# Patient Record
Sex: FEMALE | Race: BLACK OR AFRICAN AMERICAN | NOT HISPANIC OR LATINO | Employment: OTHER | URBAN - METROPOLITAN AREA
[De-identification: names, ages, dates, MRNs, and addresses within clinical notes are randomized per-mention and may not be internally consistent; named-entity substitution may affect disease eponyms.]

---

## 2018-06-05 ENCOUNTER — HOSPITAL ENCOUNTER (OUTPATIENT)
Dept: LAB | Facility: HOSPITAL | Age: 73
Discharge: HOME | End: 2018-06-05
Attending: INTERNAL MEDICINE | Admitting: INTERNAL MEDICINE

## 2018-06-05 LAB
ANION GAP SERPL CALC-SCNC: 9.5 MMOL/L (ref 10–20)
BUN SERPL-MCNC: 16 MG/DL (ref 8–20)
BUN/CREAT SERPL: 14.5 (ref 5.4–26.2)
CALCIUM SERPL-MCNC: 8.7 MG/DL (ref 8.9–10.3)
CHLORIDE SERPL-SCNC: 106 MMOL/L (ref 101–111)
CONV CO2: 28 MMOL/L (ref 22–32)
CREAT UR-MCNC: 1.1 MG/DL (ref 0.4–1)
GLUCOSE SERPL-MCNC: 90 MG/DL (ref 65–99)
POTASSIUM SERPL-SCNC: 3.5 MMOL/L (ref 3.6–5.1)
SODIUM SERPL-SCNC: 140 MMOL/L (ref 136–144)

## 2019-03-25 ENCOUNTER — OFFICE VISIT (OUTPATIENT)
Dept: INTERNAL MEDICINE | Facility: CLINIC | Age: 74
End: 2019-03-25

## 2019-03-25 ENCOUNTER — TELEPHONE (OUTPATIENT)
Dept: INTERNAL MEDICINE | Facility: CLINIC | Age: 74
End: 2019-03-25

## 2019-03-25 VITALS — HEIGHT: 61 IN

## 2019-03-25 DIAGNOSIS — R05.9 COUGH IN ADULT: ICD-10-CM

## 2019-03-25 DIAGNOSIS — I25.10 CORONARY ARTERIOSCLEROSIS IN NATIVE ARTERY: ICD-10-CM

## 2019-03-25 DIAGNOSIS — J45.991 ASTHMA, COUGH VARIANT: Primary | ICD-10-CM

## 2019-03-25 PROBLEM — R51.9 CHRONIC DAILY HEADACHE: Status: ACTIVE | Noted: 2019-03-25

## 2019-03-25 PROBLEM — I10 ESSENTIAL HYPERTENSION: Status: ACTIVE | Noted: 2019-03-25

## 2019-03-25 PROCEDURE — 99213 OFFICE O/P EST LOW 20 MIN: CPT | Performed by: INTERNAL MEDICINE

## 2019-03-25 RX ORDER — OMEPRAZOLE 20 MG/1
20 CAPSULE, DELAYED RELEASE ORAL DAILY
COMMUNITY
End: 2019-12-10 | Stop reason: SDUPTHER

## 2019-03-25 RX ORDER — FUROSEMIDE 40 MG/1
40 TABLET ORAL 2 TIMES DAILY
COMMUNITY
End: 2019-04-08 | Stop reason: SDUPTHER

## 2019-03-25 RX ORDER — POTASSIUM CHLORIDE 750 MG/1
10 TABLET, FILM COATED, EXTENDED RELEASE ORAL DAILY
Status: ON HOLD | COMMUNITY
End: 2019-06-13 | Stop reason: DRUGHIGH

## 2019-03-25 RX ORDER — ERGOCALCIFEROL 1.25 MG/1
50000 CAPSULE ORAL WEEKLY
COMMUNITY
End: 2019-04-03 | Stop reason: SDUPTHER

## 2019-03-25 RX ORDER — METHYLPREDNISOLONE 4 MG/1
TABLET ORAL
Qty: 1 EACH | Refills: 0 | Status: SHIPPED | OUTPATIENT
Start: 2019-03-25 | End: 2019-05-28

## 2019-03-25 RX ORDER — ALBUTEROL SULFATE 90 UG/1
2 AEROSOL, METERED RESPIRATORY (INHALATION) EVERY 4 HOURS PRN
COMMUNITY
End: 2020-05-13 | Stop reason: SDUPTHER

## 2019-03-25 RX ORDER — AMLODIPINE BESYLATE AND BENAZEPRIL HYDROCHLORIDE 10; 40 MG/1; MG/1
1 CAPSULE ORAL DAILY
COMMUNITY
End: 2019-07-26

## 2019-03-25 RX ORDER — FERROUS SULFATE 325(65) MG
325 TABLET ORAL
COMMUNITY
End: 2019-04-03 | Stop reason: SDUPTHER

## 2019-03-25 RX ORDER — RANOLAZINE 500 MG/1
500 TABLET, EXTENDED RELEASE ORAL 2 TIMES DAILY
COMMUNITY
End: 2020-02-25 | Stop reason: SDUPTHER

## 2019-03-25 RX ORDER — ATORVASTATIN CALCIUM 40 MG/1
40 TABLET, FILM COATED ORAL DAILY
COMMUNITY
End: 2019-04-03 | Stop reason: SDUPTHER

## 2019-03-25 RX ORDER — LORATADINE 10 MG/1
10 TABLET ORAL
COMMUNITY
End: 2021-12-21

## 2019-03-25 RX ORDER — CARVEDILOL 25 MG/1
25 TABLET ORAL 2 TIMES DAILY WITH MEALS
COMMUNITY
End: 2019-11-27 | Stop reason: SDUPTHER

## 2019-03-25 RX ORDER — PROMETHAZINE HYDROCHLORIDE 25 MG/1
25 TABLET ORAL EVERY 6 HOURS PRN
COMMUNITY
End: 2019-04-03 | Stop reason: SDUPTHER

## 2019-03-25 RX ORDER — ASPIRIN 81 MG/1
81 TABLET ORAL DAILY
COMMUNITY

## 2019-03-25 RX ORDER — CLOPIDOGREL BISULFATE 75 MG/1
75 TABLET ORAL DAILY
COMMUNITY
End: 2019-11-11 | Stop reason: SDUPTHER

## 2019-03-25 RX ORDER — ISOSORBIDE MONONITRATE 60 MG/1
60 TABLET, EXTENDED RELEASE ORAL DAILY
COMMUNITY
End: 2020-02-06 | Stop reason: SDUPTHER

## 2019-03-25 NOTE — PROGRESS NOTES
Subjective   Marleny Greenwood is a 73 y.o. female.     This cough has been months-thought she was getting better but it came back again.  She does not feel SOB unless she's in a coughing spell.  Took steroids in December-thought there was some improvement.  Saw  in January-he did not think cardiac involvement.      Cough   This is a recurrent problem. The current episode started more than 1 month ago. The problem has been gradually worsening. The problem occurs hourly. The cough is non-productive. Pertinent negatives include no chest pain, rhinorrhea, shortness of breath, sweats, weight loss or wheezing. Nothing aggravates the symptoms. She has tried prescription cough suppressant and a beta-agonist inhaler for the symptoms. The treatment provided mild relief. There is no history of asthma or environmental allergies.        The following portions of the patient's history were reviewed and updated as appropriate: allergies, current medications, past medical history, past social history, past surgical history and problem list.    Review of Systems   Constitutional: Negative for unexpected weight loss.   HENT: Negative for congestion, rhinorrhea, sinus pressure and trouble swallowing.    Respiratory: Positive for cough. Negative for shortness of breath and wheezing.    Cardiovascular: Negative for chest pain.   Musculoskeletal: Negative for back pain.   Allergic/Immunologic: Negative for environmental allergies.       Objective   Physical Exam   Constitutional: She appears well-developed.   HENT:   Mouth/Throat: Oropharynx is clear and moist.   Cardiovascular: Normal rate, regular rhythm and normal heart sounds.   Pulmonary/Chest: Effort normal and breath sounds normal. She has no wheezes.   Lymphadenopathy:     She has no cervical adenopathy.   Psychiatric: She has a normal mood and affect.         Assessment/Plan   Marleny was seen today for cough.    Diagnoses and all orders for this visit:    Asthma, cough  variant    Cough in adult

## 2019-03-25 NOTE — TELEPHONE ENCOUNTER
----- Message from Arsh Kent sent at 3/25/2019  9:06 AM EDT -----  Contact: [phone]  Patient c/o cough she cannot get rid of, needs to get in ASAP, please call

## 2019-04-03 ENCOUNTER — TELEPHONE (OUTPATIENT)
Dept: INTERNAL MEDICINE | Facility: CLINIC | Age: 74
End: 2019-04-03

## 2019-04-03 RX ORDER — ATORVASTATIN CALCIUM 40 MG/1
40 TABLET, FILM COATED ORAL DAILY
Qty: 90 TABLET | Refills: 3 | Status: SHIPPED | OUTPATIENT
Start: 2019-04-03 | End: 2020-04-13

## 2019-04-03 RX ORDER — ERGOCALCIFEROL 1.25 MG/1
50000 CAPSULE ORAL WEEKLY
Qty: 12 CAPSULE | Refills: 3 | Status: SHIPPED | OUTPATIENT
Start: 2019-04-03 | End: 2020-03-05

## 2019-04-03 RX ORDER — PROMETHAZINE HYDROCHLORIDE 25 MG/1
25 TABLET ORAL EVERY 6 HOURS PRN
Qty: 90 TABLET | Refills: 3 | Status: SHIPPED | OUTPATIENT
Start: 2019-04-03 | End: 2020-05-13 | Stop reason: SDUPTHER

## 2019-04-03 RX ORDER — FERROUS SULFATE 325(65) MG
325 TABLET ORAL
Qty: 90 TABLET | Refills: 3 | Status: SHIPPED | OUTPATIENT
Start: 2019-04-03 | End: 2020-05-12

## 2019-04-03 NOTE — TELEPHONE ENCOUNTER
Patient need promethazine 25mg, atorvastin 40mg, and vit-d-2 1.25mg sent to [facility]. She also need iron 325mg tab sent to [facility].

## 2019-04-08 RX ORDER — FUROSEMIDE 40 MG/1
40 TABLET ORAL DAILY
Qty: 90 TABLET | Refills: 3 | Status: ON HOLD | OUTPATIENT
Start: 2019-04-08 | End: 2019-06-13 | Stop reason: SDUPTHER

## 2019-04-29 ENCOUNTER — TRANSCRIBE ORDERS (OUTPATIENT)
Dept: ADMINISTRATIVE | Facility: HOSPITAL | Age: 74
End: 2019-04-29

## 2019-04-29 DIAGNOSIS — R05.9 COUGH: Primary | ICD-10-CM

## 2019-05-02 ENCOUNTER — HOSPITAL ENCOUNTER (OUTPATIENT)
Dept: CT IMAGING | Facility: HOSPITAL | Age: 74
Discharge: HOME | End: 2019-05-02
Admitting: INTERNAL MEDICINE

## 2019-05-02 DIAGNOSIS — R05.9 COUGH: ICD-10-CM

## 2019-05-02 PROCEDURE — 71250 CT THORAX DX C-: CPT

## 2019-05-25 ENCOUNTER — RESULTS ENCOUNTER (OUTPATIENT)
Dept: INTERNAL MEDICINE | Facility: CLINIC | Age: 74
End: 2019-05-25

## 2019-05-25 DIAGNOSIS — I25.10 CORONARY ARTERIOSCLEROSIS IN NATIVE ARTERY: ICD-10-CM

## 2019-05-28 ENCOUNTER — OFFICE VISIT (OUTPATIENT)
Dept: INTERNAL MEDICINE | Facility: CLINIC | Age: 74
End: 2019-05-28

## 2019-05-28 VITALS — WEIGHT: 151 LBS | BODY MASS INDEX: 28.51 KG/M2 | HEIGHT: 61 IN

## 2019-05-28 DIAGNOSIS — I10 ESSENTIAL HYPERTENSION: Primary | ICD-10-CM

## 2019-05-28 DIAGNOSIS — I25.10 CORONARY ARTERIOSCLEROSIS IN NATIVE ARTERY: ICD-10-CM

## 2019-05-28 DIAGNOSIS — R91.1 NODULE OF UPPER LOBE OF RIGHT LUNG: ICD-10-CM

## 2019-05-28 PROCEDURE — 99213 OFFICE O/P EST LOW 20 MIN: CPT | Performed by: INTERNAL MEDICINE

## 2019-05-28 RX ORDER — CODEINE/BUTALBITAL/ASA/CAFFEIN 30-50-325
1 CAPSULE ORAL EVERY 4 HOURS PRN
COMMUNITY
End: 2019-07-17 | Stop reason: SDUPTHER

## 2019-05-28 NOTE — PROGRESS NOTES
Assessment and Plan  Marleny was seen today for hypertension.    Diagnoses and all orders for this visit:    Essential hypertension    Nodule of upper lobe of right lung  Comments:  will f/u with pulm, likely need CT in a few months.    Coronary arteriosclerosis in native artery  Comments:  stable      F/U and Patient Instructions    Return in about 6 months (around 11/28/2019).  There are no Patient Instructions on file for this visit.    Subjective    Marleny Greenwood is a 73 y.o. female being seen in our office today for Hypertension     History of the Present Illness  HPI  Went to see pulmonologist about her cough- CT was negative.  He gave her Hydromet prn use - using now prn- less than every day.  Uses 5 ml at a time.    CT reviewed by me- RUL nodule- likely to have 6 month f/u.  I reviewed CT form 2017 and it was not present then.   Otherwise she feels great- better than she's been in a while.  She is trying to be more active.  Headaches are stable.  Had blood work at [facility] as well, reported as negative.    Area in abd that stuck out and became sore with all of the coughing, seems better now.    Patient History        Significant Past History  The following portions of the patient's history were reviewed and updated as appropriate:PMHroutine: Social history , Allergies, Current Medications, Active Problem List and Health Maintenance              Social History  She  reports that she has never smoked. She does not have any smokeless tobacco history on file. She reports that she does not drink alcohol or use drugs.                         Review of Symptoms  Review of Systems   Constitution: Negative for weight loss.   HENT: Negative.    Cardiovascular: Negative.    Musculoskeletal: Negative.    Neurological: Negative.      Objective  Vital Signs         BP Readings from Last 1 Encounters:   No data found for BP     Wt Readings from Last 3 Encounters:   05/28/19 68.5 kg (151 lb)   Body mass index is 28.53  kg/m².          Physical Exam   Physical Exam   Constitutional: No distress.   Cardiovascular: Normal rate and regular rhythm.   Pulmonary/Chest: Effort normal and breath sounds normal.   Musculoskeletal: She exhibits no edema.   Psychiatric: She has a normal mood and affect. Her behavior is normal.     Data Reviewed    No results found for this or any previous visit (from the past 2016 hour(s)).

## 2019-06-11 ENCOUNTER — HOSPITAL ENCOUNTER (INPATIENT)
Facility: HOSPITAL | Age: 74
LOS: 2 days | Discharge: HOME | End: 2019-06-13
Attending: INTERNAL MEDICINE | Admitting: INTERNAL MEDICINE

## 2019-06-11 ENCOUNTER — OFFICE VISIT (OUTPATIENT)
Dept: CARDIOLOGY | Facility: CLINIC | Age: 74
End: 2019-06-11

## 2019-06-11 VITALS
BODY MASS INDEX: 29.25 KG/M2 | DIASTOLIC BLOOD PRESSURE: 80 MMHG | SYSTOLIC BLOOD PRESSURE: 116 MMHG | HEART RATE: 76 BPM | WEIGHT: 149 LBS | HEIGHT: 60 IN

## 2019-06-11 DIAGNOSIS — I50.41 ACUTE COMBINED SYSTOLIC AND DIASTOLIC CONGESTIVE HEART FAILURE (HCC): ICD-10-CM

## 2019-06-11 DIAGNOSIS — I47.29 NONSUSTAINED VENTRICULAR TACHYCARDIA (HCC): ICD-10-CM

## 2019-06-11 DIAGNOSIS — I10 ESSENTIAL HYPERTENSION: ICD-10-CM

## 2019-06-11 DIAGNOSIS — R06.89 RESPIRATORY INSUFFICIENCY: Primary | ICD-10-CM

## 2019-06-11 DIAGNOSIS — I25.10 CORONARY ARTERIOSCLEROSIS IN NATIVE ARTERY: ICD-10-CM

## 2019-06-11 DIAGNOSIS — I50.31 ACUTE DIASTOLIC CONGESTIVE HEART FAILURE (HCC): Primary | ICD-10-CM

## 2019-06-11 DIAGNOSIS — E78.5 HYPERLIPIDEMIA LDL GOAL <100: ICD-10-CM

## 2019-06-11 DIAGNOSIS — Z95.5 H/O HEART ARTERY STENT: ICD-10-CM

## 2019-06-11 DIAGNOSIS — Z95.1 S/P CABG (CORONARY ARTERY BYPASS GRAFT): ICD-10-CM

## 2019-06-11 PROBLEM — Z98.890 HISTORY OF CARDIAC CATH: Status: ACTIVE | Noted: 2019-06-11

## 2019-06-11 PROBLEM — Z98.890 HISTORY OF LOOP RECORDER: Status: ACTIVE | Noted: 2019-06-11

## 2019-06-11 PROBLEM — I50.9 CONGESTIVE HEART FAILURE (CHF) (HCC): Status: ACTIVE | Noted: 2019-06-11

## 2019-06-11 PROBLEM — Z92.89 H/O ECHOCARDIOGRAM: Status: ACTIVE | Noted: 2019-06-11

## 2019-06-11 PROBLEM — Z92.89 HISTORY OF NUCLEAR STRESS TEST: Status: ACTIVE | Noted: 2019-06-11

## 2019-06-11 LAB
ANION GAP SERPL CALCULATED.3IONS-SCNC: 12.2 MMOL/L
BUN BLD-MCNC: 13 MG/DL (ref 8–23)
BUN/CREAT SERPL: 14.3 (ref 7–25)
CALCIUM SPEC-SCNC: 8.9 MG/DL (ref 8.6–10.5)
CHLORIDE SERPL-SCNC: 106 MMOL/L (ref 98–107)
CO2 SERPL-SCNC: 25.8 MMOL/L (ref 22–29)
CREAT BLD-MCNC: 0.91 MG/DL (ref 0.57–1)
GFR SERPL CREATININE-BSD FRML MDRD: 74 ML/MIN/1.73
GLUCOSE BLD-MCNC: 103 MG/DL (ref 65–99)
GLUCOSE BLDC GLUCOMTR-MCNC: 103 MG/DL (ref 70–130)
GLUCOSE BLDC GLUCOMTR-MCNC: 115 MG/DL (ref 70–130)
POTASSIUM BLD-SCNC: 3.7 MMOL/L (ref 3.5–5.2)
SODIUM BLD-SCNC: 144 MMOL/L (ref 136–145)

## 2019-06-11 PROCEDURE — 93005 ELECTROCARDIOGRAM TRACING: CPT | Performed by: INTERNAL MEDICINE

## 2019-06-11 PROCEDURE — 80048 BASIC METABOLIC PNL TOTAL CA: CPT | Performed by: INTERNAL MEDICINE

## 2019-06-11 PROCEDURE — 94640 AIRWAY INHALATION TREATMENT: CPT

## 2019-06-11 PROCEDURE — 25010000002 FUROSEMIDE PER 20 MG: Performed by: INTERNAL MEDICINE

## 2019-06-11 PROCEDURE — 25010000002 ENOXAPARIN PER 10 MG: Performed by: INTERNAL MEDICINE

## 2019-06-11 PROCEDURE — 93010 ELECTROCARDIOGRAM REPORT: CPT | Performed by: INTERNAL MEDICINE

## 2019-06-11 PROCEDURE — 94664 DEMO&/EVAL PT USE INHALER: CPT

## 2019-06-11 PROCEDURE — 82962 GLUCOSE BLOOD TEST: CPT

## 2019-06-11 PROCEDURE — 99223 1ST HOSP IP/OBS HIGH 75: CPT | Performed by: INTERNAL MEDICINE

## 2019-06-11 RX ORDER — SODIUM CHLORIDE 0.9 % (FLUSH) 0.9 %
3-10 SYRINGE (ML) INJECTION AS NEEDED
Status: DISCONTINUED | OUTPATIENT
Start: 2019-06-11 | End: 2019-06-13 | Stop reason: HOSPADM

## 2019-06-11 RX ORDER — NITROGLYCERIN 0.4 MG/1
0.4 TABLET SUBLINGUAL
COMMUNITY
End: 2019-08-23 | Stop reason: SDUPTHER

## 2019-06-11 RX ORDER — FLUTICASONE PROPIONATE 50 MCG
1 SPRAY, SUSPENSION (ML) NASAL DAILY
Status: DISCONTINUED | OUTPATIENT
Start: 2019-06-11 | End: 2019-06-13 | Stop reason: HOSPADM

## 2019-06-11 RX ORDER — NITROGLYCERIN 0.4 MG/1
0.4 TABLET SUBLINGUAL
Status: DISCONTINUED | OUTPATIENT
Start: 2019-06-11 | End: 2019-06-13 | Stop reason: HOSPADM

## 2019-06-11 RX ORDER — ATORVASTATIN CALCIUM 20 MG/1
40 TABLET, FILM COATED ORAL DAILY
Status: DISCONTINUED | OUTPATIENT
Start: 2019-06-11 | End: 2019-06-11

## 2019-06-11 RX ORDER — PROMETHAZINE HYDROCHLORIDE 25 MG/1
25 TABLET ORAL EVERY 6 HOURS PRN
Status: DISCONTINUED | OUTPATIENT
Start: 2019-06-11 | End: 2019-06-13 | Stop reason: HOSPADM

## 2019-06-11 RX ORDER — FUROSEMIDE 10 MG/ML
40 INJECTION INTRAMUSCULAR; INTRAVENOUS
Status: DISCONTINUED | OUTPATIENT
Start: 2019-06-11 | End: 2019-06-13 | Stop reason: HOSPADM

## 2019-06-11 RX ORDER — POTASSIUM CHLORIDE 750 MG/1
10 CAPSULE, EXTENDED RELEASE ORAL DAILY
Status: DISCONTINUED | OUTPATIENT
Start: 2019-06-11 | End: 2019-06-13 | Stop reason: HOSPADM

## 2019-06-11 RX ORDER — CARVEDILOL 25 MG/1
25 TABLET ORAL 2 TIMES DAILY WITH MEALS
Status: DISCONTINUED | OUTPATIENT
Start: 2019-06-11 | End: 2019-06-13 | Stop reason: HOSPADM

## 2019-06-11 RX ORDER — PANTOPRAZOLE SODIUM 40 MG/1
40 TABLET, DELAYED RELEASE ORAL EVERY MORNING
Status: DISCONTINUED | OUTPATIENT
Start: 2019-06-11 | End: 2019-06-13 | Stop reason: HOSPADM

## 2019-06-11 RX ORDER — ASPIRIN 81 MG/1
81 TABLET ORAL DAILY
Status: DISCONTINUED | OUTPATIENT
Start: 2019-06-11 | End: 2019-06-13 | Stop reason: HOSPADM

## 2019-06-11 RX ORDER — POLYETHYLENE GLYCOL 3350 17 G/17G
17 POWDER, FOR SOLUTION ORAL DAILY
Status: DISCONTINUED | OUTPATIENT
Start: 2019-06-11 | End: 2019-06-13 | Stop reason: HOSPADM

## 2019-06-11 RX ORDER — CLOPIDOGREL BISULFATE 75 MG/1
75 TABLET ORAL DAILY
Status: DISCONTINUED | OUTPATIENT
Start: 2019-06-11 | End: 2019-06-13 | Stop reason: HOSPADM

## 2019-06-11 RX ORDER — SODIUM CHLORIDE 0.9 % (FLUSH) 0.9 %
3 SYRINGE (ML) INJECTION EVERY 12 HOURS SCHEDULED
Status: DISCONTINUED | OUTPATIENT
Start: 2019-06-11 | End: 2019-06-13 | Stop reason: HOSPADM

## 2019-06-11 RX ORDER — CODEINE/BUTALBITAL/ASA/CAFFEIN 30-50-325
1 CAPSULE ORAL EVERY 4 HOURS PRN
Status: DISCONTINUED | OUTPATIENT
Start: 2019-06-11 | End: 2019-06-11 | Stop reason: RX

## 2019-06-11 RX ORDER — ATORVASTATIN CALCIUM 20 MG/1
40 TABLET, FILM COATED ORAL NIGHTLY
Status: DISCONTINUED | OUTPATIENT
Start: 2019-06-11 | End: 2019-06-13 | Stop reason: HOSPADM

## 2019-06-11 RX ORDER — MONTELUKAST SODIUM 10 MG/1
10 TABLET ORAL NIGHTLY
Status: DISCONTINUED | OUTPATIENT
Start: 2019-06-11 | End: 2019-06-13 | Stop reason: HOSPADM

## 2019-06-11 RX ORDER — ALBUTEROL SULFATE 2.5 MG/3ML
2.5 SOLUTION RESPIRATORY (INHALATION) EVERY 6 HOURS PRN
Status: DISCONTINUED | OUTPATIENT
Start: 2019-06-11 | End: 2019-06-13 | Stop reason: HOSPADM

## 2019-06-11 RX ORDER — ISOSORBIDE MONONITRATE 60 MG/1
60 TABLET, EXTENDED RELEASE ORAL DAILY
Status: DISCONTINUED | OUTPATIENT
Start: 2019-06-11 | End: 2019-06-13 | Stop reason: HOSPADM

## 2019-06-11 RX ORDER — MONTELUKAST SODIUM 10 MG/1
10 TABLET ORAL NIGHTLY
COMMUNITY
End: 2020-03-11

## 2019-06-11 RX ORDER — RANOLAZINE 500 MG/1
500 TABLET, EXTENDED RELEASE ORAL EVERY 12 HOURS SCHEDULED
Status: DISCONTINUED | OUTPATIENT
Start: 2019-06-11 | End: 2019-06-13 | Stop reason: HOSPADM

## 2019-06-11 RX ORDER — CETIRIZINE HYDROCHLORIDE 10 MG/1
10 TABLET ORAL DAILY
Status: DISCONTINUED | OUTPATIENT
Start: 2019-06-11 | End: 2019-06-13 | Stop reason: HOSPADM

## 2019-06-11 RX ORDER — FERROUS SULFATE 325(65) MG
325 TABLET ORAL
Status: DISCONTINUED | OUTPATIENT
Start: 2019-06-11 | End: 2019-06-13 | Stop reason: HOSPADM

## 2019-06-11 RX ORDER — POLYETHYLENE GLYCOL 3350 17 G/17G
17 POWDER, FOR SOLUTION ORAL DAILY
COMMUNITY

## 2019-06-11 RX ORDER — BUDESONIDE AND FORMOTEROL FUMARATE DIHYDRATE 160; 4.5 UG/1; UG/1
2 AEROSOL RESPIRATORY (INHALATION)
Status: DISCONTINUED | OUTPATIENT
Start: 2019-06-11 | End: 2019-06-13 | Stop reason: HOSPADM

## 2019-06-11 RX ORDER — ERGOCALCIFEROL 1.25 MG/1
50000 CAPSULE ORAL WEEKLY
Status: DISCONTINUED | OUTPATIENT
Start: 2019-06-11 | End: 2019-06-13 | Stop reason: HOSPADM

## 2019-06-11 RX ADMIN — FUROSEMIDE 40 MG: 10 INJECTION, SOLUTION INTRAMUSCULAR; INTRAVENOUS at 20:56

## 2019-06-11 RX ADMIN — CARVEDILOL 25 MG: 25 TABLET, FILM COATED ORAL at 18:25

## 2019-06-11 RX ADMIN — ENOXAPARIN SODIUM 40 MG: 40 INJECTION SUBCUTANEOUS at 15:01

## 2019-06-11 RX ADMIN — SODIUM CHLORIDE, PRESERVATIVE FREE 3 ML: 5 INJECTION INTRAVENOUS at 20:56

## 2019-06-11 RX ADMIN — ATORVASTATIN CALCIUM 40 MG: 20 TABLET, FILM COATED ORAL at 20:56

## 2019-06-11 RX ADMIN — BUDESONIDE AND FORMOTEROL FUMARATE DIHYDRATE 2 PUFF: 160; 4.5 AEROSOL RESPIRATORY (INHALATION) at 20:19

## 2019-06-11 RX ADMIN — FUROSEMIDE 40 MG: 10 INJECTION, SOLUTION INTRAMUSCULAR; INTRAVENOUS at 15:01

## 2019-06-11 RX ADMIN — MONTELUKAST SODIUM 10 MG: 10 TABLET, FILM COATED ORAL at 20:56

## 2019-06-11 RX ADMIN — ASPIRIN 81 MG: 81 TABLET, COATED ORAL at 21:52

## 2019-06-11 RX ADMIN — RANOLAZINE 500 MG: 500 TABLET, FILM COATED, EXTENDED RELEASE ORAL at 20:56

## 2019-06-11 NOTE — PROGRESS NOTES
[facility]        Subjective:     Encounter Date:06/11/2019      Patient ID: Marleny Greenwood is a 73 y.o. female.      HPI: I saw Marleny Greenwood today for continued treatment of her cardiovascular disease she is a very pleasant 73-year-old female who reports progressive respiratory insufficiency over the last month she has had a nonproductive cough and is undergone ENT evaluation which was unremarkable according to the patient.  She states that she can walk across the room in her home and she is fatigued and short of breath.  She denies chest pain pressure or tightness she sleeps with 2 pillows does not report orthopnea or PND in that position but has had lower extremity edema.  She denies syncope near syncope or palpitation.    Ms. Greenwood has a history of coronary artery disease and underwent coronary artery bypass surgery 4/12/2011 with a left DANNI to the LAD and a saphenous vein graft to the posterior descending artery.  She underwent percutaneous coronary intervention with bare-metal stent deployment to the saphenous vein graft to the right coronary artery 3/22/2012.  Her most recent coronary angiogram was 11/13/2017 which demonstrated an occluded saphenous vein graft to the right coronary artery, atretic LIMA to the LAD and a patent saphenous vein graft to the LAD, chronic total occlusion of the native right coronary artery and LAD.  Echocardiogram obtained 6/5/2018 revealed left ventricular ejection fraction 60-65%, mild concentric left ventricular hypertrophy, mild aortic mitral and tricuspid insufficiency.  Ms. Greenwood has a history of hypertension which is well controlled.  She also has a prior history of nonsustained ventricular tachycardia currently is asymptomatic.  She is tolerating her current medical regimen well and her lipids are monitored by her primary care physician Dr. Liz Zaragoza.      The following portions of the patient's history were reviewed and updated as  appropriate: allergies, current medications, past family history, past medical history, past social history, past surgical history and problem list.    Problem List:  Patient Active Problem List   Diagnosis   • Asthma, cough variant   • Coronary arteriosclerosis in native artery   • Essential hypertension   • Chronic daily headache   • Nodule of upper lobe of right lung   • Hyperlipidemia LDL goal <100   • Nonsustained ventricular tachycardia (CMS/HCC)   • Respiratory insufficiency   • History of cardiac cath   • History of loop recorder   • H/O heart artery stent   • S/P CABG (coronary artery bypass graft)   • H/O echocardiogram   • History of nuclear stress test       Past Medical History:  Past Medical History:   Diagnosis Date   • Asthma    • H/O echocardiogram 6/11/2019 6/05/18 - Normal LV size and function.  EF 60-65%.  Mild concentric LVH.  Midl AR, MR, and TR.   • H/O heart artery stent 6/11/2019    3/22/12 - VISION stent to the SVG of the RCA reducing a total occlusion to 0% residual narrowing.   • Headache    • History of cardiac cath 6/11/2019   • History of loop recorder 6/11/2019    5/20/15 - Medtronic LINQ implant.   • History of nuclear stress test 6/11/2019    10/09/17 - Abnormal study.  EF 46%.   • Hyperlipidemia LDL goal <100 6/11/2019   • Hypertension    • Nonsustained ventricular tachycardia (CMS/HCC) 6/11/2019   • Respiratory insufficiency 6/11/2019   • S/P CABG (coronary artery bypass graft) 6/11/2019 4/12/11 - LIMA to the LAD and SVG to the posterior descending artery.       Past Surgical History:  Past Surgical History:   Procedure Laterality Date   • APPENDECTOMY     • CHOLECYSTECTOMY     • CORONARY ARTERY BYPASS GRAFT Bilateral 2011   • HYSTERECTOMY         Social History:  Social History     Socioeconomic History   • Marital status:      Spouse name: Not on file   • Number of children: Not on file   • Years of education: Not on file   • Highest education level: Not on file  "  Tobacco Use   • Smoking status: Never Smoker   Substance and Sexual Activity   • Alcohol use: No     Frequency: Never   • Drug use: No       Allergies:  Allergies   Allergen Reactions   • Cardizem [Diltiazem] Unknown (See Comments)     Pt is unknown why she can not take this medication    • Levaquin [Levofloxacin] Cough         ROS:  Review of Systems   Constitution: Positive for weakness and malaise/fatigue.   HENT: Negative for hearing loss and nosebleeds.    Eyes: Negative for double vision and visual disturbance.   Cardiovascular: Positive for dyspnea on exertion and leg swelling. Negative for chest pain, claudication, near-syncope, orthopnea, palpitations, paroxysmal nocturnal dyspnea and syncope.   Respiratory: Positive for shortness of breath. Negative for cough, sleep disturbances due to breathing, snoring, sputum production and wheezing.    Endocrine: Negative for cold intolerance, heat intolerance, polydipsia and polyuria.   Hematologic/Lymphatic: Negative for adenopathy and bleeding problem. Does not bruise/bleed easily.   Skin: Negative for flushing and itching.   Musculoskeletal: Negative for back pain, falls, joint pain, joint swelling, muscle weakness and neck pain.   Gastrointestinal: Negative for abdominal pain, dysphagia, heartburn, nausea and vomiting.   Genitourinary: Negative for dysuria and frequency.   Neurological: Negative for disturbances in coordination, dizziness, light-headedness, loss of balance and numbness.   Psychiatric/Behavioral: Negative for altered mental status and memory loss. The patient is not nervous/anxious.    Allergic/Immunologic: Negative for hives and persistent infections.          Objective:         /80   Pulse 76   Ht 152.4 cm (60\")   Wt 67.6 kg (149 lb)   BMI 29.10 kg/m²     Physical Exam   Constitutional: She is oriented to person, place, and time. She appears well-developed and well-nourished.   HENT:   Head: Normocephalic and atraumatic.   Eyes: " Conjunctivae and EOM are normal. Pupils are equal, round, and reactive to light.   Neck: Neck supple. JVD present. No thyromegaly present.   Cardiovascular: Normal rate, regular rhythm, S1 normal, S2 normal and intact distal pulses. PMI is not displaced. Exam reveals no S3, no S4, no distant heart sounds, no friction rub, no midsystolic click and no opening snap.   No murmur heard.  Pulses:       Carotid pulses are 2+ on the right side, and 2+ on the left side.       Radial pulses are 2+ on the right side, and 2+ on the left side.        Femoral pulses are 2+ on the right side, and 2+ on the left side.       Dorsalis pedis pulses are 2+ on the right side, and 2+ on the left side.        Posterior tibial pulses are 2+ on the right side, and 2+ on the left side.   Pulmonary/Chest: Effort normal. No respiratory distress. She has no wheezes. She has rales.   Abdominal: Soft. Bowel sounds are normal. She exhibits no distension and no mass. There is no tenderness.   Musculoskeletal: Normal range of motion. She exhibits edema.   Neurological: She is alert and oriented to person, place, and time.   Skin: Skin is warm and dry. No erythema.   Psychiatric: She has a normal mood and affect.       In-Office Procedure(s):  Procedures    ASCVD RIsk Score::  The ASCVD Risk score (Thom NEEL Jr., et al., 2013) failed to calculate for the following reasons:    Cannot find a previous HDL lab    Cannot find a previous total cholesterol lab        Assessment/Plan:         1. Respiratory insufficiency  Progressive respiratory insufficiency with evidence of volume overload, elevated jugular venous pressure lower extremity edema now quite symptomatic and limiting activity level.    2. Acute combined systolic and diastolic congestive heart failure (CMS/HCC)  Same    3. Coronary arteriosclerosis in native artery  Stable on current medical regimen    4. Essential hypertension  Stable on current medical regimen with volume overload    5.  Hyperlipidemia LDL goal <100  Stable on current medical regimen    6. Nonsustained ventricular tachycardia (CMS/HCC)  Asymptomatic    7. S/P CABG (coronary artery bypass graft)  Asymptomatic    8. H/O heart artery stent  Asymptomatic    Ms. Greenwood has progressive respiratory insufficiency with volume overload with a declining activity level.  We will admit Ms. Greenwood for inpatient treatment of her congestive heart failure with intravenous diuretics, monitoring electrolyte and renal function.  Obtain an echocardiogram.  We will restrict her salt and fluid intake.  I will keep you abreast of her course and any further medication adjustments.           Mal Moser MD  06/11/19  .

## 2019-06-11 NOTE — PROGRESS NOTES
"Pharmacy Consult to dose Lovenox    Marleny Greenwood has been consulted for pharmacy to dose enoxaparin for VTE prophylaxis per Dr Moser's request.     73 y.o. female 154.9 cm (61\") 67.6 kg (149 lb)     No results found for: PLT  No results found for: HGB  Estimated Creatinine Clearance: 49.1 mL/min (by C-G formula based on SCr of 0.91 mg/dL).      Plan  Will start lovenox 40mg Q24h.  Pharmacy will continue to follow.     Gill Ralph, [facility]      "

## 2019-06-11 NOTE — H&P
[facility] HEART SPECIALISTS H & P        Subjective:     Encounter Date:06/11/2019      Patient ID: Marleny Greenwood is a 73 y.o. female.      HPI: I saw Marleny Greenwood today for continued treatment of her cardiovascular disease she is a very pleasant 73-year-old female who reports progressive respiratory insufficiency over the last month she has had a nonproductive cough and is undergone ENT evaluation which was unremarkable according to the patient.  She states that she can walk across the room in her home and she is fatigued and short of breath.  She denies chest pain pressure or tightness she sleeps with 2 pillows does not report orthopnea or PND in that position but has had lower extremity edema.  She denies syncope near syncope or palpitation.    Ms. Greenwood has a history of coronary artery disease and underwent coronary artery bypass surgery 4/12/2011 with a left DANNI to the LAD and a saphenous vein graft to the posterior descending artery.  She underwent percutaneous coronary intervention with bare-metal stent deployment to the saphenous vein graft to the right coronary artery 3/22/2012.  Her most recent coronary angiogram was 11/13/2017 which demonstrated an occluded saphenous vein graft to the right coronary artery, atretic LIMA to the LAD and a patent saphenous vein graft to the LAD, chronic total occlusion of the native right coronary artery and LAD.  Echocardiogram obtained 6/5/2018 revealed left ventricular ejection fraction 60-65%, mild concentric left ventricular hypertrophy, mild aortic mitral and tricuspid insufficiency.  Ms. Greenwood has a history of hypertension which is well controlled.  She also has a prior history of nonsustained ventricular tachycardia currently is asymptomatic.  She is tolerating her current medical regimen well and her lipids are monitored by her primary care physician Dr. Liz Zaragoza.      The following portions of the patient's history were reviewed and updated as  appropriate: allergies, current medications, past family history, past medical history, past social history, past surgical history and problem list.    Problem List:  Patient Active Problem List   Diagnosis   • Asthma, cough variant   • Coronary arteriosclerosis in native artery   • Essential hypertension   • Chronic daily headache   • Nodule of upper lobe of right lung   • Hyperlipidemia LDL goal <100   • Nonsustained ventricular tachycardia (CMS/HCC)   • Respiratory insufficiency   • History of cardiac cath   • History of loop recorder   • H/O heart artery stent   • S/P CABG (coronary artery bypass graft)   • H/O echocardiogram   • History of nuclear stress test       Past Medical History:  Past Medical History:   Diagnosis Date   • Asthma    • H/O echocardiogram 6/11/2019 6/05/18 - Normal LV size and function.  EF 60-65%.  Mild concentric LVH.  Midl AR, MR, and TR.   • H/O heart artery stent 6/11/2019    3/22/12 - VISION stent to the SVG of the RCA reducing a total occlusion to 0% residual narrowing.   • Headache    • History of cardiac cath 6/11/2019   • History of loop recorder 6/11/2019    5/20/15 - Medtronic LINQ implant.   • History of nuclear stress test 6/11/2019    10/09/17 - Abnormal study.  EF 46%.   • Hyperlipidemia LDL goal <100 6/11/2019   • Hypertension    • Nonsustained ventricular tachycardia (CMS/HCC) 6/11/2019   • Respiratory insufficiency 6/11/2019   • S/P CABG (coronary artery bypass graft) 6/11/2019 4/12/11 - LIMA to the LAD and SVG to the posterior descending artery.       Past Surgical History:  Past Surgical History:   Procedure Laterality Date   • APPENDECTOMY     • CHOLECYSTECTOMY     • CORONARY ARTERY BYPASS GRAFT Bilateral 2011   • HYSTERECTOMY         Social History:  Social History     Socioeconomic History   • Marital status:      Spouse name: Not on file   • Number of children: Not on file   • Years of education: Not on file   • Highest education level: Not on file    Tobacco Use   • Smoking status: Never Smoker   • Smokeless tobacco: Never Used   Substance and Sexual Activity   • Alcohol use: No     Frequency: Never   • Drug use: No   • Sexual activity: Defer       Allergies:  Allergies   Allergen Reactions   • Cardizem [Diltiazem] Unknown (See Comments)     Pt is unknown why she can not take this medication    • Levaquin [Levofloxacin] Cough   • Adhesive Tape Unknown (See Comments)     Rash and itchy skin         ROS:  Review of Systems   Constitution: Positive for weakness and malaise/fatigue.   HENT: Negative for hearing loss and nosebleeds.    Eyes: Negative for double vision and visual disturbance.   Cardiovascular: Positive for dyspnea on exertion and leg swelling. Negative for chest pain, claudication, near-syncope, orthopnea, palpitations, paroxysmal nocturnal dyspnea and syncope.   Respiratory: Positive for shortness of breath. Negative for cough, sleep disturbances due to breathing, snoring, sputum production and wheezing.    Endocrine: Negative for cold intolerance, heat intolerance, polydipsia and polyuria.   Hematologic/Lymphatic: Negative for adenopathy and bleeding problem. Does not bruise/bleed easily.   Skin: Negative for flushing and itching.   Musculoskeletal: Negative for back pain, falls, joint pain, joint swelling, muscle weakness and neck pain.   Gastrointestinal: Negative for abdominal pain, dysphagia, heartburn, nausea and vomiting.   Genitourinary: Negative for dysuria and frequency.   Neurological: Negative for disturbances in coordination, dizziness, light-headedness, loss of balance and numbness.   Psychiatric/Behavioral: Negative for altered mental status and memory loss. The patient is not nervous/anxious.    Allergic/Immunologic: Negative for hives and persistent infections.          Objective:         There were no vitals taken for this visit.    Physical Exam   Constitutional: She is oriented to person, place, and time. She appears  well-developed and well-nourished.   HENT:   Head: Normocephalic and atraumatic.   Eyes: Conjunctivae and EOM are normal. Pupils are equal, round, and reactive to light.   Neck: Neck supple. JVD present. No thyromegaly present.   Cardiovascular: Normal rate, regular rhythm, S1 normal, S2 normal and intact distal pulses. PMI is not displaced. Exam reveals no S3, no S4, no distant heart sounds, no friction rub, no midsystolic click and no opening snap.   No murmur heard.  Pulses:       Carotid pulses are 2+ on the right side, and 2+ on the left side.       Radial pulses are 2+ on the right side, and 2+ on the left side.        Femoral pulses are 2+ on the right side, and 2+ on the left side.       Dorsalis pedis pulses are 2+ on the right side, and 2+ on the left side.        Posterior tibial pulses are 2+ on the right side, and 2+ on the left side.   Pulmonary/Chest: Effort normal. No respiratory distress. She has no wheezes. She has rales.   Abdominal: Soft. Bowel sounds are normal. She exhibits no distension and no mass. There is no tenderness.   Musculoskeletal: Normal range of motion. She exhibits edema.   Neurological: She is alert and oriented to person, place, and time.   Skin: Skin is warm and dry. No erythema.   Psychiatric: She has a normal mood and affect.       In-Office Procedure(s):  Procedures    ASCVD RIsk Score::  The ASCVD Risk score (Thom NEEL Jr., et al., 2013) failed to calculate for the following reasons:    Cannot find a previous HDL lab    Cannot find a previous total cholesterol lab        Assessment/Plan:         1. Respiratory insufficiency  Progressive respiratory insufficiency with evidence of volume overload, elevated jugular venous pressure lower extremity edema now quite symptomatic and limiting activity level.    2. Acute combined systolic and diastolic congestive heart failure (CMS/HCC)  Same    3. Coronary arteriosclerosis in native artery  Stable on current medical regimen    4.  Essential hypertension  Stable on current medical regimen with volume overload    5. Hyperlipidemia LDL goal <100  Stable on current medical regimen    6. Nonsustained ventricular tachycardia (CMS/HCC)  Asymptomatic    7. S/P CABG (coronary artery bypass graft)  Asymptomatic    8. H/O heart artery stent  Asymptomatic    Ms. Greenwood has progressive respiratory insufficiency with volume overload with a declining activity level.  We will admit Ms. Greenwood for inpatient treatment of her congestive heart failure with intravenous diuretics, monitoring electrolyte and renal function.  Obtain an echocardiogram.  We will restrict her salt and fluid intake.  I will keep you abreast of her course and any further medication adjustments.           Mal Moser MD  06/11/19  .

## 2019-06-11 NOTE — PLAN OF CARE
Problem: Patient Care Overview  Goal: Plan of Care Review  Outcome: Ongoing (interventions implemented as appropriate)   06/11/19 1401   Plan of Care Review   Progress no change   OTHER   Outcome Summary Direct admit from Cardio office. CTM

## 2019-06-12 LAB
ANION GAP SERPL CALCULATED.3IONS-SCNC: 11.9 MMOL/L
BUN BLD-MCNC: 15 MG/DL (ref 8–23)
BUN/CREAT SERPL: 14.2 (ref 7–25)
CALCIUM SPEC-SCNC: 8.6 MG/DL (ref 8.6–10.5)
CHLORIDE SERPL-SCNC: 104 MMOL/L (ref 98–107)
CO2 SERPL-SCNC: 28.1 MMOL/L (ref 22–29)
CREAT BLD-MCNC: 1.06 MG/DL (ref 0.57–1)
GFR SERPL CREATININE-BSD FRML MDRD: 62 ML/MIN/1.73
GLUCOSE BLD-MCNC: 92 MG/DL (ref 65–99)
POTASSIUM BLD-SCNC: 3.2 MMOL/L (ref 3.5–5.2)
SODIUM BLD-SCNC: 144 MMOL/L (ref 136–145)

## 2019-06-12 PROCEDURE — 94799 UNLISTED PULMONARY SVC/PX: CPT

## 2019-06-12 PROCEDURE — 25010000002 ENOXAPARIN PER 10 MG: Performed by: INTERNAL MEDICINE

## 2019-06-12 PROCEDURE — 25010000002 FUROSEMIDE PER 20 MG: Performed by: INTERNAL MEDICINE

## 2019-06-12 PROCEDURE — 80048 BASIC METABOLIC PNL TOTAL CA: CPT | Performed by: INTERNAL MEDICINE

## 2019-06-12 PROCEDURE — 99232 SBSQ HOSP IP/OBS MODERATE 35: CPT | Performed by: INTERNAL MEDICINE

## 2019-06-12 RX ORDER — POTASSIUM CHLORIDE 1.5 G/1.77G
40 POWDER, FOR SOLUTION ORAL ONCE
Status: COMPLETED | OUTPATIENT
Start: 2019-06-12 | End: 2019-06-12

## 2019-06-12 RX ORDER — POTASSIUM CHLORIDE 1.5 G/1.77G
20 POWDER, FOR SOLUTION ORAL 2 TIMES DAILY
Status: DISCONTINUED | OUTPATIENT
Start: 2019-06-12 | End: 2019-06-13 | Stop reason: HOSPADM

## 2019-06-12 RX ORDER — GUAIFENESIN/DEXTROMETHORPHAN 100-10MG/5
10 SYRUP ORAL EVERY 4 HOURS PRN
Status: DISCONTINUED | OUTPATIENT
Start: 2019-06-12 | End: 2019-06-13 | Stop reason: HOSPADM

## 2019-06-12 RX ADMIN — POTASSIUM CHLORIDE 20 MEQ: 1.5 POWDER, FOR SOLUTION ORAL at 20:40

## 2019-06-12 RX ADMIN — ENOXAPARIN SODIUM 40 MG: 40 INJECTION SUBCUTANEOUS at 14:13

## 2019-06-12 RX ADMIN — BUDESONIDE AND FORMOTEROL FUMARATE DIHYDRATE 2 PUFF: 160; 4.5 AEROSOL RESPIRATORY (INHALATION) at 08:33

## 2019-06-12 RX ADMIN — GUAIFENESIN AND DEXTROMETHORPHAN 10 ML: 100; 10 SYRUP ORAL at 20:41

## 2019-06-12 RX ADMIN — FUROSEMIDE 40 MG: 10 INJECTION, SOLUTION INTRAMUSCULAR; INTRAVENOUS at 17:20

## 2019-06-12 RX ADMIN — MONTELUKAST SODIUM 10 MG: 10 TABLET, FILM COATED ORAL at 20:40

## 2019-06-12 RX ADMIN — AMLODIPINE BESYLATE: 10 TABLET ORAL at 08:41

## 2019-06-12 RX ADMIN — ISOSORBIDE MONONITRATE 60 MG: 60 TABLET ORAL at 08:41

## 2019-06-12 RX ADMIN — POTASSIUM CHLORIDE 40 MEQ: 1.5 POWDER, FOR SOLUTION ORAL at 17:20

## 2019-06-12 RX ADMIN — RANOLAZINE 500 MG: 500 TABLET, FILM COATED, EXTENDED RELEASE ORAL at 08:41

## 2019-06-12 RX ADMIN — RANOLAZINE 500 MG: 500 TABLET, FILM COATED, EXTENDED RELEASE ORAL at 20:40

## 2019-06-12 RX ADMIN — POTASSIUM CHLORIDE 10 MEQ: 750 CAPSULE, EXTENDED RELEASE ORAL at 08:41

## 2019-06-12 RX ADMIN — ASPIRIN 81 MG: 81 TABLET, COATED ORAL at 20:41

## 2019-06-12 RX ADMIN — CLOPIDOGREL 75 MG: 75 TABLET, FILM COATED ORAL at 08:41

## 2019-06-12 RX ADMIN — FUROSEMIDE 40 MG: 10 INJECTION, SOLUTION INTRAMUSCULAR; INTRAVENOUS at 08:40

## 2019-06-12 RX ADMIN — SODIUM CHLORIDE, PRESERVATIVE FREE 3 ML: 5 INJECTION INTRAVENOUS at 20:45

## 2019-06-12 RX ADMIN — CARVEDILOL 25 MG: 25 TABLET, FILM COATED ORAL at 17:21

## 2019-06-12 RX ADMIN — PANTOPRAZOLE SODIUM 40 MG: 40 TABLET, DELAYED RELEASE ORAL at 06:05

## 2019-06-12 RX ADMIN — FERROUS SULFATE TAB 325 MG (65 MG ELEMENTAL FE) 325 MG: 325 (65 FE) TAB at 08:41

## 2019-06-12 RX ADMIN — CETIRIZINE HYDROCHLORIDE 10 MG: 10 TABLET, FILM COATED ORAL at 08:41

## 2019-06-12 RX ADMIN — CARVEDILOL 25 MG: 25 TABLET, FILM COATED ORAL at 08:41

## 2019-06-12 RX ADMIN — ATORVASTATIN CALCIUM 40 MG: 20 TABLET, FILM COATED ORAL at 20:40

## 2019-06-12 RX ADMIN — SODIUM CHLORIDE, PRESERVATIVE FREE 3 ML: 5 INJECTION INTRAVENOUS at 08:41

## 2019-06-12 NOTE — PROGRESS NOTES
[facility]      Hospital Follow Up    LOS:  LOS: 1 day   Patient Name: Marleny Greenwood  Age/Sex: 73 y.o. female  : 1945  MRN: [id number]    Day of Service: 19   Length of Stay: 1  Encounter Provider: Gregory Sharp MD  Place of Service: [facility]  Patient Care Team:  Liz Zaragoza MD as PCP - General (Internal Medicine)    Subjective:     Interval History: Patient admitted with CHF, placed on IV diuretics.  Feeling better, no dypsnea, edema improved.    Current Medications:   Scheduled Meds:  amLODIPine-lisinopril 10-40 mg combo dose  Oral Q24H   aspirin 81 mg Oral Daily   atorvastatin 40 mg Oral Nightly   budesonide-formoterol 2 puff Inhalation BID - RT   carvedilol 25 mg Oral BID With Meals   cetirizine 10 mg Oral Daily   clopidogrel 75 mg Oral Daily   enoxaparin 40 mg Subcutaneous Q24H   ferrous sulfate 325 mg Oral Daily With Breakfast   fluticasone 1 spray Nasal Daily   furosemide 40 mg Intravenous BID   isosorbide mononitrate 60 mg Oral Daily   montelukast 10 mg Oral Nightly   pantoprazole 40 mg Oral QAM   polyethylene glycol 17 g Oral Daily   potassium chloride 10 mEq Oral Daily   ranolazine 500 mg Oral Q12H   sodium chloride 3 mL Intravenous Q12H   vitamin D 50,000 Units Oral Weekly     Continuous Infusions:     Allergies:  Allergies   Allergen Reactions   • Cardizem [Diltiazem] Unknown (See Comments)     Pt is unknown why she can not take this medication    • Levaquin [Levofloxacin] Cough   • Adhesive Tape Unknown (See Comments)     Rash and itchy skin       Review of Systems   Constitution: Negative.   HENT:        Cough   Eyes: Negative.    Cardiovascular: Positive for palpitations. Negative for chest pain, dyspnea on exertion, leg swelling and orthopnea.   Respiratory: Positive for cough.    Endocrine: Negative.    Hematologic/Lymphatic: Negative.    Skin: Negative.    Musculoskeletal: Negative.    Gastrointestinal:  Negative.    Genitourinary: Negative.    Neurological: Negative.    Psychiatric/Behavioral: Negative.        Objective:     Temp:  [97.8 °F (36.6 °C)-98.5 °F (36.9 °C)] 98 °F (36.7 °C)  Heart Rate:  [69-76] 76  Resp:  [16-18] 16  BP: (121-140)/(76-89) 121/76     Intake/Output Summary (Last 24 hours) at 6/12/2019 1330  Last data filed at 6/12/2019 1258  Gross per 24 hour   Intake 120 ml   Output 2800 ml   Net -2680 ml     Body mass index is 28.89 kg/m².      06/11/19  1300 06/12/19  0531   Weight: 67.6 kg (149 lb) 69.4 kg (152 lb 14.4 oz)           Physical Exam   Constitutional: She is oriented to person, place, and time. She appears well-developed and well-nourished. No distress.   HENT:   Head: Normocephalic and atraumatic.   Eyes: Conjunctivae and EOM are normal. Pupils are equal, round, and reactive to light. No scleral icterus.   Neck: Normal range of motion. No thyromegaly present.   Cardiovascular: Normal rate, regular rhythm and normal heart sounds.   Pulmonary/Chest: Effort normal and breath sounds normal.   Abdominal: Soft. Bowel sounds are normal.   Musculoskeletal: Normal range of motion.   Neurological: She is alert and oriented to person, place, and time.   Skin: Skin is warm and dry.   Psychiatric: She has a normal mood and affect.         Lab Review:   Results from last 7 days   Lab Units 06/12/19  0513 06/11/19  1246   SODIUM mmol/L 144 144   POTASSIUM mmol/L 3.2* 3.7   CHLORIDE mmol/L 104 106   CO2 mmol/L 28.1 25.8   BUN mg/dL 15 13   CREATININE mg/dL 1.06* 0.91   GLUCOSE mg/dL 92 103*   CALCIUM mg/dL 8.6 8.9                           Invalid input(s): LDLCALC            Recent Radiology:  Imaging Results (most recent)     None          I reviewed the patient's new clinical results.  I personally viewed and interpreted the patient's EKG      Assessment:       Congestive heart failure (CHF) (CMS/HCC)  Improving, continue diuresis today.    Plan:   Possible discharge in am.        Gregory Sharp,  MD  06/12/19  1:30 PM

## 2019-06-12 NOTE — PROGRESS NOTES
Discharge Planning Assessment  [facility]     Patient Name: Marleny Greenwood  MRN: [id number]  Today's Date: 6/12/2019    Admit Date: 6/11/2019    Discharge Needs Assessment     Row Name 06/12/19 0412       Living Environment    Lives With  spouse    Name(s) of Who Lives With Patient  , Claude Greenwood, [phone]    Current Living Arrangements  home/apartment/condo    Primary Care Provided by  self    Provides Primary Care For  no one    Family Caregiver if Needed  spouse    Quality of Family Relationships  helpful;involved;supportive    Able to Return to Prior Arrangements  yes       Transition Planning    Patient/Family Anticipates Transition to  home with family    Patient/Family Anticipated Services at Transition  none    Transportation Anticipated  family or friend will provide       Discharge Needs Assessment    Concerns to be Addressed  no discharge needs identified;denies needs/concerns at this time    Equipment Currently Used at Home  none    Discharge Coordination/Progress  Home        Discharge Plan     Row Name 06/12/19 6015       Plan    Plan  Home with     Patient/Family in Agreement with Plan  yes    Plan Comments  IMM letter checked. CCP met with pt and  (Claude Greenwood, [phone]) to complete d/c planning screen. CCP role explained. Pt confirms facesheet. Pt resides with her  in a two level home equipped with chair lift between levels, and denies DME use. Pt unable to recall past home health agency used, and has been to rehab at [facility] (now [facility] Pt's pharmacy is [address] Pt denies known d/c needs at this time. CCP to follow to assist should needs arise. Dipika Hughes, [facility]        Destination      No service coordination in this encounter.      Durable Medical Equipment      No service coordination in this encounter.      Dialysis/Infusion      No service coordination in this encounter.      Home Medical Care      No service coordination in this encounter.       Therapy      No service coordination in this encounter.      Community Resources      No service coordination in this encounter.          Demographic Summary     Row Name 06/12/19 1330       General Information    Admission Type  inpatient    Arrived From  home    Required Notices Provided  Important Message from Medicare    Referral Source  admission list    Reason for Consult  discharge planning    Preferred Language  English        Functional Status     Row Name 06/12/19 1334       Functional Status    Usual Activity Tolerance  good    Current Activity Tolerance  good       Functional Status, IADL    Medications  independent    Meal Preparation  independent    Housekeeping  independent    Laundry  independent    Shopping  independent       Mental Status Summary    Recent Changes in Mental Status/Cognitive Functioning  no changes        Psychosocial    No documentation.       Abuse/Neglect    No documentation.       Legal    No documentation.       Substance Abuse    No documentation.       Patient Forms    No documentation.           Sarah Hughes LCSW

## 2019-06-12 NOTE — PLAN OF CARE
Problem: Patient Care Overview  Goal: Plan of Care Review  Outcome: Ongoing (interventions implemented as appropriate)   06/12/19 4220   Plan of Care Review   Progress improving   OTHER   Outcome Summary Pt A&Ox4. VSS. Edema slightly better. Will monitor.    Coping/Psychosocial   Plan of Care Reviewed With patient       Problem: Cardiac: Heart Failure (Adult)  Goal: Signs and Symptoms of Listed Potential Problems Will be Absent, Minimized or Managed (Cardiac: Heart Failure)  Outcome: Ongoing (interventions implemented as appropriate)      Problem: Fall Risk (Adult)  Goal: Absence of Fall  Outcome: Ongoing (interventions implemented as appropriate)

## 2019-06-12 NOTE — PLAN OF CARE
Problem: Patient Care Overview  Goal: Plan of Care Review  Outcome: Ongoing (interventions implemented as appropriate)   06/12/19 9342   Plan of Care Review   Progress improving   OTHER   Outcome Summary VSS. IV Lasix given as ordered. Good urine output. Still with some mild BLE edema. Will continue to monitor.    Coping/PsychosocialPlan of Care Reviewed With    patient       Problem: Cardiac: Heart Failure (Adult)  Goal: Signs and Symptoms of Listed Potential Problems Will be Absent, Minimized or Managed (Cardiac: Heart Failure)  Outcome: Ongoing (interventions implemented as appropriate)

## 2019-06-13 ENCOUNTER — APPOINTMENT (OUTPATIENT)
Dept: CARDIOLOGY | Facility: HOSPITAL | Age: 74
End: 2019-06-13

## 2019-06-13 VITALS
SYSTOLIC BLOOD PRESSURE: 105 MMHG | BODY MASS INDEX: 28.72 KG/M2 | DIASTOLIC BLOOD PRESSURE: 71 MMHG | TEMPERATURE: 98.6 F | HEART RATE: 76 BPM | HEIGHT: 61 IN | WEIGHT: 152.12 LBS | RESPIRATION RATE: 18 BRPM | OXYGEN SATURATION: 95 %

## 2019-06-13 PROBLEM — I50.9 CONGESTIVE HEART FAILURE (CHF): Status: RESOLVED | Noted: 2019-06-11 | Resolved: 2019-06-13

## 2019-06-13 LAB
ANION GAP SERPL CALCULATED.3IONS-SCNC: 9.5 MMOL/L
BUN BLD-MCNC: 19 MG/DL (ref 8–23)
BUN/CREAT SERPL: 15.4 (ref 7–25)
CALCIUM SPEC-SCNC: 8.1 MG/DL (ref 8.6–10.5)
CHLORIDE SERPL-SCNC: 100 MMOL/L (ref 98–107)
CO2 SERPL-SCNC: 28.5 MMOL/L (ref 22–29)
CREAT BLD-MCNC: 1.23 MG/DL (ref 0.57–1)
GFR SERPL CREATININE-BSD FRML MDRD: 52 ML/MIN/1.73
GLUCOSE BLD-MCNC: 97 MG/DL (ref 65–99)
POTASSIUM BLD-SCNC: 3.6 MMOL/L (ref 3.5–5.2)
SODIUM BLD-SCNC: 138 MMOL/L (ref 136–145)

## 2019-06-13 PROCEDURE — 93306 TTE W/DOPPLER COMPLETE: CPT | Performed by: INTERNAL MEDICINE

## 2019-06-13 PROCEDURE — 93306 TTE W/DOPPLER COMPLETE: CPT

## 2019-06-13 PROCEDURE — 25010000002 FUROSEMIDE PER 20 MG: Performed by: INTERNAL MEDICINE

## 2019-06-13 PROCEDURE — 25010000002 PERFLUTREN (DEFINITY) 8.476 MG IN SODIUM CHLORIDE 0.9 % 10 ML INJECTION: Performed by: INTERNAL MEDICINE

## 2019-06-13 PROCEDURE — 80048 BASIC METABOLIC PNL TOTAL CA: CPT | Performed by: INTERNAL MEDICINE

## 2019-06-13 PROCEDURE — 99238 HOSP IP/OBS DSCHRG MGMT 30/<: CPT | Performed by: INTERNAL MEDICINE

## 2019-06-13 RX ORDER — FUROSEMIDE 40 MG/1
60 TABLET ORAL DAILY
Qty: 90 TABLET | Refills: 3 | Status: SHIPPED | OUTPATIENT
Start: 2019-06-13 | End: 2019-10-25 | Stop reason: SDUPTHER

## 2019-06-13 RX ORDER — POTASSIUM CHLORIDE 750 MG/1
20 TABLET, FILM COATED, EXTENDED RELEASE ORAL DAILY
Qty: 60 TABLET | Refills: 3 | Status: SHIPPED | OUTPATIENT
Start: 2019-06-13 | End: 2019-10-25

## 2019-06-13 RX ORDER — POTASSIUM CHLORIDE 750 MG/1
20 TABLET, FILM COATED, EXTENDED RELEASE ORAL DAILY
Qty: 60 TABLET | Refills: 3 | Status: SHIPPED | OUTPATIENT
Start: 2019-06-13 | End: 2019-06-13 | Stop reason: DRUGHIGH

## 2019-06-13 RX ADMIN — PANTOPRAZOLE SODIUM 40 MG: 40 TABLET, DELAYED RELEASE ORAL at 06:06

## 2019-06-13 RX ADMIN — RANOLAZINE 500 MG: 500 TABLET, FILM COATED, EXTENDED RELEASE ORAL at 09:33

## 2019-06-13 RX ADMIN — GUAIFENESIN AND DEXTROMETHORPHAN 10 ML: 100; 10 SYRUP ORAL at 10:51

## 2019-06-13 RX ADMIN — POLYETHYLENE GLYCOL 3350 17 G: 17 POWDER, FOR SOLUTION ORAL at 09:38

## 2019-06-13 RX ADMIN — FERROUS SULFATE TAB 325 MG (65 MG ELEMENTAL FE) 325 MG: 325 (65 FE) TAB at 09:33

## 2019-06-13 RX ADMIN — PERFLUTREN 2 ML: 6.52 INJECTION, SUSPENSION INTRAVENOUS at 07:30

## 2019-06-13 RX ADMIN — CARVEDILOL 25 MG: 25 TABLET, FILM COATED ORAL at 09:33

## 2019-06-13 RX ADMIN — AMLODIPINE BESYLATE: 10 TABLET ORAL at 09:33

## 2019-06-13 RX ADMIN — POTASSIUM CHLORIDE 10 MEQ: 750 CAPSULE, EXTENDED RELEASE ORAL at 09:33

## 2019-06-13 RX ADMIN — FUROSEMIDE 40 MG: 10 INJECTION, SOLUTION INTRAMUSCULAR; INTRAVENOUS at 09:34

## 2019-06-13 RX ADMIN — ISOSORBIDE MONONITRATE 60 MG: 60 TABLET ORAL at 09:33

## 2019-06-13 RX ADMIN — CLOPIDOGREL 75 MG: 75 TABLET, FILM COATED ORAL at 09:33

## 2019-06-13 RX ADMIN — SODIUM CHLORIDE, PRESERVATIVE FREE 3 ML: 5 INJECTION INTRAVENOUS at 09:36

## 2019-06-13 RX ADMIN — CETIRIZINE HYDROCHLORIDE 10 MG: 10 TABLET, FILM COATED ORAL at 09:33

## 2019-06-13 NOTE — PLAN OF CARE
Problem: Patient Care Overview  Goal: Plan of Care Review  Outcome: Ongoing (interventions implemented as appropriate)   06/13/19 0695   Plan of Care Review   Progress improving   OTHER   Outcome Summary VSS. No new complaints. Patient slept most of the night. BLE swelling improved. Plan is for possible d/c today. Will continue to monitor.    Coping/Psychosocial   Plan of Care Reviewed With patient       Problem: Cardiac: Heart Failure (Adult)  Goal: Signs and Symptoms of Listed Potential Problems Will be Absent, Minimized or Managed (Cardiac: Heart Failure)  Outcome: Ongoing (interventions implemented as appropriate)      Problem: Fall Risk (Adult)  Goal: Absence of Fall  Outcome: Ongoing (interventions implemented as appropriate)

## 2019-06-13 NOTE — DISCHARGE SUMMARY
[facility]    DISCHARGE SUMMARY      Patient Name: Marleny Greenwood  :1945  73 y.o.    Date of Admit: 2019  Date of Discharge:  2019    Discharge Diagnosis:  Problem List Items Addressed This Visit        Cardiovascular and Mediastinum    RESOLVED: Congestive heart failure (CHF) (CMS/[facility]) - Primary    Relevant Orders    Basic Metabolic Panel          Hospital Course:   Ms. Greenwood is a 73-year-old female with a history of combined systolic and diastolic heart failure.  She also has coronary artery disease.  She has a prior history of coronary artery bypass grafting and a history of nonsustained ventricular tachycardia.  She presented to the office on 2019 with respiratory insufficiency and evidence of volume overload.  Was admitted to the hospital and placed on intravenous diuretics.  She responded to intravenous diuresis.  On 2019, she is feeling well.  She is not complaining of shortness of air.  She has no lower extremity edema.  Her BMP demonstrated her that her creatinine had increased to 1.23, most likely secondary to diuresis.  She will be discharged on Lasix 60 mg daily and potassium supplement of 40 mEq daily.  She will not take any Lasix tomorrow.  He will not take any potassium tomorrow.  She will follow-up with Dr. Mal Moser in 4 weeks and with Dr. Liz Zaragoza in 2 weeks.  She will have a BMP obtained in the office in 1 week.    Procedures Performed         Consults     No orders found from 2019 to 2019.          Pertinent Test Results:   Results from last 7 days   Lab Units 19  0618   SODIUM mmol/L 138   POTASSIUM mmol/L 3.6   CHLORIDE mmol/L 100   CO2 mmol/L 28.5   BUN mg/dL 19   CREATININE mg/dL 1.23*   CALCIUM mg/dL 8.1*   GLUCOSE mg/dL 97         @LABRCNT(bnp)@                    Condition on Discharge: stable    Discharge Medications     Discharge Medications      Changes to Medications      Instructions Start Date   furosemide 40  MG tablet  Commonly known as:  LASIX  What changed:  how much to take   60 mg, Oral, Daily      potassium chloride 10 MEQ CR tablet  Commonly known as:  K-DUR  What changed:  how much to take   20 mEq, Oral, Daily         Continue These Medications      Instructions Start Date   albuterol sulfate  (90 Base) MCG/ACT inhaler  Commonly known as:  PROVENTIL HFA;VENTOLIN HFA;PROAIR HFA   2 puffs, Inhalation, Every 4 Hours PRN      amLODIPine-benazepril 10-40 MG per capsule  Commonly known as:  LOTREL   1 capsule, Oral, Daily      aspirin 81 MG EC tablet   81 mg, Oral, Daily      atorvastatin 40 MG tablet  Commonly known as:  LIPITOR   40 mg, Oral, Daily      butalbital-aspirin-caffeine-codeine -60-30 MG capsule  Commonly known as:  FIORINAL WITH CODEINE   1 capsule, Oral, Every 4 Hours PRN      carvedilol 25 MG tablet  Commonly known as:  COREG   25 mg, Oral, 2 Times Daily With Meals      CLARITIN 10 MG tablet  Generic drug:  loratadine   10 mg, Oral, Daily      clopidogrel 75 MG tablet  Commonly known as:  PLAVIX   75 mg, Oral, Daily      ferrous sulfate 325 (65 FE) MG tablet   325 mg, Oral, Daily With Breakfast      fluticasone 27.5 MCG/SPRAY nasal spray  Commonly known as:  VERAMYST   2 sprays, Nasal, Daily      isosorbide mononitrate 60 MG 24 hr tablet  Commonly known as:  IMDUR   60 mg, Oral, Daily      mometasone-formoterol 200-5 MCG/ACT inhaler  Commonly known as:  DULERA 200   2 puffs, Inhalation, 2 Times Daily - RT      montelukast 10 MG tablet  Commonly known as:  SINGULAIR   10 mg, Oral, Nightly      nitroglycerin 0.4 MG SL tablet  Commonly known as:  NITROSTAT   0.4 mg, Sublingual, Every 5 Minutes PRN, Take no more than 3 doses in 15 minutes.       OCUVITE ADULT 50+ PO   Oral      omeprazole 20 MG capsule  Commonly known as:  priLOSEC   20 mg, Oral, Daily      polyethylene glycol packet  Commonly known as:  MIRALAX   17 g, Oral, Daily      PROLIA SC   Subcutaneous, One injection q6 months        promethazine 25 MG tablet  Commonly known as:  PHENERGAN   25 mg, Oral, Every 6 Hours PRN      RANEXA 500 MG 12 hr tablet  Generic drug:  ranolazine   500 mg, Oral, 2 Times Daily      vitamin D 63965 units capsule capsule  Commonly known as:  ERGOCALCIFEROL   50,000 Units, Oral, Weekly             Discharge Diet:     Activity at Discharge:     Discharge disposition: home    Follow-up Appointments  Future Appointments   Date Time Provider Department Center   11/25/2019 10:10 AM [facility]   12/2/2019 10:15 AM Liz Zaragoza MD [facility]     Additional Instructions for the Follow-ups that You Need to Schedule     Discharge Follow-up with PCP   As directed       Currently Documented PCP:    Liz Zaragoza MD    PCP Phone Number:    [phone]     Follow Up Details:  2 weeks         Discharge Follow-up with Specified Provider: Mal Moser; 1 Month   As directed      To:  Mal Moser    Follow Up:  1 Month         Basic Metabolic Panel    Jun 25, 2019 (Approximate)            Test Results Pending at Discharge       Ronit Moser MD, [facility]    06/13/19  3:10 PM    Time: Discharge 30 min

## 2019-06-14 ENCOUNTER — READMISSION MANAGEMENT (OUTPATIENT)
Dept: CALL CENTER | Facility: HOSPITAL | Age: 74
End: 2019-06-14

## 2019-06-14 NOTE — PROGRESS NOTES
Case Management Discharge Note    Final Note: Pt was dc'd home    Destination      No service has been selected for the patient.      Durable Medical Equipment      No service has been selected for the patient.      Dialysis/Infusion      No service has been selected for the patient.      Home Medical Care      No service has been selected for the patient.      Therapy      No service has been selected for the patient.      Community Resources      No service has been selected for the patient.        Transportation Services  Private: Car    Final Discharge Disposition Code: 01 - home or self-care

## 2019-06-14 NOTE — OUTREACH NOTE
Prep Survey      Responses   Facility patient discharged from?  [facility]   Is patient eligible?  Yes   Discharge diagnosis  CHF   Does the patient have one of the following disease processes/diagnoses(primary or secondary)?  CHF   Is there a DME ordered?  No   Prep survey completed?  Yes          Yun Mcclure RN

## 2019-06-17 ENCOUNTER — READMISSION MANAGEMENT (OUTPATIENT)
Dept: CALL CENTER | Facility: HOSPITAL | Age: 74
End: 2019-06-17

## 2019-06-17 LAB
AORTIC DIMENSIONLESS INDEX: 0.7 (DI)
BH CV ECHO MEAS - AI DEC SLOPE: 229 CM/SEC^2
BH CV ECHO MEAS - AI MAX PG: 85.7 MMHG
BH CV ECHO MEAS - AI MAX VEL: 463 CM/SEC
BH CV ECHO MEAS - AI P1/2T: 592.2 MSEC
BH CV ECHO MEAS - AO MAX PG: 8.9 MMHG
BH CV ECHO MEAS - AO MEAN PG (FULL): 2 MMHG
BH CV ECHO MEAS - AO MEAN PG: 5 MMHG
BH CV ECHO MEAS - AO ROOT AREA (BSA CORRECTED): 1.9
BH CV ECHO MEAS - AO ROOT AREA: 8 CM^2
BH CV ECHO MEAS - AO ROOT DIAM: 3.2 CM
BH CV ECHO MEAS - AO V2 MEAN: 110 CM/SEC
BH CV ECHO MEAS - AO V2 VTI: 31 CM
BH CV ECHO MEAS - ASC AORTA: 3.7 CM
BH CV ECHO MEAS - AVA(I,A): 1.7 CM^2
BH CV ECHO MEAS - AVA(I,D): 1.7 CM^2
BH CV ECHO MEAS - BSA(HAYCOCK): 1.7 M^2
BH CV ECHO MEAS - BSA: 1.7 M^2
BH CV ECHO MEAS - BZI_BMI: 29.1 KILOGRAMS/M^2
BH CV ECHO MEAS - BZI_METRIC_HEIGHT: 154 CM
BH CV ECHO MEAS - BZI_METRIC_WEIGHT: 69 KG
BH CV ECHO MEAS - EDV(CUBED): 61.6 ML
BH CV ECHO MEAS - EDV(MOD-SP4): 80.1 ML
BH CV ECHO MEAS - EDV(SP4-EL): 84.3 ML
BH CV ECHO MEAS - EDV(TEICH): 67.9 ML
BH CV ECHO MEAS - EF(CUBED): 60.4 %
BH CV ECHO MEAS - EF(MOD-BP): 62 %
BH CV ECHO MEAS - EF(MOD-SP4): 62.4 %
BH CV ECHO MEAS - EF(SP4-EL): 68.1 %
BH CV ECHO MEAS - EF(TEICH): 52.6 %
BH CV ECHO MEAS - ESV(CUBED): 24.4 ML
BH CV ECHO MEAS - ESV(MOD-SP4): 30.1 ML
BH CV ECHO MEAS - ESV(SP4-EL): 26.9 ML
BH CV ECHO MEAS - ESV(TEICH): 32.2 ML
BH CV ECHO MEAS - FS: 26.6 %
BH CV ECHO MEAS - IVS/LVPW: 1.2
BH CV ECHO MEAS - IVSD: 1.3 CM
BH CV ECHO MEAS - LAT PEAK E' VEL: 7.9 CM/SEC
BH CV ECHO MEAS - LV DIASTOLIC VOL/BSA (35-75): 47.9 ML/M^2
BH CV ECHO MEAS - LV MASS(C)D: 162.4 GRAMS
BH CV ECHO MEAS - LV MASS(C)DI: 97 GRAMS/M^2
BH CV ECHO MEAS - LV MEAN PG: 3 MMHG
BH CV ECHO MEAS - LV SYSTOLIC VOL/BSA (12-30): 18 ML/M^2
BH CV ECHO MEAS - LV V1 MAX: 109 CM/SEC
BH CV ECHO MEAS - LV V1 MEAN: 76.6 CM/SEC
BH CV ECHO MEAS - LV V1 VTI: 23.2 CM
BH CV ECHO MEAS - LVAD AP4: 27.3 CM^2
BH CV ECHO MEAS - LVAS AP4: 14 CM^2
BH CV ECHO MEAS - LVIDD: 4 CM
BH CV ECHO MEAS - LVIDS: 2.9 CM
BH CV ECHO MEAS - LVLD AP4: 7.5 CM
BH CV ECHO MEAS - LVLS AP4: 6.2 CM
BH CV ECHO MEAS - LVOT AREA (M): 2.3 CM^2
BH CV ECHO MEAS - LVOT AREA: 2.3 CM^2
BH CV ECHO MEAS - LVOT DIAM: 1.7 CM
BH CV ECHO MEAS - LVPWD: 1.1 CM
BH CV ECHO MEAS - MED PEAK E' VEL: 5.3 CM/SEC
BH CV ECHO MEAS - MV A DUR: 183 SEC
BH CV ECHO MEAS - MV A MAX VEL: 108 CM/SEC
BH CV ECHO MEAS - MV DEC SLOPE: 248 CM/SEC^2
BH CV ECHO MEAS - MV DEC TIME: 232 SEC
BH CV ECHO MEAS - MV E MAX VEL: 72.1 CM/SEC
BH CV ECHO MEAS - MV E/A: 0.67
BH CV ECHO MEAS - MV MEAN PG: 1 MMHG
BH CV ECHO MEAS - MV P1/2T MAX VEL: 88.8 CM/SEC
BH CV ECHO MEAS - MV P1/2T: 104.9 MSEC
BH CV ECHO MEAS - MV V2 MEAN: 54.6 CM/SEC
BH CV ECHO MEAS - MV V2 VTI: 30.7 CM
BH CV ECHO MEAS - MVA P1/2T LCG: 2.5 CM^2
BH CV ECHO MEAS - MVA(P1/2T): 2.1 CM^2
BH CV ECHO MEAS - MVA(VTI): 1.7 CM^2
BH CV ECHO MEAS - PA ACC SLOPE: 779 CM/SEC^2
BH CV ECHO MEAS - PA ACC TIME: 0.09 SEC
BH CV ECHO MEAS - PA MAX PG: 2.3 MMHG
BH CV ECHO MEAS - PA PR(ACCEL): 39.4 MMHG
BH CV ECHO MEAS - PA V2 MAX: 76.6 CM/SEC
BH CV ECHO MEAS - PULM A REVS DUR: 0.13 SEC
BH CV ECHO MEAS - PULM A REVS VEL: 37 CM/SEC
BH CV ECHO MEAS - PULM DIAS VEL: 44.4 CM/SEC
BH CV ECHO MEAS - PULM S/D: 1.6
BH CV ECHO MEAS - PULM SYS VEL: 70.1 CM/SEC
BH CV ECHO MEAS - QP/QS: 1.2
BH CV ECHO MEAS - RV MEAN PG: 1 MMHG
BH CV ECHO MEAS - RV V1 MEAN: 47.5 CM/SEC
BH CV ECHO MEAS - RV V1 VTI: 12.9 CM
BH CV ECHO MEAS - RVOT AREA: 4.9 CM^2
BH CV ECHO MEAS - RVOT DIAM: 2.5 CM
BH CV ECHO MEAS - RVSP: 8 MMHG
BH CV ECHO MEAS - SI(AO): 148.9 ML/M^2
BH CV ECHO MEAS - SI(CUBED): 22.2 ML/M^2
BH CV ECHO MEAS - SI(LVOT): 31.5 ML/M^2
BH CV ECHO MEAS - SI(MOD-SP4): 29.9 ML/M^2
BH CV ECHO MEAS - SI(SP4-EL): 34.3 ML/M^2
BH CV ECHO MEAS - SI(TEICH): 21.3 ML/M^2
BH CV ECHO MEAS - SV(AO): 249.3 ML
BH CV ECHO MEAS - SV(CUBED): 37.2 ML
BH CV ECHO MEAS - SV(LVOT): 52.7 ML
BH CV ECHO MEAS - SV(MOD-SP4): 50 ML
BH CV ECHO MEAS - SV(RVOT): 63.3 ML
BH CV ECHO MEAS - SV(SP4-EL): 57.4 ML
BH CV ECHO MEAS - SV(TEICH): 35.7 ML
BH CV ECHO MEAS - TAPSE (>1.6): 1.1 CM2
BH CV ECHO MEAS - TR MAX VEL: 258 CM/SEC
BH CV ECHO MEASUREMENTS AVERAGE E/E' RATIO: 10.92
BH CV XLRA - RV BASE: 4.2 CM
BH CV XLRA - TDI S': 5.92 CM/SEC
LEFT ATRIUM VOLUME INDEX: 34 ML/M2
MAXIMAL PREDICTED HEART RATE: 148 BPM
STRESS TARGET HR: 126 BPM

## 2019-06-18 ENCOUNTER — READMISSION MANAGEMENT (OUTPATIENT)
Dept: CALL CENTER | Facility: HOSPITAL | Age: 74
End: 2019-06-18

## 2019-06-18 NOTE — OUTREACH NOTE
CHF Week 1 Survey      Responses   Facility patient discharged from?  [facility]   Does the patient have one of the following disease processes/diagnoses(primary or secondary)?  CHF   Is there a successful TCM telephone encounter documented?  No   CHF Week 1 attempt successful?  Yes   Call start time  1547   Call end time  1556   Discharge diagnosis  CHF   Is patient permission given to speak with other caregiver?  Yes   List who call center can speak with  Claude Greenwood-   Meds reviewed with patient/caregiver?  Yes   Is the patient having any side effects they believe may be caused by any medication additions or changes?  No   Does the patient have all medications ordered at discharge?  Yes   Is the patient taking all medications as directed (includes completed medication regime)?  Yes   Comments regarding appointments  Pt is aware of upcoming appointments   Does the patient have a primary care provider?   Yes   Does the patient have an appointment with their PCP within 7 days of discharge?  No   Comments regarding PCP  Pt has an appointment with PCP on 7/2/19.    What is preventing the patient from scheduling follow up appointments within 7 days of discharge?  Earlier appointment not available   Nursing Interventions  Verified appointment date/time/provider   Has the patient kept scheduled appointments due by today?  N/A   Comments  Educated patient that BMP was ordered to be drawn around 6/25/19. She is going to take care of making an appointment.    Psychosocial issues?  No   Did the patient receive a copy of their discharge instructions?  Yes   Nursing interventions  Reviewed instructions with patient   What is the patient's perception of their health status since discharge?  Improving   Nursing interventions  Nurse provided patient education   Is the patient weighing daily?  Yes   Does the patient have scales?  Yes   Daily weight interventions  Education provided on importance of daily weight   Is the  patient able to teach back Heart Failure diet management?  Yes   Is the patient able to teach back Heart Failure Zones?  Yes   Is the patient able to teach back signs and symptoms of worsening condition? (i.e. weight gain, shortness of air, etc.)  Yes   If the patient is a current smoker, are they able to teach back resources for cessation?  -- [Not a smoker]   Is the patient/caregiver able to teach back the hierarchy of who to call/visit for symptoms/problems? PCP, Specialist, Home health nurse, Urgent Care, ED, 911  Yes   Additional teach back comments  Educated patient to keep a record of daily weights and take those with her to see her cardiologist.     CHF Week 1 call completed?  Yes          Estelita Cristina RN

## 2019-06-26 ENCOUNTER — READMISSION MANAGEMENT (OUTPATIENT)
Dept: CALL CENTER | Facility: HOSPITAL | Age: 74
End: 2019-06-26

## 2019-06-26 NOTE — OUTREACH NOTE
CHF Week 2 Survey      Responses   Facility patient discharged from?  [facility]   Does the patient have one of the following disease processes/diagnoses(primary or secondary)?  CHF   Week 2 attempt successful?  Yes   Call start time  1209   Call end time  1212   Discharge diagnosis  CHF   Meds reviewed with patient/caregiver?  Yes   Is the patient having any side effects they believe may be caused by any medication additions or changes?  No   Does the patient have all medications ordered at discharge?  Yes   Is the patient taking all medications as directed (includes completed medication regime)?  Yes   Comments regarding appointments  Pt is aware of upcoming appointments   Does the patient have a primary care provider?   Yes   Does the patient have an appointment with their PCP within 7 days of discharge?  No   Comments regarding PCP  Pt has an appointment with PCP on 7/2/19.    What is preventing the patient from scheduling follow up appointments within 7 days of discharge?  Earlier appointment not available   Has the patient kept scheduled appointments due by today?  Yes   Has home health visited the patient within 72 hours of discharge?  N/A   Psychosocial issues?  No   Did the patient receive a copy of their discharge instructions?  Yes   Nursing interventions  Reviewed instructions with patient   What is the patient's perception of their health status since discharge?  Improving   Nursing interventions  Nurse provided patient education   Is the patient weighing daily?  Yes   Does the patient have scales?  Yes   Daily weight interventions  Education provided on importance of daily weight   Is the patient able to teach back Heart Failure diet management?  Yes   Is the patient able to teach back Heart Failure Zones?  Yes   Is the patient able to teach back signs and symptoms of worsening condition? (i.e. weight gain, shortness of air, etc.)  Yes   Is the patient/caregiver able to teach back the hierarchy of who  to call/visit for symptoms/problems? PCP, Specialist, Home health nurse, Urgent Care, ED, 911  Yes   CHF Week 2 call completed?  Yes          George Reese RN

## 2019-07-02 ENCOUNTER — OFFICE VISIT (OUTPATIENT)
Dept: INTERNAL MEDICINE | Facility: CLINIC | Age: 74
End: 2019-07-02

## 2019-07-02 VITALS
DIASTOLIC BLOOD PRESSURE: 68 MMHG | WEIGHT: 149 LBS | HEIGHT: 60 IN | BODY MASS INDEX: 29.25 KG/M2 | SYSTOLIC BLOOD PRESSURE: 104 MMHG | HEART RATE: 72 BPM | RESPIRATION RATE: 10 BRPM

## 2019-07-02 DIAGNOSIS — J45.991 ASTHMA, COUGH VARIANT: ICD-10-CM

## 2019-07-02 DIAGNOSIS — I50.31 ACUTE DIASTOLIC CONGESTIVE HEART FAILURE (HCC): Primary | ICD-10-CM

## 2019-07-02 LAB
BUN SERPL-MCNC: 16 MG/DL (ref 8–23)
BUN/CREAT SERPL: 14.5 (ref 7–25)
CALCIUM SERPL-MCNC: 8.8 MG/DL (ref 8.6–10.5)
CHLORIDE SERPL-SCNC: 105 MMOL/L (ref 98–107)
CO2 SERPL-SCNC: 27.8 MMOL/L (ref 22–29)
CREAT SERPL-MCNC: 1.1 MG/DL (ref 0.57–1)
GLUCOSE SERPL-MCNC: 89 MG/DL (ref 65–99)
POTASSIUM SERPL-SCNC: 4.1 MMOL/L (ref 3.5–5.2)
SODIUM SERPL-SCNC: 144 MMOL/L (ref 136–145)

## 2019-07-02 PROCEDURE — 99214 OFFICE O/P EST MOD 30 MIN: CPT | Performed by: INTERNAL MEDICINE

## 2019-07-02 NOTE — PROGRESS NOTES
Assessment and Plan  Marleny was seen today for congestive heart failure.    Diagnoses and all orders for this visit:    Acute diastolic congestive heart failure (CMS/[facility])  Comments:  appears better compensated today- check BMP with increased dose of furosemide.   Orders:  -     Basic Metabolic Panel    Asthma, cough variant  Comments:  will refill the hydromet one time, will keep appointment with Dr. Sheridan.  Call with worsening.  Reassured about lung nodule and about the sensation in the ster    Other orders  -     HYDROcodone-homatropine (HYCODAN) 5-1.5 MG/5ML syrup; Take 5 mL by mouth Every 6 (Six) Hours As Needed for Cough for up to 10 days.      F/U and Patient Instructions    No Follow-up on file.  There are no Patient Instructions on file for this visit.    Subjective    Marleny Greenwood is a 73 y.o. female being seen in our office today for Congestive Heart Failure (Hospital follow up )     History of the Present Illness  HPI Was hospitalized for volume overload.  Her biggest concern is that her cough has returned.  The only thing that has helped is Hydromet- given to her by pulmonologist.  She is seeing him again 7/10.  She is also having complaints of feeling like there is something in her upper cw that is pushing against her throat, worst when she lies back.  She has been sleeping in the recliner for several nights. This has been since she had to have a revision of her sternal wound due to infection but acutely worse lately.  Reviewed CT from 4/29- no evidence of abnormality.    Newly diagnosed 4mm RUL nodule- to recheck in November.   Does feel like her heart failure has improved- she is on a larger dose of diuretic.     Patient History        Significant Past History  The following portions of the patient's history were reviewed and updated as appropriate:PMHroutine: Social history , Surgical history , Allergies, Current Medications, Active Problem List and Health Maintenance              Social  History  She  reports that she has never smoked. She has never used smokeless tobacco. She reports that she does not drink alcohol or use drugs.                         Review of Symptoms  Review of Systems   Constitution: Negative for decreased appetite and weight loss.   Cardiovascular: Positive for orthopnea. Negative for chest pain and palpitations.   Respiratory: Negative for cough. Shortness of breath: better since hospitalization but still with some dyspnea.    Gastrointestinal: Negative.    Psychiatric/Behavioral: Negative.      Objective  Vital Signs         BP Readings from Last 1 Encounters:   07/02/19 104/68     Wt Readings from Last 3 Encounters:   07/02/19 67.6 kg (149 lb)   06/13/19 69 kg (152 lb 1.9 oz)   06/11/19 67.6 kg (149 lb)   Body mass index is 29.1 kg/m².          Physical Exam   Physical Exam   Constitutional: No distress.   HENT:   Mouth/Throat: No oropharyngeal exudate.   Neck: No thyromegaly present.   Cardiovascular: Normal rate and regular rhythm.   Pulmonary/Chest: Effort normal and breath sounds normal. No respiratory distress. She has no wheezes.   Lymphadenopathy:     She has no cervical adenopathy.     Data Reviewed    Recent Results (from the past 2016 hour(s))   POC Glucose Once    Collection Time: 06/11/19 11:54 AM   Result Value Ref Range    Glucose 115 70 - 130 mg/dL   Basic Metabolic Panel    Collection Time: 06/11/19 12:46 PM   Result Value Ref Range    Glucose 103 (H) 65 - 99 mg/dL    BUN 13 8 - 23 mg/dL    Creatinine 0.91 0.57 - 1.00 mg/dL    Sodium 144 136 - 145 mmol/L    Potassium 3.7 3.5 - 5.2 mmol/L    Chloride 106 98 - 107 mmol/L    CO2 25.8 22.0 - 29.0 mmol/L    Calcium 8.9 8.6 - 10.5 mg/dL    eGFR  African Amer 74 >60 mL/min/1.73    BUN/Creatinine Ratio 14.3 7.0 - 25.0    Anion Gap 12.2 mmol/L   POC Glucose Once    Collection Time: 06/11/19  4:25 PM   Result Value Ref Range    Glucose 103 70 - 130 mg/dL   Basic Metabolic Panel    Collection Time: 06/12/19  5:13 AM    Result Value Ref Range    Glucose 92 65 - 99 mg/dL    BUN 15 8 - 23 mg/dL    Creatinine 1.06 (H) 0.57 - 1.00 mg/dL    Sodium 144 136 - 145 mmol/L    Potassium 3.2 (L) 3.5 - 5.2 mmol/L    Chloride 104 98 - 107 mmol/L    CO2 28.1 22.0 - 29.0 mmol/L    Calcium 8.6 8.6 - 10.5 mg/dL    eGFR  African Amer 62 >60 mL/min/1.73    BUN/Creatinine Ratio 14.2 7.0 - 25.0    Anion Gap 11.9 mmol/L   Basic Metabolic Panel    Collection Time: 06/13/19  6:18 AM   Result Value Ref Range    Glucose 97 65 - 99 mg/dL    BUN 19 8 - 23 mg/dL    Creatinine 1.23 (H) 0.57 - 1.00 mg/dL    Sodium 138 136 - 145 mmol/L    Potassium 3.6 3.5 - 5.2 mmol/L    Chloride 100 98 - 107 mmol/L    CO2 28.5 22.0 - 29.0 mmol/L    Calcium 8.1 (L) 8.6 - 10.5 mg/dL    eGFR  African Amer 52 (L) >60 mL/min/1.73    BUN/Creatinine Ratio 15.4 7.0 - 25.0    Anion Gap 9.5 mmol/L   Adult Transthoracic Echo Complete W/ Cont if Necessary Per Protocol    Collection Time: 06/13/19  9:08 AM   Result Value Ref Range    BSA 1.7 m^2    IVSd 1.3 cm    LVIDd 4.0 cm    LVIDs 2.9 cm    LVPWd 1.1 cm    IVS/LVPW 1.2     FS 26.6 %    EDV(Teich) 67.9 ml    ESV(Teich) 32.2 ml    EF(Teich) 52.6 %    EDV(cubed) 61.6 ml    ESV(cubed) 24.4 ml    EF(cubed) 60.4 %    LV mass(C)d 162.4 grams    LV mass(C)dI 97.0 grams/m^2    SV(Teich) 35.7 ml    SI(Teich) 21.3 ml/m^2    SV(cubed) 37.2 ml    SI(cubed) 22.2 ml/m^2    Ao root diam 3.2 cm    Ao root area 8.0 cm^2    asc Aorta Diam 3.7 cm    LVOT diam 1.7 cm    LVOT area 2.3 cm^2    LVOT area(traced) 2.3 cm^2    RVOT diam 2.5 cm    RVOT area 4.9 cm^2    LVAd ap4 27.3 cm^2    LVLd ap4 7.5 cm    EDV(MOD-sp4) 80.1 ml    EDV(sp4-el) 84.3 ml    LVAs ap4 14.0 cm^2    LVLs ap4 6.2 cm    ESV(MOD-sp4) 30.1 ml    ESV(sp4-el) 26.9 ml    EF(MOD-sp4) 62.4 %    EF(sp4-el) 68.1 %    SV(MOD-sp4) 50.0 ml    SI(MOD-sp4) 29.9 ml/m^2    SV(sp4-el) 57.4 ml    SI(sp4-el) 34.3 ml/m^2    Ao root area (BSA corrected) 1.9     LV Montes Vol (BSA corrected) 47.9 ml/m^2    LV  Sys Vol (BSA corrected) 18.0 ml/m^2    MV A dur 183.0 sec    MV E max luis 72.1 cm/sec    MV A max luis 108.0 cm/sec    MV E/A 0.67     MV V2 mean 54.6 cm/sec    MV mean PG 1.0 mmHg    MV V2 VTI 30.7 cm    MVA(VTI) 1.7 cm^2    MV P1/2t max luis 88.8 cm/sec    MV P1/2t 104.9 msec    MVA(P1/2t) 2.1 cm^2    MV dec slope 248.0 cm/sec^2    MV dec time 232 sec    Ao V2 mean 110.0 cm/sec    Ao mean PG 5.0 mmHg    Ao mean PG (full) 2.0 mmHg    Ao V2 VTI 31.0 cm    CARMEN(I,A) 1.7 cm^2    CARMEN(I,D) 1.7 cm^2    AI max luis 463.0 cm/sec    AI max PG 85.7 mmHg    AI dec slope 229.0 cm/sec^2    AI P1/2t 592.2 msec    LV V1 mean PG 3.0 mmHg    LV V1 mean 76.6 cm/sec    LV V1 VTI 23.2 cm    SV(Ao) 249.3 ml    SI(Ao) 148.9 ml/m^2    SV(LVOT) 52.7 ml    SV(RVOT) 63.3 ml    SI(LVOT) 31.5 ml/m^2    PA V2 max 76.6 cm/sec    PA max PG 2.3 mmHg    PA acc slope 779.0 cm/sec^2    PA acc time 0.09 sec    RV V1 mean PG 1.0 mmHg    RV V1 mean 47.5 cm/sec    RV V1 VTI 12.9 cm    TR max luis 258.0 cm/sec    PA pr(Accel) 39.4 mmHg    Pulm Sys Luis 70.1 cm/sec    Pulm Montes Luis 44.4 cm/sec    Pulm S/D 1.6     Qp/Qs 1.2     Pulm A Revs Dur 0.13 sec    Pulm A Revs Luis 37.0 cm/sec    MVA P1/2T LCG 2.5 cm^2     CV ECHO MYLES - BZI_BMI 29.1 kilograms/m^2     CV ECHO MYLES - BSA(HAYCOCK) 1.7 m^2     CV ECHO MYLES - BZI_METRIC_WEIGHT 69.0 kg     CV ECHO MYLES - BZI_METRIC_HEIGHT 154.0 cm    Target HR (85%) 126 bpm    Max. Pred. HR (100%) 148 bpm    TDI S' 5.92 cm/sec    RV Base 4.20 cm    Dimensionless Index 0.7 (DI)    LA Volume Index 34.0 mL/m2    Avg E/e' ratio 10.92     EF(MOD-bp) 62.0 %    Lat Peak E' Luis 7.9 cm/sec    LV V1 max 109.0 cm/sec    Med Peak E' Luis 5.30 cm/sec    RVSP(TR) 8 mmHg    Ao max PG 8.9 mmHg    TAPSE (>1.6) 1.10 cm2

## 2019-07-17 RX ORDER — CODEINE/BUTALBITAL/ASA/CAFFEIN 30-50-325
1 CAPSULE ORAL EVERY 4 HOURS PRN
Qty: 360 CAPSULE | Refills: 1 | Status: SHIPPED | OUTPATIENT
Start: 2019-07-17 | End: 2020-08-04

## 2019-07-26 ENCOUNTER — OFFICE VISIT (OUTPATIENT)
Dept: CARDIOLOGY | Facility: CLINIC | Age: 74
End: 2019-07-26

## 2019-07-26 VITALS
BODY MASS INDEX: 29.84 KG/M2 | HEIGHT: 60 IN | RESPIRATION RATE: 16 BRPM | HEART RATE: 72 BPM | DIASTOLIC BLOOD PRESSURE: 56 MMHG | SYSTOLIC BLOOD PRESSURE: 94 MMHG | WEIGHT: 152 LBS

## 2019-07-26 DIAGNOSIS — Z95.1 S/P CABG (CORONARY ARTERY BYPASS GRAFT): ICD-10-CM

## 2019-07-26 DIAGNOSIS — I47.29 NONSUSTAINED VENTRICULAR TACHYCARDIA (HCC): ICD-10-CM

## 2019-07-26 DIAGNOSIS — I25.10 CORONARY ARTERIOSCLEROSIS IN NATIVE ARTERY: Primary | ICD-10-CM

## 2019-07-26 DIAGNOSIS — Z95.5 H/O HEART ARTERY STENT: ICD-10-CM

## 2019-07-26 DIAGNOSIS — R06.89 RESPIRATORY INSUFFICIENCY: ICD-10-CM

## 2019-07-26 DIAGNOSIS — E78.5 HYPERLIPIDEMIA LDL GOAL <100: ICD-10-CM

## 2019-07-26 DIAGNOSIS — I10 ESSENTIAL HYPERTENSION: ICD-10-CM

## 2019-07-26 PROCEDURE — 99213 OFFICE O/P EST LOW 20 MIN: CPT | Performed by: INTERNAL MEDICINE

## 2019-07-26 RX ORDER — BUDESONIDE AND FORMOTEROL FUMARATE DIHYDRATE 160; 4.5 UG/1; UG/1
2 AEROSOL RESPIRATORY (INHALATION) DAILY
COMMUNITY
End: 2019-10-25

## 2019-07-26 RX ORDER — AMLODIPINE BESYLATE 10 MG/1
10 TABLET ORAL DAILY
Status: ON HOLD | COMMUNITY
End: 2023-01-20 | Stop reason: SDUPTHER

## 2019-07-26 RX ORDER — FLUTICASONE PROPIONATE 50 MCG
2 SPRAY, SUSPENSION (ML) NASAL DAILY
COMMUNITY
End: 2019-10-25

## 2019-07-26 RX ORDER — LOSARTAN POTASSIUM 25 MG/1
25 TABLET ORAL DAILY
COMMUNITY
End: 2019-10-25 | Stop reason: SDUPTHER

## 2019-07-26 NOTE — PROGRESS NOTES
[facility] [place] [facility]        Subjective:     Encounter Date:07/26/2019      Patient ID: Marleny Greenwood is a 73 y.o. female.      HPI: I saw Marleny: Continue cardiovascular care.  She is a pleasant 74-year-old female who has complaint of nonproductive cough and dyspnea on exertion.  She has a known history of coronary heart disease underwent coronary artery bypass surgery in 2011.  Coronary angiography 11/13/2017 demonstrated an occluded vein graft to the right coronary artery, atretic LIMA to the LAD and a patent saphenous vein graft to the LAD.  There is chronic total occlusion of the native right coronary artery and LAD.  Echocardiogram obtained 6/13/2019 demonstrated left ventricular ejection fraction 62%, mild concentric left ventricular hypertrophy grade 1 diastolic dysfunction and mild aortic mitral tricuspid insufficiency.  She demonstrates some volume excess and underwent effective diuresis.  Nevertheless her respiratory insufficiency and chronic cough persists.  She is undergoing evaluation by pulmonologist Dr.Scott Jameson.  She is to undergo bronchoscopy 8/7/2019.  She denies orthopnea or PND and does not experience chest discomfort or lower extremity edema.      The following portions of the patient's history were reviewed and updated as appropriate: allergies, current medications, past family history, past medical history, past social history, past surgical history and problem list.    Problem List:  Patient Active Problem List   Diagnosis   • Asthma, cough variant   • Coronary arteriosclerosis in native artery   • Essential hypertension   • Chronic daily headache   • Nodule of upper lobe of right lung   • Hyperlipidemia LDL goal <100   • Nonsustained ventricular tachycardia (CMS/HCC)   • Respiratory insufficiency   • History of cardiac cath   • History of loop recorder   • H/O heart artery stent   • S/P CABG (coronary artery bypass graft)   • H/O echocardiogram   • History of nuclear stress  test       Past Medical History:  Past Medical History:   Diagnosis Date   • Asthma    • H/O echocardiogram 6/11/2019 6/05/18 - Normal LV size and function.  EF 60-65%.  Mild concentric LVH.  Midl AR, MR, and TR.   • H/O heart artery stent 6/11/2019    3/22/12 - VISION stent to the SVG of the RCA reducing a total occlusion to 0% residual narrowing.   • Headache    • History of cardiac cath 6/11/2019   • History of loop recorder 6/11/2019    5/20/15 - [employer] LINQ implant.   • History of nuclear stress test 6/11/2019    10/09/17 - Abnormal study.  EF 46%.   • Hyperlipidemia LDL goal <100 6/11/2019   • Hypertension    • Nonsustained ventricular tachycardia (CMS/HCC) 6/11/2019   • Respiratory insufficiency 6/11/2019   • S/P CABG (coronary artery bypass graft) 6/11/2019 4/12/11 - LIMA to the LAD and SVG to the posterior descending artery.       Past Surgical History:  Past Surgical History:   Procedure Laterality Date   • APPENDECTOMY     • CHOLECYSTECTOMY     • CORONARY ARTERY BYPASS GRAFT Bilateral 2011   • HYSTERECTOMY         Social History:  Social History     Socioeconomic History   • Marital status:      Spouse name: Not on file   • Number of children: Not on file   • Years of education: Not on file   • Highest education level: Not on file   Tobacco Use   • Smoking status: Never Smoker   • Smokeless tobacco: Never Used   Substance and Sexual Activity   • Alcohol use: No     Frequency: Never   • Drug use: No   • Sexual activity: Defer       Allergies:  Allergies   Allergen Reactions   • Cardizem [Diltiazem] Unknown (See Comments)     Pt is unknown why she can not take this medication    • Levaquin [Levofloxacin] Cough   • Adhesive Tape Unknown (See Comments)     Rash and itchy skin         ROS:  Review of Systems   Constitution: Positive for weakness and malaise/fatigue.   HENT: Negative for hearing loss and nosebleeds.    Eyes: Negative for double vision and visual disturbance.   Cardiovascular:  "Positive for dyspnea on exertion. Negative for chest pain, claudication, leg swelling, near-syncope, orthopnea, palpitations, paroxysmal nocturnal dyspnea and syncope.   Respiratory: Negative for cough, shortness of breath, sleep disturbances due to breathing, snoring, sputum production and wheezing.    Endocrine: Negative for cold intolerance, heat intolerance, polydipsia and polyuria.   Hematologic/Lymphatic: Negative for adenopathy and bleeding problem. Does not bruise/bleed easily.   Skin: Negative for flushing and itching.   Musculoskeletal: Negative for back pain, falls, joint pain, joint swelling, muscle weakness and neck pain.   Gastrointestinal: Negative for abdominal pain, dysphagia, heartburn, nausea and vomiting.   Genitourinary: Negative for dysuria and frequency.   Neurological: Negative for disturbances in coordination, dizziness, light-headedness, loss of balance and numbness.   Psychiatric/Behavioral: Negative for altered mental status and memory loss. The patient is not nervous/anxious.    Allergic/Immunologic: Negative for hives and persistent infections.          Objective:         BP 94/56 (BP Location: Left arm, Patient Position: Sitting, Cuff Size: Adult)   Pulse 72   Resp 16   Ht 152.4 cm (60\")   Wt 68.9 kg (152 lb)   BMI 29.69 kg/m²     Physical Exam   Constitutional: She is oriented to person, place, and time. She appears well-developed and well-nourished.   HENT:   Head: Normocephalic and atraumatic.   Eyes: Conjunctivae and EOM are normal. Pupils are equal, round, and reactive to light.   Neck: Neck supple. No JVD present. No thyromegaly present.   Cardiovascular: Normal rate, regular rhythm, S1 normal, S2 normal and intact distal pulses. PMI is not displaced. Exam reveals no S3, no S4, no distant heart sounds, no friction rub, no midsystolic click and no opening snap.   No murmur heard.  Pulses:       Carotid pulses are 2+ on the right side, and 2+ on the left side.       Radial " pulses are 2+ on the right side, and 2+ on the left side.        Femoral pulses are 2+ on the right side, and 2+ on the left side.       Dorsalis pedis pulses are 2+ on the right side, and 2+ on the left side.        Posterior tibial pulses are 2+ on the right side, and 2+ on the left side.   Pulmonary/Chest: Effort normal. No respiratory distress. She has no wheezes. She has no rales.   Abdominal: Soft. Bowel sounds are normal. She exhibits no distension and no mass. There is no tenderness.   Musculoskeletal: Normal range of motion. She exhibits no edema.   Neurological: She is alert and oriented to person, place, and time.   Skin: Skin is warm and dry. No erythema.   Psychiatric: She has a normal mood and affect.       In-Office Procedure(s):  Procedures    ASCVD RIsk Score::  The ASCVD Risk score (Thomlevon SHAIKH Jr., et al., 2013) failed to calculate for the following reasons:    Cannot find a previous HDL lab    Cannot find a previous total cholesterol lab        Assessment/Plan:         1. Coronary arteriosclerosis in native artery  Stable    2. Essential hypertension  Controlled    3. Hyperlipidemia LDL goal <100  Stable    4. Nonsustained ventricular tachycardia (CMS/HCC)  Stable    5. Respiratory insufficiency  Persistent    6. H/O heart artery stent  Stable    7. S/P CABG (coronary artery bypass graft)  Stable    I made no changes in Ms. Greenwood's medications.  Of encouraged her to continue her salt and fluid restriction follow her current medical regimen.  She will continue to monitor her weight and if there is a progressive increase she will contact.  In addition should she develop any orthostatic dizziness or lower extremity edema she will contact.  I will await the completion of the pulmonary evaluation.  If it is unremarkable we will then consider right and left heart catheterization for further evaluation of possible underlying constrictive or restrictive physiology.  I will plan to see her in follow-up in 3  months.           Mal Moser MD  07/26/19  .

## 2019-08-07 ENCOUNTER — HOSPITAL ENCOUNTER (OUTPATIENT)
Facility: HOSPITAL | Age: 74
Setting detail: HOSPITAL OUTPATIENT SURGERY
Discharge: HOME | End: 2019-08-07
Attending: INTERNAL MEDICINE | Admitting: INTERNAL MEDICINE

## 2019-08-07 ENCOUNTER — ANESTHESIA EVENT (OUTPATIENT)
Dept: GASTROENTEROLOGY | Facility: HOSPITAL | Age: 74
End: 2019-08-07

## 2019-08-07 ENCOUNTER — ANESTHESIA (OUTPATIENT)
Dept: GASTROENTEROLOGY | Facility: HOSPITAL | Age: 74
End: 2019-08-07

## 2019-08-07 VITALS
TEMPERATURE: 98.6 F | BODY MASS INDEX: 29.51 KG/M2 | HEIGHT: 60 IN | WEIGHT: 150.31 LBS | DIASTOLIC BLOOD PRESSURE: 86 MMHG | SYSTOLIC BLOOD PRESSURE: 136 MMHG | OXYGEN SATURATION: 98 % | HEART RATE: 82 BPM | RESPIRATION RATE: 16 BRPM

## 2019-08-07 DIAGNOSIS — R05.3 CHRONIC COUGH: ICD-10-CM

## 2019-08-07 LAB
APPEARANCE FLD: ABNORMAL
B PARAPERT DNA SPEC QL NAA+PROBE: NOT DETECTED
B PERT DNA SPEC QL NAA+PROBE: NOT DETECTED
C PNEUM DNA NPH QL NAA+NON-PROBE: NOT DETECTED
COLOR FLD: ABNORMAL
EOSINOPHIL NFR FLD MANUAL: 3 %
FLUAV H1 2009 PAND RNA NPH QL NAA+PROBE: NOT DETECTED
FLUAV H1 HA GENE NPH QL NAA+PROBE: NOT DETECTED
FLUAV H3 RNA NPH QL NAA+PROBE: NOT DETECTED
FLUAV SUBTYP SPEC NAA+PROBE: NOT DETECTED
FLUBV RNA ISLT QL NAA+PROBE: NOT DETECTED
GIE STN SPEC: NORMAL
HADV DNA SPEC NAA+PROBE: NOT DETECTED
HCOV 229E RNA SPEC QL NAA+PROBE: NOT DETECTED
HCOV HKU1 RNA SPEC QL NAA+PROBE: NOT DETECTED
HCOV NL63 RNA SPEC QL NAA+PROBE: NOT DETECTED
HCOV OC43 RNA SPEC QL NAA+PROBE: NOT DETECTED
HMPV RNA NPH QL NAA+NON-PROBE: NOT DETECTED
HPIV1 RNA SPEC QL NAA+PROBE: NOT DETECTED
HPIV2 RNA SPEC QL NAA+PROBE: NOT DETECTED
HPIV3 RNA NPH QL NAA+PROBE: NOT DETECTED
HPIV4 P GENE NPH QL NAA+PROBE: NOT DETECTED
LYMPHOCYTES NFR FLD MANUAL: 15 %
M PNEUMO IGG SER IA-ACNC: NOT DETECTED
MONOS+MACROS NFR FLD: 64 %
NEUTROPHILS NFR FLD MANUAL: 18 %
OTHER CELLS FLUID PER 100/WBCS: 38 /100 WBCS
RBC # FLD AUTO: 8600 /MM3
RHINOVIRUS RNA SPEC NAA+PROBE: NOT DETECTED
RSV RNA NPH QL NAA+NON-PROBE: NOT DETECTED
WBC # FLD AUTO: 68 /MM3

## 2019-08-07 PROCEDURE — 87071 CULTURE AEROBIC QUANT OTHER: CPT | Performed by: INTERNAL MEDICINE

## 2019-08-07 PROCEDURE — 87206 SMEAR FLUORESCENT/ACID STAI: CPT | Performed by: INTERNAL MEDICINE

## 2019-08-07 PROCEDURE — 25010000002 PROPOFOL 1000 MG/ML EMULSION: Performed by: ANESTHESIOLOGY

## 2019-08-07 PROCEDURE — 88112 CYTOPATH CELL ENHANCE TECH: CPT | Performed by: INTERNAL MEDICINE

## 2019-08-07 PROCEDURE — 88305 TISSUE EXAM BY PATHOLOGIST: CPT | Performed by: INTERNAL MEDICINE

## 2019-08-07 PROCEDURE — 87102 FUNGUS ISOLATION CULTURE: CPT | Performed by: INTERNAL MEDICINE

## 2019-08-07 PROCEDURE — 87116 MYCOBACTERIA CULTURE: CPT | Performed by: INTERNAL MEDICINE

## 2019-08-07 PROCEDURE — 0100U HC BIOFIRE FILMARRAY RESP PANEL 2: CPT | Performed by: INTERNAL MEDICINE

## 2019-08-07 PROCEDURE — 87205 SMEAR GRAM STAIN: CPT | Performed by: INTERNAL MEDICINE

## 2019-08-07 PROCEDURE — 25010000002 PROPOFOL 10 MG/ML EMULSION: Performed by: ANESTHESIOLOGY

## 2019-08-07 PROCEDURE — 89051 BODY FLUID CELL COUNT: CPT | Performed by: INTERNAL MEDICINE

## 2019-08-07 PROCEDURE — 88312 SPECIAL STAINS GROUP 1: CPT | Performed by: INTERNAL MEDICINE

## 2019-08-07 RX ORDER — LIDOCAINE HYDROCHLORIDE 10 MG/ML
INJECTION, SOLUTION EPIDURAL; INFILTRATION; INTRACAUDAL; PERINEURAL AS NEEDED
Status: DISCONTINUED | OUTPATIENT
Start: 2019-08-07 | End: 2019-08-07 | Stop reason: HOSPADM

## 2019-08-07 RX ORDER — LIDOCAINE HYDROCHLORIDE 20 MG/ML
INJECTION, SOLUTION EPIDURAL; INFILTRATION; INTRACAUDAL; PERINEURAL AS NEEDED
Status: DISCONTINUED | OUTPATIENT
Start: 2019-08-07 | End: 2019-08-07 | Stop reason: HOSPADM

## 2019-08-07 RX ORDER — PROPOFOL 10 MG/ML
VIAL (ML) INTRAVENOUS AS NEEDED
Status: DISCONTINUED | OUTPATIENT
Start: 2019-08-07 | End: 2019-08-07 | Stop reason: SURG

## 2019-08-07 RX ORDER — SODIUM CHLORIDE, SODIUM LACTATE, POTASSIUM CHLORIDE, CALCIUM CHLORIDE 600; 310; 30; 20 MG/100ML; MG/100ML; MG/100ML; MG/100ML
1000 INJECTION, SOLUTION INTRAVENOUS CONTINUOUS
Status: DISCONTINUED | OUTPATIENT
Start: 2019-08-07 | End: 2019-08-07 | Stop reason: HOSPADM

## 2019-08-07 RX ORDER — LIDOCAINE HYDROCHLORIDE 20 MG/ML
INJECTION, SOLUTION INFILTRATION; PERINEURAL AS NEEDED
Status: DISCONTINUED | OUTPATIENT
Start: 2019-08-07 | End: 2019-08-07 | Stop reason: SURG

## 2019-08-07 RX ADMIN — SODIUM CHLORIDE, POTASSIUM CHLORIDE, SODIUM LACTATE AND CALCIUM CHLORIDE 1000 ML: 600; 310; 30; 20 INJECTION, SOLUTION INTRAVENOUS at 08:29

## 2019-08-07 RX ADMIN — LIDOCAINE HYDROCHLORIDE 50 MG: 20 INJECTION, SOLUTION INFILTRATION; PERINEURAL at 09:50

## 2019-08-07 RX ADMIN — PROPOFOL 125 MCG/KG/MIN: 10 INJECTION, EMULSION INTRAVENOUS at 09:53

## 2019-08-07 RX ADMIN — PROPOFOL 150 MG: 10 INJECTION, EMULSION INTRAVENOUS at 09:50

## 2019-08-07 NOTE — ANESTHESIA PROCEDURE NOTES
Airway  Date/Time: 8/7/2019 9:52 AM  Airway not difficult    General Information and Staff    Patient location: endo.  Anesthesiologist: Miguel Ángel Correia MD    Indications and Patient Condition  Indications for airway management: airway protection      Final Airway Details  Final airway type: supraglottic airway      Successful airway: Combitube  Size 3    Number of attempts at approach: 1

## 2019-08-07 NOTE — ANESTHESIA POSTPROCEDURE EVALUATION
Patient: Marleny Greenwood    Procedure Summary     Date:  08/07/19 Room / Location:  [facility] 7 /  [facility]    Anesthesia Start:  0939 Anesthesia Stop:  1003    Procedure:  BRONCHOSCOPY WITH BRONCHOALVEOLAR LAVAGE (N/A Bronchus) Diagnosis:      Surgeon:  Thomas Sheridan MD Provider:  Miguel Ángel Correia MD    Anesthesia Type:  MAC ASA Status:  3          Anesthesia Type: MAC  Last vitals  BP   107/57 (08/07/19 1007)   Temp   37 °C (98.6 °F) (08/07/19 0826)   Pulse   69 (08/07/19 1007)   Resp   16 (08/07/19 1007)     SpO2   96 % (08/07/19 1007)     Post Anesthesia Care and Evaluation    Patient location during evaluation: bedside  Patient participation: complete - patient participated  Level of consciousness: awake and alert  Pain score: 0  Pain management: adequate  Airway patency: patent  Anesthetic complications: No anesthetic complications  PONV Status: none  Cardiovascular status: acceptable  Respiratory status: acceptable  Hydration status: acceptable  Post Neuraxial Block status: Motor and sensory function returned to baseline

## 2019-08-07 NOTE — DISCHARGE INSTRUCTIONS
For the next 24 hours patient needs to be with a responsible adult.    For 24 hours DO NOT drive, operate machinery, appliances, drink alcohol, make important decisions or sign legal documents.    Start with a light or bland diet if you are feeling sick to your stomach otherwise advance to regular diet as tolerated.    Follow recommendations on procedure report if provided by your doctor.    Call Dr Sheridan for problems [phone]    Problems may include but not limited to: large amounts of bleeding, trouble breathing, repeated vomiting, severe unrelieved pain, fever or chills.

## 2019-08-07 NOTE — ANESTHESIA PREPROCEDURE EVALUATION
Anesthesia Evaluation     Patient summary reviewed   NPO Solid Status: > 8 hours  NPO Liquid Status: > 8 hours           Airway   Mallampati: III  TM distance: >3 FB  Neck ROM: full  No difficulty expected  Dental - normal exam     Pulmonary     breath sounds clear to auscultation  Cardiovascular   Exercise tolerance: good (4-7 METS)    Rhythm: regular  Rate: normal    (+) CABG,       Neuro/Psych  GI/Hepatic/Renal/Endo      Musculoskeletal     Abdominal    Substance History      OB/GYN          Other                        Anesthesia Plan    ASA 3     MAC     intravenous induction   Anesthetic plan, all risks, benefits, and alternatives have been provided, discussed and informed consent has been obtained with: patient.

## 2019-08-08 LAB
CYTO UR: NORMAL
LAB AP CASE REPORT: NORMAL
LAB AP DIAGNOSIS COMMENT: NORMAL
LAB AP SPECIAL STAINS: NORMAL
PATH REPORT.FINAL DX SPEC: NORMAL
PATH REPORT.GROSS SPEC: NORMAL

## 2019-08-09 LAB
BACTERIA SPEC AEROBE CULT: NORMAL
GRAM STN SPEC: NORMAL
GRAM STN SPEC: NORMAL

## 2019-08-23 ENCOUNTER — TELEPHONE (OUTPATIENT)
Dept: INTERNAL MEDICINE | Facility: CLINIC | Age: 74
End: 2019-08-23

## 2019-08-23 ENCOUNTER — TELEPHONE (OUTPATIENT)
Dept: CARDIOLOGY | Facility: CLINIC | Age: 74
End: 2019-08-23

## 2019-08-23 RX ORDER — NITROGLYCERIN 0.4 MG/1
0.4 TABLET SUBLINGUAL
Qty: 25 TABLET | Refills: 3 | Status: SHIPPED | OUTPATIENT
Start: 2019-08-23 | End: 2021-08-18 | Stop reason: SDUPTHER

## 2019-08-23 NOTE — TELEPHONE ENCOUNTER
Patient called left message for refill on Nitrostat 0.4 mg tablets. Looked in patient's chart and notice that it has never been filled by Dr. Zaragoza. I called the patient back and asked if her cardiologist usually fills the prescription for and she said she was going to call cardiology first and then give us a call back

## 2019-09-09 LAB — FUNGUS WND CULT: ABNORMAL

## 2019-09-18 LAB
MYCOBACTERIUM SPEC CULT: NORMAL
NIGHT BLUE STAIN TISS: NORMAL

## 2019-10-25 ENCOUNTER — TELEPHONE (OUTPATIENT)
Dept: CARDIOLOGY | Facility: CLINIC | Age: 74
End: 2019-10-25

## 2019-10-25 ENCOUNTER — OFFICE VISIT (OUTPATIENT)
Dept: CARDIOLOGY | Facility: CLINIC | Age: 74
End: 2019-10-25

## 2019-10-25 ENCOUNTER — APPOINTMENT (OUTPATIENT)
Dept: LAB | Facility: HOSPITAL | Age: 74
End: 2019-10-25

## 2019-10-25 VITALS
HEIGHT: 60 IN | SYSTOLIC BLOOD PRESSURE: 186 MMHG | WEIGHT: 155 LBS | RESPIRATION RATE: 18 BRPM | OXYGEN SATURATION: 95 % | BODY MASS INDEX: 30.43 KG/M2 | HEART RATE: 64 BPM | DIASTOLIC BLOOD PRESSURE: 106 MMHG

## 2019-10-25 DIAGNOSIS — Z95.5 H/O HEART ARTERY STENT: ICD-10-CM

## 2019-10-25 DIAGNOSIS — E87.6 HYPOKALEMIA: Primary | ICD-10-CM

## 2019-10-25 DIAGNOSIS — I47.29 NONSUSTAINED VENTRICULAR TACHYCARDIA (HCC): ICD-10-CM

## 2019-10-25 DIAGNOSIS — Z98.890 HISTORY OF LOOP RECORDER: ICD-10-CM

## 2019-10-25 DIAGNOSIS — I10 ESSENTIAL HYPERTENSION: ICD-10-CM

## 2019-10-25 DIAGNOSIS — I31.1 PERICARDIAL CONSTRICTION: ICD-10-CM

## 2019-10-25 DIAGNOSIS — I25.10 CORONARY ARTERIOSCLEROSIS IN NATIVE ARTERY: Primary | ICD-10-CM

## 2019-10-25 DIAGNOSIS — Z95.1 S/P CABG (CORONARY ARTERY BYPASS GRAFT): ICD-10-CM

## 2019-10-25 DIAGNOSIS — E78.5 HYPERLIPIDEMIA LDL GOAL <100: ICD-10-CM

## 2019-10-25 DIAGNOSIS — R06.02 SHORTNESS OF BREATH: ICD-10-CM

## 2019-10-25 DIAGNOSIS — R06.89 RESPIRATORY INSUFFICIENCY: ICD-10-CM

## 2019-10-25 LAB
ANION GAP SERPL CALCULATED.3IONS-SCNC: 10.4 MMOL/L (ref 5–15)
BUN BLD-MCNC: 15 MG/DL (ref 8–23)
BUN/CREAT SERPL: 15.5 (ref 7–25)
CALCIUM SPEC-SCNC: 8.9 MG/DL (ref 8.6–10.5)
CHLORIDE SERPL-SCNC: 105 MMOL/L (ref 98–107)
CO2 SERPL-SCNC: 28.6 MMOL/L (ref 22–29)
CREAT BLD-MCNC: 0.97 MG/DL (ref 0.57–1)
GFR SERPL CREATININE-BSD FRML MDRD: 68 ML/MIN/1.73
GLUCOSE BLD-MCNC: 108 MG/DL (ref 65–99)
NT-PROBNP SERPL-MCNC: 168.7 PG/ML (ref 5–900)
POTASSIUM BLD-SCNC: 3.2 MMOL/L (ref 3.5–5.2)
SODIUM BLD-SCNC: 144 MMOL/L (ref 136–145)

## 2019-10-25 PROCEDURE — 83880 ASSAY OF NATRIURETIC PEPTIDE: CPT | Performed by: INTERNAL MEDICINE

## 2019-10-25 PROCEDURE — 80048 BASIC METABOLIC PNL TOTAL CA: CPT | Performed by: INTERNAL MEDICINE

## 2019-10-25 PROCEDURE — 99215 OFFICE O/P EST HI 40 MIN: CPT | Performed by: INTERNAL MEDICINE

## 2019-10-25 PROCEDURE — 36415 COLL VENOUS BLD VENIPUNCTURE: CPT | Performed by: INTERNAL MEDICINE

## 2019-10-25 RX ORDER — FUROSEMIDE 40 MG/1
60 TABLET ORAL 2 TIMES DAILY
Qty: 180 TABLET | Refills: 1 | Status: SHIPPED | OUTPATIENT
Start: 2019-10-25 | End: 2020-04-16

## 2019-10-25 RX ORDER — LOSARTAN POTASSIUM 50 MG/1
50 TABLET ORAL DAILY
Qty: 90 TABLET | Refills: 1 | Status: SHIPPED | OUTPATIENT
Start: 2019-10-25 | End: 2020-03-11

## 2019-10-25 RX ORDER — POTASSIUM CHLORIDE 1500 MG/1
20 TABLET, FILM COATED, EXTENDED RELEASE ORAL 3 TIMES DAILY
Qty: 90 TABLET | Refills: 5 | Status: SHIPPED | OUTPATIENT
Start: 2019-10-25 | End: 2019-12-02 | Stop reason: SDUPTHER

## 2019-10-25 NOTE — PROGRESS NOTES
[facility]        Subjective:     Encounter Date:10/25/2019      Patient ID: Marleny Greenwood is a 74 y.o. female.      HPI:I saw Marleny Greenwood today for continued cardiovascular care.  She is a pleasant 74-year-old female who has complaint of nonproductive cough and dyspnea on exertion.  She has a known history of coronary heart disease underwent coronary artery bypass surgery in 2011.  Coronary angiography 11/13/2017 demonstrated an occluded vein graft to the right coronary artery, atretic LIMA to the LAD and a patent saphenous vein graft to the LAD.  There is chronic total occlusion of the native right coronary artery and LAD.  Echocardiogram obtained 6/13/2019 demonstrated left ventricular ejection fraction 62%, mild concentric left ventricular hypertrophy grade 1 diastolic dysfunction and mild aortic mitral tricuspid insufficiency.  She demonstrates some volume excess and underwent effective diuresis.  Nevertheless her respiratory insufficiency and chronic cough persists.    She underwent bronchoscopy by pulmonologist Dr. Thomas Jameson on 8/7/2019 which apparently was unremarkable.  Her dyspnea on exertion persists.  She does not report chest pain pressure tightness.  She does not experience orthopnea or PND, no lower extremity edema    The following portions of the patient's history were reviewed and updated as appropriate: allergies, current medications, past family history, past medical history, past social history, past surgical history and problem list.    Problem List:  Patient Active Problem List   Diagnosis   • Asthma, cough variant   • Coronary arteriosclerosis in native artery   • Essential hypertension   • Chronic daily headache   • Nodule of upper lobe of right lung   • Hyperlipidemia LDL goal <100   • Nonsustained ventricular tachycardia (CMS/HCC)   • Respiratory insufficiency   • History of cardiac cath   • History of loop recorder   • H/O heart artery stent   • S/P CABG  (coronary artery bypass graft)   • H/O echocardiogram   • History of nuclear stress test       Past Medical History:  Past Medical History:   Diagnosis Date   • Asthma    • H/O echocardiogram 6/11/2019 6/05/18 - Normal LV size and function.  EF 60-65%.  Mild concentric LVH.  Midl AR, MR, and TR.   • H/O heart artery stent 6/11/2019    3/22/12 - VISION stent to the SVG of the RCA reducing a total occlusion to 0% residual narrowing.   • Headache    • History of cardiac cath 6/11/2019   • History of loop recorder 6/11/2019    5/20/15 - [employer] LINQ implant.   • History of nuclear stress test 6/11/2019    10/09/17 - Abnormal study.  EF 46%.   • Hyperlipidemia LDL goal <100 6/11/2019   • Hypertension    • Nonsustained ventricular tachycardia (CMS/HCC) 6/11/2019   • Respiratory insufficiency 6/11/2019   • S/P CABG (coronary artery bypass graft) 6/11/2019 4/12/11 - LIMA to the LAD and SVG to the posterior descending artery.       Past Surgical History:  Past Surgical History:   Procedure Laterality Date   • APPENDECTOMY     • BRONCHOSCOPY N/A 8/7/2019    Procedure: BRONCHOSCOPY WITH BRONCHOALVEOLAR LAVAGE;  Surgeon: Thomas Sheridan MD;  Location: [facility];  Service: Pulmonary   • CHOLECYSTECTOMY     • CORONARY ARTERY BYPASS GRAFT Bilateral 2011   • HYSTERECTOMY         Social History:  Social History     Socioeconomic History   • Marital status:      Spouse name: Not on file   • Number of children: Not on file   • Years of education: Not on file   • Highest education level: Not on file   Tobacco Use   • Smoking status: Never Smoker   • Smokeless tobacco: Never Used   Substance and Sexual Activity   • Alcohol use: No     Frequency: Never   • Drug use: No   • Sexual activity: Defer       Allergies:  Allergies   Allergen Reactions   • Cardizem [Diltiazem] Unknown (See Comments)     Pt is unknown why she can not take this medication    • Levaquin [Levofloxacin] Cough   • Adhesive Tape Unknown (See  "Comments)     Rash and itchy skin         ROS:  Review of Systems   Constitution: Positive for weakness and malaise/fatigue.   HENT: Negative for hearing loss and nosebleeds.    Eyes: Negative for double vision and visual disturbance.   Cardiovascular: Positive for dyspnea on exertion. Negative for chest pain, claudication, leg swelling, near-syncope, orthopnea, palpitations, paroxysmal nocturnal dyspnea and syncope.   Respiratory: Negative for cough, shortness of breath, sleep disturbances due to breathing, snoring, sputum production and wheezing.    Endocrine: Negative for cold intolerance, heat intolerance, polydipsia and polyuria.   Hematologic/Lymphatic: Negative for adenopathy and bleeding problem. Does not bruise/bleed easily.   Skin: Negative for flushing and itching.   Musculoskeletal: Negative for back pain, falls, joint pain, joint swelling, muscle weakness and neck pain.   Gastrointestinal: Negative for abdominal pain, dysphagia, heartburn, nausea and vomiting.   Genitourinary: Negative for dysuria and frequency.   Neurological: Negative for disturbances in coordination, dizziness, light-headedness, loss of balance and numbness.   Psychiatric/Behavioral: Negative for altered mental status and memory loss. The patient is not nervous/anxious.    Allergic/Immunologic: Negative for hives and persistent infections.          Objective:         BP (!) 186/106 (BP Location: Left arm, Patient Position: Sitting, Cuff Size: Large Adult)   Pulse 64   Resp 18   Ht 152.4 cm (60\")   Wt 70.3 kg (155 lb)   SpO2 95%   BMI 30.27 kg/m²     Physical Exam   Constitutional: She is oriented to person, place, and time. She appears well-developed and well-nourished.   HENT:   Head: Normocephalic and atraumatic.   Eyes: Conjunctivae and EOM are normal. Pupils are equal, round, and reactive to light.   Neck: Neck supple. No JVD present. No thyromegaly present.   Cardiovascular: Normal rate, regular rhythm, S1 normal, S2 " normal and intact distal pulses. PMI is not displaced. Exam reveals no S3, no S4, no distant heart sounds, no friction rub, no midsystolic click and no opening snap.   No murmur heard.  Pulses:       Carotid pulses are 2+ on the right side, and 2+ on the left side.       Radial pulses are 2+ on the right side, and 2+ on the left side.        Femoral pulses are 2+ on the right side, and 2+ on the left side.       Dorsalis pedis pulses are 2+ on the right side, and 2+ on the left side.        Posterior tibial pulses are 2+ on the right side, and 2+ on the left side.   Pulmonary/Chest: Effort normal. No respiratory distress. She has no wheezes. She has no rales.   Abdominal: Soft. Bowel sounds are normal. She exhibits no distension and no mass. There is no tenderness.   Musculoskeletal: Normal range of motion. She exhibits no edema.   Neurological: She is alert and oriented to person, place, and time.   Skin: Skin is warm and dry. No erythema.   Psychiatric: She has a normal mood and affect.       In-Office Procedure(s):  Procedures    ASCVD RIsk Score::  The ASCVD Risk score (Thom DC Jr., et al., 2013) failed to calculate for the following reasons:    Cannot find a previous HDL lab    Cannot find a previous total cholesterol lab        Assessment/Plan:         1. Coronary arteriosclerosis in native artery  Stable    2. Essential hypertension  Elevated, target less than 130/80 will advance losartan  - Basic Metabolic Panel    3. Hyperlipidemia LDL goal <100  Stable    4. Nonsustained ventricular tachycardia (CMS/HCC)  Stable    5. Respiratory insufficiency  Persistent and limiting, advanced blood pressure control and diuretic therapy  - Case Request Cath Lab: Right and Left Heart Cath    6. H/O heart artery stent  Stable    7. S/P CABG (coronary artery bypass graft)  Stable    8. History of loop recorder      9. Shortness of breath  Persistent  - BNP    10. Pericardial constriction  Consider constrictive or restrictive  physiology  - Case Request Cath Lab: Right and Left Heart Cath      We will advance losartan 50 mg a day and Lasix from 60 mg once daily to 40 mg twice daily.  We will obtain a basic metabolic panel and a BNP in 7 days.  We will proceed to right left heart catheterization coronary arteriography left ventriculogram for further assessment of for limiting respiratory insufficiency and possible constrictive or restrictive physiology.  Risk benefits been explained patient understands and is prepared to proceed.         Mal Moser MD  10/25/19  .

## 2019-10-25 NOTE — TELEPHONE ENCOUNTER
Per Dr. Resendez,  From office visit 10/25, pt to increase Lasix from 60mg qd to 40mg BID. Also increase K to 20meq TID. Recheck BMP 10/28 or 10/29. Run results past doc on call for any adjustments that may be needed. [place]

## 2019-10-28 ENCOUNTER — LAB (OUTPATIENT)
Dept: LAB | Facility: HOSPITAL | Age: 74
End: 2019-10-28

## 2019-10-28 DIAGNOSIS — E87.6 HYPOKALEMIA: ICD-10-CM

## 2019-10-28 LAB
ANION GAP SERPL CALCULATED.3IONS-SCNC: 11.7 MMOL/L (ref 5–15)
BUN BLD-MCNC: 21 MG/DL (ref 8–23)
BUN/CREAT SERPL: 16 (ref 7–25)
CALCIUM SPEC-SCNC: 8.8 MG/DL (ref 8.6–10.5)
CHLORIDE SERPL-SCNC: 105 MMOL/L (ref 98–107)
CO2 SERPL-SCNC: 26.3 MMOL/L (ref 22–29)
CREAT BLD-MCNC: 1.31 MG/DL (ref 0.57–1)
GFR SERPL CREATININE-BSD FRML MDRD: 48 ML/MIN/1.73
GLUCOSE BLD-MCNC: 103 MG/DL (ref 65–99)
POTASSIUM BLD-SCNC: 3.1 MMOL/L (ref 3.5–5.2)
SODIUM BLD-SCNC: 143 MMOL/L (ref 136–145)

## 2019-10-28 PROCEDURE — 80048 BASIC METABOLIC PNL TOTAL CA: CPT

## 2019-10-28 PROCEDURE — 36415 COLL VENOUS BLD VENIPUNCTURE: CPT

## 2019-10-29 NOTE — TELEPHONE ENCOUNTER
Labs back and K is still low. Per Dr. Bolton, increase to 40meq BID and have pt take an extra 40meq today. Recheck BMP Monday morning. Continue Lasix 40mg BID. Pt notified. [place]

## 2019-11-01 DIAGNOSIS — Z01.818 PRE-OP TESTING: Primary | ICD-10-CM

## 2019-11-01 DIAGNOSIS — I25.10 CORONARY ARTERIOSCLEROSIS IN NATIVE ARTERY: ICD-10-CM

## 2019-11-01 PROBLEM — I31.1: Status: ACTIVE | Noted: 2019-11-01

## 2019-11-04 ENCOUNTER — LAB (OUTPATIENT)
Dept: LAB | Facility: HOSPITAL | Age: 74
End: 2019-11-04

## 2019-11-04 DIAGNOSIS — I25.10 CORONARY ARTERIOSCLEROSIS IN NATIVE ARTERY: ICD-10-CM

## 2019-11-04 DIAGNOSIS — Z01.818 PRE-OP TESTING: ICD-10-CM

## 2019-11-04 LAB
ALBUMIN SERPL-MCNC: 4.1 G/DL (ref 3.5–5.2)
ALBUMIN/GLOB SERPL: 1.4 G/DL
ALP SERPL-CCNC: 82 U/L (ref 39–117)
ALT SERPL W P-5'-P-CCNC: 16 U/L (ref 1–33)
ANION GAP SERPL CALCULATED.3IONS-SCNC: 13.9 MMOL/L (ref 5–15)
AST SERPL-CCNC: 10 U/L (ref 1–32)
BASOPHILS # BLD AUTO: 0.03 10*3/MM3 (ref 0–0.2)
BASOPHILS NFR BLD AUTO: 0.4 % (ref 0–1.5)
BILIRUB SERPL-MCNC: 0.3 MG/DL (ref 0.2–1.2)
BUN BLD-MCNC: 26 MG/DL (ref 8–23)
BUN/CREAT SERPL: 18.3 (ref 7–25)
CALCIUM SPEC-SCNC: 8.9 MG/DL (ref 8.6–10.5)
CHLORIDE SERPL-SCNC: 106 MMOL/L (ref 98–107)
CO2 SERPL-SCNC: 26.1 MMOL/L (ref 22–29)
CREAT BLD-MCNC: 1.42 MG/DL (ref 0.57–1)
DEPRECATED RDW RBC AUTO: 46.7 FL (ref 37–54)
EOSINOPHIL # BLD AUTO: 0.15 10*3/MM3 (ref 0–0.4)
EOSINOPHIL NFR BLD AUTO: 2 % (ref 0.3–6.2)
ERYTHROCYTE [DISTWIDTH] IN BLOOD BY AUTOMATED COUNT: 14.5 % (ref 12.3–15.4)
GFR SERPL CREATININE-BSD FRML MDRD: 44 ML/MIN/1.73
GLOBULIN UR ELPH-MCNC: 3 GM/DL
GLUCOSE BLD-MCNC: 116 MG/DL (ref 65–99)
HCT VFR BLD AUTO: 40 % (ref 34–46.6)
HGB BLD-MCNC: 13.1 G/DL (ref 12–15.9)
IMM GRANULOCYTES # BLD AUTO: 0.07 10*3/MM3 (ref 0–0.05)
IMM GRANULOCYTES NFR BLD AUTO: 0.9 % (ref 0–0.5)
INR PPP: 1.07 (ref 0.9–1.1)
LYMPHOCYTES # BLD AUTO: 1.18 10*3/MM3 (ref 0.7–3.1)
LYMPHOCYTES NFR BLD AUTO: 15.6 % (ref 19.6–45.3)
MCH RBC QN AUTO: 29 PG (ref 26.6–33)
MCHC RBC AUTO-ENTMCNC: 32.8 G/DL (ref 31.5–35.7)
MCV RBC AUTO: 88.7 FL (ref 79–97)
MONOCYTES # BLD AUTO: 0.88 10*3/MM3 (ref 0.1–0.9)
MONOCYTES NFR BLD AUTO: 11.7 % (ref 5–12)
NEUTROPHILS # BLD AUTO: 5.23 10*3/MM3 (ref 1.7–7)
NEUTROPHILS NFR BLD AUTO: 69.4 % (ref 42.7–76)
NRBC BLD AUTO-RTO: 0 /100 WBC (ref 0–0.2)
PLATELET # BLD AUTO: 175 10*3/MM3 (ref 140–450)
PMV BLD AUTO: 10 FL (ref 6–12)
POTASSIUM BLD-SCNC: 3.6 MMOL/L (ref 3.5–5.2)
PROT SERPL-MCNC: 7.1 G/DL (ref 6–8.5)
PROTHROMBIN TIME: 13.6 SECONDS (ref 11.7–14.2)
RBC # BLD AUTO: 4.51 10*6/MM3 (ref 3.77–5.28)
SODIUM BLD-SCNC: 146 MMOL/L (ref 136–145)
WBC NRBC COR # BLD: 7.54 10*3/MM3 (ref 3.4–10.8)

## 2019-11-04 PROCEDURE — 80053 COMPREHEN METABOLIC PANEL: CPT

## 2019-11-04 PROCEDURE — 85025 COMPLETE CBC W/AUTO DIFF WBC: CPT

## 2019-11-04 PROCEDURE — 85610 PROTHROMBIN TIME: CPT

## 2019-11-04 PROCEDURE — 36415 COLL VENOUS BLD VENIPUNCTURE: CPT

## 2019-11-11 RX ORDER — CLOPIDOGREL BISULFATE 75 MG/1
TABLET ORAL
Qty: 90 TABLET | Refills: 4 | Status: SHIPPED | OUTPATIENT
Start: 2019-11-11 | End: 2021-02-02

## 2019-11-14 ENCOUNTER — LAB (OUTPATIENT)
Dept: LAB | Facility: HOSPITAL | Age: 74
End: 2019-11-14

## 2019-11-14 ENCOUNTER — TELEPHONE (OUTPATIENT)
Dept: CARDIOLOGY | Facility: CLINIC | Age: 74
End: 2019-11-14

## 2019-11-14 DIAGNOSIS — E87.6 HYPOKALEMIA: ICD-10-CM

## 2019-11-14 DIAGNOSIS — E87.6 HYPOKALEMIA: Primary | ICD-10-CM

## 2019-11-14 LAB
ANION GAP SERPL CALCULATED.3IONS-SCNC: 10.8 MMOL/L (ref 5–15)
BUN BLD-MCNC: 19 MG/DL (ref 8–23)
BUN/CREAT SERPL: 13.1 (ref 7–25)
CALCIUM SPEC-SCNC: 9.2 MG/DL (ref 8.6–10.5)
CHLORIDE SERPL-SCNC: 102 MMOL/L (ref 98–107)
CO2 SERPL-SCNC: 29.2 MMOL/L (ref 22–29)
CREAT BLD-MCNC: 1.45 MG/DL (ref 0.57–1)
GFR SERPL CREATININE-BSD FRML MDRD: 43 ML/MIN/1.73
GLUCOSE BLD-MCNC: 111 MG/DL (ref 65–99)
POTASSIUM BLD-SCNC: 2.8 MMOL/L (ref 3.5–5.2)
SODIUM BLD-SCNC: 142 MMOL/L (ref 136–145)

## 2019-11-14 PROCEDURE — 80048 BASIC METABOLIC PNL TOTAL CA: CPT

## 2019-11-14 PROCEDURE — 36415 COLL VENOUS BLD VENIPUNCTURE: CPT

## 2019-11-14 NOTE — TELEPHONE ENCOUNTER
Dr Mal Moser reviewed labs 11/14/19, K+ noted 2.8.  Initially recommended Spironolactone 25mg QD with an additional 60mEq x1 of KCl and pt to continue KCl 60mEq daily. BMP Mon 11/18.  I spoke with pt. She states she was out of town and did not have her KCl recently, she went 5 days without med. I also verified current dosage of KCl, she states she take 40mEq BID.  I spoke with Dr Mal Moser regarding the above. He changed initial orders for pt to take an additional KCl of 60mEq x1 and pt to continue KCl 40mEq BID. He does not want pt to start Spironolactone at this time. Recheck BMP Mon 11/18/19.  I spoke with pt and gave her the current recommendations. She verbalized understanding and repeated instructions back to me. Lab order entered in Epic. RTRN

## 2019-11-18 ENCOUNTER — RESULTS ENCOUNTER (OUTPATIENT)
Dept: CARDIOLOGY | Facility: CLINIC | Age: 74
End: 2019-11-18

## 2019-11-18 ENCOUNTER — PREP FOR SURGERY (OUTPATIENT)
Dept: OTHER | Facility: HOSPITAL | Age: 74
End: 2019-11-18

## 2019-11-18 ENCOUNTER — APPOINTMENT (OUTPATIENT)
Dept: LAB | Facility: HOSPITAL | Age: 74
End: 2019-11-18

## 2019-11-18 DIAGNOSIS — E87.6 HYPOKALEMIA: ICD-10-CM

## 2019-11-18 LAB
ANION GAP SERPL CALCULATED.3IONS-SCNC: 15.3 MMOL/L (ref 5–15)
BUN BLD-MCNC: 25 MG/DL (ref 8–23)
BUN/CREAT SERPL: 14.1 (ref 7–25)
CALCIUM SPEC-SCNC: 9.1 MG/DL (ref 8.6–10.5)
CHLORIDE SERPL-SCNC: 104 MMOL/L (ref 98–107)
CO2 SERPL-SCNC: 23.7 MMOL/L (ref 22–29)
CREAT BLD-MCNC: 1.77 MG/DL (ref 0.57–1)
GFR SERPL CREATININE-BSD FRML MDRD: 34 ML/MIN/1.73
GLUCOSE BLD-MCNC: 104 MG/DL (ref 65–99)
POTASSIUM BLD-SCNC: 4 MMOL/L (ref 3.5–5.2)
SODIUM BLD-SCNC: 143 MMOL/L (ref 136–145)

## 2019-11-18 PROCEDURE — 80048 BASIC METABOLIC PNL TOTAL CA: CPT | Performed by: INTERNAL MEDICINE

## 2019-11-18 PROCEDURE — 36415 COLL VENOUS BLD VENIPUNCTURE: CPT | Performed by: INTERNAL MEDICINE

## 2019-11-18 RX ORDER — SODIUM CHLORIDE 9 MG/ML
100 INJECTION, SOLUTION INTRAVENOUS ONCE
Status: CANCELLED | OUTPATIENT
Start: 2019-11-19

## 2019-11-19 ENCOUNTER — HOSPITAL ENCOUNTER (INPATIENT)
Facility: HOSPITAL | Age: 74
LOS: 1 days | Discharge: HOME | End: 2019-11-22
Attending: INTERNAL MEDICINE | Admitting: INTERNAL MEDICINE

## 2019-11-19 DIAGNOSIS — I31.1 PERICARDIAL CONSTRICTION: ICD-10-CM

## 2019-11-19 DIAGNOSIS — R06.89 RESPIRATORY INSUFFICIENCY: ICD-10-CM

## 2019-11-19 LAB
ANION GAP SERPL CALCULATED.3IONS-SCNC: 14.7 MMOL/L (ref 5–15)
BUN BLD-MCNC: 26 MG/DL (ref 8–23)
BUN/CREAT SERPL: 17 (ref 7–25)
CALCIUM SPEC-SCNC: 9 MG/DL (ref 8.6–10.5)
CHLORIDE SERPL-SCNC: 108 MMOL/L (ref 98–107)
CO2 SERPL-SCNC: 23.3 MMOL/L (ref 22–29)
CREAT BLD-MCNC: 1.53 MG/DL (ref 0.57–1)
GFR SERPL CREATININE-BSD FRML MDRD: 40 ML/MIN/1.73
GLUCOSE BLD-MCNC: 123 MG/DL (ref 65–99)
HCT VFR BLDA CALC: 32 % (ref 38–51)
HCT VFR BLDA CALC: 33 % (ref 38–51)
HCT VFR BLDA CALC: 34 % (ref 38–51)
HGB BLDA-MCNC: 10.9 G/DL (ref 12–17)
HGB BLDA-MCNC: 11.2 G/DL (ref 12–17)
HGB BLDA-MCNC: 11.6 G/DL (ref 12–17)
MAGNESIUM SERPL-MCNC: 1.9 MG/DL (ref 1.6–2.4)
POTASSIUM BLD-SCNC: 3.8 MMOL/L (ref 3.5–5.2)
SAO2 % BLDA: 74 % (ref 95–98)
SAO2 % BLDA: 76 % (ref 95–98)
SAO2 % BLDA: 95 % (ref 95–98)
SODIUM BLD-SCNC: 146 MMOL/L (ref 136–145)

## 2019-11-19 PROCEDURE — 85018 HEMOGLOBIN: CPT

## 2019-11-19 PROCEDURE — C1894 INTRO/SHEATH, NON-LASER: HCPCS | Performed by: INTERNAL MEDICINE

## 2019-11-19 PROCEDURE — 0 IOPAMIDOL PER 1 ML: Performed by: INTERNAL MEDICINE

## 2019-11-19 PROCEDURE — C1769 GUIDE WIRE: HCPCS | Performed by: INTERNAL MEDICINE

## 2019-11-19 PROCEDURE — B2111ZZ FLUOROSCOPY OF MULTIPLE CORONARY ARTERIES USING LOW OSMOLAR CONTRAST: ICD-10-PCS | Performed by: INTERNAL MEDICINE

## 2019-11-19 PROCEDURE — 80048 BASIC METABOLIC PNL TOTAL CA: CPT | Performed by: NURSE PRACTITIONER

## 2019-11-19 PROCEDURE — B2131ZZ FLUOROSCOPY OF MULTIPLE CORONARY ARTERY BYPASS GRAFTS USING LOW OSMOLAR CONTRAST: ICD-10-PCS | Performed by: INTERNAL MEDICINE

## 2019-11-19 PROCEDURE — 93461 R&L HRT ART/VENTRICLE ANGIO: CPT | Performed by: INTERNAL MEDICINE

## 2019-11-19 PROCEDURE — 25010000002 FENTANYL CITRATE (PF) 100 MCG/2ML SOLUTION: Performed by: INTERNAL MEDICINE

## 2019-11-19 PROCEDURE — 83735 ASSAY OF MAGNESIUM: CPT | Performed by: NURSE PRACTITIONER

## 2019-11-19 PROCEDURE — 85014 HEMATOCRIT: CPT

## 2019-11-19 PROCEDURE — 99152 MOD SED SAME PHYS/QHP 5/>YRS: CPT | Performed by: INTERNAL MEDICINE

## 2019-11-19 PROCEDURE — G0378 HOSPITAL OBSERVATION PER HR: HCPCS

## 2019-11-19 PROCEDURE — 4A023N7 MEASUREMENT OF CARDIAC SAMPLING AND PRESSURE, LEFT HEART, PERCUTANEOUS APPROACH: ICD-10-PCS | Performed by: INTERNAL MEDICINE

## 2019-11-19 PROCEDURE — 99153 MOD SED SAME PHYS/QHP EA: CPT | Performed by: INTERNAL MEDICINE

## 2019-11-19 PROCEDURE — 25010000002 MIDAZOLAM PER 1 MG: Performed by: INTERNAL MEDICINE

## 2019-11-19 RX ORDER — ATORVASTATIN CALCIUM 20 MG/1
40 TABLET, FILM COATED ORAL DAILY
Status: DISCONTINUED | OUTPATIENT
Start: 2019-11-19 | End: 2019-11-22 | Stop reason: HOSPADM

## 2019-11-19 RX ORDER — POLYETHYLENE GLYCOL 3350 17 G/17G
17 POWDER, FOR SOLUTION ORAL DAILY
Status: DISCONTINUED | OUTPATIENT
Start: 2019-11-19 | End: 2019-11-19 | Stop reason: SDUPTHER

## 2019-11-19 RX ORDER — POLYETHYLENE GLYCOL 3350 17 G/17G
17 POWDER, FOR SOLUTION ORAL DAILY
Status: CANCELLED | OUTPATIENT
Start: 2019-11-19

## 2019-11-19 RX ORDER — ASPIRIN 81 MG/1
81 TABLET ORAL DAILY
Status: DISCONTINUED | OUTPATIENT
Start: 2019-11-19 | End: 2019-11-22 | Stop reason: HOSPADM

## 2019-11-19 RX ORDER — SODIUM CHLORIDE 0.9 % (FLUSH) 0.9 %
3 SYRINGE (ML) INJECTION EVERY 12 HOURS SCHEDULED
Status: DISCONTINUED | OUTPATIENT
Start: 2019-11-19 | End: 2019-11-19 | Stop reason: HOSPADM

## 2019-11-19 RX ORDER — ISOSORBIDE MONONITRATE 60 MG/1
60 TABLET, EXTENDED RELEASE ORAL DAILY
Status: DISCONTINUED | OUTPATIENT
Start: 2019-11-19 | End: 2019-11-22 | Stop reason: HOSPADM

## 2019-11-19 RX ORDER — SODIUM CHLORIDE 0.9 % (FLUSH) 0.9 %
10 SYRINGE (ML) INJECTION AS NEEDED
Status: DISCONTINUED | OUTPATIENT
Start: 2019-11-19 | End: 2019-11-22 | Stop reason: HOSPADM

## 2019-11-19 RX ORDER — CLOPIDOGREL BISULFATE 75 MG/1
75 TABLET ORAL DAILY
Status: DISCONTINUED | OUTPATIENT
Start: 2019-11-19 | End: 2019-11-22 | Stop reason: HOSPADM

## 2019-11-19 RX ORDER — RANOLAZINE 500 MG/1
500 TABLET, EXTENDED RELEASE ORAL EVERY 12 HOURS SCHEDULED
Status: DISCONTINUED | OUTPATIENT
Start: 2019-11-19 | End: 2019-11-22 | Stop reason: HOSPADM

## 2019-11-19 RX ORDER — MONTELUKAST SODIUM 10 MG/1
10 TABLET ORAL NIGHTLY
Status: DISCONTINUED | OUTPATIENT
Start: 2019-11-19 | End: 2019-11-19

## 2019-11-19 RX ORDER — SODIUM CHLORIDE 0.9 % (FLUSH) 0.9 %
10 SYRINGE (ML) INJECTION EVERY 12 HOURS SCHEDULED
Status: DISCONTINUED | OUTPATIENT
Start: 2019-11-19 | End: 2019-11-22 | Stop reason: HOSPADM

## 2019-11-19 RX ORDER — NITROGLYCERIN 0.4 MG/1
0.4 TABLET SUBLINGUAL
Status: DISCONTINUED | OUTPATIENT
Start: 2019-11-19 | End: 2019-11-22 | Stop reason: HOSPADM

## 2019-11-19 RX ORDER — POLYETHYLENE GLYCOL 3350 17 G/17G
17 POWDER, FOR SOLUTION ORAL DAILY
Status: DISCONTINUED | OUTPATIENT
Start: 2019-11-19 | End: 2019-11-22 | Stop reason: HOSPADM

## 2019-11-19 RX ORDER — LIDOCAINE HYDROCHLORIDE 20 MG/ML
INJECTION, SOLUTION INFILTRATION; PERINEURAL AS NEEDED
Status: DISCONTINUED | OUTPATIENT
Start: 2019-11-19 | End: 2019-11-19 | Stop reason: HOSPADM

## 2019-11-19 RX ORDER — SODIUM CHLORIDE 9 MG/ML
100 INJECTION, SOLUTION INTRAVENOUS ONCE
Status: COMPLETED | OUTPATIENT
Start: 2019-11-19 | End: 2019-11-20

## 2019-11-19 RX ORDER — SODIUM CHLORIDE 0.9 % (FLUSH) 0.9 %
10 SYRINGE (ML) INJECTION AS NEEDED
Status: DISCONTINUED | OUTPATIENT
Start: 2019-11-19 | End: 2019-11-19 | Stop reason: HOSPADM

## 2019-11-19 RX ORDER — CARVEDILOL 25 MG/1
25 TABLET ORAL 2 TIMES DAILY WITH MEALS
Status: DISCONTINUED | OUTPATIENT
Start: 2019-11-19 | End: 2019-11-22 | Stop reason: HOSPADM

## 2019-11-19 RX ORDER — FERROUS SULFATE 325(65) MG
325 TABLET ORAL
Status: DISCONTINUED | OUTPATIENT
Start: 2019-11-20 | End: 2019-11-22 | Stop reason: HOSPADM

## 2019-11-19 RX ORDER — AMLODIPINE BESYLATE 10 MG/1
10 TABLET ORAL DAILY
Status: DISCONTINUED | OUTPATIENT
Start: 2019-11-19 | End: 2019-11-22 | Stop reason: HOSPADM

## 2019-11-19 RX ORDER — SODIUM CHLORIDE 9 MG/ML
100 INJECTION, SOLUTION INTRAVENOUS CONTINUOUS
Status: ACTIVE | OUTPATIENT
Start: 2019-11-19 | End: 2019-11-19

## 2019-11-19 RX ORDER — MIDAZOLAM HYDROCHLORIDE 1 MG/ML
INJECTION INTRAMUSCULAR; INTRAVENOUS AS NEEDED
Status: DISCONTINUED | OUTPATIENT
Start: 2019-11-19 | End: 2019-11-19 | Stop reason: HOSPADM

## 2019-11-19 RX ORDER — FENTANYL CITRATE 50 UG/ML
INJECTION, SOLUTION INTRAMUSCULAR; INTRAVENOUS AS NEEDED
Status: DISCONTINUED | OUTPATIENT
Start: 2019-11-19 | End: 2019-11-19 | Stop reason: HOSPADM

## 2019-11-19 RX ORDER — CETIRIZINE HYDROCHLORIDE 10 MG/1
10 TABLET ORAL DAILY
Status: DISCONTINUED | OUTPATIENT
Start: 2019-11-19 | End: 2019-11-19

## 2019-11-19 RX ORDER — POLYETHYLENE GLYCOL 3350 17 G/17G
17 POWDER, FOR SOLUTION ORAL DAILY
Status: DISCONTINUED | OUTPATIENT
Start: 2019-11-19 | End: 2019-11-19

## 2019-11-19 RX ADMIN — CARVEDILOL 25 MG: 25 TABLET, FILM COATED ORAL at 22:46

## 2019-11-19 RX ADMIN — ATORVASTATIN CALCIUM 40 MG: 20 TABLET, FILM COATED ORAL at 22:46

## 2019-11-19 RX ADMIN — SODIUM CHLORIDE, PRESERVATIVE FREE 10 ML: 5 INJECTION INTRAVENOUS at 22:47

## 2019-11-19 RX ADMIN — RANOLAZINE 500 MG: 500 TABLET, FILM COATED, EXTENDED RELEASE ORAL at 22:46

## 2019-11-19 RX ADMIN — SODIUM CHLORIDE 100 ML/HR: 9 INJECTION, SOLUTION INTRAVENOUS at 08:57

## 2019-11-19 RX ADMIN — SODIUM CHLORIDE 100 ML/HR: 9 INJECTION, SOLUTION INTRAVENOUS at 14:12

## 2019-11-20 ENCOUNTER — APPOINTMENT (OUTPATIENT)
Dept: GENERAL RADIOLOGY | Facility: HOSPITAL | Age: 74
End: 2019-11-20

## 2019-11-20 ENCOUNTER — APPOINTMENT (OUTPATIENT)
Dept: CARDIOLOGY | Facility: HOSPITAL | Age: 74
End: 2019-11-20

## 2019-11-20 ENCOUNTER — APPOINTMENT (OUTPATIENT)
Dept: RESPIRATORY THERAPY | Facility: HOSPITAL | Age: 74
End: 2019-11-20

## 2019-11-20 LAB
ANION GAP SERPL CALCULATED.3IONS-SCNC: 11.2 MMOL/L (ref 5–15)
BUN BLD-MCNC: 23 MG/DL (ref 8–23)
BUN/CREAT SERPL: 16.3 (ref 7–25)
CALCIUM SPEC-SCNC: 8.6 MG/DL (ref 8.6–10.5)
CHLORIDE SERPL-SCNC: 107 MMOL/L (ref 98–107)
CO2 SERPL-SCNC: 25.8 MMOL/L (ref 22–29)
CREAT BLD-MCNC: 1.41 MG/DL (ref 0.57–1)
DEPRECATED RDW RBC AUTO: 46.8 FL (ref 37–54)
ERYTHROCYTE [DISTWIDTH] IN BLOOD BY AUTOMATED COUNT: 14.7 % (ref 12.3–15.4)
GFR SERPL CREATININE-BSD FRML MDRD: 44 ML/MIN/1.73
GLUCOSE BLD-MCNC: 96 MG/DL (ref 65–99)
HCT VFR BLD AUTO: 33.6 % (ref 34–46.6)
HGB BLD-MCNC: 11.4 G/DL (ref 12–15.9)
MAGNESIUM SERPL-MCNC: 1.9 MG/DL (ref 1.6–2.4)
MCH RBC QN AUTO: 29.8 PG (ref 26.6–33)
MCHC RBC AUTO-ENTMCNC: 33.9 G/DL (ref 31.5–35.7)
MCV RBC AUTO: 87.7 FL (ref 79–97)
NT-PROBNP SERPL-MCNC: 230 PG/ML (ref 5–900)
PLATELET # BLD AUTO: 159 10*3/MM3 (ref 140–450)
PMV BLD AUTO: 9.9 FL (ref 6–12)
POTASSIUM BLD-SCNC: 3.4 MMOL/L (ref 3.5–5.2)
RBC # BLD AUTO: 3.83 10*6/MM3 (ref 3.77–5.28)
SODIUM BLD-SCNC: 144 MMOL/L (ref 136–145)
WBC NRBC COR # BLD: 5.97 10*3/MM3 (ref 3.4–10.8)

## 2019-11-20 PROCEDURE — 93306 TTE W/DOPPLER COMPLETE: CPT

## 2019-11-20 PROCEDURE — G0378 HOSPITAL OBSERVATION PER HR: HCPCS

## 2019-11-20 PROCEDURE — 93306 TTE W/DOPPLER COMPLETE: CPT | Performed by: INTERNAL MEDICINE

## 2019-11-20 PROCEDURE — 25010000002 FUROSEMIDE PER 20 MG: Performed by: INTERNAL MEDICINE

## 2019-11-20 PROCEDURE — 85027 COMPLETE CBC AUTOMATED: CPT | Performed by: NURSE PRACTITIONER

## 2019-11-20 PROCEDURE — 94618 PULMONARY STRESS TESTING: CPT

## 2019-11-20 PROCEDURE — 83735 ASSAY OF MAGNESIUM: CPT | Performed by: NURSE PRACTITIONER

## 2019-11-20 PROCEDURE — 80048 BASIC METABOLIC PNL TOTAL CA: CPT | Performed by: NURSE PRACTITIONER

## 2019-11-20 PROCEDURE — 25010000002 PERFLUTREN (DEFINITY) 8.476 MG IN SODIUM CHLORIDE 0.9 % 10 ML INJECTION: Performed by: INTERNAL MEDICINE

## 2019-11-20 PROCEDURE — 94060 EVALUATION OF WHEEZING: CPT

## 2019-11-20 PROCEDURE — 25010000002 FUROSEMIDE PER 20 MG: Performed by: NURSE PRACTITIONER

## 2019-11-20 PROCEDURE — 94640 AIRWAY INHALATION TREATMENT: CPT

## 2019-11-20 PROCEDURE — 71046 X-RAY EXAM CHEST 2 VIEWS: CPT

## 2019-11-20 PROCEDURE — 83880 ASSAY OF NATRIURETIC PEPTIDE: CPT | Performed by: INTERNAL MEDICINE

## 2019-11-20 PROCEDURE — 76000 FLUOROSCOPY <1 HR PHYS/QHP: CPT

## 2019-11-20 PROCEDURE — 99226 PR SBSQ OBSERVATION CARE/DAY 35 MINUTES: CPT | Performed by: INTERNAL MEDICINE

## 2019-11-20 RX ORDER — FUROSEMIDE 10 MG/ML
20 INJECTION INTRAMUSCULAR; INTRAVENOUS ONCE
Status: COMPLETED | OUTPATIENT
Start: 2019-11-20 | End: 2019-11-20

## 2019-11-20 RX ORDER — FUROSEMIDE 10 MG/ML
40 INJECTION INTRAMUSCULAR; INTRAVENOUS ONCE
Status: DISCONTINUED | OUTPATIENT
Start: 2019-11-20 | End: 2019-11-22 | Stop reason: HOSPADM

## 2019-11-20 RX ORDER — POTASSIUM CHLORIDE 750 MG/1
40 CAPSULE, EXTENDED RELEASE ORAL ONCE
Status: COMPLETED | OUTPATIENT
Start: 2019-11-20 | End: 2019-11-20

## 2019-11-20 RX ORDER — POTASSIUM CHLORIDE 750 MG/1
30 CAPSULE, EXTENDED RELEASE ORAL ONCE
Status: COMPLETED | OUTPATIENT
Start: 2019-11-20 | End: 2019-11-20

## 2019-11-20 RX ORDER — ALBUTEROL SULFATE 2.5 MG/3ML
2.5 SOLUTION RESPIRATORY (INHALATION) ONCE
Status: COMPLETED | OUTPATIENT
Start: 2019-11-20 | End: 2019-11-20

## 2019-11-20 RX ORDER — FUROSEMIDE 40 MG/1
40 TABLET ORAL
Status: DISCONTINUED | OUTPATIENT
Start: 2019-11-20 | End: 2019-11-22 | Stop reason: HOSPADM

## 2019-11-20 RX ADMIN — AMLODIPINE BESYLATE 10 MG: 10 TABLET ORAL at 09:10

## 2019-11-20 RX ADMIN — ALBUTEROL SULFATE 2.5 MG: 2.5 SOLUTION RESPIRATORY (INHALATION) at 08:53

## 2019-11-20 RX ADMIN — RANOLAZINE 500 MG: 500 TABLET, FILM COATED, EXTENDED RELEASE ORAL at 21:28

## 2019-11-20 RX ADMIN — POTASSIUM CHLORIDE 30 MEQ: 750 CAPSULE, EXTENDED RELEASE ORAL at 09:10

## 2019-11-20 RX ADMIN — CARVEDILOL 25 MG: 25 TABLET, FILM COATED ORAL at 18:48

## 2019-11-20 RX ADMIN — CARVEDILOL 25 MG: 25 TABLET, FILM COATED ORAL at 09:10

## 2019-11-20 RX ADMIN — RANOLAZINE 500 MG: 500 TABLET, FILM COATED, EXTENDED RELEASE ORAL at 09:10

## 2019-11-20 RX ADMIN — FUROSEMIDE 40 MG: 40 TABLET ORAL at 21:28

## 2019-11-20 RX ADMIN — ISOSORBIDE MONONITRATE 60 MG: 60 TABLET ORAL at 09:10

## 2019-11-20 RX ADMIN — FUROSEMIDE 20 MG: 20 INJECTION, SOLUTION INTRAMUSCULAR; INTRAVENOUS at 11:32

## 2019-11-20 RX ADMIN — SODIUM CHLORIDE, PRESERVATIVE FREE 10 ML: 5 INJECTION INTRAVENOUS at 09:11

## 2019-11-20 RX ADMIN — ATORVASTATIN CALCIUM 40 MG: 20 TABLET, FILM COATED ORAL at 09:10

## 2019-11-20 RX ADMIN — ASPIRIN 81 MG: 81 TABLET, COATED ORAL at 09:10

## 2019-11-20 RX ADMIN — SODIUM CHLORIDE, PRESERVATIVE FREE 10 ML: 5 INJECTION INTRAVENOUS at 21:29

## 2019-11-20 RX ADMIN — FERROUS SULFATE TAB 325 MG (65 MG ELEMENTAL FE) 325 MG: 325 (65 FE) TAB at 09:10

## 2019-11-20 RX ADMIN — CLOPIDOGREL 75 MG: 75 TABLET, FILM COATED ORAL at 09:10

## 2019-11-20 RX ADMIN — POTASSIUM CHLORIDE 40 MEQ: 750 CAPSULE, EXTENDED RELEASE ORAL at 16:24

## 2019-11-20 RX ADMIN — PERFLUTREN 2 ML: 6.52 INJECTION, SUSPENSION INTRAVENOUS at 18:20

## 2019-11-21 LAB
ANION GAP SERPL CALCULATED.3IONS-SCNC: 10.8 MMOL/L (ref 5–15)
AORTIC DIMENSIONLESS INDEX: 0.8 (DI)
BH CV ECHO MEAS - AO MAX PG (FULL): 4.5 MMHG
BH CV ECHO MEAS - AO MAX PG: 12.8 MMHG
BH CV ECHO MEAS - AO MEAN PG (FULL): 2 MMHG
BH CV ECHO MEAS - AO MEAN PG: 7 MMHG
BH CV ECHO MEAS - AO ROOT AREA (BSA CORRECTED): 2.1
BH CV ECHO MEAS - AO ROOT AREA: 9.6 CM^2
BH CV ECHO MEAS - AO ROOT DIAM: 3.5 CM
BH CV ECHO MEAS - AO V2 MAX: 179 CM/SEC
BH CV ECHO MEAS - AO V2 MEAN: 122 CM/SEC
BH CV ECHO MEAS - AO V2 VTI: 35.6 CM
BH CV ECHO MEAS - ASC AORTA: 3.4 CM
BH CV ECHO MEAS - AVA(I,A): 1.5 CM^2
BH CV ECHO MEAS - AVA(I,D): 1.5 CM^2
BH CV ECHO MEAS - AVA(V,A): 1.6 CM^2
BH CV ECHO MEAS - AVA(V,D): 1.6 CM^2
BH CV ECHO MEAS - BSA(HAYCOCK): 1.7 M^2
BH CV ECHO MEAS - BSA: 1.7 M^2
BH CV ECHO MEAS - BZI_BMI: 29.1 KILOGRAMS/M^2
BH CV ECHO MEAS - BZI_METRIC_HEIGHT: 154 CM
BH CV ECHO MEAS - BZI_METRIC_WEIGHT: 69 KG
BH CV ECHO MEAS - EDV(CUBED): 97.3 ML
BH CV ECHO MEAS - EDV(MOD-SP2): 73 ML
BH CV ECHO MEAS - EDV(MOD-SP4): 41 ML
BH CV ECHO MEAS - EDV(TEICH): 97.3 ML
BH CV ECHO MEAS - EF(CUBED): 79.8 %
BH CV ECHO MEAS - EF(MOD-BP): 74 %
BH CV ECHO MEAS - EF(MOD-SP2): 74 %
BH CV ECHO MEAS - EF(MOD-SP4): 70.7 %
BH CV ECHO MEAS - EF(TEICH): 72.2 %
BH CV ECHO MEAS - ESV(CUBED): 19.7 ML
BH CV ECHO MEAS - ESV(MOD-SP2): 19 ML
BH CV ECHO MEAS - ESV(MOD-SP4): 12 ML
BH CV ECHO MEAS - ESV(TEICH): 27 ML
BH CV ECHO MEAS - FS: 41.3 %
BH CV ECHO MEAS - IVS/LVPW: 0.82
BH CV ECHO MEAS - IVSD: 0.9 CM
BH CV ECHO MEAS - LAT PEAK E' VEL: 12.2 CM/SEC
BH CV ECHO MEAS - LV DIASTOLIC VOL/BSA (35-75): 24.5 ML/M^2
BH CV ECHO MEAS - LV MASS(C)D: 158.8 GRAMS
BH CV ECHO MEAS - LV MASS(C)DI: 94.9 GRAMS/M^2
BH CV ECHO MEAS - LV MAX PG: 8.3 MMHG
BH CV ECHO MEAS - LV MEAN PG: 5 MMHG
BH CV ECHO MEAS - LV SYSTOLIC VOL/BSA (12-30): 7.2 ML/M^2
BH CV ECHO MEAS - LV V1 MAX: 144 CM/SEC
BH CV ECHO MEAS - LV V1 MEAN: 102 CM/SEC
BH CV ECHO MEAS - LV V1 VTI: 27.3 CM
BH CV ECHO MEAS - LVIDD: 4.6 CM
BH CV ECHO MEAS - LVIDS: 2.7 CM
BH CV ECHO MEAS - LVLD AP2: 7.1 CM
BH CV ECHO MEAS - LVLD AP4: 7.3 CM
BH CV ECHO MEAS - LVLS AP2: 6 CM
BH CV ECHO MEAS - LVLS AP4: 6.5 CM
BH CV ECHO MEAS - LVOT AREA (M): 2 CM^2
BH CV ECHO MEAS - LVOT AREA: 2 CM^2
BH CV ECHO MEAS - LVOT DIAM: 1.6 CM
BH CV ECHO MEAS - LVPWD: 1.1 CM
BH CV ECHO MEAS - MED PEAK E' VEL: 8.7 CM/SEC
BH CV ECHO MEAS - MV A DUR: 177 SEC
BH CV ECHO MEAS - MV A MAX VEL: 108 CM/SEC
BH CV ECHO MEAS - MV DEC SLOPE: 356 CM/SEC^2
BH CV ECHO MEAS - MV DEC TIME: 283 SEC
BH CV ECHO MEAS - MV E MAX VEL: 72.7 CM/SEC
BH CV ECHO MEAS - MV E/A: 0.67
BH CV ECHO MEAS - MV MAX PG: 4.7 MMHG
BH CV ECHO MEAS - MV MEAN PG: 2 MMHG
BH CV ECHO MEAS - MV P1/2T MAX VEL: 98.3 CM/SEC
BH CV ECHO MEAS - MV P1/2T: 80.9 MSEC
BH CV ECHO MEAS - MV V2 MAX: 108 CM/SEC
BH CV ECHO MEAS - MV V2 MEAN: 58.8 CM/SEC
BH CV ECHO MEAS - MV V2 VTI: 30.4 CM
BH CV ECHO MEAS - MVA P1/2T LCG: 2.2 CM^2
BH CV ECHO MEAS - MVA(P1/2T): 2.7 CM^2
BH CV ECHO MEAS - MVA(VTI): 1.8 CM^2
BH CV ECHO MEAS - PA ACC TIME: 0.08 SEC
BH CV ECHO MEAS - PA MAX PG (FULL): 0.97 MMHG
BH CV ECHO MEAS - PA MAX PG: 3.1 MMHG
BH CV ECHO MEAS - PA PR(ACCEL): 42.1 MMHG
BH CV ECHO MEAS - PA V2 MAX: 87.6 CM/SEC
BH CV ECHO MEAS - PULM A REVS DUR: 0.11 SEC
BH CV ECHO MEAS - PULM A REVS VEL: 43.8 CM/SEC
BH CV ECHO MEAS - PULM DIAS VEL: 51.4 CM/SEC
BH CV ECHO MEAS - PULM S/D: 1.6
BH CV ECHO MEAS - PULM SYS VEL: 84.8 CM/SEC
BH CV ECHO MEAS - PVA(V,A): 2.3 CM^2
BH CV ECHO MEAS - PVA(V,D): 2.3 CM^2
BH CV ECHO MEAS - QP/QS: 0.63
BH CV ECHO MEAS - RAP SYSTOLE: 8 MMHG
BH CV ECHO MEAS - RV MAX PG: 2.1 MMHG
BH CV ECHO MEAS - RV MEAN PG: 1 MMHG
BH CV ECHO MEAS - RV V1 MAX: 72.4 CM/SEC
BH CV ECHO MEAS - RV V1 MEAN: 44 CM/SEC
BH CV ECHO MEAS - RV V1 VTI: 12.1 CM
BH CV ECHO MEAS - RVOT AREA: 2.8 CM^2
BH CV ECHO MEAS - RVOT DIAM: 1.9 CM
BH CV ECHO MEAS - RVSP: 45 MMHG
BH CV ECHO MEAS - SI(AO): 204.6 ML/M^2
BH CV ECHO MEAS - SI(CUBED): 46.4 ML/M^2
BH CV ECHO MEAS - SI(LVOT): 32.8 ML/M^2
BH CV ECHO MEAS - SI(MOD-SP2): 32.3 ML/M^2
BH CV ECHO MEAS - SI(MOD-SP4): 17.3 ML/M^2
BH CV ECHO MEAS - SI(TEICH): 42 ML/M^2
BH CV ECHO MEAS - SV(AO): 342.5 ML
BH CV ECHO MEAS - SV(CUBED): 77.7 ML
BH CV ECHO MEAS - SV(LVOT): 54.9 ML
BH CV ECHO MEAS - SV(MOD-SP2): 54 ML
BH CV ECHO MEAS - SV(MOD-SP4): 29 ML
BH CV ECHO MEAS - SV(RVOT): 34.3 ML
BH CV ECHO MEAS - SV(TEICH): 70.3 ML
BH CV ECHO MEAS - TAPSE (>1.6): 1.5 CM
BH CV ECHO MEAS - TR MAX PG: 37
BH CV ECHO MEAS - TR MAX VEL: 306 CM/SEC
BH CV ECHO MEASUREMENTS AVERAGE E/E' RATIO: 6.96
BH CV XLRA - RV BASE: 2.64 CM
BH CV XLRA - TDI S': 9.46 CM/SEC
BUN BLD-MCNC: 19 MG/DL (ref 8–23)
BUN/CREAT SERPL: 15.4 (ref 7–25)
CALCIUM SPEC-SCNC: 8.4 MG/DL (ref 8.6–10.5)
CHLORIDE SERPL-SCNC: 106 MMOL/L (ref 98–107)
CO2 SERPL-SCNC: 27.2 MMOL/L (ref 22–29)
CREAT BLD-MCNC: 1.23 MG/DL (ref 0.57–1)
GFR SERPL CREATININE-BSD FRML MDRD: 52 ML/MIN/1.73
GLUCOSE BLD-MCNC: 88 MG/DL (ref 65–99)
LEFT ATRIUM VOLUME INDEX: 22 ML/M2
MAGNESIUM SERPL-MCNC: 1.6 MG/DL (ref 1.6–2.4)
POTASSIUM BLD-SCNC: 3.4 MMOL/L (ref 3.5–5.2)
SODIUM BLD-SCNC: 144 MMOL/L (ref 136–145)

## 2019-11-21 PROCEDURE — 80048 BASIC METABOLIC PNL TOTAL CA: CPT | Performed by: NURSE PRACTITIONER

## 2019-11-21 PROCEDURE — 83735 ASSAY OF MAGNESIUM: CPT | Performed by: NURSE PRACTITIONER

## 2019-11-21 PROCEDURE — 25010000002 MAGNESIUM SULFATE IN D5W 1G/100ML (PREMIX) 1-5 GM/100ML-% SOLUTION: Performed by: NURSE PRACTITIONER

## 2019-11-21 PROCEDURE — 99233 SBSQ HOSP IP/OBS HIGH 50: CPT | Performed by: INTERNAL MEDICINE

## 2019-11-21 PROCEDURE — 25010000002 FUROSEMIDE PER 20 MG: Performed by: NURSE PRACTITIONER

## 2019-11-21 RX ORDER — FUROSEMIDE 10 MG/ML
40 INJECTION INTRAMUSCULAR; INTRAVENOUS ONCE
Status: COMPLETED | OUTPATIENT
Start: 2019-11-21 | End: 2019-11-21

## 2019-11-21 RX ORDER — POTASSIUM CHLORIDE 750 MG/1
20 CAPSULE, EXTENDED RELEASE ORAL ONCE
Status: COMPLETED | OUTPATIENT
Start: 2019-11-21 | End: 2019-11-21

## 2019-11-21 RX ORDER — MAGNESIUM SULFATE 1 G/100ML
2 INJECTION INTRAVENOUS ONCE
Status: COMPLETED | OUTPATIENT
Start: 2019-11-21 | End: 2019-11-21

## 2019-11-21 RX ORDER — MAGNESIUM SULFATE 1 G/100ML
1 INJECTION INTRAVENOUS ONCE
Status: DISCONTINUED | OUTPATIENT
Start: 2019-11-21 | End: 2019-11-21

## 2019-11-21 RX ORDER — POTASSIUM CHLORIDE 750 MG/1
40 CAPSULE, EXTENDED RELEASE ORAL ONCE
Status: COMPLETED | OUTPATIENT
Start: 2019-11-21 | End: 2019-11-21

## 2019-11-21 RX ADMIN — ISOSORBIDE MONONITRATE 60 MG: 60 TABLET ORAL at 09:56

## 2019-11-21 RX ADMIN — FUROSEMIDE 40 MG: 40 TABLET ORAL at 09:57

## 2019-11-21 RX ADMIN — POTASSIUM CHLORIDE 40 MEQ: 750 CAPSULE, EXTENDED RELEASE ORAL at 09:56

## 2019-11-21 RX ADMIN — FUROSEMIDE 40 MG: 40 INJECTION, SOLUTION INTRAMUSCULAR; INTRAVENOUS at 11:01

## 2019-11-21 RX ADMIN — AMLODIPINE BESYLATE 10 MG: 10 TABLET ORAL at 09:57

## 2019-11-21 RX ADMIN — RANOLAZINE 500 MG: 500 TABLET, FILM COATED, EXTENDED RELEASE ORAL at 22:49

## 2019-11-21 RX ADMIN — ASPIRIN 81 MG: 81 TABLET, COATED ORAL at 09:57

## 2019-11-21 RX ADMIN — SODIUM CHLORIDE, PRESERVATIVE FREE 10 ML: 5 INJECTION INTRAVENOUS at 22:52

## 2019-11-21 RX ADMIN — RANOLAZINE 500 MG: 500 TABLET, FILM COATED, EXTENDED RELEASE ORAL at 09:56

## 2019-11-21 RX ADMIN — POTASSIUM CHLORIDE 20 MEQ: 750 CAPSULE, EXTENDED RELEASE ORAL at 11:22

## 2019-11-21 RX ADMIN — CLOPIDOGREL 75 MG: 75 TABLET, FILM COATED ORAL at 09:56

## 2019-11-21 RX ADMIN — FUROSEMIDE 40 MG: 40 TABLET ORAL at 17:03

## 2019-11-21 RX ADMIN — ATORVASTATIN CALCIUM 40 MG: 20 TABLET, FILM COATED ORAL at 09:57

## 2019-11-21 RX ADMIN — CARVEDILOL 25 MG: 25 TABLET, FILM COATED ORAL at 17:03

## 2019-11-21 RX ADMIN — CARVEDILOL 25 MG: 25 TABLET, FILM COATED ORAL at 09:57

## 2019-11-21 RX ADMIN — MAGNESIUM SULFATE 2 G: 1 INJECTION INTRAVENOUS at 09:57

## 2019-11-21 RX ADMIN — FERROUS SULFATE TAB 325 MG (65 MG ELEMENTAL FE) 325 MG: 325 (65 FE) TAB at 09:57

## 2019-11-21 RX ADMIN — SODIUM CHLORIDE, PRESERVATIVE FREE 10 ML: 5 INJECTION INTRAVENOUS at 10:08

## 2019-11-22 VITALS
BODY MASS INDEX: 28.89 KG/M2 | HEIGHT: 61 IN | SYSTOLIC BLOOD PRESSURE: 111 MMHG | RESPIRATION RATE: 16 BRPM | TEMPERATURE: 98 F | OXYGEN SATURATION: 93 % | HEART RATE: 71 BPM | DIASTOLIC BLOOD PRESSURE: 63 MMHG | WEIGHT: 153 LBS

## 2019-11-22 LAB
ANION GAP SERPL CALCULATED.3IONS-SCNC: 11.9 MMOL/L (ref 5–15)
BUN BLD-MCNC: 18 MG/DL (ref 8–23)
BUN/CREAT SERPL: 14.5 (ref 7–25)
CALCIUM SPEC-SCNC: 8.8 MG/DL (ref 8.6–10.5)
CHLORIDE SERPL-SCNC: 103 MMOL/L (ref 98–107)
CO2 SERPL-SCNC: 29.1 MMOL/L (ref 22–29)
CREAT BLD-MCNC: 1.24 MG/DL (ref 0.57–1)
GFR SERPL CREATININE-BSD FRML MDRD: 51 ML/MIN/1.73
GLUCOSE BLD-MCNC: 86 MG/DL (ref 65–99)
MAGNESIUM SERPL-MCNC: 2.2 MG/DL (ref 1.6–2.4)
POTASSIUM BLD-SCNC: 3.7 MMOL/L (ref 3.5–5.2)
SODIUM BLD-SCNC: 144 MMOL/L (ref 136–145)

## 2019-11-22 PROCEDURE — 80048 BASIC METABOLIC PNL TOTAL CA: CPT | Performed by: NURSE PRACTITIONER

## 2019-11-22 PROCEDURE — 99239 HOSP IP/OBS DSCHRG MGMT >30: CPT | Performed by: INTERNAL MEDICINE

## 2019-11-22 PROCEDURE — 83735 ASSAY OF MAGNESIUM: CPT | Performed by: NURSE PRACTITIONER

## 2019-11-22 RX ADMIN — AMLODIPINE BESYLATE 10 MG: 10 TABLET ORAL at 09:08

## 2019-11-22 RX ADMIN — RANOLAZINE 500 MG: 500 TABLET, FILM COATED, EXTENDED RELEASE ORAL at 09:07

## 2019-11-22 RX ADMIN — FERROUS SULFATE TAB 325 MG (65 MG ELEMENTAL FE) 325 MG: 325 (65 FE) TAB at 09:08

## 2019-11-22 RX ADMIN — ASPIRIN 81 MG: 81 TABLET, COATED ORAL at 09:08

## 2019-11-22 RX ADMIN — CLOPIDOGREL 75 MG: 75 TABLET, FILM COATED ORAL at 09:07

## 2019-11-22 RX ADMIN — CARVEDILOL 25 MG: 25 TABLET, FILM COATED ORAL at 09:07

## 2019-11-22 RX ADMIN — FUROSEMIDE 40 MG: 40 TABLET ORAL at 09:00

## 2019-11-22 RX ADMIN — SODIUM CHLORIDE, PRESERVATIVE FREE 10 ML: 5 INJECTION INTRAVENOUS at 09:00

## 2019-11-22 RX ADMIN — ISOSORBIDE MONONITRATE 60 MG: 60 TABLET ORAL at 09:07

## 2019-11-22 RX ADMIN — ATORVASTATIN CALCIUM 40 MG: 20 TABLET, FILM COATED ORAL at 09:08

## 2019-11-23 ENCOUNTER — READMISSION MANAGEMENT (OUTPATIENT)
Dept: CALL CENTER | Facility: HOSPITAL | Age: 74
End: 2019-11-23

## 2019-11-23 NOTE — OUTREACH NOTE
Prep Survey      Responses   Facility patient discharged from?  [place]   Is patient eligible?  Yes   Discharge diagnosis  Pericardial constriction-right and left cath this visit   Does the patient have one of the following disease processes/diagnoses(primary or secondary)?  Other   Does the patient have Home health ordered?  No   Is there a DME ordered?  No   Prep survey completed?  Yes          Beth Medeiros RN

## 2019-11-24 ENCOUNTER — READMISSION MANAGEMENT (OUTPATIENT)
Dept: CALL CENTER | Facility: HOSPITAL | Age: 74
End: 2019-11-24

## 2019-11-24 NOTE — OUTREACH NOTE
Medical Week 1 Survey      Responses   Facility patient discharged from?  [place]   Does the patient have one of the following disease processes/diagnoses(primary or secondary)?  Other   Is there a successful TCM telephone encounter documented?  No   Week 1 attempt successful?  Yes   Call start time  1003   Call end time  1009   Is patient permission given to speak with other caregiver?  Yes   List who call center can speak with  Claude   Medication alerts for this patient  no new medication   Meds reviewed with patient/caregiver?  N/A   Does the patient have all medications ordered at discharge?  N/A   Is the patient taking all medications as directed (includes completed medication regime)?  N/A   Comments regarding appointments  Appointments were reviewed and she plans on keeping all appointments as scheduled   Does the patient have a primary care provider?   Yes   Does the patient have an appointment with their PCP within 7 days of discharge?  N/A   Has the patient kept scheduled appointments due by today?  N/A   Comments  will be calling pcp in the morning   Has home health visited the patient within 72 hours of discharge?  No   Did the patient receive a copy of their discharge instructions?  Yes   Nursing interventions  Reviewed instructions with patient [declined MY Chart]   What is the patient's perception of their health status since discharge?  Improving   Is the patient/caregiver able to teach back signs and symptoms related to disease process for when to call PCP?  Yes   Is the patient/caregiver able to teach back signs and symptoms related to disease process for when to call 911?  Yes   Is the patient/caregiver able to teach back the hierarchy of who to call/visit for symptoms/problems? PCP, Specialist, Home health nurse, Urgent Care, ED, 911  Yes   Week 1 call completed?  Yes   Wrap up additional comments  patient is concerned about breathing medication which was dc in the hospital, will be  calling pulmonary in the morning          Alida Wellington RN

## 2019-11-26 LAB
ALBUMIN SERPL-MCNC: 3.9 G/DL (ref 3.5–5.2)
ALBUMIN/GLOB SERPL: 1.5 G/DL
ALP SERPL-CCNC: 81 U/L (ref 39–117)
ALT SERPL-CCNC: 12 U/L (ref 1–33)
AST SERPL-CCNC: 15 U/L (ref 1–32)
BILIRUB SERPL-MCNC: 0.2 MG/DL (ref 0.2–1.2)
BUN SERPL-MCNC: 17 MG/DL (ref 8–23)
BUN/CREAT SERPL: 12.8 (ref 7–25)
CALCIUM SERPL-MCNC: 9.1 MG/DL (ref 8.6–10.5)
CHLORIDE SERPL-SCNC: 104 MMOL/L (ref 98–107)
CHOLEST SERPL-MCNC: 154 MG/DL (ref 0–200)
CO2 SERPL-SCNC: 30.7 MMOL/L (ref 22–29)
CREAT SERPL-MCNC: 1.33 MG/DL (ref 0.57–1)
GLOBULIN SER CALC-MCNC: 2.6 GM/DL
GLUCOSE SERPL-MCNC: 119 MG/DL (ref 65–99)
HDLC SERPL-MCNC: 62 MG/DL (ref 40–60)
LDLC SERPL CALC-MCNC: 75 MG/DL (ref 0–100)
LDLC/HDLC SERPL: 1.22 {RATIO}
POTASSIUM SERPL-SCNC: 3.3 MMOL/L (ref 3.5–5.2)
PROT SERPL-MCNC: 6.5 G/DL (ref 6–8.5)
SODIUM SERPL-SCNC: 148 MMOL/L (ref 136–145)
TRIGL SERPL-MCNC: 83 MG/DL (ref 0–150)
VLDLC SERPL CALC-MCNC: 16.6 MG/DL

## 2019-11-27 ENCOUNTER — TELEPHONE (OUTPATIENT)
Dept: INTERNAL MEDICINE | Facility: CLINIC | Age: 74
End: 2019-11-27

## 2019-11-27 RX ORDER — CARVEDILOL 25 MG/1
TABLET ORAL
Qty: 180 TABLET | Refills: 1 | Status: SHIPPED | OUTPATIENT
Start: 2019-11-27 | End: 2020-05-26

## 2019-12-02 ENCOUNTER — OFFICE VISIT (OUTPATIENT)
Dept: INTERNAL MEDICINE | Facility: CLINIC | Age: 74
End: 2019-12-02

## 2019-12-02 VITALS
HEIGHT: 60 IN | HEART RATE: 68 BPM | SYSTOLIC BLOOD PRESSURE: 92 MMHG | BODY MASS INDEX: 30.82 KG/M2 | DIASTOLIC BLOOD PRESSURE: 50 MMHG | WEIGHT: 157 LBS

## 2019-12-02 DIAGNOSIS — I10 ESSENTIAL HYPERTENSION: Primary | ICD-10-CM

## 2019-12-02 DIAGNOSIS — E87.6 HYPOKALEMIA: ICD-10-CM

## 2019-12-02 DIAGNOSIS — M81.0 AGE-RELATED OSTEOPOROSIS WITHOUT CURRENT PATHOLOGICAL FRACTURE: ICD-10-CM

## 2019-12-02 DIAGNOSIS — E87.79 OTHER HYPERVOLEMIA: ICD-10-CM

## 2019-12-02 DIAGNOSIS — R14.0 ABDOMINAL DISTENSION: ICD-10-CM

## 2019-12-02 PROCEDURE — 99214 OFFICE O/P EST MOD 30 MIN: CPT | Performed by: INTERNAL MEDICINE

## 2019-12-02 RX ORDER — POTASSIUM CHLORIDE 1500 MG/1
20 TABLET, FILM COATED, EXTENDED RELEASE ORAL 4 TIMES DAILY
Qty: 120 TABLET | Refills: 5 | Status: SHIPPED | OUTPATIENT
Start: 2019-12-02 | End: 2020-05-26 | Stop reason: SDUPTHER

## 2019-12-02 NOTE — PROGRESS NOTES
Assessment and Plan  Marleny was seen today for hypertension.    Diagnoses and all orders for this visit:    Essential hypertension  Comments:  doing fine, no symptoms.     Hypokalemia  Comments:  increased to 4 supplements last week- will check again next week.   Orders:  -     potassium chloride ER (K-TAB) 20 MEQ tablet controlled-release ER tablet; Take 1 tablet by mouth 4 (Four) Times a Day.  -     Basic Metabolic Panel; Future    Other hypervolemia  Comments:  advised to weigh herself every am and call me or Dr. Moser if she starts to see an increase.    Age-related osteoporosis without current pathological fracture  Comments:  due for Prolia inj.  Will arrange.     Abdominal distension  Comments:  I think this is due to changes in musculature and her coughing, etc.  She will call if there are changes.       F/U and Patient Instructions    Return in about 3 months (around 3/2/2020).  There are no Patient Instructions on file for this visit.    Subjective    Marleny Greenwood is a 74 y.o. female being seen in our office today for Hypertension     History of the Present Illness  HPI Hospitalized for SOB- dx with pulm edema, had cath with results noted.    Saw cardiology and pulm - who said things are stable from that standpoint.  Symptoms seems to be related to volume overload. Potassium has been a problem, now on 4 tablets daily along with the furosemide.   Pulm took her off Dulera- she feels no difference. She has not needed albuterol inhaler at all since d/c.  Had some RUQ pain last year with a prolonged bout of coughing.  She feels like the area is swollen but there is no pain, etc.    Patient History        Significant Past History  The following portions of the patient's history were reviewed and updated as appropriate:PMHroutine: Social history , Allergies, Current Medications, Active Problem List and Health Maintenance              Social History  She  reports that she has never smoked. She has never  used smokeless tobacco. She reports that she does not drink alcohol or use drugs.                         Review of Symptoms  Review of Systems   Constitution: Negative.   Cardiovascular: Negative.    Respiratory: Positive for cough. Negative for shortness of breath.    Endocrine: Negative.    Musculoskeletal: Negative.    Genitourinary: Negative.    Psychiatric/Behavioral: Negative.      Objective  Vital Signs         BP Readings from Last 1 Encounters:   12/02/19 92/50     Wt Readings from Last 3 Encounters:   12/02/19 71.2 kg (157 lb)   11/21/19 69.4 kg (153 lb)   10/25/19 70.3 kg (155 lb)   Body mass index is 30.66 kg/m².          Physical Exam   Physical Exam   Constitutional: No distress.   Cardiovascular: Normal rate and regular rhythm.   Pulmonary/Chest: Effort normal and breath sounds normal.   Abdominal: Soft. Bowel sounds are normal. She exhibits no distension and no mass. There is no tenderness.   Psychiatric: She has a normal mood and affect.     Data Reviewed    Recent Results (from the past 2016 hour(s))   Basic Metabolic Panel    Collection Time: 10/25/19 11:39 AM   Result Value Ref Range    Glucose 108 (H) 65 - 99 mg/dL    BUN 15 8 - 23 mg/dL    Creatinine 0.97 0.57 - 1.00 mg/dL    Sodium 144 136 - 145 mmol/L    Potassium 3.2 (L) 3.5 - 5.2 mmol/L    Chloride 105 98 - 107 mmol/L    CO2 28.6 22.0 - 29.0 mmol/L    Calcium 8.9 8.6 - 10.5 mg/dL    eGFR  African Amer 68 >60 mL/min/1.73    BUN/Creatinine Ratio 15.5 7.0 - 25.0    Anion Gap 10.4 5.0 - 15.0 mmol/L   BNP    Collection Time: 10/25/19 11:39 AM   Result Value Ref Range    proBNP 168.7 5.0 - 900.0 pg/mL   Basic Metabolic Panel    Collection Time: 10/28/19 10:35 AM   Result Value Ref Range    Glucose 103 (H) 65 - 99 mg/dL    BUN 21 8 - 23 mg/dL    Creatinine 1.31 (H) 0.57 - 1.00 mg/dL    Sodium 143 136 - 145 mmol/L    Potassium 3.1 (L) 3.5 - 5.2 mmol/L    Chloride 105 98 - 107 mmol/L    CO2 26.3 22.0 - 29.0 mmol/L    Calcium 8.8 8.6 - 10.5 mg/dL     eGFR   Amer 48 (L) >60 mL/min/1.73    BUN/Creatinine Ratio 16.0 7.0 - 25.0    Anion Gap 11.7 5.0 - 15.0 mmol/L   Comprehensive Metabolic Panel    Collection Time: 11/04/19 11:38 AM   Result Value Ref Range    Glucose 116 (H) 65 - 99 mg/dL    BUN 26 (H) 8 - 23 mg/dL    Creatinine 1.42 (H) 0.57 - 1.00 mg/dL    Sodium 146 (H) 136 - 145 mmol/L    Potassium 3.6 3.5 - 5.2 mmol/L    Chloride 106 98 - 107 mmol/L    CO2 26.1 22.0 - 29.0 mmol/L    Calcium 8.9 8.6 - 10.5 mg/dL    Total Protein 7.1 6.0 - 8.5 g/dL    Albumin 4.10 3.50 - 5.20 g/dL    ALT (SGPT) 16 1 - 33 U/L    AST (SGOT) 10 1 - 32 U/L    Alkaline Phosphatase 82 39 - 117 U/L    Total Bilirubin 0.3 0.2 - 1.2 mg/dL    eGFR  African Amer 44 (L) >60 mL/min/1.73    Globulin 3.0 gm/dL    A/G Ratio 1.4 g/dL    BUN/Creatinine Ratio 18.3 7.0 - 25.0    Anion Gap 13.9 5.0 - 15.0 mmol/L   Protime-INR    Collection Time: 11/04/19 11:38 AM   Result Value Ref Range    Protime 13.6 11.7 - 14.2 Seconds    INR 1.07 0.90 - 1.10   CBC Auto Differential    Collection Time: 11/04/19 11:38 AM   Result Value Ref Range    WBC 7.54 3.40 - 10.80 10*3/mm3    RBC 4.51 3.77 - 5.28 10*6/mm3    Hemoglobin 13.1 12.0 - 15.9 g/dL    Hematocrit 40.0 34.0 - 46.6 %    MCV 88.7 79.0 - 97.0 fL    MCH 29.0 26.6 - 33.0 pg    MCHC 32.8 31.5 - 35.7 g/dL    RDW 14.5 12.3 - 15.4 %    RDW-SD 46.7 37.0 - 54.0 fl    MPV 10.0 6.0 - 12.0 fL    Platelets 175 140 - 450 10*3/mm3    Neutrophil % 69.4 42.7 - 76.0 %    Lymphocyte % 15.6 (L) 19.6 - 45.3 %    Monocyte % 11.7 5.0 - 12.0 %    Eosinophil % 2.0 0.3 - 6.2 %    Basophil % 0.4 0.0 - 1.5 %    Immature Grans % 0.9 (H) 0.0 - 0.5 %    Neutrophils, Absolute 5.23 1.70 - 7.00 10*3/mm3    Lymphocytes, Absolute 1.18 0.70 - 3.10 10*3/mm3    Monocytes, Absolute 0.88 0.10 - 0.90 10*3/mm3    Eosinophils, Absolute 0.15 0.00 - 0.40 10*3/mm3    Basophils, Absolute 0.03 0.00 - 0.20 10*3/mm3    Immature Grans, Absolute 0.07 (H) 0.00 - 0.05 10*3/mm3    nRBC 0.0 0.0 - 0.2  /100 WBC   Basic Metabolic Panel    Collection Time: 11/14/19 10:50 AM   Result Value Ref Range    Glucose 111 (H) 65 - 99 mg/dL    BUN 19 8 - 23 mg/dL    Creatinine 1.45 (H) 0.57 - 1.00 mg/dL    Sodium 142 136 - 145 mmol/L    Potassium 2.8 (L) 3.5 - 5.2 mmol/L    Chloride 102 98 - 107 mmol/L    CO2 29.2 (H) 22.0 - 29.0 mmol/L    Calcium 9.2 8.6 - 10.5 mg/dL    eGFR  African Amer 43 (L) >60 mL/min/1.73    BUN/Creatinine Ratio 13.1 7.0 - 25.0    Anion Gap 10.8 5.0 - 15.0 mmol/L   Basic Metabolic Panel    Collection Time: 11/18/19 11:01 AM   Result Value Ref Range    Glucose 104 (H) 65 - 99 mg/dL    BUN 25 (H) 8 - 23 mg/dL    Creatinine 1.77 (H) 0.57 - 1.00 mg/dL    Sodium 143 136 - 145 mmol/L    Potassium 4.0 3.5 - 5.2 mmol/L    Chloride 104 98 - 107 mmol/L    CO2 23.7 22.0 - 29.0 mmol/L    Calcium 9.1 8.6 - 10.5 mg/dL    eGFR  African Amer 34 (L) >60 mL/min/1.73    BUN/Creatinine Ratio 14.1 7.0 - 25.0    Anion Gap 15.3 (H) 5.0 - 15.0 mmol/L   POC Panel 9, ISTAT    Collection Time: 11/19/19 12:28 PM   Result Value Ref Range    Hemoglobin 11.6 (L) 12.0 - 17.0 g/dL    Hematocrit 34 (L) 38 - 51 %    O2 Saturation, Arterial 76 (L) 95 - 98 %   POC Panel 9, ISTAT    Collection Time: 11/19/19 12:45 PM   Result Value Ref Range    Hemoglobin 11.2 (L) 12.0 - 17.0 g/dL    Hematocrit 33 (L) 38 - 51 %    O2 Saturation, Arterial 74 (L) 95 - 98 %   POC Panel 9, ISTAT    Collection Time: 11/19/19  1:06 PM   Result Value Ref Range    Hemoglobin 10.9 (L) 12.0 - 17.0 g/dL    Hematocrit 32 (L) 38 - 51 %    O2 Saturation, Arterial 95 95 - 98 %   Basic Metabolic Panel    Collection Time: 11/19/19  4:59 PM   Result Value Ref Range    Glucose 123 (H) 65 - 99 mg/dL    BUN 26 (H) 8 - 23 mg/dL    Creatinine 1.53 (H) 0.57 - 1.00 mg/dL    Sodium 146 (H) 136 - 145 mmol/L    Potassium 3.8 3.5 - 5.2 mmol/L    Chloride 108 (H) 98 - 107 mmol/L    CO2 23.3 22.0 - 29.0 mmol/L    Calcium 9.0 8.6 - 10.5 mg/dL    eGFR  African Amer 40 (L) >60 mL/min/1.73     BUN/Creatinine Ratio 17.0 7.0 - 25.0    Anion Gap 14.7 5.0 - 15.0 mmol/L   Magnesium    Collection Time: 11/19/19  4:59 PM   Result Value Ref Range    Magnesium 1.9 1.6 - 2.4 mg/dL   Basic Metabolic Panel    Collection Time: 11/20/19  5:47 AM   Result Value Ref Range    Glucose 96 65 - 99 mg/dL    BUN 23 8 - 23 mg/dL    Creatinine 1.41 (H) 0.57 - 1.00 mg/dL    Sodium 144 136 - 145 mmol/L    Potassium 3.4 (L) 3.5 - 5.2 mmol/L    Chloride 107 98 - 107 mmol/L    CO2 25.8 22.0 - 29.0 mmol/L    Calcium 8.6 8.6 - 10.5 mg/dL    eGFR  African Amer 44 (L) >60 mL/min/1.73    BUN/Creatinine Ratio 16.3 7.0 - 25.0    Anion Gap 11.2 5.0 - 15.0 mmol/L   Magnesium    Collection Time: 11/20/19  5:47 AM   Result Value Ref Range    Magnesium 1.9 1.6 - 2.4 mg/dL   CBC (No Diff)    Collection Time: 11/20/19  5:47 AM   Result Value Ref Range    WBC 5.97 3.40 - 10.80 10*3/mm3    RBC 3.83 3.77 - 5.28 10*6/mm3    Hemoglobin 11.4 (L) 12.0 - 15.9 g/dL    Hematocrit 33.6 (L) 34.0 - 46.6 %    MCV 87.7 79.0 - 97.0 fL    MCH 29.8 26.6 - 33.0 pg    MCHC 33.9 31.5 - 35.7 g/dL    RDW 14.7 12.3 - 15.4 %    RDW-SD 46.8 37.0 - 54.0 fl    MPV 9.9 6.0 - 12.0 fL    Platelets 159 140 - 450 10*3/mm3   BNP    Collection Time: 11/20/19  5:20 PM   Result Value Ref Range    proBNP 230.0 5.0 - 900.0 pg/mL   Adult Transthoracic Echo Complete W/ Cont if Necessary Per Protocol    Collection Time: 11/20/19  6:32 PM   Result Value Ref Range    BSA 1.7 m^2    IVSd 0.9 cm    LVIDd 4.6 cm    LVIDs 2.7 cm    LVPWd 1.1 cm    IVS/LVPW 0.82     FS 41.3 %    EDV(Teich) 97.3 ml    ESV(Teich) 27.0 ml    EF(Teich) 72.2 %    EDV(cubed) 97.3 ml    ESV(cubed) 19.7 ml    EF(cubed) 79.8 %    LV mass(C)d 158.8 grams    LV mass(C)dI 94.9 grams/m^2    SV(Teich) 70.3 ml    SI(Teich) 42.0 ml/m^2    SV(cubed) 77.7 ml    SI(cubed) 46.4 ml/m^2    Ao root diam 3.5 cm    Ao root area 9.6 cm^2    asc Aorta Diam 3.4 cm    LVOT diam 1.6 cm    LVOT area 2.0 cm^2    LVOT area(traced) 2.0 cm^2     RVOT diam 1.9 cm    RVOT area 2.8 cm^2    LVLd ap4 7.3 cm    EDV(MOD-sp4) 41.0 ml    LVLs ap4 6.5 cm    ESV(MOD-sp4) 12.0 ml    EF(MOD-sp4) 70.7 %    LVLd ap2 7.1 cm    EDV(MOD-sp2) 73.0 ml    LVLs ap2 6.0 cm    ESV(MOD-sp2) 19.0 ml    EF(MOD-sp2) 74.0 %    SV(MOD-sp4) 29.0 ml    SI(MOD-sp4) 17.3 ml/m^2    SV(MOD-sp2) 54.0 ml    SI(MOD-sp2) 32.3 ml/m^2    Ao root area (BSA corrected) 2.1     LV Montes Vol (BSA corrected) 24.5 ml/m^2    LV Sys Vol (BSA corrected) 7.2 ml/m^2    TAPSE (>1.6) 1.5 cm    MV A dur 177.0 sec    MV E max luis 72.7 cm/sec    MV A max luis 108.0 cm/sec    MV E/A 0.67     MV V2 max 108.0 cm/sec    MV max PG 4.7 mmHg    MV V2 mean 58.8 cm/sec    MV mean PG 2.0 mmHg    MV V2 VTI 30.4 cm    MVA(VTI) 1.8 cm^2    MV P1/2t max luis 98.3 cm/sec    MV P1/2t 80.9 msec    MVA(P1/2t) 2.7 cm^2    MV dec slope 356.0 cm/sec^2    MV dec time 283 sec    Ao pk luis 179.0 cm/sec    Ao max PG 12.8 mmHg    Ao max PG (full) 4.5 mmHg    Ao V2 mean 122.0 cm/sec    Ao mean PG 7.0 mmHg    Ao mean PG (full) 2.0 mmHg    Ao V2 VTI 35.6 cm    CARMEN(I,A) 1.5 cm^2    CARMEN(I,D) 1.5 cm^2    CARMEN(V,A) 1.6 cm^2    CARMEN(V,D) 1.6 cm^2    LV V1 max PG 8.3 mmHg    LV V1 mean PG 5.0 mmHg    LV V1 max 144.0 cm/sec    LV V1 mean 102.0 cm/sec    LV V1 VTI 27.3 cm    SV(Ao) 342.5 ml    SI(Ao) 204.6 ml/m^2    SV(LVOT) 54.9 ml    SV(RVOT) 34.3 ml    SI(LVOT) 32.8 ml/m^2    PA V2 max 87.6 cm/sec    PA max PG 3.1 mmHg    PA max PG (full) 0.97 mmHg    BH CV ECHO MYLES - PVA(V,A) 2.3 cm^2    BH CV ECHO MYLES - PVA(V,D) 2.3 cm^2    PA acc time 0.08 sec    RV V1 max PG 2.1 mmHg    RV V1 mean PG 1.0 mmHg    RV V1 max 72.4 cm/sec    RV V1 mean 44.0 cm/sec    RV V1 VTI 12.1 cm    TR max luis 306.0 cm/sec    PA pr(Accel) 42.1 mmHg    Pulm Sys Luis 84.8 cm/sec    Pulm Montes Luis 51.4 cm/sec    Pulm S/D 1.6     Qp/Qs 0.63     Pulm A Revs Dur 0.11 sec    Pulm A Revs Luis 43.8 cm/sec    MVA P1/2T LCG 2.2 cm^2    BH CV ECHO MYLES - BZI_BMI 29.1 kilograms/m^2    BH CV  ECHO MYLES - BSA([facility]) 1.7 m^2     CV ECHO MYLES - BZI_METRIC_WEIGHT 69.0 kg     CV ECHO MYLES - BZI_METRIC_HEIGHT 154.0 cm    TDI S' 9.46 cm/sec    RV Base 2.64 cm    Dimensionless Index 0.8 (DI)    LA Volume Index 22.0 mL/m2    Avg E/e' ratio 6.96     EF(MOD-bp) 74.0 %    Lat Peak E' Luis 12.2 cm/sec    Med Peak E' Luis 8.70 cm/sec    RAP systole 8 mmHg    RVSP(TR) 45 mmHg     CV ECHO MYLES - TR MAX PG 37    Basic Metabolic Panel    Collection Time: 11/21/19  4:30 AM   Result Value Ref Range    Glucose 88 65 - 99 mg/dL    BUN 19 8 - 23 mg/dL    Creatinine 1.23 (H) 0.57 - 1.00 mg/dL    Sodium 144 136 - 145 mmol/L    Potassium 3.4 (L) 3.5 - 5.2 mmol/L    Chloride 106 98 - 107 mmol/L    CO2 27.2 22.0 - 29.0 mmol/L    Calcium 8.4 (L) 8.6 - 10.5 mg/dL    eGFR  African Amer 52 (L) >60 mL/min/1.73    BUN/Creatinine Ratio 15.4 7.0 - 25.0    Anion Gap 10.8 5.0 - 15.0 mmol/L   Magnesium    Collection Time: 11/21/19  4:30 AM   Result Value Ref Range    Magnesium 1.6 1.6 - 2.4 mg/dL   Basic Metabolic Panel    Collection Time: 11/22/19  5:21 AM   Result Value Ref Range    Glucose 86 65 - 99 mg/dL    BUN 18 8 - 23 mg/dL    Creatinine 1.24 (H) 0.57 - 1.00 mg/dL    Sodium 144 136 - 145 mmol/L    Potassium 3.7 3.5 - 5.2 mmol/L    Chloride 103 98 - 107 mmol/L    CO2 29.1 (H) 22.0 - 29.0 mmol/L    Calcium 8.8 8.6 - 10.5 mg/dL    eGFR  African Amer 51 (L) >60 mL/min/1.73    BUN/Creatinine Ratio 14.5 7.0 - 25.0    Anion Gap 11.9 5.0 - 15.0 mmol/L   Magnesium    Collection Time: 11/22/19  5:21 AM   Result Value Ref Range    Magnesium 2.2 1.6 - 2.4 mg/dL   Comprehensive Metabolic Panel    Collection Time: 11/26/19 10:02 AM   Result Value Ref Range    Glucose 119 (H) 65 - 99 mg/dL    BUN 17 8 - 23 mg/dL    Creatinine 1.33 (H) 0.57 - 1.00 mg/dL    eGFR Non African Am 39 (L) >60 mL/min/1.73    eGFR  Am 47 (L) >60 mL/min/1.73    BUN/Creatinine Ratio 12.8 7.0 - 25.0    Sodium 148 (H) 136 - 145 mmol/L    Potassium 3.3 (L) 3.5 - 5.2  mmol/L    Chloride 104 98 - 107 mmol/L    Total CO2 30.7 (H) 22.0 - 29.0 mmol/L    Calcium 9.1 8.6 - 10.5 mg/dL    Total Protein 6.5 6.0 - 8.5 g/dL    Albumin 3.90 3.50 - 5.20 g/dL    Globulin 2.6 gm/dL    A/G Ratio 1.5 g/dL    Total Bilirubin 0.2 0.2 - 1.2 mg/dL    Alkaline Phosphatase 81 39 - 117 U/L    AST (SGOT) 15 1 - 32 U/L    ALT (SGPT) 12 1 - 33 U/L   Lipid Panel With LDL / HDL Ratio    Collection Time: 11/26/19 10:02 AM   Result Value Ref Range    Total Cholesterol 154 0 - 200 mg/dL    Triglycerides 83 0 - 150 mg/dL    HDL Cholesterol 62 (H) 40 - 60 mg/dL    VLDL Cholesterol 16.6 mg/dL    LDL Cholesterol  75 0 - 100 mg/dL    LDL/HDL Ratio 1.22

## 2019-12-03 ENCOUNTER — READMISSION MANAGEMENT (OUTPATIENT)
Dept: CALL CENTER | Facility: HOSPITAL | Age: 74
End: 2019-12-03

## 2019-12-03 DIAGNOSIS — M81.0 AGE-RELATED OSTEOPOROSIS WITHOUT CURRENT PATHOLOGICAL FRACTURE: Primary | ICD-10-CM

## 2019-12-04 NOTE — OUTREACH NOTE
Medical Week 2 Survey      Responses   Facility patient discharged from?  [facility]   Does the patient have one of the following disease processes/diagnoses(primary or secondary)?  Other   Week 2 attempt successful?  Yes   Call start time  1818   Call end time  1819   Meds reviewed with patient/caregiver?  Yes   Is the patient taking all medications as directed (includes completed medication regime)?  Yes   Medication comments  potassium increased   Has the patient kept scheduled appointments due by today?  Yes   What is the patient's perception of their health status since discharge?  Improving   Week 2 Call Completed?  Yes   Wrap up additional comments  doing well          Laurie Crowe, RN

## 2019-12-05 PROBLEM — I31.1: Status: RESOLVED | Noted: 2019-11-01 | Resolved: 2019-12-05

## 2019-12-06 ENCOUNTER — OFFICE VISIT (OUTPATIENT)
Dept: CARDIOLOGY | Facility: CLINIC | Age: 74
End: 2019-12-06

## 2019-12-06 VITALS
WEIGHT: 157 LBS | HEIGHT: 60 IN | RESPIRATION RATE: 16 BRPM | SYSTOLIC BLOOD PRESSURE: 104 MMHG | DIASTOLIC BLOOD PRESSURE: 68 MMHG | BODY MASS INDEX: 30.82 KG/M2 | HEART RATE: 68 BPM

## 2019-12-06 DIAGNOSIS — Z95.1 S/P CABG (CORONARY ARTERY BYPASS GRAFT): ICD-10-CM

## 2019-12-06 DIAGNOSIS — E78.5 HYPERLIPIDEMIA LDL GOAL <100: ICD-10-CM

## 2019-12-06 DIAGNOSIS — Z95.5 H/O HEART ARTERY STENT: ICD-10-CM

## 2019-12-06 DIAGNOSIS — I47.29 NONSUSTAINED VENTRICULAR TACHYCARDIA (HCC): ICD-10-CM

## 2019-12-06 DIAGNOSIS — I10 ESSENTIAL HYPERTENSION: ICD-10-CM

## 2019-12-06 DIAGNOSIS — R06.89 RESPIRATORY INSUFFICIENCY: Primary | ICD-10-CM

## 2019-12-06 DIAGNOSIS — I25.10 CORONARY ARTERIOSCLEROSIS IN NATIVE ARTERY: ICD-10-CM

## 2019-12-06 PROCEDURE — 99213 OFFICE O/P EST LOW 20 MIN: CPT | Performed by: INTERNAL MEDICINE

## 2019-12-06 NOTE — PROGRESS NOTES
[facility]        Subjective:     Encounter Date:12/06/2019      Patient ID: Malreny Greenwood is a 74 y.o. female.      HPI: I saw Marleny Greenwood today for cardiovascular care.  She is a pleasant 74-year-old female who reports improved dyspnea on exertion.  She remains free of chest pain pressure tightness and does not experience orthopnea or PND no charity syncope near syncope or palpitation.  She has known coronary heart disease status post bypass surgery 2011.  Echocardiogram 6/30/2019 ventricular limited to function mild concentric left ventricular hypertrophy and grade 1 diastolic dysfunction.  She does state mild aortic mitral and tricuspid insufficiency.  She reported persistent and limiting dyspnea on exertion.  She underwent pulmonary evaluation including bronchoscopy which was unrevealing.  Subsequently she underwent right left heart catheterization on 11/19/2019 overall this demonstrated normal left heart filling pressures, mildly elevated right heart pressures.  Coronary disease and bypass were stable.  Echocardiogram obtained 11/20/2019 no significant ejection fraction 74%, grade 1 diastolic dysfunction mild aortic insufficiency.  She underwent aggressive diuresis and is overall improved.  Previously her blood pressure was markedly elevated up to 186/106 today is 104/68.      The following portions of the patient's history were reviewed and updated as appropriate: allergies, current medications, past family history, past medical history, past social history, past surgical history and problem list.    Problem List:  Patient Active Problem List   Diagnosis   • Asthma, cough variant   • Coronary arteriosclerosis in native artery   • Essential hypertension   • Chronic daily headache   • Nodule of upper lobe of right lung   • Hyperlipidemia LDL goal <100   • Nonsustained ventricular tachycardia (CMS/HCC)   • Respiratory insufficiency   • History of cardiac cath   • History of loop recorder    • H/O heart artery stent   • S/P CABG (coronary artery bypass graft)   • H/O echocardiogram   • History of nuclear stress test   • Age-related osteoporosis without current pathological fracture       Past Medical History:  Past Medical History:   Diagnosis Date   • Anemia    • Asthma    • Benign essential hypertension    • Bronchitis    • Coronary artery disease    • Cough    • Dyspnea    • Esophageal reflux    • H/O echocardiogram 6/11/2019 6/05/18 - Normal LV size and function.  EF 60-65%.  Mild concentric LVH.  Midl AR, MR, and TR.   • H/O heart artery stent 6/11/2019    3/22/12 - VISION stent to the SVG of the RCA reducing a total occlusion to 0% residual narrowing.   • Headache    • History of cardiac cath 6/11/2019   • History of loop recorder 6/11/2019    5/20/15 - [employer] LINQ implant.   • History of nuclear stress test 6/11/2019    10/09/17 - Abnormal study.  EF 46%.   • Hyperlipidemia LDL goal <100 6/11/2019   • Hypertension    • Hypokalemia    • Migraine    • Moderate osteopenia    • Nonsustained ventricular tachycardia (CMS/HCC) 6/11/2019   • NSVT (nonsustained ventricular tachycardia) (CMS/HCC)    • Osteopenia    • Postmenopausal atrophic vaginitis    • Pre-syncope    • Respiratory insufficiency 6/11/2019   • S/P CABG (coronary artery bypass graft) 6/11/2019 4/12/11 - LIMA to the LAD and SVG to the posterior descending artery.   • S/P cardiac catheterization 06/02/2014   • Seasonal allergies    • Shoulder dislocation    • Status post placement of implantable loop recorder 05/20/2015      Successful loop/LINQ implantation by Dr. Geo Wilson.   • Sternal wound dehiscence    • Syncope    • Valvular heart disease    • Vasovagal syncope    • Vitamin D deficiency        Past Surgical History:  Past Surgical History:   Procedure Laterality Date   • APPENDECTOMY     • BRONCHOSCOPY N/A 8/7/2019    Procedure: BRONCHOSCOPY WITH BRONCHOALVEOLAR LAVAGE;  Surgeon: Thomas Sheridan MD;  Location: [facility]  ENDOSCOPY;  Service: Pulmonary   • CARDIAC CATHETERIZATION  2014    History of Cardiac Cath Procedure Outcome: Successful   • CARDIAC CATHETERIZATION N/A 2019    Procedure: Right and Left Heart Cath lv pressures;  Surgeon: Mal Moser MD;  [facility];  Service: Cardiology   • CARDIAC CATHETERIZATION N/A 2019    Procedure: Saphenous Vein Graft;  Surgeon: Mal Moser MD;  [facility];  Service: Cardiology   • CARDIAC CATHETERIZATION N/A 2019    Procedure: Coronary angiography;  Surgeon: Mal Moser MD;  [facility];  Service: Cardiology   • CATARACT EXTRACTION     •  SECTION     • CHOLECYSTECTOMY     • COLONOSCOPY  2009    q-10   • CORONARY ANGIOPLASTY WITH STENT PLACEMENT  2012     [facility] by Dr. Roman.   • CORONARY ARTERY BYPASS GRAFT Bilateral 2011    with a left internal mammary graft to the LAD and saphenous vein graft to the posterior descending artery   • ENDOSCOPY       Diagnostic Esophagogastroduodenoscopy   • HYSTERECTOMY     • NERVE BLOCK       Nerve Block Transforaminal Epidural Lumbar   • OTHER SURGICAL HISTORY  2015    Patient-Activated Cardiac Event Recorder - From 2015 to 2015       Social History:  Social History     Socioeconomic History   • Marital status:      Spouse name: Not on file   • Number of children: Not on file   • Years of education: Not on file   • Highest education level: Not on file   Tobacco Use   • Smoking status: Never Smoker   • Smokeless tobacco: Never Used   Substance and Sexual Activity   • Alcohol use: No     Frequency: Never   • Drug use: No   • Sexual activity: Defer       Allergies:  Allergies   Allergen Reactions   • Adhesive Tape Unknown (See Comments)     Rash and itchy skin   • Cardizem [Diltiazem] Unknown (See Comments)     Pt is unknown why she can not take this  "medication    • Levaquin [Levofloxacin] Cough         ROS:  Review of Systems   Constitution: Negative for weakness and malaise/fatigue.   HENT: Negative for hearing loss and nosebleeds.    Eyes: Negative for double vision and visual disturbance.   Cardiovascular: Positive for dyspnea on exertion. Negative for chest pain, claudication, near-syncope, orthopnea, palpitations, paroxysmal nocturnal dyspnea and syncope.   Respiratory: Negative for cough, shortness of breath, sleep disturbances due to breathing, snoring, sputum production and wheezing.    Endocrine: Negative for cold intolerance, heat intolerance, polydipsia and polyuria.   Hematologic/Lymphatic: Negative for adenopathy and bleeding problem. Does not bruise/bleed easily.   Skin: Negative for flushing and itching.   Musculoskeletal: Negative for back pain, falls, joint pain, joint swelling, muscle weakness and neck pain.   Gastrointestinal: Negative for abdominal pain, dysphagia, heartburn, nausea and vomiting.   Genitourinary: Negative for dysuria and frequency.   Neurological: Negative for disturbances in coordination, dizziness, light-headedness, loss of balance and numbness.   Psychiatric/Behavioral: Negative for altered mental status and memory loss. The patient is not nervous/anxious.    Allergic/Immunologic: Negative for hives and persistent infections.          Objective:         /68   Pulse 68   Resp 16   Ht 152.4 cm (60\")   Wt 71.2 kg (157 lb)   BMI 30.66 kg/m²     Physical Exam   Constitutional: She is oriented to person, place, and time. She appears well-developed and well-nourished.   HENT:   Head: Normocephalic and atraumatic.   Eyes: Conjunctivae and EOM are normal. Pupils are equal, round, and reactive to light.   Neck: Neck supple. No thyromegaly present.   Cardiovascular: Normal rate, regular rhythm, S1 normal, S2 normal, normal heart sounds and intact distal pulses. PMI is not displaced. Exam reveals no gallop, no S3, no S4, " no distant heart sounds, no friction rub, no midsystolic click and no opening snap.   No murmur heard.  Pulses:       Carotid pulses are 2+ on the right side, and 2+ on the left side.       Radial pulses are 2+ on the right side, and 2+ on the left side.        Femoral pulses are 2+ on the right side, and 2+ on the left side.       Dorsalis pedis pulses are 2+ on the right side, and 2+ on the left side.        Posterior tibial pulses are 2+ on the right side, and 2+ on the left side.   Pulmonary/Chest: Effort normal and breath sounds normal. No respiratory distress. She has no wheezes. She has no rales.   Abdominal: Soft. Bowel sounds are normal. She exhibits no distension and no mass. There is no tenderness.   Musculoskeletal: Normal range of motion. She exhibits no edema.   Neurological: She is alert and oriented to person, place, and time.   Skin: Skin is warm and dry. No erythema.   Psychiatric: She has a normal mood and affect.       In-Office Procedure(s):  Procedures    ASCVD RIsk Score::  The 10-year ASCVD risk score (Thom NEEL Jr., et al., 2013) is: 8.2%    Values used to calculate the score:      Age: 74 years      Sex: Female      Is Non- : Yes      Diabetic: No      Tobacco smoker: No      Systolic Blood Pressure: 104 mmHg      Is BP treated: Yes      HDL Cholesterol: 62 mg/dL      Total Cholesterol: 154 mg/dL        Assessment/Plan:         1. Respiratory insufficiency  Stable and improved with the euvolemic state    2. Coronary arteriosclerosis in native artery  Stable    3. S/P CABG (coronary artery bypass graft)  Stable    4. H/O heart artery stent  Stable    5. Essential hypertension  Well-controlled    6. Hyperlipidemia LDL goal <100  Stable    7. Nonsustained ventricular tachycardia (CMS/HCC)  Asymptomatic    Marleny is stable and improved.  She continues with minimal dyspnea on exertion improved volume status.  She will weigh herself daily and contact us if there is a gradual  increase in her weight.  She will continue to restrict salt and fluid in her diet by the current medical regimen.  We will see her again in 3 months.                 Mal Moser MD  12/06/19  .

## 2019-12-09 ENCOUNTER — RESULTS ENCOUNTER (OUTPATIENT)
Dept: INTERNAL MEDICINE | Facility: CLINIC | Age: 74
End: 2019-12-09

## 2019-12-09 DIAGNOSIS — E87.6 HYPOKALEMIA: ICD-10-CM

## 2019-12-10 RX ORDER — OMEPRAZOLE 20 MG/1
CAPSULE, DELAYED RELEASE ORAL
Qty: 90 CAPSULE | Refills: 4 | Status: SHIPPED | OUTPATIENT
Start: 2019-12-10 | End: 2021-02-25 | Stop reason: SDUPTHER

## 2019-12-11 ENCOUNTER — READMISSION MANAGEMENT (OUTPATIENT)
Dept: CALL CENTER | Facility: HOSPITAL | Age: 74
End: 2019-12-11

## 2019-12-11 LAB
BUN SERPL-MCNC: 23 MG/DL (ref 8–23)
BUN/CREAT SERPL: 14.8 (ref 7–25)
CALCIUM SERPL-MCNC: 9 MG/DL (ref 8.6–10.5)
CHLORIDE SERPL-SCNC: 103 MMOL/L (ref 98–107)
CO2 SERPL-SCNC: 29.2 MMOL/L (ref 22–29)
CREAT SERPL-MCNC: 1.55 MG/DL (ref 0.57–1)
GLUCOSE SERPL-MCNC: 122 MG/DL (ref 65–99)
POTASSIUM SERPL-SCNC: 4.4 MMOL/L (ref 3.5–5.2)
SODIUM SERPL-SCNC: 144 MMOL/L (ref 136–145)

## 2019-12-11 NOTE — OUTREACH NOTE
Medical Week 3 Survey      Responses   Facility patient discharged from?  [facility]   Does the patient have one of the following disease processes/diagnoses(primary or secondary)?  Other   Week 3 attempt successful?  Yes   Call start time  0834   Call end time  0836   Discharge diagnosis  Pericardial constriction-right and left cath this visit   Meds reviewed with patient/caregiver?  Yes   Is the patient taking all medications as directed (includes completed medication regime)?  Yes   Has the patient kept scheduled appointments due by today?  Yes   Has home health visited the patient within 72 hours of discharge?  N/A   What is the patient's perception of their health status since discharge?  Improving   Week 3 Call Completed?  Yes   Graduated  Yes   Did the patient feel the follow up calls were helpful during their recovery period?  Yes   Was the number of calls appropriate?  Yes          Maria Isabel Ellsworth RN

## 2019-12-17 DIAGNOSIS — I10 ESSENTIAL HYPERTENSION: Primary | ICD-10-CM

## 2019-12-20 ENCOUNTER — INFUSION (OUTPATIENT)
Dept: ONCOLOGY | Facility: HOSPITAL | Age: 74
End: 2019-12-20

## 2019-12-20 VITALS
OXYGEN SATURATION: 92 % | WEIGHT: 159.8 LBS | TEMPERATURE: 98.4 F | HEART RATE: 75 BPM | SYSTOLIC BLOOD PRESSURE: 146 MMHG | BODY MASS INDEX: 31.21 KG/M2 | DIASTOLIC BLOOD PRESSURE: 76 MMHG

## 2019-12-20 DIAGNOSIS — M81.0 AGE-RELATED OSTEOPOROSIS WITHOUT CURRENT PATHOLOGICAL FRACTURE: Primary | ICD-10-CM

## 2019-12-20 PROCEDURE — 96372 THER/PROPH/DIAG INJ SC/IM: CPT | Performed by: NURSE PRACTITIONER

## 2019-12-20 PROCEDURE — 25010000002 DENOSUMAB 60 MG/ML SOLUTION PREFILLED SYRINGE: Performed by: INTERNAL MEDICINE

## 2019-12-20 RX ADMIN — DENOSUMAB 60 MG: 60 INJECTION SUBCUTANEOUS at 15:35

## 2019-12-20 NOTE — NURSING NOTE
Arrived  for prolia injection. Indication and side effects reviewed. Denies recent dental work. Labs and medications verified. Prolia administered in   arm without incidence. Instructed to call prescribing MD for any concerns or questions. She has a f/u appt for 6- at 1330. Pt vu and discharged ambulatory. Discussed with her to talk with Dr. Nik romero supplemental calcium.

## 2019-12-23 ENCOUNTER — TELEPHONE (OUTPATIENT)
Dept: CARDIOLOGY | Facility: CLINIC | Age: 74
End: 2019-12-23

## 2019-12-23 NOTE — TELEPHONE ENCOUNTER
Reviewed with Dr Mal Moser: please ask that pt contact PCP as most recent change made there.  I spoke with pt and informed her of the above, she voiced understanding. RTRN

## 2019-12-23 NOTE — TELEPHONE ENCOUNTER
Mrs Greenwood called to report she has gained 4 pounds since Saturday (12/21/19). Requesting a return call    [phone]

## 2019-12-23 NOTE — TELEPHONE ENCOUNTER
I spoke with pt. She has noted swelling in left foot/ankle as well as ROJAS.  Dr Zaragoza decreased Lasix from 40mg BID to 40mg QD due to elevated Cr 12/11/19 of 1.55.  Please advise. RTRN

## 2019-12-24 ENCOUNTER — TELEPHONE (OUTPATIENT)
Dept: INTERNAL MEDICINE | Facility: CLINIC | Age: 74
End: 2019-12-24

## 2019-12-24 NOTE — TELEPHONE ENCOUNTER
Dr. LOZADA Pt called said that she is up 5 lbs from Saturday, she called cardio Dr they told her to call you since you took her off 1 of her lasix, pt is having some SOA with she moves around    Please advise    Pt # [phone]

## 2019-12-26 ENCOUNTER — OFFICE VISIT (OUTPATIENT)
Dept: INTERNAL MEDICINE | Facility: CLINIC | Age: 74
End: 2019-12-26

## 2019-12-26 VITALS
DIASTOLIC BLOOD PRESSURE: 56 MMHG | HEART RATE: 74 BPM | BODY MASS INDEX: 31.8 KG/M2 | SYSTOLIC BLOOD PRESSURE: 94 MMHG | WEIGHT: 162 LBS | HEIGHT: 60 IN | OXYGEN SATURATION: 95 %

## 2019-12-26 DIAGNOSIS — N28.9 RENAL INSUFFICIENCY, MILD: ICD-10-CM

## 2019-12-26 DIAGNOSIS — I10 ESSENTIAL HYPERTENSION: Primary | ICD-10-CM

## 2019-12-26 DIAGNOSIS — R06.89 RESPIRATORY INSUFFICIENCY: ICD-10-CM

## 2019-12-26 DIAGNOSIS — R60.0 LOWER EXTREMITY EDEMA: ICD-10-CM

## 2019-12-26 PROCEDURE — 99214 OFFICE O/P EST MOD 30 MIN: CPT | Performed by: INTERNAL MEDICINE

## 2019-12-26 NOTE — PROGRESS NOTES
Assessment and Plan  Marleny was seen today for weight gain.    Diagnoses and all orders for this visit:    Essential hypertension  Comments:  hold losartan until f/u next week wtjoe Hernandez NP    Renal insufficiency, mild  Comments:  recheck BMP today- monitor diuretic use     Respiratory insufficiency    Lower extremity edema  Comments:  daily weight- take 2 furosemide acutely.      F/U and Patient Instructions    Return in about 1 week (around 1/2/2020).  There are no Patient Instructions on file for this visit.    Subjective    Marleny Greenwood is a 74 y.o. female being seen in our office today for Weight Gain     History of the Present Illness  HPI noticed her weight was going up with some SOB- I instructed her to increase the furosemide back to BID on 12/24- notes a small improvement.  Had cut her back to once a day for increase in creatinine.  She says any moving around is making her tired and short winded.  Denies CP, cough.   Reviewed discharge note- recommended sleep study- has never been a problem for her- she does not want to pursue this - will f/u with her on this.     Patient History        Significant Past History  The following portions of the patient's history were reviewed and updated as appropriate:PMHroutine: Social history , Allergies, Current Medications, Active Problem List and Health Maintenance              Social History  She  reports that she has never smoked. She has never used smokeless tobacco. She reports that she does not drink alcohol or use drugs.                         Review of Symptoms  Review of Systems   Constitution: Positive for weight gain. Negative for chills and decreased appetite.   HENT: Negative for congestion.    Cardiovascular: Negative.    Respiratory: Positive for shortness of breath. Negative for cough and sleep disturbances due to breathing.    Musculoskeletal: Negative.    Gastrointestinal: Negative.      Objective  Vital Signs         BP Readings from Last 1  Encounters:   12/26/19 94/56     Wt Readings from Last 3 Encounters:   12/26/19 73.5 kg (162 lb)   12/20/19 72.5 kg (159 lb 12.8 oz)   12/06/19 71.2 kg (157 lb)   Body mass index is 31.64 kg/m².          Physical Exam   Physical Exam   Constitutional: No distress.   Neck: No JVD present.   Cardiovascular: Normal rate and regular rhythm.   Pulmonary/Chest: Effort normal and breath sounds normal.   Musculoskeletal: Edema: trace B edema.   Lymphadenopathy:     She has no cervical adenopathy.     Data Reviewed    Recent Results (from the past 2016 hour(s))   Basic Metabolic Panel    Collection Time: 10/25/19 11:39 AM   Result Value Ref Range    Glucose 108 (H) 65 - 99 mg/dL    BUN 15 8 - 23 mg/dL    Creatinine 0.97 0.57 - 1.00 mg/dL    Sodium 144 136 - 145 mmol/L    Potassium 3.2 (L) 3.5 - 5.2 mmol/L    Chloride 105 98 - 107 mmol/L    CO2 28.6 22.0 - 29.0 mmol/L    Calcium 8.9 8.6 - 10.5 mg/dL    eGFR  African Amer 68 >60 mL/min/1.73    BUN/Creatinine Ratio 15.5 7.0 - 25.0    Anion Gap 10.4 5.0 - 15.0 mmol/L   BNP    Collection Time: 10/25/19 11:39 AM   Result Value Ref Range    proBNP 168.7 5.0 - 900.0 pg/mL   Basic Metabolic Panel    Collection Time: 10/28/19 10:35 AM   Result Value Ref Range    Glucose 103 (H) 65 - 99 mg/dL    BUN 21 8 - 23 mg/dL    Creatinine 1.31 (H) 0.57 - 1.00 mg/dL    Sodium 143 136 - 145 mmol/L    Potassium 3.1 (L) 3.5 - 5.2 mmol/L    Chloride 105 98 - 107 mmol/L    CO2 26.3 22.0 - 29.0 mmol/L    Calcium 8.8 8.6 - 10.5 mg/dL    eGFR  African Amer 48 (L) >60 mL/min/1.73    BUN/Creatinine Ratio 16.0 7.0 - 25.0    Anion Gap 11.7 5.0 - 15.0 mmol/L   Comprehensive Metabolic Panel    Collection Time: 11/04/19 11:38 AM   Result Value Ref Range    Glucose 116 (H) 65 - 99 mg/dL    BUN 26 (H) 8 - 23 mg/dL    Creatinine 1.42 (H) 0.57 - 1.00 mg/dL    Sodium 146 (H) 136 - 145 mmol/L    Potassium 3.6 3.5 - 5.2 mmol/L    Chloride 106 98 - 107 mmol/L    CO2 26.1 22.0 - 29.0 mmol/L    Calcium 8.9 8.6 - 10.5  mg/dL    Total Protein 7.1 6.0 - 8.5 g/dL    Albumin 4.10 3.50 - 5.20 g/dL    ALT (SGPT) 16 1 - 33 U/L    AST (SGOT) 10 1 - 32 U/L    Alkaline Phosphatase 82 39 - 117 U/L    Total Bilirubin 0.3 0.2 - 1.2 mg/dL    eGFR  African Amer 44 (L) >60 mL/min/1.73    Globulin 3.0 gm/dL    A/G Ratio 1.4 g/dL    BUN/Creatinine Ratio 18.3 7.0 - 25.0    Anion Gap 13.9 5.0 - 15.0 mmol/L   Protime-INR    Collection Time: 11/04/19 11:38 AM   Result Value Ref Range    Protime 13.6 11.7 - 14.2 Seconds    INR 1.07 0.90 - 1.10   CBC Auto Differential    Collection Time: 11/04/19 11:38 AM   Result Value Ref Range    WBC 7.54 3.40 - 10.80 10*3/mm3    RBC 4.51 3.77 - 5.28 10*6/mm3    Hemoglobin 13.1 12.0 - 15.9 g/dL    Hematocrit 40.0 34.0 - 46.6 %    MCV 88.7 79.0 - 97.0 fL    MCH 29.0 26.6 - 33.0 pg    MCHC 32.8 31.5 - 35.7 g/dL    RDW 14.5 12.3 - 15.4 %    RDW-SD 46.7 37.0 - 54.0 fl    MPV 10.0 6.0 - 12.0 fL    Platelets 175 140 - 450 10*3/mm3    Neutrophil % 69.4 42.7 - 76.0 %    Lymphocyte % 15.6 (L) 19.6 - 45.3 %    Monocyte % 11.7 5.0 - 12.0 %    Eosinophil % 2.0 0.3 - 6.2 %    Basophil % 0.4 0.0 - 1.5 %    Immature Grans % 0.9 (H) 0.0 - 0.5 %    Neutrophils, Absolute 5.23 1.70 - 7.00 10*3/mm3    Lymphocytes, Absolute 1.18 0.70 - 3.10 10*3/mm3    Monocytes, Absolute 0.88 0.10 - 0.90 10*3/mm3    Eosinophils, Absolute 0.15 0.00 - 0.40 10*3/mm3    Basophils, Absolute 0.03 0.00 - 0.20 10*3/mm3    Immature Grans, Absolute 0.07 (H) 0.00 - 0.05 10*3/mm3    nRBC 0.0 0.0 - 0.2 /100 WBC   Basic Metabolic Panel    Collection Time: 11/14/19 10:50 AM   Result Value Ref Range    Glucose 111 (H) 65 - 99 mg/dL    BUN 19 8 - 23 mg/dL    Creatinine 1.45 (H) 0.57 - 1.00 mg/dL    Sodium 142 136 - 145 mmol/L    Potassium 2.8 (L) 3.5 - 5.2 mmol/L    Chloride 102 98 - 107 mmol/L    CO2 29.2 (H) 22.0 - 29.0 mmol/L    Calcium 9.2 8.6 - 10.5 mg/dL    eGFR  African Amer 43 (L) >60 mL/min/1.73    BUN/Creatinine Ratio 13.1 7.0 - 25.0    Anion Gap 10.8 5.0 -  15.0 mmol/L   Basic Metabolic Panel    Collection Time: 11/18/19 11:01 AM   Result Value Ref Range    Glucose 104 (H) 65 - 99 mg/dL    BUN 25 (H) 8 - 23 mg/dL    Creatinine 1.77 (H) 0.57 - 1.00 mg/dL    Sodium 143 136 - 145 mmol/L    Potassium 4.0 3.5 - 5.2 mmol/L    Chloride 104 98 - 107 mmol/L    CO2 23.7 22.0 - 29.0 mmol/L    Calcium 9.1 8.6 - 10.5 mg/dL    eGFR  African Amer 34 (L) >60 mL/min/1.73    BUN/Creatinine Ratio 14.1 7.0 - 25.0    Anion Gap 15.3 (H) 5.0 - 15.0 mmol/L   POC Panel 9, ISTAT    Collection Time: 11/19/19 12:28 PM   Result Value Ref Range    Hemoglobin 11.6 (L) 12.0 - 17.0 g/dL    Hematocrit 34 (L) 38 - 51 %    O2 Saturation, Arterial 76 (L) 95 - 98 %   POC Panel 9, ISTAT    Collection Time: 11/19/19 12:45 PM   Result Value Ref Range    Hemoglobin 11.2 (L) 12.0 - 17.0 g/dL    Hematocrit 33 (L) 38 - 51 %    O2 Saturation, Arterial 74 (L) 95 - 98 %   POC Panel 9, ISTAT    Collection Time: 11/19/19  1:06 PM   Result Value Ref Range    Hemoglobin 10.9 (L) 12.0 - 17.0 g/dL    Hematocrit 32 (L) 38 - 51 %    O2 Saturation, Arterial 95 95 - 98 %   Basic Metabolic Panel    Collection Time: 11/19/19  4:59 PM   Result Value Ref Range    Glucose 123 (H) 65 - 99 mg/dL    BUN 26 (H) 8 - 23 mg/dL    Creatinine 1.53 (H) 0.57 - 1.00 mg/dL    Sodium 146 (H) 136 - 145 mmol/L    Potassium 3.8 3.5 - 5.2 mmol/L    Chloride 108 (H) 98 - 107 mmol/L    CO2 23.3 22.0 - 29.0 mmol/L    Calcium 9.0 8.6 - 10.5 mg/dL    eGFR  African Amer 40 (L) >60 mL/min/1.73    BUN/Creatinine Ratio 17.0 7.0 - 25.0    Anion Gap 14.7 5.0 - 15.0 mmol/L   Magnesium    Collection Time: 11/19/19  4:59 PM   Result Value Ref Range    Magnesium 1.9 1.6 - 2.4 mg/dL   Basic Metabolic Panel    Collection Time: 11/20/19  5:47 AM   Result Value Ref Range    Glucose 96 65 - 99 mg/dL    BUN 23 8 - 23 mg/dL    Creatinine 1.41 (H) 0.57 - 1.00 mg/dL    Sodium 144 136 - 145 mmol/L    Potassium 3.4 (L) 3.5 - 5.2 mmol/L    Chloride 107 98 - 107 mmol/L    CO2  25.8 22.0 - 29.0 mmol/L    Calcium 8.6 8.6 - 10.5 mg/dL    eGFR  African Amer 44 (L) >60 mL/min/1.73    BUN/Creatinine Ratio 16.3 7.0 - 25.0    Anion Gap 11.2 5.0 - 15.0 mmol/L   Magnesium    Collection Time: 11/20/19  5:47 AM   Result Value Ref Range    Magnesium 1.9 1.6 - 2.4 mg/dL   CBC (No Diff)    Collection Time: 11/20/19  5:47 AM   Result Value Ref Range    WBC 5.97 3.40 - 10.80 10*3/mm3    RBC 3.83 3.77 - 5.28 10*6/mm3    Hemoglobin 11.4 (L) 12.0 - 15.9 g/dL    Hematocrit 33.6 (L) 34.0 - 46.6 %    MCV 87.7 79.0 - 97.0 fL    MCH 29.8 26.6 - 33.0 pg    MCHC 33.9 31.5 - 35.7 g/dL    RDW 14.7 12.3 - 15.4 %    RDW-SD 46.8 37.0 - 54.0 fl    MPV 9.9 6.0 - 12.0 fL    Platelets 159 140 - 450 10*3/mm3   BNP    Collection Time: 11/20/19  5:20 PM   Result Value Ref Range    proBNP 230.0 5.0 - 900.0 pg/mL   Adult Transthoracic Echo Complete W/ Cont if Necessary Per Protocol    Collection Time: 11/20/19  6:32 PM   Result Value Ref Range    BSA 1.7 m^2    IVSd 0.9 cm    LVIDd 4.6 cm    LVIDs 2.7 cm    LVPWd 1.1 cm    IVS/LVPW 0.82     FS 41.3 %    EDV(Teich) 97.3 ml    ESV(Teich) 27.0 ml    EF(Teich) 72.2 %    EDV(cubed) 97.3 ml    ESV(cubed) 19.7 ml    EF(cubed) 79.8 %    LV mass(C)d 158.8 grams    LV mass(C)dI 94.9 grams/m^2    SV(Teich) 70.3 ml    SI(Teich) 42.0 ml/m^2    SV(cubed) 77.7 ml    SI(cubed) 46.4 ml/m^2    Ao root diam 3.5 cm    Ao root area 9.6 cm^2    asc Aorta Diam 3.4 cm    LVOT diam 1.6 cm    LVOT area 2.0 cm^2    LVOT area(traced) 2.0 cm^2    RVOT diam 1.9 cm    RVOT area 2.8 cm^2    LVLd ap4 7.3 cm    EDV(MOD-sp4) 41.0 ml    LVLs ap4 6.5 cm    ESV(MOD-sp4) 12.0 ml    EF(MOD-sp4) 70.7 %    LVLd ap2 7.1 cm    EDV(MOD-sp2) 73.0 ml    LVLs ap2 6.0 cm    ESV(MOD-sp2) 19.0 ml    EF(MOD-sp2) 74.0 %    SV(MOD-sp4) 29.0 ml    SI(MOD-sp4) 17.3 ml/m^2    SV(MOD-sp2) 54.0 ml    SI(MOD-sp2) 32.3 ml/m^2    Ao root area (BSA corrected) 2.1     LV Montes Vol (BSA corrected) 24.5 ml/m^2    LV Sys Vol (BSA corrected) 7.2  ml/m^2    TAPSE (>1.6) 1.5 cm    MV A dur 177.0 sec    MV E max luis 72.7 cm/sec    MV A max luis 108.0 cm/sec    MV E/A 0.67     MV V2 max 108.0 cm/sec    MV max PG 4.7 mmHg    MV V2 mean 58.8 cm/sec    MV mean PG 2.0 mmHg    MV V2 VTI 30.4 cm    MVA(VTI) 1.8 cm^2    MV P1/2t max luis 98.3 cm/sec    MV P1/2t 80.9 msec    MVA(P1/2t) 2.7 cm^2    MV dec slope 356.0 cm/sec^2    MV dec time 283 sec    Ao pk luis 179.0 cm/sec    Ao max PG 12.8 mmHg    Ao max PG (full) 4.5 mmHg    Ao V2 mean 122.0 cm/sec    Ao mean PG 7.0 mmHg    Ao mean PG (full) 2.0 mmHg    Ao V2 VTI 35.6 cm    CARMEN(I,A) 1.5 cm^2    CARMEN(I,D) 1.5 cm^2    CARMEN(V,A) 1.6 cm^2    CARMEN(V,D) 1.6 cm^2    LV V1 max PG 8.3 mmHg    LV V1 mean PG 5.0 mmHg    LV V1 max 144.0 cm/sec    LV V1 mean 102.0 cm/sec    LV V1 VTI 27.3 cm    SV(Ao) 342.5 ml    SI(Ao) 204.6 ml/m^2    SV(LVOT) 54.9 ml    SV(RVOT) 34.3 ml    SI(LVOT) 32.8 ml/m^2    PA V2 max 87.6 cm/sec    PA max PG 3.1 mmHg    PA max PG (full) 0.97 mmHg    BH CV ECHO MYLES - PVA(V,A) 2.3 cm^2    BH CV ECHO MYLES - PVA(V,D) 2.3 cm^2    PA acc time 0.08 sec    RV V1 max PG 2.1 mmHg    RV V1 mean PG 1.0 mmHg    RV V1 max 72.4 cm/sec    RV V1 mean 44.0 cm/sec    RV V1 VTI 12.1 cm    TR max luis 306.0 cm/sec    PA pr(Accel) 42.1 mmHg    Pulm Sys Luis 84.8 cm/sec    Pulm Montes Luis 51.4 cm/sec    Pulm S/D 1.6     Qp/Qs 0.63     Pulm A Revs Dur 0.11 sec    Pulm A Revs Luis 43.8 cm/sec    MVA P1/2T LCG 2.2 cm^2     CV ECHO MYLES - BZI_BMI 29.1 kilograms/m^2     CV ECHO MYLES - BSA(HAYCOCK) 1.7 m^2     CV ECHO MYLES - BZI_METRIC_WEIGHT 69.0 kg     CV ECHO MYLES - BZI_METRIC_HEIGHT 154.0 cm    TDI S' 9.46 cm/sec    RV Base 2.64 cm    Dimensionless Index 0.8 (DI)    LA Volume Index 22.0 mL/m2    Avg E/e' ratio 6.96     EF(MOD-bp) 74.0 %    Lat Peak E' Luis 12.2 cm/sec    Med Peak E' Luis 8.70 cm/sec    RAP systole 8 mmHg    RVSP(TR) 45 mmHg     CV ECHO MYLES - TR MAX PG 37    Basic Metabolic Panel    Collection Time: 11/21/19  4:30  AM   Result Value Ref Range    Glucose 88 65 - 99 mg/dL    BUN 19 8 - 23 mg/dL    Creatinine 1.23 (H) 0.57 - 1.00 mg/dL    Sodium 144 136 - 145 mmol/L    Potassium 3.4 (L) 3.5 - 5.2 mmol/L    Chloride 106 98 - 107 mmol/L    CO2 27.2 22.0 - 29.0 mmol/L    Calcium 8.4 (L) 8.6 - 10.5 mg/dL    eGFR  African Amer 52 (L) >60 mL/min/1.73    BUN/Creatinine Ratio 15.4 7.0 - 25.0    Anion Gap 10.8 5.0 - 15.0 mmol/L   Magnesium    Collection Time: 11/21/19  4:30 AM   Result Value Ref Range    Magnesium 1.6 1.6 - 2.4 mg/dL   Basic Metabolic Panel    Collection Time: 11/22/19  5:21 AM   Result Value Ref Range    Glucose 86 65 - 99 mg/dL    BUN 18 8 - 23 mg/dL    Creatinine 1.24 (H) 0.57 - 1.00 mg/dL    Sodium 144 136 - 145 mmol/L    Potassium 3.7 3.5 - 5.2 mmol/L    Chloride 103 98 - 107 mmol/L    CO2 29.1 (H) 22.0 - 29.0 mmol/L    Calcium 8.8 8.6 - 10.5 mg/dL    eGFR  African Amer 51 (L) >60 mL/min/1.73    BUN/Creatinine Ratio 14.5 7.0 - 25.0    Anion Gap 11.9 5.0 - 15.0 mmol/L   Magnesium    Collection Time: 11/22/19  5:21 AM   Result Value Ref Range    Magnesium 2.2 1.6 - 2.4 mg/dL   Comprehensive Metabolic Panel    Collection Time: 11/26/19 10:02 AM   Result Value Ref Range    Glucose 119 (H) 65 - 99 mg/dL    BUN 17 8 - 23 mg/dL    Creatinine 1.33 (H) 0.57 - 1.00 mg/dL    eGFR Non African Am 39 (L) >60 mL/min/1.73    eGFR  Am 47 (L) >60 mL/min/1.73    BUN/Creatinine Ratio 12.8 7.0 - 25.0    Sodium 148 (H) 136 - 145 mmol/L    Potassium 3.3 (L) 3.5 - 5.2 mmol/L    Chloride 104 98 - 107 mmol/L    Total CO2 30.7 (H) 22.0 - 29.0 mmol/L    Calcium 9.1 8.6 - 10.5 mg/dL    Total Protein 6.5 6.0 - 8.5 g/dL    Albumin 3.90 3.50 - 5.20 g/dL    Globulin 2.6 gm/dL    A/G Ratio 1.5 g/dL    Total Bilirubin 0.2 0.2 - 1.2 mg/dL    Alkaline Phosphatase 81 39 - 117 U/L    AST (SGOT) 15 1 - 32 U/L    ALT (SGPT) 12 1 - 33 U/L   Lipid Panel With LDL / HDL Ratio    Collection Time: 11/26/19 10:02 AM   Result Value Ref Range    Total  Cholesterol 154 0 - 200 mg/dL    Triglycerides 83 0 - 150 mg/dL    HDL Cholesterol 62 (H) 40 - 60 mg/dL    VLDL Cholesterol 16.6 mg/dL    LDL Cholesterol  75 0 - 100 mg/dL    LDL/HDL Ratio 1.22    Basic Metabolic Panel    Collection Time: 12/11/19 10:35 AM   Result Value Ref Range    Glucose 122 (H) 65 - 99 mg/dL    BUN 23 8 - 23 mg/dL    Creatinine 1.55 (H) 0.57 - 1.00 mg/dL    eGFR Non African Am 33 (L) >60 mL/min/1.73    eGFR African Am 40 (L) >60 mL/min/1.73    BUN/Creatinine Ratio 14.8 7.0 - 25.0    Sodium 144 136 - 145 mmol/L    Potassium 4.4 3.5 - 5.2 mmol/L    Chloride 103 98 - 107 mmol/L    Total CO2 29.2 (H) 22.0 - 29.0 mmol/L    Calcium 9.0 8.6 - 10.5 mg/dL

## 2019-12-29 NOTE — PROGRESS NOTES
Subjective   Chief Complaint   Patient presents with   • Med Dose Change     follow up on medication change    • Leg Swelling     resolved   • Weight Gain     resolved       History of Present Illness     73 yo AAf presents for one week follow up after seeing her PCP Dr. Zaragoza last week for weight gain (5#) and dyspnea. Lasix was increased acutely (earlier this month it was decreased due to bump in her CR) and she was advised to hold Losartan (BP94/56).     Today she is feeling better. Weight is back down at home to baseline and lower extremity edema improved. Denies cough. Dyspnea at baseline. Notes some fatigue.       Patient Active Problem List   Diagnosis   • Asthma, cough variant   • Coronary arteriosclerosis in native artery   • Essential hypertension   • Chronic daily headache   • Nodule of upper lobe of right lung   • Hyperlipidemia LDL goal <100   • Nonsustained ventricular tachycardia (CMS/HCC)   • Respiratory insufficiency   • History of cardiac cath   • History of loop recorder   • H/O heart artery stent   • S/P CABG (coronary artery bypass graft)   • H/O echocardiogram   • History of nuclear stress test   • Age-related osteoporosis without current pathological fracture       Allergies   Allergen Reactions   • Adhesive Tape Unknown (See Comments)     Rash and itchy skin   • Cardizem [Diltiazem] Unknown (See Comments)     Pt is unknown why she can not take this medication    • Levaquin [Levofloxacin] Cough       Current Outpatient Medications on File Prior to Visit   Medication Sig Dispense Refill   • albuterol sulfate  (90 Base) MCG/ACT inhaler Inhale 2 puffs Every 4 (Four) Hours As Needed for Wheezing.     • amLODIPine (NORVASC) 10 MG tablet Take 10 mg by mouth Daily.     • aspirin 81 MG EC tablet Take 81 mg by mouth Daily.     • atorvastatin (LIPITOR) 40 MG tablet Take 1 tablet by mouth Daily. 90 tablet 3   • butalbital-aspirin-caffeine-codeine (FIORINAL WITH CODEINE) -26-30 MG capsule  Take 1 capsule by mouth Every 4 (Four) Hours As Needed for Headache. 360 capsule 1   • carvedilol (COREG) 25 MG tablet TAKE 1 TABLET TWICE A  tablet 1   • clopidogrel (PLAVIX) 75 MG tablet TAKE 1 TABLET DAILY 90 tablet 4   • Denosumab (PROLIA SC) Inject  under the skin into the appropriate area as directed. One injection q6 months     • ferrous sulfate 325 (65 FE) MG tablet Take 1 tablet by mouth Daily With Breakfast. 90 tablet 3   • furosemide (LASIX) 40 MG tablet Take 1.5 tablets by mouth 2 (Two) Times a Day. (Patient taking differently: Take 40 mg by mouth 2 (Two) Times a Day.) 180 tablet 1   • isosorbide mononitrate (IMDUR) 60 MG 24 hr tablet Take 60 mg by mouth Daily.     • loratadine (CLARITIN) 10 MG tablet Take 10 mg by mouth Daily.     • montelukast (SINGULAIR) 10 MG tablet Take 10 mg by mouth Every Night.     • Multiple Vitamins-Minerals (OCUVITE PO) Take 1 tablet by mouth Daily.     • nitroglycerin (NITROSTAT) 0.4 MG SL tablet Place 1 tablet under the tongue Every 5 (Five) Minutes As Needed for Chest Pain. Take no more than 3 doses in 15 minutes. 25 tablet 3   • omeprazole (priLOSEC) 20 MG capsule TAKE 1 CAPSULE DAILY 90 capsule 4   • polyethylene glycol (MIRALAX) packet Take 17 g by mouth Daily.     • potassium chloride ER (K-TAB) 20 MEQ tablet controlled-release ER tablet Take 1 tablet by mouth 4 (Four) Times a Day. 120 tablet 5   • promethazine (PHENERGAN) 25 MG tablet Take 1 tablet by mouth Every 6 (Six) Hours As Needed for Nausea or Vomiting. 90 tablet 3   • ranolazine (RANEXA) 500 MG 12 hr tablet Take 500 mg by mouth 2 (Two) Times a Day.     • vitamin D (ERGOCALCIFEROL) 08276 units capsule capsule Take 1 capsule by mouth 1 (One) Time Per Week. 12 capsule 3   • losartan (COZAAR) 50 MG tablet Take 1 tablet by mouth Daily. 90 tablet 1     No current facility-administered medications on file prior to visit.        Past Medical History:   Diagnosis Date   • Anemia    • Asthma    • Bronchitis    •  Coronary artery disease    • Cough    • Dyspnea    • Esophageal reflux    • H/O echocardiogram 6/11/2019 6/05/18 - Normal LV size and function.  EF 60-65%.  Mild concentric LVH.  Midl AR, MR, and TR.   • H/O heart artery stent 6/11/2019    3/22/12 - VISION stent to the SVG of the RCA reducing a total occlusion to 0% residual narrowing.   • Headache    • History of cardiac cath 6/11/2019   • History of loop recorder 6/11/2019    5/20/15 - [employer] LINQ implant.   • History of nuclear stress test 6/11/2019    10/09/17 - Abnormal study.  EF 46%.   • Hyperlipidemia LDL goal <100 6/11/2019   • Hypokalemia    • Migraine    • Moderate osteopenia    • Nonsustained ventricular tachycardia (CMS/HCC) 6/11/2019   • NSVT (nonsustained ventricular tachycardia) (CMS/HCC)    • Osteopenia    • Postmenopausal atrophic vaginitis    • Pre-syncope    • Respiratory insufficiency 6/11/2019   • S/P CABG (coronary artery bypass graft) 6/11/2019 4/12/11 - LIMA to the LAD and SVG to the posterior descending artery.   • S/P cardiac catheterization 06/02/2014   • Seasonal allergies    • Shoulder dislocation    • Status post placement of implantable loop recorder 05/20/2015      Successful loop/LINQ implantation by Dr. Geo Wilson.   • Sternal wound dehiscence    • Syncope    • Valvular heart disease    • Vasovagal syncope    • Vitamin D deficiency        Family History   Problem Relation Age of Onset   • Heart disease Other    • Breast cancer Other    • Breast cancer Mother    • Hypertension Mother        Social History     Socioeconomic History   • Marital status:      Spouse name: Not on file   • Number of children: Not on file   • Years of education: Not on file   • Highest education level: Not on file   Tobacco Use   • Smoking status: Never Smoker   • Smokeless tobacco: Never Used   Substance and Sexual Activity   • Alcohol use: No     Frequency: Never   • Drug use: No   • Sexual activity: Defer       Past Surgical History:    Procedure Laterality Date   • APPENDECTOMY     • BRONCHOSCOPY N/A 2019    Procedure: BRONCHOSCOPY WITH BRONCHOALVEOLAR LAVAGE;  Surgeon: Thomas Sheridan MD;  Location: [facility] ENDOSCOPY;  Service: Pulmonary   • CARDIAC CATHETERIZATION  2014    History of Cardiac Cath Procedure Outcome: Successful   • CARDIAC CATHETERIZATION N/A 2019    Procedure: Right and Left Heart Cath lv pressures;  Surgeon: Mal Moser MD;  [facility];  Service: Cardiology   • CARDIAC CATHETERIZATION N/A 2019    Procedure: Saphenous Vein Graft;  Surgeon: Mal Moser MD;  [facility] LOC[facility];  Service: Cardiology   • CARDIAC CATHETERIZATION N/A 2019    Procedure: Coronary angiography;  Surgeon: Mal Moser MD;  Location: [facility] CATH INVASIVE LOCATION;  Service: Cardiology   • CATARACT EXTRACTION     •  SECTION     • CHOLECYSTECTOMY     • COLONOSCOPY  2009    q-10   • CORONARY ANGIOPLASTY WITH STENT PLACEMENT  2012     [facility] by Dr. Roman.   • CORONARY ARTERY BYPASS GRAFT Bilateral 2011    with a left internal mammary graft to the LAD and saphenous vein graft to the posterior descending artery   • ENDOSCOPY       Diagnostic Esophagogastroduodenoscopy   • HYSTERECTOMY     • NERVE BLOCK       Nerve Block Transforaminal Epidural Lumbar   • OTHER SURGICAL HISTORY  2015    Patient-Activated Cardiac Event Recorder - From 2015 to 2015       The following portions of the patient's history were reviewed and updated as appropriate: problem list, allergies, current medications, past medical history and past social history.    Review of Systems   Constitutional: Positive for fatigue. Negative for unexpected weight gain.   Respiratory: Negative for cough and shortness of breath.    Cardiovascular: Negative for chest pain and leg swelling.       Immunization History   Administered Date(s)  "Administered   • Fluzone High Dose =>65 Years ([employer] ONLY) 09/01/2018, 09/15/2019   • PEDS-Pneumococcal Conjugate (PCV7) 06/23/2015       Objective   Vitals:    12/31/19 0949 12/31/19 1010   BP:  118/62   Pulse:  72   Resp:  12   Weight: 71.2 kg (157 lb)    Height: 152.4 cm (60\")      Body mass index is 30.66 kg/m².  Physical Exam   Constitutional: She is oriented to person, place, and time. She appears well-developed and well-nourished.   HENT:   Head: Normocephalic and atraumatic.   Cardiovascular: Normal rate, regular rhythm, S1 normal, S2 normal, normal heart sounds and intact distal pulses.   Pulmonary/Chest: Effort normal and breath sounds normal.   Musculoskeletal: She exhibits no edema.   Neurological: She is alert and oriented to person, place, and time.   Skin: Skin is warm and dry.   Psychiatric: She has a normal mood and affect.   Vitals reviewed.      Procedures    Assessment/Plan   Marleny was seen today for med dose change, leg swelling and weight gain.    Diagnoses and all orders for this visit:    Bilateral lower extremity edema  Comments:  Weight back to baseline. LLE resolved with bid dosing of furosemide. Recheck BMP--order previously placed.     Respiratory insufficiency  Comments:  Stable. Reports dyspnea at baseline.     Essential hypertension  Comments:  BP at goal with discontinuation of Losartan. Will recheck BP when she comes in for repeat BMP.      Records reviewed include last two OV with Dr. Zaragoza and recent labs.     Return for Next scheduled follow up.           "

## 2019-12-31 ENCOUNTER — OFFICE VISIT (OUTPATIENT)
Dept: INTERNAL MEDICINE | Facility: CLINIC | Age: 74
End: 2019-12-31

## 2019-12-31 VITALS
BODY MASS INDEX: 30.82 KG/M2 | RESPIRATION RATE: 12 BRPM | DIASTOLIC BLOOD PRESSURE: 62 MMHG | HEART RATE: 72 BPM | SYSTOLIC BLOOD PRESSURE: 118 MMHG | WEIGHT: 157 LBS | HEIGHT: 60 IN

## 2019-12-31 DIAGNOSIS — R60.0 BILATERAL LOWER EXTREMITY EDEMA: Primary | ICD-10-CM

## 2019-12-31 DIAGNOSIS — R06.89 RESPIRATORY INSUFFICIENCY: ICD-10-CM

## 2019-12-31 DIAGNOSIS — I10 ESSENTIAL HYPERTENSION: ICD-10-CM

## 2019-12-31 PROCEDURE — 99213 OFFICE O/P EST LOW 20 MIN: CPT | Performed by: NURSE PRACTITIONER

## 2020-01-14 ENCOUNTER — OFFICE VISIT (OUTPATIENT)
Dept: INTERNAL MEDICINE | Facility: CLINIC | Age: 75
End: 2020-01-14

## 2020-01-14 ENCOUNTER — RESULTS ENCOUNTER (OUTPATIENT)
Dept: INTERNAL MEDICINE | Facility: CLINIC | Age: 75
End: 2020-01-14

## 2020-01-14 VITALS
BODY MASS INDEX: 31.22 KG/M2 | HEIGHT: 60 IN | WEIGHT: 159 LBS | SYSTOLIC BLOOD PRESSURE: 94 MMHG | DIASTOLIC BLOOD PRESSURE: 56 MMHG | HEART RATE: 74 BPM

## 2020-01-14 DIAGNOSIS — I10 ESSENTIAL HYPERTENSION: ICD-10-CM

## 2020-01-14 DIAGNOSIS — R91.1 NODULE OF UPPER LOBE OF RIGHT LUNG: Primary | ICD-10-CM

## 2020-01-14 PROCEDURE — 99213 OFFICE O/P EST LOW 20 MIN: CPT | Performed by: INTERNAL MEDICINE

## 2020-01-14 NOTE — PROGRESS NOTES
Assessment and Plan  Marleny was seen today for hypertension.    Diagnoses and all orders for this visit:    Nodule of upper lobe of right lung  Comments:  due for f/u chest CT  Orders:  -     CT Chest Without Contrast; Future    Essential hypertension  Comments:  improved, continue daily weights- call if goes > 155.  Recheck as scheduled.       F/U and Patient Instructions    No follow-ups on file.  There are no Patient Instructions on file for this visit.    Subjective    Marleny Greenwood is a 74 y.o. female being seen in our office today for Hypertension     History of the Present Illness  HPI  Here for BP f/u - held losartan and added some furosemide in Dec.  She has felt better since.  Same cough but that has been worked up.  No orthopnea, edema.  She is weighing herself every am- [id number] consistently.   Denies orthostasis.    Patient History        Significant Past History  The following portions of the patient's history were reviewed and updated as appropriate:PMHroutine: Social history , Allergies, Current Medications, Active Problem List and Health Maintenance              Social History  She  reports that she has never smoked. She has never used smokeless tobacco. She reports that she does not drink alcohol or use drugs.                         Review of Symptoms  Review of Systems   Constitution: Negative.   Cardiovascular: Negative.    Respiratory: Positive for cough.    Psychiatric/Behavioral: Negative.      Objective  Vital Signs         BP Readings from Last 1 Encounters:   01/14/20 94/56     Wt Readings from Last 3 Encounters:   01/14/20 72.1 kg (159 lb)   12/31/19 71.2 kg (157 lb)   12/26/19 73.5 kg (162 lb)   Body mass index is 31.05 kg/m².          Physical Exam   Physical Exam   Constitutional: No distress.   Cardiovascular: Normal rate and regular rhythm.   Pulmonary/Chest: Effort normal and breath sounds normal.   Musculoskeletal: She exhibits no edema.     Data Reviewed    Recent Results  (from the past 2016 hour(s))   Basic Metabolic Panel    Collection Time: 10/25/19 11:39 AM   Result Value Ref Range    Glucose 108 (H) 65 - 99 mg/dL    BUN 15 8 - 23 mg/dL    Creatinine 0.97 0.57 - 1.00 mg/dL    Sodium 144 136 - 145 mmol/L    Potassium 3.2 (L) 3.5 - 5.2 mmol/L    Chloride 105 98 - 107 mmol/L    CO2 28.6 22.0 - 29.0 mmol/L    Calcium 8.9 8.6 - 10.5 mg/dL    eGFR  African Amer 68 >60 mL/min/1.73    BUN/Creatinine Ratio 15.5 7.0 - 25.0    Anion Gap 10.4 5.0 - 15.0 mmol/L   BNP    Collection Time: 10/25/19 11:39 AM   Result Value Ref Range    proBNP 168.7 5.0 - 900.0 pg/mL   Basic Metabolic Panel    Collection Time: 10/28/19 10:35 AM   Result Value Ref Range    Glucose 103 (H) 65 - 99 mg/dL    BUN 21 8 - 23 mg/dL    Creatinine 1.31 (H) 0.57 - 1.00 mg/dL    Sodium 143 136 - 145 mmol/L    Potassium 3.1 (L) 3.5 - 5.2 mmol/L    Chloride 105 98 - 107 mmol/L    CO2 26.3 22.0 - 29.0 mmol/L    Calcium 8.8 8.6 - 10.5 mg/dL    eGFR  African Amer 48 (L) >60 mL/min/1.73    BUN/Creatinine Ratio 16.0 7.0 - 25.0    Anion Gap 11.7 5.0 - 15.0 mmol/L   Comprehensive Metabolic Panel    Collection Time: 11/04/19 11:38 AM   Result Value Ref Range    Glucose 116 (H) 65 - 99 mg/dL    BUN 26 (H) 8 - 23 mg/dL    Creatinine 1.42 (H) 0.57 - 1.00 mg/dL    Sodium 146 (H) 136 - 145 mmol/L    Potassium 3.6 3.5 - 5.2 mmol/L    Chloride 106 98 - 107 mmol/L    CO2 26.1 22.0 - 29.0 mmol/L    Calcium 8.9 8.6 - 10.5 mg/dL    Total Protein 7.1 6.0 - 8.5 g/dL    Albumin 4.10 3.50 - 5.20 g/dL    ALT (SGPT) 16 1 - 33 U/L    AST (SGOT) 10 1 - 32 U/L    Alkaline Phosphatase 82 39 - 117 U/L    Total Bilirubin 0.3 0.2 - 1.2 mg/dL    eGFR  African Amer 44 (L) >60 mL/min/1.73    Globulin 3.0 gm/dL    A/G Ratio 1.4 g/dL    BUN/Creatinine Ratio 18.3 7.0 - 25.0    Anion Gap 13.9 5.0 - 15.0 mmol/L   Protime-INR    Collection Time: 11/04/19 11:38 AM   Result Value Ref Range    Protime 13.6 11.7 - 14.2 Seconds    INR 1.07 0.90 - 1.10   CBC Auto  Differential    Collection Time: 11/04/19 11:38 AM   Result Value Ref Range    WBC 7.54 3.40 - 10.80 10*3/mm3    RBC 4.51 3.77 - 5.28 10*6/mm3    Hemoglobin 13.1 12.0 - 15.9 g/dL    Hematocrit 40.0 34.0 - 46.6 %    MCV 88.7 79.0 - 97.0 fL    MCH 29.0 26.6 - 33.0 pg    MCHC 32.8 31.5 - 35.7 g/dL    RDW 14.5 12.3 - 15.4 %    RDW-SD 46.7 37.0 - 54.0 fl    MPV 10.0 6.0 - 12.0 fL    Platelets 175 140 - 450 10*3/mm3    Neutrophil % 69.4 42.7 - 76.0 %    Lymphocyte % 15.6 (L) 19.6 - 45.3 %    Monocyte % 11.7 5.0 - 12.0 %    Eosinophil % 2.0 0.3 - 6.2 %    Basophil % 0.4 0.0 - 1.5 %    Immature Grans % 0.9 (H) 0.0 - 0.5 %    Neutrophils, Absolute 5.23 1.70 - 7.00 10*3/mm3    Lymphocytes, Absolute 1.18 0.70 - 3.10 10*3/mm3    Monocytes, Absolute 0.88 0.10 - 0.90 10*3/mm3    Eosinophils, Absolute 0.15 0.00 - 0.40 10*3/mm3    Basophils, Absolute 0.03 0.00 - 0.20 10*3/mm3    Immature Grans, Absolute 0.07 (H) 0.00 - 0.05 10*3/mm3    nRBC 0.0 0.0 - 0.2 /100 WBC   Basic Metabolic Panel    Collection Time: 11/14/19 10:50 AM   Result Value Ref Range    Glucose 111 (H) 65 - 99 mg/dL    BUN 19 8 - 23 mg/dL    Creatinine 1.45 (H) 0.57 - 1.00 mg/dL    Sodium 142 136 - 145 mmol/L    Potassium 2.8 (L) 3.5 - 5.2 mmol/L    Chloride 102 98 - 107 mmol/L    CO2 29.2 (H) 22.0 - 29.0 mmol/L    Calcium 9.2 8.6 - 10.5 mg/dL    eGFR  African Amer 43 (L) >60 mL/min/1.73    BUN/Creatinine Ratio 13.1 7.0 - 25.0    Anion Gap 10.8 5.0 - 15.0 mmol/L   Basic Metabolic Panel    Collection Time: 11/18/19 11:01 AM   Result Value Ref Range    Glucose 104 (H) 65 - 99 mg/dL    BUN 25 (H) 8 - 23 mg/dL    Creatinine 1.77 (H) 0.57 - 1.00 mg/dL    Sodium 143 136 - 145 mmol/L    Potassium 4.0 3.5 - 5.2 mmol/L    Chloride 104 98 - 107 mmol/L    CO2 23.7 22.0 - 29.0 mmol/L    Calcium 9.1 8.6 - 10.5 mg/dL    eGFR  African Amer 34 (L) >60 mL/min/1.73    BUN/Creatinine Ratio 14.1 7.0 - 25.0    Anion Gap 15.3 (H) 5.0 - 15.0 mmol/L   POC Panel 9, ISTAT    Collection Time:  11/19/19 12:28 PM   Result Value Ref Range    Hemoglobin 11.6 (L) 12.0 - 17.0 g/dL    Hematocrit 34 (L) 38 - 51 %    O2 Saturation, Arterial 76 (L) 95 - 98 %   POC Panel 9, ISTAT    Collection Time: 11/19/19 12:45 PM   Result Value Ref Range    Hemoglobin 11.2 (L) 12.0 - 17.0 g/dL    Hematocrit 33 (L) 38 - 51 %    O2 Saturation, Arterial 74 (L) 95 - 98 %   POC Panel 9, ISTAT    Collection Time: 11/19/19  1:06 PM   Result Value Ref Range    Hemoglobin 10.9 (L) 12.0 - 17.0 g/dL    Hematocrit 32 (L) 38 - 51 %    O2 Saturation, Arterial 95 95 - 98 %   Basic Metabolic Panel    Collection Time: 11/19/19  4:59 PM   Result Value Ref Range    Glucose 123 (H) 65 - 99 mg/dL    BUN 26 (H) 8 - 23 mg/dL    Creatinine 1.53 (H) 0.57 - 1.00 mg/dL    Sodium 146 (H) 136 - 145 mmol/L    Potassium 3.8 3.5 - 5.2 mmol/L    Chloride 108 (H) 98 - 107 mmol/L    CO2 23.3 22.0 - 29.0 mmol/L    Calcium 9.0 8.6 - 10.5 mg/dL    eGFR  African Amer 40 (L) >60 mL/min/1.73    BUN/Creatinine Ratio 17.0 7.0 - 25.0    Anion Gap 14.7 5.0 - 15.0 mmol/L   Magnesium    Collection Time: 11/19/19  4:59 PM   Result Value Ref Range    Magnesium 1.9 1.6 - 2.4 mg/dL   Basic Metabolic Panel    Collection Time: 11/20/19  5:47 AM   Result Value Ref Range    Glucose 96 65 - 99 mg/dL    BUN 23 8 - 23 mg/dL    Creatinine 1.41 (H) 0.57 - 1.00 mg/dL    Sodium 144 136 - 145 mmol/L    Potassium 3.4 (L) 3.5 - 5.2 mmol/L    Chloride 107 98 - 107 mmol/L    CO2 25.8 22.0 - 29.0 mmol/L    Calcium 8.6 8.6 - 10.5 mg/dL    eGFR  African Amer 44 (L) >60 mL/min/1.73    BUN/Creatinine Ratio 16.3 7.0 - 25.0    Anion Gap 11.2 5.0 - 15.0 mmol/L   Magnesium    Collection Time: 11/20/19  5:47 AM   Result Value Ref Range    Magnesium 1.9 1.6 - 2.4 mg/dL   CBC (No Diff)    Collection Time: 11/20/19  5:47 AM   Result Value Ref Range    WBC 5.97 3.40 - 10.80 10*3/mm3    RBC 3.83 3.77 - 5.28 10*6/mm3    Hemoglobin 11.4 (L) 12.0 - 15.9 g/dL    Hematocrit 33.6 (L) 34.0 - 46.6 %    MCV 87.7 79.0  - 97.0 fL    MCH 29.8 26.6 - 33.0 pg    MCHC 33.9 31.5 - 35.7 g/dL    RDW 14.7 12.3 - 15.4 %    RDW-SD 46.8 37.0 - 54.0 fl    MPV 9.9 6.0 - 12.0 fL    Platelets 159 140 - 450 10*3/mm3   BNP    Collection Time: 11/20/19  5:20 PM   Result Value Ref Range    proBNP 230.0 5.0 - 900.0 pg/mL   Adult Transthoracic Echo Complete W/ Cont if Necessary Per Protocol    Collection Time: 11/20/19  6:32 PM   Result Value Ref Range    BSA 1.7 m^2    IVSd 0.9 cm    LVIDd 4.6 cm    LVIDs 2.7 cm    LVPWd 1.1 cm    IVS/LVPW 0.82     FS 41.3 %    EDV(Teich) 97.3 ml    ESV(Teich) 27.0 ml    EF(Teich) 72.2 %    EDV(cubed) 97.3 ml    ESV(cubed) 19.7 ml    EF(cubed) 79.8 %    LV mass(C)d 158.8 grams    LV mass(C)dI 94.9 grams/m^2    SV(Teich) 70.3 ml    SI(Teich) 42.0 ml/m^2    SV(cubed) 77.7 ml    SI(cubed) 46.4 ml/m^2    Ao root diam 3.5 cm    Ao root area 9.6 cm^2    asc Aorta Diam 3.4 cm    LVOT diam 1.6 cm    LVOT area 2.0 cm^2    LVOT area(traced) 2.0 cm^2    RVOT diam 1.9 cm    RVOT area 2.8 cm^2    LVLd ap4 7.3 cm    EDV(MOD-sp4) 41.0 ml    LVLs ap4 6.5 cm    ESV(MOD-sp4) 12.0 ml    EF(MOD-sp4) 70.7 %    LVLd ap2 7.1 cm    EDV(MOD-sp2) 73.0 ml    LVLs ap2 6.0 cm    ESV(MOD-sp2) 19.0 ml    EF(MOD-sp2) 74.0 %    SV(MOD-sp4) 29.0 ml    SI(MOD-sp4) 17.3 ml/m^2    SV(MOD-sp2) 54.0 ml    SI(MOD-sp2) 32.3 ml/m^2    Ao root area (BSA corrected) 2.1     LV Montes Vol (BSA corrected) 24.5 ml/m^2    LV Sys Vol (BSA corrected) 7.2 ml/m^2    TAPSE (>1.6) 1.5 cm    MV A dur 177.0 sec    MV E max marquis 72.7 cm/sec    MV A max marquis 108.0 cm/sec    MV E/A 0.67     MV V2 max 108.0 cm/sec    MV max PG 4.7 mmHg    MV V2 mean 58.8 cm/sec    MV mean PG 2.0 mmHg    MV V2 VTI 30.4 cm    MVA(VTI) 1.8 cm^2    MV P1/2t max marquis 98.3 cm/sec    MV P1/2t 80.9 msec    MVA(P1/2t) 2.7 cm^2    MV dec slope 356.0 cm/sec^2    MV dec time 283 sec    Ao pk marquis 179.0 cm/sec    Ao max PG 12.8 mmHg    Ao max PG (full) 4.5 mmHg    Ao V2 mean 122.0 cm/sec    Ao mean PG 7.0 mmHg     Ao mean PG (full) 2.0 mmHg    Ao V2 VTI 35.6 cm    CARMEN(I,A) 1.5 cm^2    CARMEN(I,D) 1.5 cm^2    CARMEN(V,A) 1.6 cm^2    CARMEN(V,D) 1.6 cm^2    LV V1 max PG 8.3 mmHg    LV V1 mean PG 5.0 mmHg    LV V1 max 144.0 cm/sec    LV V1 mean 102.0 cm/sec    LV V1 VTI 27.3 cm    SV(Ao) 342.5 ml    SI(Ao) 204.6 ml/m^2    SV(LVOT) 54.9 ml    SV(RVOT) 34.3 ml    SI(LVOT) 32.8 ml/m^2    PA V2 max 87.6 cm/sec    PA max PG 3.1 mmHg    PA max PG (full) 0.97 mmHg     CV ECHO MYLES - PVA(V,A) 2.3 cm^2     CV ECHO MYLES - PVA(V,D) 2.3 cm^2    PA acc time 0.08 sec    RV V1 max PG 2.1 mmHg    RV V1 mean PG 1.0 mmHg    RV V1 max 72.4 cm/sec    RV V1 mean 44.0 cm/sec    RV V1 VTI 12.1 cm    TR max luis 306.0 cm/sec    PA pr(Accel) 42.1 mmHg    Pulm Sys Luis 84.8 cm/sec    Pulm Montes Luis 51.4 cm/sec    Pulm S/D 1.6     Qp/Qs 0.63     Pulm A Revs Dur 0.11 sec    Pulm A Revs Luis 43.8 cm/sec    MVA P1/2T LCG 2.2 cm^2     CV ECHO MYLES - BZI_BMI 29.1 kilograms/m^2     CV ECHO MYLES - BSA([facility], [place]) 1.7 m^2     CV ECHO MYLES - BZI_METRIC_WEIGHT 69.0 kg     CV ECHO MYLES - BZI_METRIC_HEIGHT 154.0 cm    TDI S' 9.46 cm/sec    RV Base 2.64 cm    Dimensionless Index 0.8 (DI)    LA Volume Index 22.0 mL/m2    Avg E/e' ratio 6.96     EF(MOD-bp) 74.0 %    Lat Peak E' Luis 12.2 cm/sec    Med Peak E' Luis 8.70 cm/sec    RAP systole 8 mmHg    RVSP(TR) 45 mmHg    TR max PG 37    Basic Metabolic Panel    Collection Time: 11/21/19  4:30 AM   Result Value Ref Range    Glucose 88 65 - 99 mg/dL    BUN 19 8 - 23 mg/dL    Creatinine 1.23 (H) 0.57 - 1.00 mg/dL    Sodium 144 136 - 145 mmol/L    Potassium 3.4 (L) 3.5 - 5.2 mmol/L    Chloride 106 98 - 107 mmol/L    CO2 27.2 22.0 - 29.0 mmol/L    Calcium 8.4 (L) 8.6 - 10.5 mg/dL    eGFR  African Amer 52 (L) >60 mL/min/1.73    BUN/Creatinine Ratio 15.4 7.0 - 25.0    Anion Gap 10.8 5.0 - 15.0 mmol/L   Magnesium    Collection Time: 11/21/19  4:30 AM   Result Value Ref Range    Magnesium 1.6 1.6 - 2.4 mg/dL   Basic Metabolic Panel     Collection Time: 11/22/19  5:21 AM   Result Value Ref Range    Glucose 86 65 - 99 mg/dL    BUN 18 8 - 23 mg/dL    Creatinine 1.24 (H) 0.57 - 1.00 mg/dL    Sodium 144 136 - 145 mmol/L    Potassium 3.7 3.5 - 5.2 mmol/L    Chloride 103 98 - 107 mmol/L    CO2 29.1 (H) 22.0 - 29.0 mmol/L    Calcium 8.8 8.6 - 10.5 mg/dL    eGFR  African Amer 51 (L) >60 mL/min/1.73    BUN/Creatinine Ratio 14.5 7.0 - 25.0    Anion Gap 11.9 5.0 - 15.0 mmol/L   Magnesium    Collection Time: 11/22/19  5:21 AM   Result Value Ref Range    Magnesium 2.2 1.6 - 2.4 mg/dL   Comprehensive Metabolic Panel    Collection Time: 11/26/19 10:02 AM   Result Value Ref Range    Glucose 119 (H) 65 - 99 mg/dL    BUN 17 8 - 23 mg/dL    Creatinine 1.33 (H) 0.57 - 1.00 mg/dL    eGFR Non African Am 39 (L) >60 mL/min/1.73    eGFR  Am 47 (L) >60 mL/min/1.73    BUN/Creatinine Ratio 12.8 7.0 - 25.0    Sodium 148 (H) 136 - 145 mmol/L    Potassium 3.3 (L) 3.5 - 5.2 mmol/L    Chloride 104 98 - 107 mmol/L    Total CO2 30.7 (H) 22.0 - 29.0 mmol/L    Calcium 9.1 8.6 - 10.5 mg/dL    Total Protein 6.5 6.0 - 8.5 g/dL    Albumin 3.90 3.50 - 5.20 g/dL    Globulin 2.6 gm/dL    A/G Ratio 1.5 g/dL    Total Bilirubin 0.2 0.2 - 1.2 mg/dL    Alkaline Phosphatase 81 39 - 117 U/L    AST (SGOT) 15 1 - 32 U/L    ALT (SGPT) 12 1 - 33 U/L   Lipid Panel With LDL / HDL Ratio    Collection Time: 11/26/19 10:02 AM   Result Value Ref Range    Total Cholesterol 154 0 - 200 mg/dL    Triglycerides 83 0 - 150 mg/dL    HDL Cholesterol 62 (H) 40 - 60 mg/dL    VLDL Cholesterol 16.6 mg/dL    LDL Cholesterol  75 0 - 100 mg/dL    LDL/HDL Ratio 1.22    Basic Metabolic Panel    Collection Time: 12/11/19 10:35 AM   Result Value Ref Range    Glucose 122 (H) 65 - 99 mg/dL    BUN 23 8 - 23 mg/dL    Creatinine 1.55 (H) 0.57 - 1.00 mg/dL    eGFR Non African Am 33 (L) >60 mL/min/1.73    eGFR African Am 40 (L) >60 mL/min/1.73    BUN/Creatinine Ratio 14.8 7.0 - 25.0    Sodium 144 136 - 145 mmol/L    Potassium  4.4 3.5 - 5.2 mmol/L    Chloride 103 98 - 107 mmol/L    Total CO2 29.2 (H) 22.0 - 29.0 mmol/L    Calcium 9.0 8.6 - 10.5 mg/dL

## 2020-01-16 ENCOUNTER — TELEPHONE (OUTPATIENT)
Dept: INTERNAL MEDICINE | Facility: CLINIC | Age: 75
End: 2020-01-16

## 2020-01-16 ENCOUNTER — OFFICE VISIT (OUTPATIENT)
Dept: INTERNAL MEDICINE | Facility: CLINIC | Age: 75
End: 2020-01-16

## 2020-01-16 VITALS — BODY MASS INDEX: 31.22 KG/M2 | HEIGHT: 60 IN | WEIGHT: 159 LBS

## 2020-01-16 DIAGNOSIS — J06.9 UPPER RESPIRATORY TRACT INFECTION, UNSPECIFIED TYPE: Primary | ICD-10-CM

## 2020-01-16 PROCEDURE — 99213 OFFICE O/P EST LOW 20 MIN: CPT | Performed by: INTERNAL MEDICINE

## 2020-01-16 RX ORDER — AMOXICILLIN 875 MG/1
875 TABLET, COATED ORAL 2 TIMES DAILY
Qty: 14 TABLET | Refills: 0 | Status: SHIPPED | OUTPATIENT
Start: 2020-01-16 | End: 2020-03-03

## 2020-01-16 NOTE — PROGRESS NOTES
Assessment and Plan  Marleny was seen today for cough and nasal congestion.    Diagnoses and all orders for this visit:    Upper respiratory tract infection, unspecified type  Comments:  very low threshold to treat this lady- will add Mucinex and abx- call if worsens.     Other orders  -     amoxicillin (AMOXIL) 875 MG tablet; Take 1 tablet by mouth 2 (Two) Times a Day.      F/U and Patient Instructions    No follow-ups on file.  There are no Patient Instructions on file for this visit.    Subjective    Marleny Greenwood is a 74 y.o. female being seen in our office today for Cough and Nasal Congestion     History of the Present Illness  HPI Having trouble with cough, congestion - worse over the last couple of days.  The cough syrup she takes chronically hasn't been helping as much- which is usually a sign that there is something else going on.    Patient History        Significant Past History  The following portions of the patient's history were reviewed and updated as appropriate:PMHroutine: Social history , Allergies, Current Medications, Active Problem List and Health Maintenance              Social History  She  reports that she has never smoked. She has never used smokeless tobacco. She reports that she does not drink alcohol or use drugs.                         Review of Symptoms  Review of Systems   Constitution: Negative for decreased appetite and fever.   HENT: Positive for congestion. Negative for sore throat.    Cardiovascular: Negative.    Respiratory: Positive for cough. Negative for shortness of breath and wheezing.    Musculoskeletal: Negative.    Gastrointestinal: Negative.      Objective  Vital Signs         BP Readings from Last 1 Encounters:   01/14/20 94/56     Wt Readings from Last 3 Encounters:   01/16/20 72.1 kg (159 lb)   01/14/20 72.1 kg (159 lb)   12/31/19 71.2 kg (157 lb)   Body mass index is 31.05 kg/m².          Physical Exam   Physical Exam   Constitutional: No distress.    Cardiovascular: Normal rate, regular rhythm and normal heart sounds.   Pulmonary/Chest: Effort normal.   Some upper airway congestion, no wheezing or consolidation     Data Reviewed    Recent Results (from the past 2016 hour(s))   Basic Metabolic Panel    Collection Time: 10/28/19 10:35 AM   Result Value Ref Range    Glucose 103 (H) 65 - 99 mg/dL    BUN 21 8 - 23 mg/dL    Creatinine 1.31 (H) 0.57 - 1.00 mg/dL    Sodium 143 136 - 145 mmol/L    Potassium 3.1 (L) 3.5 - 5.2 mmol/L    Chloride 105 98 - 107 mmol/L    CO2 26.3 22.0 - 29.0 mmol/L    Calcium 8.8 8.6 - 10.5 mg/dL    eGFR  African Amer 48 (L) >60 mL/min/1.73    BUN/Creatinine Ratio 16.0 7.0 - 25.0    Anion Gap 11.7 5.0 - 15.0 mmol/L   Comprehensive Metabolic Panel    Collection Time: 11/04/19 11:38 AM   Result Value Ref Range    Glucose 116 (H) 65 - 99 mg/dL    BUN 26 (H) 8 - 23 mg/dL    Creatinine 1.42 (H) 0.57 - 1.00 mg/dL    Sodium 146 (H) 136 - 145 mmol/L    Potassium 3.6 3.5 - 5.2 mmol/L    Chloride 106 98 - 107 mmol/L    CO2 26.1 22.0 - 29.0 mmol/L    Calcium 8.9 8.6 - 10.5 mg/dL    Total Protein 7.1 6.0 - 8.5 g/dL    Albumin 4.10 3.50 - 5.20 g/dL    ALT (SGPT) 16 1 - 33 U/L    AST (SGOT) 10 1 - 32 U/L    Alkaline Phosphatase 82 39 - 117 U/L    Total Bilirubin 0.3 0.2 - 1.2 mg/dL    eGFR  African Amer 44 (L) >60 mL/min/1.73    Globulin 3.0 gm/dL    A/G Ratio 1.4 g/dL    BUN/Creatinine Ratio 18.3 7.0 - 25.0    Anion Gap 13.9 5.0 - 15.0 mmol/L   Protime-INR    Collection Time: 11/04/19 11:38 AM   Result Value Ref Range    Protime 13.6 11.7 - 14.2 Seconds    INR 1.07 0.90 - 1.10   CBC Auto Differential    Collection Time: 11/04/19 11:38 AM   Result Value Ref Range    WBC 7.54 3.40 - 10.80 10*3/mm3    RBC 4.51 3.77 - 5.28 10*6/mm3    Hemoglobin 13.1 12.0 - 15.9 g/dL    Hematocrit 40.0 34.0 - 46.6 %    MCV 88.7 79.0 - 97.0 fL    MCH 29.0 26.6 - 33.0 pg    MCHC 32.8 31.5 - 35.7 g/dL    RDW 14.5 12.3 - 15.4 %    RDW-SD 46.7 37.0 - 54.0 fl    MPV 10.0 6.0 -  12.0 fL    Platelets 175 140 - 450 10*3/mm3    Neutrophil % 69.4 42.7 - 76.0 %    Lymphocyte % 15.6 (L) 19.6 - 45.3 %    Monocyte % 11.7 5.0 - 12.0 %    Eosinophil % 2.0 0.3 - 6.2 %    Basophil % 0.4 0.0 - 1.5 %    Immature Grans % 0.9 (H) 0.0 - 0.5 %    Neutrophils, Absolute 5.23 1.70 - 7.00 10*3/mm3    Lymphocytes, Absolute 1.18 0.70 - 3.10 10*3/mm3    Monocytes, Absolute 0.88 0.10 - 0.90 10*3/mm3    Eosinophils, Absolute 0.15 0.00 - 0.40 10*3/mm3    Basophils, Absolute 0.03 0.00 - 0.20 10*3/mm3    Immature Grans, Absolute 0.07 (H) 0.00 - 0.05 10*3/mm3    nRBC 0.0 0.0 - 0.2 /100 WBC   Basic Metabolic Panel    Collection Time: 11/14/19 10:50 AM   Result Value Ref Range    Glucose 111 (H) 65 - 99 mg/dL    BUN 19 8 - 23 mg/dL    Creatinine 1.45 (H) 0.57 - 1.00 mg/dL    Sodium 142 136 - 145 mmol/L    Potassium 2.8 (L) 3.5 - 5.2 mmol/L    Chloride 102 98 - 107 mmol/L    CO2 29.2 (H) 22.0 - 29.0 mmol/L    Calcium 9.2 8.6 - 10.5 mg/dL    eGFR  African Amer 43 (L) >60 mL/min/1.73    BUN/Creatinine Ratio 13.1 7.0 - 25.0    Anion Gap 10.8 5.0 - 15.0 mmol/L   Basic Metabolic Panel    Collection Time: 11/18/19 11:01 AM   Result Value Ref Range    Glucose 104 (H) 65 - 99 mg/dL    BUN 25 (H) 8 - 23 mg/dL    Creatinine 1.77 (H) 0.57 - 1.00 mg/dL    Sodium 143 136 - 145 mmol/L    Potassium 4.0 3.5 - 5.2 mmol/L    Chloride 104 98 - 107 mmol/L    CO2 23.7 22.0 - 29.0 mmol/L    Calcium 9.1 8.6 - 10.5 mg/dL    eGFR  African Amer 34 (L) >60 mL/min/1.73    BUN/Creatinine Ratio 14.1 7.0 - 25.0    Anion Gap 15.3 (H) 5.0 - 15.0 mmol/L   POC Panel 9, ISTAT    Collection Time: 11/19/19 12:28 PM   Result Value Ref Range    Hemoglobin 11.6 (L) 12.0 - 17.0 g/dL    Hematocrit 34 (L) 38 - 51 %    O2 Saturation, Arterial 76 (L) 95 - 98 %   POC Panel 9, ISTAT    Collection Time: 11/19/19 12:45 PM   Result Value Ref Range    Hemoglobin 11.2 (L) 12.0 - 17.0 g/dL    Hematocrit 33 (L) 38 - 51 %    O2 Saturation, Arterial 74 (L) 95 - 98 %   POC Panel  9, ISTAT    Collection Time: 11/19/19  1:06 PM   Result Value Ref Range    Hemoglobin 10.9 (L) 12.0 - 17.0 g/dL    Hematocrit 32 (L) 38 - 51 %    O2 Saturation, Arterial 95 95 - 98 %   Basic Metabolic Panel    Collection Time: 11/19/19  4:59 PM   Result Value Ref Range    Glucose 123 (H) 65 - 99 mg/dL    BUN 26 (H) 8 - 23 mg/dL    Creatinine 1.53 (H) 0.57 - 1.00 mg/dL    Sodium 146 (H) 136 - 145 mmol/L    Potassium 3.8 3.5 - 5.2 mmol/L    Chloride 108 (H) 98 - 107 mmol/L    CO2 23.3 22.0 - 29.0 mmol/L    Calcium 9.0 8.6 - 10.5 mg/dL    eGFR  African Amer 40 (L) >60 mL/min/1.73    BUN/Creatinine Ratio 17.0 7.0 - 25.0    Anion Gap 14.7 5.0 - 15.0 mmol/L   Magnesium    Collection Time: 11/19/19  4:59 PM   Result Value Ref Range    Magnesium 1.9 1.6 - 2.4 mg/dL   Basic Metabolic Panel    Collection Time: 11/20/19  5:47 AM   Result Value Ref Range    Glucose 96 65 - 99 mg/dL    BUN 23 8 - 23 mg/dL    Creatinine 1.41 (H) 0.57 - 1.00 mg/dL    Sodium 144 136 - 145 mmol/L    Potassium 3.4 (L) 3.5 - 5.2 mmol/L    Chloride 107 98 - 107 mmol/L    CO2 25.8 22.0 - 29.0 mmol/L    Calcium 8.6 8.6 - 10.5 mg/dL    eGFR  African Amer 44 (L) >60 mL/min/1.73    BUN/Creatinine Ratio 16.3 7.0 - 25.0    Anion Gap 11.2 5.0 - 15.0 mmol/L   Magnesium    Collection Time: 11/20/19  5:47 AM   Result Value Ref Range    Magnesium 1.9 1.6 - 2.4 mg/dL   CBC (No Diff)    Collection Time: 11/20/19  5:47 AM   Result Value Ref Range    WBC 5.97 3.40 - 10.80 10*3/mm3    RBC 3.83 3.77 - 5.28 10*6/mm3    Hemoglobin 11.4 (L) 12.0 - 15.9 g/dL    Hematocrit 33.6 (L) 34.0 - 46.6 %    MCV 87.7 79.0 - 97.0 fL    MCH 29.8 26.6 - 33.0 pg    MCHC 33.9 31.5 - 35.7 g/dL    RDW 14.7 12.3 - 15.4 %    RDW-SD 46.8 37.0 - 54.0 fl    MPV 9.9 6.0 - 12.0 fL    Platelets 159 140 - 450 10*3/mm3   BNP    Collection Time: 11/20/19  5:20 PM   Result Value Ref Range    proBNP 230.0 5.0 - 900.0 pg/mL   Adult Transthoracic Echo Complete W/ Cont if Necessary Per Protocol     Collection Time: 11/20/19  6:32 PM   Result Value Ref Range    BSA 1.7 m^2    IVSd 0.9 cm    LVIDd 4.6 cm    LVIDs 2.7 cm    LVPWd 1.1 cm    IVS/LVPW 0.82     FS 41.3 %    EDV(Teich) 97.3 ml    ESV(Teich) 27.0 ml    EF(Teich) 72.2 %    EDV(cubed) 97.3 ml    ESV(cubed) 19.7 ml    EF(cubed) 79.8 %    LV mass(C)d 158.8 grams    LV mass(C)dI 94.9 grams/m^2    SV(Teich) 70.3 ml    SI(Teich) 42.0 ml/m^2    SV(cubed) 77.7 ml    SI(cubed) 46.4 ml/m^2    Ao root diam 3.5 cm    Ao root area 9.6 cm^2    asc Aorta Diam 3.4 cm    LVOT diam 1.6 cm    LVOT area 2.0 cm^2    LVOT area(traced) 2.0 cm^2    RVOT diam 1.9 cm    RVOT area 2.8 cm^2    LVLd ap4 7.3 cm    EDV(MOD-sp4) 41.0 ml    LVLs ap4 6.5 cm    ESV(MOD-sp4) 12.0 ml    EF(MOD-sp4) 70.7 %    LVLd ap2 7.1 cm    EDV(MOD-sp2) 73.0 ml    LVLs ap2 6.0 cm    ESV(MOD-sp2) 19.0 ml    EF(MOD-sp2) 74.0 %    SV(MOD-sp4) 29.0 ml    SI(MOD-sp4) 17.3 ml/m^2    SV(MOD-sp2) 54.0 ml    SI(MOD-sp2) 32.3 ml/m^2    Ao root area (BSA corrected) 2.1     LV Montes Vol (BSA corrected) 24.5 ml/m^2    LV Sys Vol (BSA corrected) 7.2 ml/m^2    TAPSE (>1.6) 1.5 cm    MV A dur 177.0 sec    MV E max marquis 72.7 cm/sec    MV A max marquis 108.0 cm/sec    MV E/A 0.67     MV V2 max 108.0 cm/sec    MV max PG 4.7 mmHg    MV V2 mean 58.8 cm/sec    MV mean PG 2.0 mmHg    MV V2 VTI 30.4 cm    MVA(VTI) 1.8 cm^2    MV P1/2t max marquis 98.3 cm/sec    MV P1/2t 80.9 msec    MVA(P1/2t) 2.7 cm^2    MV dec slope 356.0 cm/sec^2    MV dec time 283 sec    Ao pk marquis 179.0 cm/sec    Ao max PG 12.8 mmHg    Ao max PG (full) 4.5 mmHg    Ao V2 mean 122.0 cm/sec    Ao mean PG 7.0 mmHg    Ao mean PG (full) 2.0 mmHg    Ao V2 VTI 35.6 cm    CARMEN(I,A) 1.5 cm^2    CARMEN(I,D) 1.5 cm^2    CARMEN(V,A) 1.6 cm^2    CARMEN(V,D) 1.6 cm^2    LV V1 max PG 8.3 mmHg    LV V1 mean PG 5.0 mmHg    LV V1 max 144.0 cm/sec    LV V1 mean 102.0 cm/sec    LV V1 VTI 27.3 cm    SV(Ao) 342.5 ml    SI(Ao) 204.6 ml/m^2    SV(LVOT) 54.9 ml    SV(RVOT) 34.3 ml    SI(LVOT) 32.8  ml/m^2    PA V2 max 87.6 cm/sec    PA max PG 3.1 mmHg    PA max PG (full) 0.97 mmHg     CV ECHO MYLES - PVA(V,A) 2.3 cm^2     CV ECHO MYLES - PVA(V,D) 2.3 cm^2    PA acc time 0.08 sec    RV V1 max PG 2.1 mmHg    RV V1 mean PG 1.0 mmHg    RV V1 max 72.4 cm/sec    RV V1 mean 44.0 cm/sec    RV V1 VTI 12.1 cm    TR max luis 306.0 cm/sec    PA pr(Accel) 42.1 mmHg    Pulm Sys Luis 84.8 cm/sec    Pulm Montes Luis 51.4 cm/sec    Pulm S/D 1.6     Qp/Qs 0.63     Pulm A Revs Dur 0.11 sec    Pulm A Revs Luis 43.8 cm/sec    MVA P1/2T LCG 2.2 cm^2     CV ECHO MYLES - BZI_BMI 29.1 kilograms/m^2     CV ECHO MYLES - BSA(HAYCOCK) 1.7 m^2     CV ECHO MYLES - BZI_METRIC_WEIGHT 69.0 kg     CV ECHO MYLES - BZI_METRIC_HEIGHT 154.0 cm    TDI S' 9.46 cm/sec    RV Base 2.64 cm    Dimensionless Index 0.8 (DI)    LA Volume Index 22.0 mL/m2    Avg E/e' ratio 6.96     EF(MOD-bp) 74.0 %    Lat Peak E' Luis 12.2 cm/sec    Med Peak E' Luis 8.70 cm/sec    RAP systole 8 mmHg    RVSP(TR) 45 mmHg    TR max PG 37    Basic Metabolic Panel    Collection Time: 11/21/19  4:30 AM   Result Value Ref Range    Glucose 88 65 - 99 mg/dL    BUN 19 8 - 23 mg/dL    Creatinine 1.23 (H) 0.57 - 1.00 mg/dL    Sodium 144 136 - 145 mmol/L    Potassium 3.4 (L) 3.5 - 5.2 mmol/L    Chloride 106 98 - 107 mmol/L    CO2 27.2 22.0 - 29.0 mmol/L    Calcium 8.4 (L) 8.6 - 10.5 mg/dL    eGFR  African Amer 52 (L) >60 mL/min/1.73    BUN/Creatinine Ratio 15.4 7.0 - 25.0    Anion Gap 10.8 5.0 - 15.0 mmol/L   Magnesium    Collection Time: 11/21/19  4:30 AM   Result Value Ref Range    Magnesium 1.6 1.6 - 2.4 mg/dL   Basic Metabolic Panel    Collection Time: 11/22/19  5:21 AM   Result Value Ref Range    Glucose 86 65 - 99 mg/dL    BUN 18 8 - 23 mg/dL    Creatinine 1.24 (H) 0.57 - 1.00 mg/dL    Sodium 144 136 - 145 mmol/L    Potassium 3.7 3.5 - 5.2 mmol/L    Chloride 103 98 - 107 mmol/L    CO2 29.1 (H) 22.0 - 29.0 mmol/L    Calcium 8.8 8.6 - 10.5 mg/dL    eGFR  African Amer 51 (L) >60  mL/min/1.73    BUN/Creatinine Ratio 14.5 7.0 - 25.0    Anion Gap 11.9 5.0 - 15.0 mmol/L   Magnesium    Collection Time: 11/22/19  5:21 AM   Result Value Ref Range    Magnesium 2.2 1.6 - 2.4 mg/dL   Comprehensive Metabolic Panel    Collection Time: 11/26/19 10:02 AM   Result Value Ref Range    Glucose 119 (H) 65 - 99 mg/dL    BUN 17 8 - 23 mg/dL    Creatinine 1.33 (H) 0.57 - 1.00 mg/dL    eGFR Non African Am 39 (L) >60 mL/min/1.73    eGFR  Am 47 (L) >60 mL/min/1.73    BUN/Creatinine Ratio 12.8 7.0 - 25.0    Sodium 148 (H) 136 - 145 mmol/L    Potassium 3.3 (L) 3.5 - 5.2 mmol/L    Chloride 104 98 - 107 mmol/L    Total CO2 30.7 (H) 22.0 - 29.0 mmol/L    Calcium 9.1 8.6 - 10.5 mg/dL    Total Protein 6.5 6.0 - 8.5 g/dL    Albumin 3.90 3.50 - 5.20 g/dL    Globulin 2.6 gm/dL    A/G Ratio 1.5 g/dL    Total Bilirubin 0.2 0.2 - 1.2 mg/dL    Alkaline Phosphatase 81 39 - 117 U/L    AST (SGOT) 15 1 - 32 U/L    ALT (SGPT) 12 1 - 33 U/L   Lipid Panel With LDL / HDL Ratio    Collection Time: 11/26/19 10:02 AM   Result Value Ref Range    Total Cholesterol 154 0 - 200 mg/dL    Triglycerides 83 0 - 150 mg/dL    HDL Cholesterol 62 (H) 40 - 60 mg/dL    VLDL Cholesterol 16.6 mg/dL    LDL Cholesterol  75 0 - 100 mg/dL    LDL/HDL Ratio 1.22    Basic Metabolic Panel    Collection Time: 12/11/19 10:35 AM   Result Value Ref Range    Glucose 122 (H) 65 - 99 mg/dL    BUN 23 8 - 23 mg/dL    Creatinine 1.55 (H) 0.57 - 1.00 mg/dL    eGFR Non African Am 33 (L) >60 mL/min/1.73    eGFR African Am 40 (L) >60 mL/min/1.73    BUN/Creatinine Ratio 14.8 7.0 - 25.0    Sodium 144 136 - 145 mmol/L    Potassium 4.4 3.5 - 5.2 mmol/L    Chloride 103 98 - 107 mmol/L    Total CO2 29.2 (H) 22.0 - 29.0 mmol/L    Calcium 9.0 8.6 - 10.5 mg/dL

## 2020-01-16 NOTE — TELEPHONE ENCOUNTER
Dr. LOZADA Pt called said that she has terrible cold and paco, she would like to know if you or someone can see her today?    Do you want me to put her in with you or offer Mary?

## 2020-02-06 RX ORDER — ISOSORBIDE MONONITRATE 60 MG/1
60 TABLET, EXTENDED RELEASE ORAL DAILY
Qty: 90 TABLET | Refills: 1 | Status: SHIPPED | OUTPATIENT
Start: 2020-02-06 | End: 2020-08-04

## 2020-02-24 ENCOUNTER — TELEPHONE (OUTPATIENT)
Dept: CARDIOLOGY | Facility: CLINIC | Age: 75
End: 2020-02-24

## 2020-02-25 RX ORDER — RANOLAZINE 500 MG/1
500 TABLET, EXTENDED RELEASE ORAL 2 TIMES DAILY
Qty: 180 TABLET | Refills: 1 | Status: SHIPPED | OUTPATIENT
Start: 2020-02-25 | End: 2020-08-24

## 2020-02-28 ENCOUNTER — HOSPITAL ENCOUNTER (OUTPATIENT)
Dept: CT IMAGING | Facility: HOSPITAL | Age: 75
Discharge: HOME | End: 2020-02-28
Admitting: INTERNAL MEDICINE

## 2020-02-28 DIAGNOSIS — R91.1 NODULE OF UPPER LOBE OF RIGHT LUNG: ICD-10-CM

## 2020-02-28 PROCEDURE — 71250 CT THORAX DX C-: CPT

## 2020-03-03 ENCOUNTER — OFFICE VISIT (OUTPATIENT)
Dept: INTERNAL MEDICINE | Facility: CLINIC | Age: 75
End: 2020-03-03

## 2020-03-03 VITALS
OXYGEN SATURATION: 96 % | HEART RATE: 74 BPM | BODY MASS INDEX: 30.82 KG/M2 | WEIGHT: 157 LBS | HEIGHT: 60 IN | SYSTOLIC BLOOD PRESSURE: 78 MMHG | DIASTOLIC BLOOD PRESSURE: 46 MMHG

## 2020-03-03 DIAGNOSIS — R91.1 NODULE OF UPPER LOBE OF RIGHT LUNG: ICD-10-CM

## 2020-03-03 DIAGNOSIS — I10 ESSENTIAL HYPERTENSION: ICD-10-CM

## 2020-03-03 DIAGNOSIS — N39.3 STRESS INCONTINENCE OF URINE: ICD-10-CM

## 2020-03-03 DIAGNOSIS — J18.9 PNEUMONIA OF BOTH LUNGS DUE TO INFECTIOUS ORGANISM, UNSPECIFIED PART OF LUNG: Primary | ICD-10-CM

## 2020-03-03 PROCEDURE — 99214 OFFICE O/P EST MOD 30 MIN: CPT | Performed by: INTERNAL MEDICINE

## 2020-03-03 RX ORDER — DOXYCYCLINE HYCLATE 100 MG
100 TABLET ORAL 2 TIMES DAILY
Qty: 20 TABLET | Refills: 0 | Status: SHIPPED | OUTPATIENT
Start: 2020-03-03 | End: 2020-05-26

## 2020-03-03 NOTE — PROGRESS NOTES
Assessment and Plan  Marleny was seen today for hypertension, cough and fatigue.    Diagnoses and all orders for this visit:    Pneumonia of both lungs due to infectious organism, unspecified part of lung  Comments:  uncertain origin- will treat with abx and follow closely- she has f/u with Dr. Sheridan on Thursday.     Stress incontinence of urine  Comments:  related to cough, discussed Kegel exercises.      Essential hypertension  Comments:  very low today with no orthostatic symptoms- will check at home, hold losartan     Nodule of upper lobe of right lung  Comments:  CT was done for the above- now looks infectious/inflammatory    Other orders  -     doxycycline (VIBRAMYICN) 100 MG tablet; Take 1 tablet by mouth 2 (Two) Times a Day.    will discuss with Dr. Sheridan  F/U and Patient Instructions    No follow-ups on file.  There are no Patient Instructions on file for this visit.    Subjective    Marleny Greenwood is a 74 y.o. female being seen in our office today for Hypertension; Cough; and Fatigue     History of the Present Illness  HPI  Had CT to f/u RUL nodule- she feels terrible- coughing, tired, SOB- uses albuterol prn- less than daily which is helpful.  Eating, no fever/chills.    She does take cough med regularly, has been for months.     Patient History        Significant Past History  The following portions of the patient's history were reviewed and updated as appropriate:PMHroutine: Social history , Allergies, Current Medications, Active Problem List and Health Maintenance              Social History  She  reports that she has never smoked. She has never used smokeless tobacco. She reports that she does not drink alcohol or use drugs.                         Review of Symptoms  Review of Systems   Constitution: Negative for fever, night sweats and weight loss.   HENT: Negative for congestion.    Cardiovascular: Negative for chest pain.   Respiratory: Positive for cough and shortness of breath.     Musculoskeletal: Negative.    Gastrointestinal: Negative.      Objective  Vital Signs         BP Readings from Last 1 Encounters:   03/03/20 (!) 78/46     Wt Readings from Last 3 Encounters:   03/03/20 71.2 kg (157 lb)   01/16/20 72.1 kg (159 lb)   01/14/20 72.1 kg (159 lb)   Body mass index is 30.66 kg/m².          Physical Exam   Physical Exam   Constitutional: No distress.   Cardiovascular: Normal rate and regular rhythm.   Pulmonary/Chest: Effort normal and breath sounds normal. No respiratory distress. She exhibits no tenderness.   Musculoskeletal: She exhibits no edema.     Data Reviewed    Recent Results (from the past 2016 hour(s))   Basic Metabolic Panel    Collection Time: 12/11/19 10:35 AM   Result Value Ref Range    Glucose 122 (H) 65 - 99 mg/dL    BUN 23 8 - 23 mg/dL    Creatinine 1.55 (H) 0.57 - 1.00 mg/dL    eGFR Non African Am 33 (L) >60 mL/min/1.73    eGFR African Am 40 (L) >60 mL/min/1.73    BUN/Creatinine Ratio 14.8 7.0 - 25.0    Sodium 144 136 - 145 mmol/L    Potassium 4.4 3.5 - 5.2 mmol/L    Chloride 103 98 - 107 mmol/L    Total CO2 29.2 (H) 22.0 - 29.0 mmol/L    Calcium 9.0 8.6 - 10.5 mg/dL

## 2020-03-05 RX ORDER — ERGOCALCIFEROL 1.25 MG/1
CAPSULE ORAL
Qty: 12 CAPSULE | Refills: 4 | Status: SHIPPED | OUTPATIENT
Start: 2020-03-05 | End: 2021-04-29

## 2020-03-11 ENCOUNTER — OFFICE VISIT (OUTPATIENT)
Dept: CARDIOLOGY | Facility: CLINIC | Age: 75
End: 2020-03-11

## 2020-03-11 VITALS
BODY MASS INDEX: 30.04 KG/M2 | DIASTOLIC BLOOD PRESSURE: 72 MMHG | HEART RATE: 72 BPM | SYSTOLIC BLOOD PRESSURE: 120 MMHG | WEIGHT: 153 LBS | HEIGHT: 60 IN

## 2020-03-11 DIAGNOSIS — R06.89 RESPIRATORY INSUFFICIENCY: ICD-10-CM

## 2020-03-11 DIAGNOSIS — I10 ESSENTIAL HYPERTENSION: ICD-10-CM

## 2020-03-11 DIAGNOSIS — I50.31 ACUTE DIASTOLIC CONGESTIVE HEART FAILURE (HCC): ICD-10-CM

## 2020-03-11 DIAGNOSIS — R53.83 FATIGUE, UNSPECIFIED TYPE: ICD-10-CM

## 2020-03-11 DIAGNOSIS — Z95.1 S/P CABG (CORONARY ARTERY BYPASS GRAFT): ICD-10-CM

## 2020-03-11 DIAGNOSIS — E78.5 HYPERLIPIDEMIA, UNSPECIFIED HYPERLIPIDEMIA TYPE: ICD-10-CM

## 2020-03-11 DIAGNOSIS — I25.10 CORONARY ARTERIOSCLEROSIS IN NATIVE ARTERY: Primary | ICD-10-CM

## 2020-03-11 DIAGNOSIS — Z95.5 H/O HEART ARTERY STENT: ICD-10-CM

## 2020-03-11 DIAGNOSIS — I47.29 NONSUSTAINED VENTRICULAR TACHYCARDIA (HCC): ICD-10-CM

## 2020-03-11 PROCEDURE — 99214 OFFICE O/P EST MOD 30 MIN: CPT | Performed by: INTERNAL MEDICINE

## 2020-03-11 NOTE — PROGRESS NOTES
[facility]        Subjective:     Encounter Date:03/11/2020      Patient ID: Marleny Greenwood is a 74 y.o. female.      HPI: I saw Marleny Greenwood today for cardiovascular care.  She is a pleasant 74-year-old female undergoing treatment for pneumonia.  She reports a recent cough which has improved.  Her dyspnea on exertion remains stable.  She does report some generalized increased fatigue.  She has some mid chest atypical discomfort.  She denies orthopnea or PND sleeping on 2 pillows and no lower extremity edema.  She feels that her p.o. intake is stable.  She denies syncope near syncope or palpitations.  Her blood pressure remains well controlled she has a history of coronary heart disease status post bypass surgery and percutaneous coronary intervention.  She has a history of dyslipidemia and remains on atorvastatin 40 mg daily.  Her lipids remain well controlled.      The following portions of the patient's history were reviewed and updated as appropriate: allergies, current medications, past family history, past medical history, past social history, past surgical history and problem list.    Problem List:  Patient Active Problem List   Diagnosis   • Asthma, cough variant   • Coronary arteriosclerosis in native artery   • Essential hypertension   • Chronic daily headache   • Nodule of upper lobe of right lung   • Nonsustained ventricular tachycardia (CMS/HCC)   • History of cardiac cath   • History of loop recorder   • H/O heart artery stent   • S/P CABG (coronary artery bypass graft)   • H/O echocardiogram   • History of nuclear stress test   • Age-related osteoporosis without current pathological fracture   • Pneumonia   • Acute diastolic congestive heart failure (CMS/HCC)       Past Medical History:  Past Medical History:   Diagnosis Date   • Anemia    • Asthma    • Coronary artery disease    • Cough    • Dyspnea    • Esophageal reflux    • H/O echocardiogram 6/11/2019 6/05/18 - Normal  LV size and function.  EF 60-65%.  Mild concentric LVH.  Midl AR, MR, and TR.   • H/O heart artery stent 6/11/2019    3/22/12 - VISION stent to the SVG of the RCA reducing a total occlusion to 0% residual narrowing.   • Headache    • History of cardiac cath 6/11/2019   • History of loop recorder 6/11/2019    5/20/15 - Medtronic LINQ implant.   • History of nuclear stress test 6/11/2019    10/09/17 - Abnormal study.  EF 46%.   • Hyperlipidemia LDL goal <100 6/11/2019   • Hypokalemia    • Migraine    • Moderate osteopenia    • Nonsustained ventricular tachycardia (CMS/HCC) 6/11/2019   • NSVT (nonsustained ventricular tachycardia) (CMS/HCC)    • Pneumonia    • Postmenopausal atrophic vaginitis    • Pre-syncope    • Respiratory insufficiency 6/11/2019   • S/P CABG (coronary artery bypass graft) 6/11/2019 4/12/11 - LIMA to the LAD and SVG to the posterior descending artery.   • Seasonal allergies    • Shoulder dislocation    • Status post placement of implantable loop recorder 05/20/2015      Successful loop/LINQ implantation by Dr. Geo Wilson.   • Vitamin D deficiency        Past Surgical History:  Past Surgical History:   Procedure Laterality Date   • APPENDECTOMY     • BRONCHOSCOPY N/A 8/7/2019    Procedure: BRONCHOSCOPY WITH BRONCHOALVEOLAR LAVAGE;  Surgeon: Thomas Sheridan MD;  Location: [facility] ENDOSCOPY;  Service: Pulmonary   • CARDIAC CATHETERIZATION  06/02/2014    History of Cardiac Cath Procedure Outcome: Successful   • CARDIAC CATHETERIZATION N/A 11/19/2019    Procedure: Right and Left Heart Cath lv pressures;  Surgeon: Mal Moser MD;  Location: [facility] CATH INVASIVE LOCATION;  Service: Cardiology   • CARDIAC CATHETERIZATION N/A 11/19/2019    Procedure: Saphenous Vein Graft;  Surgeon: Mal Moser MD;  Location: [facility] CATH INVASIVE LOCATION;  Service: Cardiology   • CARDIAC CATHETERIZATION N/A 11/19/2019    Procedure: Coronary angiography;  Surgeon: Mal Moser MD;  Location: [facility] CATH  INVASIVE LOCATION;  Service: Cardiology   • CATARACT EXTRACTION     •  SECTION     • CHOLECYSTECTOMY     • COLONOSCOPY  2009    q-10   • CORONARY ANGIOPLASTY WITH STENT PLACEMENT  2012     [facility] by Dr. Roman.   • CORONARY ARTERY BYPASS GRAFT Bilateral 2011    with a left internal mammary graft to the LAD and saphenous vein graft to the posterior descending artery   • ENDOSCOPY       Diagnostic Esophagogastroduodenoscopy   • HYSTERECTOMY     • NERVE BLOCK       Nerve Block Transforaminal Epidural Lumbar   • OTHER SURGICAL HISTORY  2015    Patient-Activated Cardiac Event Recorder - From 2015 to 2015       Social History:  Social History     Socioeconomic History   • Marital status:      Spouse name: Not on file   • Number of children: Not on file   • Years of education: Not on file   • Highest education level: Not on file   Tobacco Use   • Smoking status: Never Smoker   • Smokeless tobacco: Never Used   Substance and Sexual Activity   • Alcohol use: No     Frequency: Never   • Drug use: No   • Sexual activity: Defer       Allergies:  Allergies   Allergen Reactions   • Adhesive Tape Unknown (See Comments)     Rash and itchy skin   • Cardizem [Diltiazem] Unknown (See Comments)     Pt is unknown why she can not take this medication    • Levaquin [Levofloxacin] Cough         ROS:  Review of Systems   Constitution: Positive for malaise/fatigue.   HENT: Negative for hearing loss and nosebleeds.    Eyes: Negative for double vision and visual disturbance.   Cardiovascular: Positive for dyspnea on exertion. Negative for chest pain, claudication, near-syncope, orthopnea, palpitations, paroxysmal nocturnal dyspnea and syncope.        Atypical chest discomfort   Respiratory: Positive for cough. Negative for shortness of breath, sleep disturbances due to breathing, snoring, sputum production and wheezing.    Endocrine: Negative for cold intolerance,  "heat intolerance, polydipsia and polyuria.   Hematologic/Lymphatic: Negative for adenopathy and bleeding problem. Does not bruise/bleed easily.   Skin: Negative for flushing and itching.   Musculoskeletal: Negative for back pain, falls, joint pain, joint swelling, muscle weakness and neck pain.   Gastrointestinal: Negative for abdominal pain, dysphagia, heartburn, nausea and vomiting.   Genitourinary: Negative for dysuria and frequency.   Neurological: Negative for disturbances in coordination, dizziness, light-headedness, loss of balance, numbness and weakness.   Psychiatric/Behavioral: Negative for altered mental status and memory loss. The patient is not nervous/anxious.    Allergic/Immunologic: Negative for hives and persistent infections.          Objective:         /72   Pulse 72   Ht 152.4 cm (60\")   Wt 69.4 kg (153 lb)   BMI 29.88 kg/m²     Physical Exam   Constitutional: She is oriented to person, place, and time. She appears well-developed and well-nourished.   HENT:   Head: Normocephalic and atraumatic.   Eyes: Pupils are equal, round, and reactive to light. Conjunctivae and EOM are normal.   Neck: Neck supple. No thyromegaly present.   Cardiovascular: Normal rate, regular rhythm, S1 normal, S2 normal, normal heart sounds and intact distal pulses. PMI is not displaced. Exam reveals no gallop, no S3, no S4, no distant heart sounds, no friction rub, no midsystolic click and no opening snap.   No murmur heard.  Pulses:       Carotid pulses are 2+ on the right side, and 2+ on the left side.       Radial pulses are 2+ on the right side, and 2+ on the left side.        Femoral pulses are 2+ on the right side, and 2+ on the left side.       Dorsalis pedis pulses are 2+ on the right side, and 2+ on the left side.        Posterior tibial pulses are 2+ on the right side, and 2+ on the left side.   Pulmonary/Chest: Effort normal and breath sounds normal. No respiratory distress. She has no wheezes. She has " no rales.   Few bilateral rhonchi noted   Abdominal: Soft. Bowel sounds are normal. She exhibits no distension and no mass. There is no tenderness.   Musculoskeletal: Normal range of motion. She exhibits no edema.   Neurological: She is alert and oriented to person, place, and time.   Skin: Skin is warm and dry. No erythema.   Psychiatric: She has a normal mood and affect.       In-Office Procedure(s):  Procedures    ASCVD RIsk Score::  The 10-year ASCVD risk score ([place] SHAIKH Jr., et al., 2013) is: 10.3%    Values used to calculate the score:      Age: 74 years      Sex: Female      Is Non- : Yes      Diabetic: No      Tobacco smoker: No      Systolic Blood Pressure: 120 mmHg      Is BP treated: Yes      HDL Cholesterol: 62 mg/dL      Total Cholesterol: 154 mg/dL        Assessment/Plan:         1. Coronary arteriosclerosis in native artery  Stable    2. S/P CABG (coronary artery bypass graft)  Stable    3. H/O heart artery stent  Stable    4. Essential hypertension  Controlled    5. Hyperlipidemia, unspecified hyperlipidemia type  Controlled    6. Nonsustained ventricular tachycardia (CMS/HCC)  Asymptomatic    7. Respiratory insufficiency  Recent pneumonia    8. Acute diastolic congestive heart failure (CMS/HCC)  Compensated and euvolemic    9. Fatigue, unspecified type  Recent pneumonia    Ms. Greenwood is stable from a cardiovascular standpoint.  She is free of angina and euvolemic.  Her respiratory status is improving with recent treatment of pneumonia.  She is aware the importance of continued salt restriction and following her medical regimen.  Of encouraged her to observe a low-cholesterol low saturated fat diet and continue her statin therapy.       Mal Moser MD  03/11/20  .

## 2020-03-18 ENCOUNTER — TRANSCRIBE ORDERS (OUTPATIENT)
Dept: ADMINISTRATIVE | Facility: HOSPITAL | Age: 75
End: 2020-03-18

## 2020-03-18 DIAGNOSIS — R91.8 PULMONARY INFILTRATE: Primary | ICD-10-CM

## 2020-03-24 ENCOUNTER — APPOINTMENT (OUTPATIENT)
Dept: CT IMAGING | Facility: HOSPITAL | Age: 75
End: 2020-03-24

## 2020-04-13 RX ORDER — ATORVASTATIN CALCIUM 40 MG/1
TABLET, FILM COATED ORAL
Qty: 90 TABLET | Refills: 3 | Status: SHIPPED | OUTPATIENT
Start: 2020-04-13 | End: 2021-04-06

## 2020-04-16 ENCOUNTER — HOSPITAL ENCOUNTER (OUTPATIENT)
Dept: CT IMAGING | Facility: HOSPITAL | Age: 75
Discharge: HOME | End: 2020-04-16
Admitting: INTERNAL MEDICINE

## 2020-04-16 ENCOUNTER — APPOINTMENT (OUTPATIENT)
Dept: CT IMAGING | Facility: HOSPITAL | Age: 75
End: 2020-04-16

## 2020-04-16 DIAGNOSIS — R91.8 PULMONARY INFILTRATE: ICD-10-CM

## 2020-04-16 DIAGNOSIS — I25.10 CORONARY ARTERIOSCLEROSIS IN NATIVE ARTERY: Primary | ICD-10-CM

## 2020-04-16 PROCEDURE — 71250 CT THORAX DX C-: CPT

## 2020-04-16 RX ORDER — FUROSEMIDE 40 MG/1
40 TABLET ORAL 2 TIMES DAILY
Qty: 180 TABLET | Refills: 1 | Status: SHIPPED | OUTPATIENT
Start: 2020-04-16 | End: 2020-09-23 | Stop reason: SDUPTHER

## 2020-04-28 ENCOUNTER — TRANSCRIBE ORDERS (OUTPATIENT)
Dept: ADMINISTRATIVE | Facility: HOSPITAL | Age: 75
End: 2020-04-28

## 2020-04-28 DIAGNOSIS — R91.8 PULMONARY INFILTRATE: Primary | ICD-10-CM

## 2020-05-12 RX ORDER — FERROUS SULFATE 325(65) MG
TABLET ORAL
Qty: 90 TABLET | Refills: 2 | Status: SHIPPED | OUTPATIENT
Start: 2020-05-12 | End: 2021-07-28

## 2020-05-13 RX ORDER — ALBUTEROL SULFATE 90 UG/1
2 AEROSOL, METERED RESPIRATORY (INHALATION) EVERY 4 HOURS PRN
Qty: 1 INHALER | Refills: 2 | Status: SHIPPED | OUTPATIENT
Start: 2020-05-13 | End: 2021-08-25 | Stop reason: SDUPTHER

## 2020-05-13 RX ORDER — PROMETHAZINE HYDROCHLORIDE 25 MG/1
25 TABLET ORAL EVERY 6 HOURS PRN
Qty: 90 TABLET | Refills: 3 | Status: SHIPPED | OUTPATIENT
Start: 2020-05-13 | End: 2021-04-22 | Stop reason: SDUPTHER

## 2020-05-13 NOTE — TELEPHONE ENCOUNTER
PT CALLING IN TO REQUEST REFILLS OF     promethazine (PHENERGAN) 25 MG tablet    albuterol sulfate  (90 Base) MCG/ACT inhaler    [facility] - [address] [phone] [facility] [phone]         PT CONTACT NUMBER [phone]

## 2020-05-26 ENCOUNTER — OFFICE VISIT (OUTPATIENT)
Dept: CARDIOLOGY | Facility: CLINIC | Age: 75
End: 2020-05-26

## 2020-05-26 VITALS
HEART RATE: 64 BPM | SYSTOLIC BLOOD PRESSURE: 110 MMHG | RESPIRATION RATE: 20 BRPM | WEIGHT: 155 LBS | DIASTOLIC BLOOD PRESSURE: 74 MMHG | TEMPERATURE: 98 F | HEIGHT: 60 IN | OXYGEN SATURATION: 97 % | BODY MASS INDEX: 30.43 KG/M2

## 2020-05-26 DIAGNOSIS — I10 ESSENTIAL HYPERTENSION: Primary | ICD-10-CM

## 2020-05-26 DIAGNOSIS — Z95.1 S/P CABG (CORONARY ARTERY BYPASS GRAFT): ICD-10-CM

## 2020-05-26 DIAGNOSIS — I47.29 NONSUSTAINED VENTRICULAR TACHYCARDIA (HCC): ICD-10-CM

## 2020-05-26 DIAGNOSIS — E87.70 HYPERVOLEMIA, UNSPECIFIED HYPERVOLEMIA TYPE: ICD-10-CM

## 2020-05-26 DIAGNOSIS — R06.89 RESPIRATORY INSUFFICIENCY: ICD-10-CM

## 2020-05-26 DIAGNOSIS — I25.10 CORONARY ARTERIOSCLEROSIS IN NATIVE ARTERY: ICD-10-CM

## 2020-05-26 DIAGNOSIS — E87.6 HYPOKALEMIA: ICD-10-CM

## 2020-05-26 DIAGNOSIS — I50.31 ACUTE DIASTOLIC CONGESTIVE HEART FAILURE (HCC): ICD-10-CM

## 2020-05-26 PROCEDURE — 99214 OFFICE O/P EST MOD 30 MIN: CPT | Performed by: INTERNAL MEDICINE

## 2020-05-26 RX ORDER — POTASSIUM CHLORIDE 1500 MG/1
60 TABLET, FILM COATED, EXTENDED RELEASE ORAL DAILY
Qty: 270 TABLET | Refills: 3 | Status: SHIPPED | OUTPATIENT
Start: 2020-05-26 | End: 2020-10-02

## 2020-05-26 RX ORDER — CARVEDILOL 25 MG/1
TABLET ORAL
Qty: 180 TABLET | Refills: 3 | Status: SHIPPED | OUTPATIENT
Start: 2020-05-26 | End: 2021-05-25

## 2020-05-26 RX ORDER — SPIRONOLACTONE 25 MG/1
12.5 TABLET ORAL DAILY
Qty: 45 TABLET | Refills: 3 | Status: SHIPPED | OUTPATIENT
Start: 2020-05-26 | End: 2020-07-24 | Stop reason: SDUPTHER

## 2020-05-28 NOTE — PROGRESS NOTES
[facility]        Subjective:     Encounter Date:05/26/2020      Patient ID: Marleny Greenwood is a 74 y.o. female.      HPI: I saw Marleny Greenwood today for continued cardiovascular care.  She is a very sweet 74-year-old female who is observing the CDC guidelines for social distancing.  She remains free of fever or cough.  She does not report any change in her sense of smell or taste.  Ms. Greenwood reports increased dyspnea on exertion as well as increased lower extremity edema left greater than right.  She denies chest pain pressure or tightness.  She is free of orthopnea or PND.  She does not report syncope near syncope or palpitations.  She tolerates her current medical regimen well no reported side effects.  Echocardiogram obtained 11/20/2019 revealed left ventricular ejection fraction 74% grade 1A diastolic dysfunction and mild aortic valve insufficiency.  She underwent right left heart catheterization coronary arteriography and imaging of bypass grafts on 11/19/2019 this demonstrated.  This demonstrated normal left heart filling pressures, left main free of disease, LAD mid total occlusion, D1 90%, D2 100%, left circumflex mid 30%, mid marginal branch totally occluded, LIMA to the LAD was atretic, saphenous vein graft to the mid diagonal branch was patent, saphenous vein graft to the right coronary artery was occluded.  The right coronary artery is diffusely diseased in the mid to distal segment there is right to right and left-to-right collaterals but the distal vessel is small and diffusely diseased.  Medical management was recommended.    The following portions of the patient's history were reviewed and updated as appropriate: allergies, current medications, past family history, past medical history, past social history, past surgical history and problem list.    Problem List:  Patient Active Problem List   Diagnosis   • Asthma, cough variant   • Coronary arteriosclerosis in native artery    • Essential hypertension   • Chronic daily headache   • Nodule of upper lobe of right lung   • Nonsustained ventricular tachycardia (CMS/HCC)   • History of cardiac cath   • History of loop recorder   • H/O heart artery stent   • S/P CABG (coronary artery bypass graft)   • H/O echocardiogram   • History of nuclear stress test   • Age-related osteoporosis without current pathological fracture   • Pneumonia   • Acute diastolic congestive heart failure (CMS/HCC)       Past Medical History:  Past Medical History:   Diagnosis Date   • Anemia    • Asthma    • Coronary artery disease    • Cough    • Dyspnea    • Esophageal reflux    • H/O echocardiogram 6/11/2019 6/05/18 - Normal LV size and function.  EF 60-65%.  Mild concentric LVH.  Midl AR, MR, and TR.   • H/O heart artery stent 6/11/2019    3/22/12 - VISION stent to the SVG of the RCA reducing a total occlusion to 0% residual narrowing.   • Headache    • History of cardiac cath 6/11/2019   • History of loop recorder 6/11/2019    5/20/15 - Medtronic LINQ implant.   • History of nuclear stress test 6/11/2019    10/09/17 - Abnormal study.  EF 46%.   • Hyperlipidemia LDL goal <100 6/11/2019   • Hypokalemia    • Migraine    • Moderate osteopenia    • Nonsustained ventricular tachycardia (CMS/HCC) 6/11/2019   • NSVT (nonsustained ventricular tachycardia) (CMS/HCC)    • Pneumonia    • Postmenopausal atrophic vaginitis    • Pre-syncope    • Respiratory insufficiency 6/11/2019   • S/P CABG (coronary artery bypass graft) 6/11/2019 4/12/11 - LIMA to the LAD and SVG to the posterior descending artery.   • Seasonal allergies    • Shoulder dislocation    • Status post placement of implantable loop recorder 05/20/2015      Successful loop/LINQ implantation by Dr. Geo Wilson.   • Vitamin D deficiency        Past Surgical History:  Past Surgical History:   Procedure Laterality Date   • APPENDECTOMY     • BRONCHOSCOPY N/A 8/7/2019    Procedure: BRONCHOSCOPY WITH  BRONCHOALVEOLAR LAVAGE;  Surgeon: Thomas Sheridan MD;  [facility] END[facility];  Service: Pulmonary   • CARDIAC CATHETERIZATION  2014    History of Cardiac Cath Procedure Outcome: Successful   • CARDIAC CATHETERIZATION N/A 2019    Procedure: Right and Left Heart Cath lv pressures;  Surgeon: Mal Moser MD;  [facility] C[facility]ASIVE [facility];  Service: Cardiology   • CARDIAC CATHETERIZATION N/A 2019    Procedure: Saphenous Vein Graft;  Surgeon: Mal Moser MD;  [facility] C[facility];  Service: Cardiology   • CARDIAC CATHETERIZATION N/A 2019    Procedure: Coronary angiography;  Surgeon: Mal Moser MD;  [facility];  Service: Cardiology   • CATARACT EXTRACTION     •  SECTION     • CHOLECYSTECTOMY     • COLONOSCOPY  2009    q-10   • CORONARY ANGIOPLASTY WITH STENT PLACEMENT  2012     [facility] by Dr. Roman.   • CORONARY ARTERY BYPASS GRAFT Bilateral 2011    with a left internal mammary graft to the LAD and saphenous vein graft to the posterior descending artery   • ENDOSCOPY       Diagnostic Esophagogastroduodenoscopy   • HYSTERECTOMY     • NERVE BLOCK       Nerve Block Transforaminal Epidural Lumbar   • OTHER SURGICAL HISTORY  2015    Patient-Activated Cardiac Event Recorder - From 2015 to 2015       Social History:  Social History     Socioeconomic History   • Marital status:      Spouse name: Not on file   • Number of children: Not on file   • Years of education: Not on file   • Highest education level: Not on file   Tobacco Use   • Smoking status: Never Smoker   • Smokeless tobacco: Never Used   Substance and Sexual Activity   • Alcohol use: No     Frequency: Never   • Drug use: No   • Sexual activity: Defer       Allergies:  Allergies   Allergen Reactions   • Adhesive Tape Unknown (See Comments)     Rash and itchy skin   • Cardizem [Diltiazem]  "Unknown (See Comments)     Pt is unknown why she can not take this medication    • Levaquin [Levofloxacin] Cough         ROS:  Review of Systems   Constitution: Negative for malaise/fatigue.   HENT: Negative for hearing loss and nosebleeds.    Eyes: Negative for double vision and visual disturbance.   Cardiovascular: Positive for dyspnea on exertion and leg swelling. Negative for chest pain, claudication, near-syncope, orthopnea, palpitations, paroxysmal nocturnal dyspnea and syncope.   Respiratory: Negative for cough, shortness of breath, sleep disturbances due to breathing, snoring, sputum production and wheezing.    Endocrine: Negative for cold intolerance, heat intolerance, polydipsia and polyuria.   Hematologic/Lymphatic: Negative for adenopathy and bleeding problem. Does not bruise/bleed easily.   Skin: Negative for flushing and itching.   Musculoskeletal: Negative for back pain, falls, joint pain, joint swelling, muscle weakness and neck pain.   Gastrointestinal: Negative for abdominal pain, dysphagia, heartburn, nausea and vomiting.   Genitourinary: Negative for dysuria and frequency.   Neurological: Negative for disturbances in coordination, dizziness, light-headedness, loss of balance, numbness and weakness.   Psychiatric/Behavioral: Negative for altered mental status and memory loss. The patient is not nervous/anxious.    Allergic/Immunologic: Negative for hives and persistent infections.          Objective:         /74 (BP Location: Left arm, Patient Position: Sitting, Cuff Size: Large Adult)   Pulse 64   Temp 98 °F (36.7 °C)   Resp 20   Ht 152.4 cm (60\")   Wt 70.3 kg (155 lb)   SpO2 97%   BMI 30.27 kg/m²     Physical Exam   Constitutional: She is oriented to person, place, and time. She appears well-developed and well-nourished.   HENT:   Head: Normocephalic and atraumatic.   Eyes: Pupils are equal, round, and reactive to light. Conjunctivae and EOM are normal.   Neck: Neck supple. No " thyromegaly present.   Cardiovascular: Normal rate, regular rhythm, S1 normal, S2 normal, normal heart sounds and intact distal pulses. PMI is not displaced. Exam reveals no gallop, no S3, no S4, no distant heart sounds, no friction rub, no midsystolic click and no opening snap.   No murmur heard.  Pulses:       Carotid pulses are 2+ on the right side, and 2+ on the left side.       Radial pulses are 2+ on the right side, and 2+ on the left side.        Femoral pulses are 2+ on the right side, and 2+ on the left side.       Dorsalis pedis pulses are 2+ on the right side, and 2+ on the left side.        Posterior tibial pulses are 2+ on the right side, and 2+ on the left side.   Pulmonary/Chest: Effort normal and breath sounds normal. No respiratory distress. She has no wheezes. She has no rales.   Abdominal: Soft. Bowel sounds are normal. She exhibits no distension and no mass. There is no tenderness.   Musculoskeletal: Normal range of motion. She exhibits no edema.   Trace bilateral lower extremity edema   Neurological: She is alert and oriented to person, place, and time.   Skin: Skin is warm and dry. No erythema.   Psychiatric: She has a normal mood and affect.       In-Office Procedure(s):  Procedures    ASCVD RIsk Score::  The 10-year ASCVD risk score ([place] NEEL Jr., et al., 2013) is: 8.9%    Values used to calculate the score:      Age: 74 years      Sex: Female      Is Non- : Yes      Diabetic: No      Tobacco smoker: No      Systolic Blood Pressure: 110 mmHg      Is BP treated: Yes      HDL Cholesterol: 62 mg/dL      Total Cholesterol: 154 mg/dL        Assessment/Plan:         1. Coronary arteriosclerosis in native artery      2. S/P CABG (coronary artery bypass graft)      3. Essential hypertension    - spironolactone (ALDACTONE) 25 MG tablet; Take 0.5 tablets by mouth Daily.  Dispense: 45 tablet; Refill: 3  - Basic Metabolic Panel; Future    4. Acute diastolic congestive heart failure  (CMS/HCC)      5. Nonsustained ventricular tachycardia (CMS/HCC)      6. Hypokalemia    - potassium chloride ER (K-TAB) 20 MEQ tablet controlled-release ER tablet; Take 3 tablets by mouth Daily.  Dispense: 270 tablet; Refill: 3    7. Respiratory insufficiency      8. Hypervolemia, unspecified hypervolemia type    I have counseled Ms. Greenwood on the importance of continued salt restriction is close to 2000 mg a day as possible and maintaining her fluid intake no more than 64 ounces daily.  I will initiate Spironolactone 12.5 mg daily and decrease her potassium intake to 60 mEq daily we will assess a BMP in 7 days.  I will see her in follow-up in 2 weeks and we will adjust her medications further as needed.         Mal Moser MD  05/28/20  .

## 2020-06-04 ENCOUNTER — OFFICE VISIT (OUTPATIENT)
Dept: INTERNAL MEDICINE | Facility: CLINIC | Age: 75
End: 2020-06-04

## 2020-06-04 VITALS
DIASTOLIC BLOOD PRESSURE: 54 MMHG | SYSTOLIC BLOOD PRESSURE: 96 MMHG | TEMPERATURE: 97.3 F | BODY MASS INDEX: 30.04 KG/M2 | HEIGHT: 60 IN | HEART RATE: 70 BPM | WEIGHT: 153 LBS

## 2020-06-04 DIAGNOSIS — I10 ESSENTIAL HYPERTENSION: ICD-10-CM

## 2020-06-04 DIAGNOSIS — J45.991 ASTHMA, COUGH VARIANT: Primary | ICD-10-CM

## 2020-06-04 PROBLEM — I50.31 ACUTE DIASTOLIC CONGESTIVE HEART FAILURE: Status: RESOLVED | Noted: 2020-03-11 | Resolved: 2020-06-04

## 2020-06-04 LAB
BUN SERPL-MCNC: 14 MG/DL (ref 8–23)
BUN/CREAT SERPL: 11.7 (ref 7–25)
CALCIUM SERPL-MCNC: 9.4 MG/DL (ref 8.6–10.5)
CHLORIDE SERPL-SCNC: 101 MMOL/L (ref 98–107)
CO2 SERPL-SCNC: 31.5 MMOL/L (ref 22–29)
CREAT SERPL-MCNC: 1.2 MG/DL (ref 0.57–1)
GLUCOSE SERPL-MCNC: 113 MG/DL (ref 65–99)
POTASSIUM SERPL-SCNC: 3 MMOL/L (ref 3.5–5.2)
SODIUM SERPL-SCNC: 142 MMOL/L (ref 136–145)

## 2020-06-04 PROCEDURE — 99214 OFFICE O/P EST MOD 30 MIN: CPT | Performed by: INTERNAL MEDICINE

## 2020-06-04 RX ORDER — DOXYCYCLINE HYCLATE 100 MG/1
CAPSULE ORAL
COMMUNITY
Start: 2020-04-27 | End: 2020-06-08

## 2020-06-04 NOTE — PROGRESS NOTES
Assessment and Plan  Marleny was seen today for hypertension.    Diagnoses and all orders for this visit:    Asthma, cough variant  Comments:  may be the cause of her current sympotms- will resume the Singulair and use the inhaler regularly- report back 6/8 or sooner, if worsens.     Essential hypertension  Comments:  lower today but no symptoms- would not tolerate increased dose of spironolactone.        F/U and Patient Instructions    Return in about 2 months (around 8/4/2020) for Lab Today.  There are no Patient Instructions on file for this visit.    Subjective    Marleny Greenwood is a 74 y.o. female being seen in our office today for Hypertension     History of the Present Illness  HPI Saw Dr. Moser last week- added very low dose spironolactone- She is having some SOB- worse when she moves her arms around a lot.  Denies chest pain/tightness.  She denies lightheadedness.  She has tried the albuterol and thinks there is some success.  She has been taken off asthma meds.      Patient History        Significant Past History  The following portions of the patient's history were reviewed and updated as appropriate:PMHroutine: Social history , Allergies, Current Medications, Active Problem List and Health Maintenance              Social History  She  reports that she has never smoked. She has never used smokeless tobacco. She reports that she does not drink alcohol or use drugs.                         Review of Symptoms  Review of Systems   Constitution: Negative.   HENT: Negative for congestion.    Cardiovascular: Negative.    Respiratory: Positive for shortness of breath. Negative for cough (no new cough) and sputum production.    Musculoskeletal: Positive for arthritis.   Gastrointestinal: Negative.    Psychiatric/Behavioral: Negative.      Objective  Vital Signs         BP Readings from Last 1 Encounters:   06/04/20 96/54     Wt Readings from Last 3 Encounters:   06/04/20 69.4 kg (153 lb)   05/26/20 70.3 kg  (155 lb)   03/11/20 69.4 kg (153 lb)   Body mass index is 29.88 kg/m².          Physical Exam   Physical Exam   Constitutional: No distress.   Neck: No JVD present.   Cardiovascular: Normal rate and regular rhythm.   Pulmonary/Chest: Effort normal and breath sounds normal. She has no wheezes.   Abdominal: Soft.   Musculoskeletal: Edema: trace ankle edema.   Lymphadenopathy:     She has no cervical adenopathy.     Data Reviewed    No results found for this or any previous visit (from the past 2016 hour(s)).

## 2020-06-08 ENCOUNTER — TELEMEDICINE (OUTPATIENT)
Dept: CARDIOLOGY | Facility: CLINIC | Age: 75
End: 2020-06-08

## 2020-06-08 VITALS — BODY MASS INDEX: 29.64 KG/M2 | HEIGHT: 60 IN | WEIGHT: 151 LBS

## 2020-06-08 DIAGNOSIS — E87.6 HYPOKALEMIA: ICD-10-CM

## 2020-06-08 DIAGNOSIS — I25.10 CORONARY ARTERIOSCLEROSIS IN NATIVE ARTERY: Primary | ICD-10-CM

## 2020-06-08 DIAGNOSIS — I10 ESSENTIAL HYPERTENSION: ICD-10-CM

## 2020-06-08 DIAGNOSIS — Z95.1 S/P CABG (CORONARY ARTERY BYPASS GRAFT): ICD-10-CM

## 2020-06-08 DIAGNOSIS — E87.6 HYPOKALEMIA: Primary | ICD-10-CM

## 2020-06-08 DIAGNOSIS — E87.70 HYPERVOLEMIA, UNSPECIFIED HYPERVOLEMIA TYPE: ICD-10-CM

## 2020-06-08 DIAGNOSIS — I47.29 NONSUSTAINED VENTRICULAR TACHYCARDIA (HCC): ICD-10-CM

## 2020-06-08 DIAGNOSIS — R06.89 RESPIRATORY INSUFFICIENCY: ICD-10-CM

## 2020-06-08 DIAGNOSIS — Z95.5 H/O HEART ARTERY STENT: ICD-10-CM

## 2020-06-08 DIAGNOSIS — J45.991 ASTHMA, COUGH VARIANT: ICD-10-CM

## 2020-06-08 PROCEDURE — 99442 PR PHYS/QHP TELEPHONE EVALUATION 11-20 MIN: CPT | Performed by: INTERNAL MEDICINE

## 2020-06-08 NOTE — PROGRESS NOTES
\[facility]\"" HEART SPECIALISTS    You have chosen to receive care through a telephone visit. Do you consent to use a telephone visit for your medical care today? Yes    Subjective:     Encounter Date:06/08/2020      Patient ID: Marleny Greenwood is a 74 y.o. female.      HPI: Marleny Greenwood agreed to a telephone clinical visit today secondary to the coronavirus pandemic.  She remains free of fever cough or shortness of breath.  She does not report any change in her sense of smell or taste.  Marleny reported at her last visit increased dyspnea on exertion lower extremity edema left greater than right.  We added Spironolactone 12.5 mg daily.  Lab work obtained 6/4/2020 revealed a potassium of 3.0 BUN 14 creatinine 1.2.  She feels that her lower extremity edema is slightly improved but her respiratory status is unchanged.      The following portions of the patient's history were reviewed and updated as appropriate: allergies, current medications, past family history, past medical history, past social history, past surgical history and problem list.    Problem List:  Patient Active Problem List   Diagnosis   • Asthma, cough variant   • Coronary arteriosclerosis in native artery   • Essential hypertension   • Chronic daily headache   • Nodule of upper lobe of right lung   • Nonsustained ventricular tachycardia (CMS/HCC)   • History of cardiac cath   • History of loop recorder   • H/O heart artery stent   • S/P CABG (coronary artery bypass graft)   • H/O echocardiogram   • History of nuclear stress test   • Age-related osteoporosis without current pathological fracture       Past Medical History:  Past Medical History:   Diagnosis Date   • Anemia    • Asthma    • Coronary artery disease    • Cough    • Dyspnea    • Esophageal reflux    • H/O echocardiogram 6/11/2019 6/05/18 - Normal LV size and function.  EF 60-65%.  Mild concentric LVH.  Midl AR, MR, and TR.   • H/O heart artery stent 6/11/2019    3/22/12 - VISION stent  to the SVG of the RCA reducing a total occlusion to 0% residual narrowing.   • Headache    • History of cardiac cath 2019   • History of loop recorder 2019    5/20/15 - Medtronic LINQ implant.   • History of nuclear stress test 2019    10/09/17 - Abnormal study.  EF 46%.   • Hyperlipidemia LDL goal <100 2019   • Hypokalemia    • Migraine    • Moderate osteopenia    • Nonsustained ventricular tachycardia (CMS/HCC) 2019   • NSVT (nonsustained ventricular tachycardia) (CMS/HCC)    • Pneumonia    • Postmenopausal atrophic vaginitis    • Pre-syncope    • Respiratory insufficiency 2019   • S/P CABG (coronary artery bypass graft) 2019 - LIMA to the LAD and SVG to the posterior descending artery.   • Seasonal allergies    • Shoulder dislocation    • Status post placement of implantable loop recorder 2015      Successful loop/LINQ implantation by Dr. Geo Wilson.   • Vitamin D deficiency        Past Surgical History:  Past Surgical History:   Procedure Laterality Date   • APPENDECTOMY     • BRONCHOSCOPY N/A 2019    Procedure: BRONCHOSCOPY WITH BRONCHOALVEOLAR LAVAGE;  Surgeon: Thomas Sheridan MD;  Location: [facility] ENDOSCOPY;  Service: Pulmonary   • CARDIAC CATHETERIZATION  2014    History of Cardiac Cath Procedure Outcome: Successful   • CARDIAC CATHETERIZATION N/A 2019    Procedure: Right and Left Heart Cath lv pressures;  Surgeon: Mal Moser MD;  Location: [facility] CATH INVASIVE LOCATION;  Service: Cardiology   • CARDIAC CATHETERIZATION N/A 2019    Procedure: Saphenous Vein Graft;  Surgeon: Mal Moser MD;  Location: [facility] CATH INVASIVE LOCATION;  Service: Cardiology   • CARDIAC CATHETERIZATION N/A 2019    Procedure: Coronary angiography;  Surgeon: Mal oMser MD;  Location: [facility] CATH INVASIVE LOCATION;  Service: Cardiology   • CATARACT EXTRACTION     •  SECTION     • CHOLECYSTECTOMY     • COLONOSCOPY  2009    q-10    • CORONARY ANGIOPLASTY WITH STENT PLACEMENT  03/22/2012     [facility] by Dr. Roman.   • CORONARY ARTERY BYPASS GRAFT Bilateral 04/12/2011    with a left internal mammary graft to the LAD and saphenous vein graft to the posterior descending artery   • ENDOSCOPY       Diagnostic Esophagogastroduodenoscopy   • HYSTERECTOMY     • NERVE BLOCK       Nerve Block Transforaminal Epidural Lumbar   • OTHER SURGICAL HISTORY  04/07/2015    Patient-Activated Cardiac Event Recorder - From March 14, 2015 to April 7, 2015       Social History:  Social History     Socioeconomic History   • Marital status:      Spouse name: Not on file   • Number of children: Not on file   • Years of education: Not on file   • Highest education level: Not on file   Tobacco Use   • Smoking status: Never Smoker   • Smokeless tobacco: Never Used   Substance and Sexual Activity   • Alcohol use: No     Frequency: Never   • Drug use: No   • Sexual activity: Defer       Allergies:  Allergies   Allergen Reactions   • Adhesive Tape Unknown (See Comments)     Rash and itchy skin   • Cardizem [Diltiazem] Unknown (See Comments)     Pt is unknown why she can not take this medication    • Levaquin [Levofloxacin] Cough         ROS:  Review of Systems   Constitution: Negative for malaise/fatigue.   HENT: Negative for hearing loss and nosebleeds.    Eyes: Negative for double vision and visual disturbance.   Cardiovascular: Positive for dyspnea on exertion and leg swelling. Negative for chest pain, claudication, near-syncope, orthopnea, palpitations, paroxysmal nocturnal dyspnea and syncope.   Respiratory: Negative for cough, shortness of breath, sleep disturbances due to breathing, snoring, sputum production and wheezing.    Endocrine: Negative for cold intolerance, heat intolerance, polydipsia and polyuria.   Hematologic/Lymphatic: Negative for adenopathy and bleeding problem. Does not bruise/bleed easily.   Skin: Negative for flushing  "and itching.   Musculoskeletal: Negative for back pain, falls, joint pain, joint swelling, muscle weakness and neck pain.   Gastrointestinal: Negative for abdominal pain, dysphagia, heartburn, nausea and vomiting.   Genitourinary: Negative for dysuria and frequency.   Neurological: Negative for disturbances in coordination, dizziness, light-headedness, loss of balance, numbness and weakness.   Psychiatric/Behavioral: Negative for altered mental status and memory loss. The patient is not nervous/anxious.    Allergic/Immunologic: Negative for hives and persistent infections.          Objective:         Ht 152.4 cm (60\")   Wt 68.5 kg (151 lb)   BMI 29.49 kg/m²     Physical Exam not performed    In-Office Procedure(s):  Procedures    ASCVD RIsk Score::  The 10-year ASCVD risk score (Thom SHAIKH Jr., et al., 2013) is: 7.2%    Values used to calculate the score:      Age: 74 years      Sex: Female      Is Non- : Yes      Diabetic: No      Tobacco smoker: No      Systolic Blood Pressure: 96 mmHg      Is BP treated: Yes      HDL Cholesterol: 62 mg/dL      Total Cholesterol: 154 mg/dL        Assessment/Plan:         1. Coronary arteriosclerosis in native artery  Stable    2. S/P CABG (coronary artery bypass graft)  Stable    3. H/O heart artery stent  Stable    4. Essential hypertension  Target less than 130/80    5. Nonsustained ventricular tachycardia (CMS/HCC)  Stable    6. Asthma, cough variant  Stable    7. Respiratory insufficiency  Mild to moderately compromised    8. Hypervolemia, unspecified hypervolemia type  Slightly improved    9. Hypokalemia  Recurrent    Ms. Greenwood continues to report mild to moderate respiratory compromise and slightly improved volume status.  I have counseled her on the importance of continued salt restriction is close to 2000 mg a day as possible.  I will advance her Spironolactone to 25 mg daily.  She is due to have a repeat BMP in 7 days.  We will communicate with " her that the following day for further adjustments as needed.  I will arrange for follow-up in 4 weeks in the office to assess her blood pressure and volume status.    I spent 14 minutes on telephone visit and 10 minutes completing electronic medical record documentation           Mal Moser MD  06/08/20  .

## 2020-06-13 ENCOUNTER — RESULTS ENCOUNTER (OUTPATIENT)
Dept: INTERNAL MEDICINE | Facility: CLINIC | Age: 75
End: 2020-06-13

## 2020-06-13 DIAGNOSIS — E87.6 HYPOKALEMIA: ICD-10-CM

## 2020-06-17 ENCOUNTER — TELEPHONE (OUTPATIENT)
Dept: CARDIOLOGY | Facility: CLINIC | Age: 75
End: 2020-06-17

## 2020-06-17 LAB
BUN SERPL-MCNC: 26 MG/DL (ref 8–23)
BUN/CREAT SERPL: 15.7 (ref 7–25)
CALCIUM SERPL-MCNC: 9.9 MG/DL (ref 8.6–10.5)
CHLORIDE SERPL-SCNC: 102 MMOL/L (ref 98–107)
CO2 SERPL-SCNC: 25.5 MMOL/L (ref 22–29)
CREAT SERPL-MCNC: 1.66 MG/DL (ref 0.57–1)
GLUCOSE SERPL-MCNC: 100 MG/DL (ref 65–99)
POTASSIUM SERPL-SCNC: 5.2 MMOL/L (ref 3.5–5.2)
SODIUM SERPL-SCNC: 137 MMOL/L (ref 136–145)

## 2020-06-24 ENCOUNTER — APPOINTMENT (OUTPATIENT)
Dept: ONCOLOGY | Facility: HOSPITAL | Age: 75
End: 2020-06-24

## 2020-07-21 ENCOUNTER — DOCUMENTATION (OUTPATIENT)
Dept: INTERNAL MEDICINE | Facility: CLINIC | Age: 75
End: 2020-07-21

## 2020-07-21 ENCOUNTER — TELEPHONE (OUTPATIENT)
Dept: INTERNAL MEDICINE | Facility: CLINIC | Age: 75
End: 2020-07-21

## 2020-07-21 DIAGNOSIS — E87.6 HYPOKALEMIA: ICD-10-CM

## 2020-07-21 DIAGNOSIS — I10 ESSENTIAL HYPERTENSION: Primary | ICD-10-CM

## 2020-07-21 DIAGNOSIS — R60.0 LOWER EXTREMITY EDEMA: ICD-10-CM

## 2020-07-21 NOTE — TELEPHONE ENCOUNTER
Dr. NEVILLE SORTO is coming in tomorrow for labs, not sure what to order.  Looks like Dr. Moser has order for BMP '    Please advise

## 2020-07-22 ENCOUNTER — OFFICE VISIT (OUTPATIENT)
Dept: CARDIOLOGY | Facility: CLINIC | Age: 75
End: 2020-07-22

## 2020-07-22 VITALS
HEIGHT: 60 IN | HEART RATE: 72 BPM | DIASTOLIC BLOOD PRESSURE: 54 MMHG | SYSTOLIC BLOOD PRESSURE: 100 MMHG | WEIGHT: 156 LBS | BODY MASS INDEX: 30.63 KG/M2 | RESPIRATION RATE: 16 BRPM | TEMPERATURE: 98.2 F

## 2020-07-22 DIAGNOSIS — I25.10 CORONARY ARTERIOSCLEROSIS IN NATIVE ARTERY: Primary | ICD-10-CM

## 2020-07-22 DIAGNOSIS — I27.20 PULMONARY HYPERTENSION (HCC): ICD-10-CM

## 2020-07-22 DIAGNOSIS — Z95.5 H/O HEART ARTERY STENT: ICD-10-CM

## 2020-07-22 DIAGNOSIS — I47.29 NONSUSTAINED VENTRICULAR TACHYCARDIA (HCC): ICD-10-CM

## 2020-07-22 DIAGNOSIS — J45.991 ASTHMA, COUGH VARIANT: ICD-10-CM

## 2020-07-22 DIAGNOSIS — I35.1 MILD AORTIC INSUFFICIENCY: ICD-10-CM

## 2020-07-22 DIAGNOSIS — I10 ESSENTIAL HYPERTENSION: ICD-10-CM

## 2020-07-22 DIAGNOSIS — R06.89 RESPIRATORY INSUFFICIENCY: ICD-10-CM

## 2020-07-22 DIAGNOSIS — E87.6 HYPOKALEMIA: ICD-10-CM

## 2020-07-22 DIAGNOSIS — Z95.1 S/P CABG (CORONARY ARTERY BYPASS GRAFT): ICD-10-CM

## 2020-07-22 LAB
ALBUMIN SERPL-MCNC: 4 G/DL (ref 3.5–5.2)
ALBUMIN/GLOB SERPL: 1.5 G/DL
ALP SERPL-CCNC: 71 U/L (ref 39–117)
ALT SERPL-CCNC: 8 U/L (ref 1–33)
AST SERPL-CCNC: 12 U/L (ref 1–32)
BASOPHILS # BLD AUTO: 0.03 10*3/MM3 (ref 0–0.2)
BASOPHILS NFR BLD AUTO: 0.5 % (ref 0–1.5)
BILIRUB SERPL-MCNC: 0.3 MG/DL (ref 0–1.2)
BUN SERPL-MCNC: 18 MG/DL (ref 8–23)
BUN/CREAT SERPL: 13.2 (ref 7–25)
CALCIUM SERPL-MCNC: 9.5 MG/DL (ref 8.6–10.5)
CHLORIDE SERPL-SCNC: 105 MMOL/L (ref 98–107)
CHOLEST SERPL-MCNC: 152 MG/DL (ref 0–200)
CO2 SERPL-SCNC: 24.7 MMOL/L (ref 22–29)
CREAT SERPL-MCNC: 1.36 MG/DL (ref 0.57–1)
EOSINOPHIL # BLD AUTO: 0.23 10*3/MM3 (ref 0–0.4)
EOSINOPHIL NFR BLD AUTO: 4 % (ref 0.3–6.2)
ERYTHROCYTE [DISTWIDTH] IN BLOOD BY AUTOMATED COUNT: 14.6 % (ref 12.3–15.4)
GLOBULIN SER CALC-MCNC: 2.6 GM/DL
GLUCOSE SERPL-MCNC: 94 MG/DL (ref 65–99)
HCT VFR BLD AUTO: 36.9 % (ref 34–46.6)
HDLC SERPL-MCNC: 58 MG/DL (ref 40–60)
HGB BLD-MCNC: 12.8 G/DL (ref 12–15.9)
IMM GRANULOCYTES # BLD AUTO: 0.03 10*3/MM3 (ref 0–0.05)
IMM GRANULOCYTES NFR BLD AUTO: 0.5 % (ref 0–0.5)
LDLC SERPL CALC-MCNC: 78 MG/DL (ref 0–100)
LYMPHOCYTES # BLD AUTO: 1.29 10*3/MM3 (ref 0.7–3.1)
LYMPHOCYTES NFR BLD AUTO: 22.4 % (ref 19.6–45.3)
MCH RBC QN AUTO: 28.8 PG (ref 26.6–33)
MCHC RBC AUTO-ENTMCNC: 34.7 G/DL (ref 31.5–35.7)
MCV RBC AUTO: 83.1 FL (ref 79–97)
MONOCYTES # BLD AUTO: 0.66 10*3/MM3 (ref 0.1–0.9)
MONOCYTES NFR BLD AUTO: 11.5 % (ref 5–12)
NEUTROPHILS # BLD AUTO: 3.52 10*3/MM3 (ref 1.7–7)
NEUTROPHILS NFR BLD AUTO: 61.1 % (ref 42.7–76)
NRBC BLD AUTO-RTO: 0 /100 WBC (ref 0–0.2)
PLATELET # BLD AUTO: 217 10*3/MM3 (ref 140–450)
POTASSIUM SERPL-SCNC: 4.5 MMOL/L (ref 3.5–5.2)
PROT SERPL-MCNC: 6.6 G/DL (ref 6–8.5)
RBC # BLD AUTO: 4.44 10*6/MM3 (ref 3.77–5.28)
SODIUM SERPL-SCNC: 141 MMOL/L (ref 136–145)
TRIGL SERPL-MCNC: 80 MG/DL (ref 0–150)
VLDLC SERPL CALC-MCNC: 16 MG/DL
WBC # BLD AUTO: 5.76 10*3/MM3 (ref 3.4–10.8)

## 2020-07-22 PROCEDURE — 99214 OFFICE O/P EST MOD 30 MIN: CPT | Performed by: INTERNAL MEDICINE

## 2020-07-22 RX ORDER — MONTELUKAST SODIUM 10 MG/1
10 TABLET ORAL NIGHTLY
COMMUNITY
End: 2021-01-08

## 2020-07-22 NOTE — PROGRESS NOTES
\[facility]\"" HEART SPECIALISTS        Subjective:     Encounter Date:07/22/2020      Patient ID: Marleny Greenwood is a 74 y.o. female.      HPI: I saw Marleny Greenwood today for cardiovascular care.  She is a very pleasant 74-year-old female who is free of fever, cough or increased shortness of breath.  She does not report any change in her sense of smell or taste.  She continues to observe the CDC guidelines for social distance.  Ms. Greenwood denies chest pain pressure or tightness.  She has her baseline dyspnea on exertion which is not progressive.  She sleeps with 2 pillows and does not report orthopnea or PND.  She does not experience any significant lower extremity edema.  She denies syncope near syncope or palpitations.  She continues to tolerate her current medical regimen no reported side effects.  Her blood pressure is controlled at 100/54.  She has a history of dyslipidemia and remains on atorvastatin 40 mg daily.  Her lipids are monitored by Dr. Liz Zaragoza.  She is on Lasix 40 mg/day and has had hypokalemia.  Her potassium has been increased to 120 mEq daily and she is on Spironolactone 25 mg daily.  She had a repeat BMP today, report is not yet available.  Echocardiogram obtained 11/20/2019 revealed preserved left ventricular function, grade 1 diastolic dysfunction mild aortic insufficiency, estimated right ventricular systolic pressure 35 to 45 mmHg.  Coronary angiography 11/19/2019 revealed the LAD mid occlusion with a patent saphenous vein graft to the LAD, circumflex no significant disease.  Right coronary artery is diffusely diseased with right to right and left to right collaterals.  No significant targets for revascularization, continued medical treatment.      The following portions of the patient's history were reviewed and updated as appropriate: allergies, current medications, past family history, past medical history, past social history, past surgical history and problem list.    Problem  List:  Patient Active Problem List   Diagnosis   • Asthma, cough variant   • Coronary arteriosclerosis in native artery   • Essential hypertension   • Chronic daily headache   • Nodule of upper lobe of right lung   • Nonsustained ventricular tachycardia (CMS/HCC)   • History of cardiac cath   • History of loop recorder   • H/O heart artery stent   • S/P CABG (coronary artery bypass graft)   • H/O echocardiogram   • History of nuclear stress test   • Age-related osteoporosis without current pathological fracture   • Respiratory insufficiency   • Hypokalemia   • Pulmonary hypertension (CMS/HCC)   • Mild aortic insufficiency       Past Medical History:  Past Medical History:   Diagnosis Date   • Anemia    • Asthma    • Coronary artery disease    • Cough    • Dyspnea    • Esophageal reflux    • H/O echocardiogram 6/11/2019 6/05/18 - Normal LV size and function.  EF 60-65%.  Mild concentric LVH.  Midl AR, MR, and TR.   • H/O heart artery stent 6/11/2019    3/22/12 - VISION stent to the SVG of the RCA reducing a total occlusion to 0% residual narrowing.   • Headache    • History of cardiac cath 6/11/2019   • History of loop recorder 6/11/2019    5/20/15 - Medtronic LINQ implant.   • History of nuclear stress test 6/11/2019    10/09/17 - Abnormal study.  EF 46%.   • Hyperlipidemia LDL goal <100 6/11/2019   • Hypokalemia    • Migraine    • Moderate osteopenia    • Nonsustained ventricular tachycardia (CMS/HCC) 6/11/2019   • NSVT (nonsustained ventricular tachycardia) (CMS/HCC)    • Pneumonia    • Postmenopausal atrophic vaginitis    • Pre-syncope    • Respiratory insufficiency 6/11/2019   • S/P CABG (coronary artery bypass graft) 6/11/2019 4/12/11 - LIMA to the LAD and SVG to the posterior descending artery.   • Seasonal allergies    • Shoulder dislocation    • Status post placement of implantable loop recorder 05/20/2015      Successful loop/LINQ implantation by Dr. Geo Wilson.   • Vitamin D deficiency        Past  Surgical History:  Past Surgical History:   Procedure Laterality Date   • APPENDECTOMY     • BRONCHOSCOPY N/A 2019    Procedure: BRONCHOSCOPY WITH BRONCHOALVEOLAR LAVAGE;  Surgeon: Thomas Sheridan MD;  Location[facility] ENDOSCOPY;  Service: Pulmonary   • CARDIAC CATHETERIZATION  2014    History of Cardiac Cath Procedure Outcome: Successful   • CARDIAC CATHETERIZATION N/A 2019    Procedure: Right and Left Heart Cath lv pressures;  Surgeon: Mal Moser MD;  [facility] C[facility]ASIVE [facility];  Service: Cardiology   • CARDIAC CATHETERIZATION N/A 2019    Procedure: Saphenous Vein Graft;  Surgeon: Mal Moser MD;  Location:  [facility] CATH INVASIVE LOCATION;  Service: Cardiology   • CARDIAC CATHETERIZATION N/A 2019    Procedure: Coronary angiography;  Surgeon: Mal Moser MD;  Location: [facility] CATH INVASIVE LOCATION;  Service: Cardiology   • CATARACT EXTRACTION     •  SECTION     • CHOLECYSTECTOMY     • COLONOSCOPY  2009    q-10   • CORONARY ANGIOPLASTY WITH STENT PLACEMENT  2012     [facility] by Dr. Roman.   • CORONARY ARTERY BYPASS GRAFT Bilateral 2011    with a left internal mammary graft to the LAD and saphenous vein graft to the posterior descending artery   • ENDOSCOPY       Diagnostic Esophagogastroduodenoscopy   • HYSTERECTOMY     • NERVE BLOCK       Nerve Block Transforaminal Epidural Lumbar   • OTHER SURGICAL HISTORY  2015    Patient-Activated Cardiac Event Recorder - From 2015 to 2015       Social History:  Social History     Socioeconomic History   • Marital status:      Spouse name: Not on file   • Number of children: Not on file   • Years of education: Not on file   • Highest education level: Not on file   Tobacco Use   • Smoking status: Never Smoker   • Smokeless tobacco: Never Used   Substance and Sexual Activity   • Alcohol use: No     Frequency: Never   • Drug use: No   • Sexual  "activity: Defer       Allergies:  Allergies   Allergen Reactions   • Adhesive Tape Unknown (See Comments)     Rash and itchy skin   • Cardizem [Diltiazem] Unknown (See Comments)     Pt is unknown why she can not take this medication    • Levaquin [Levofloxacin] Cough         ROS:  Review of Systems   Constitution: Negative for malaise/fatigue.   HENT: Negative for hearing loss and nosebleeds.    Eyes: Negative for double vision and visual disturbance.   Cardiovascular: Positive for dyspnea on exertion. Negative for chest pain, claudication, near-syncope, orthopnea, palpitations, paroxysmal nocturnal dyspnea and syncope.   Respiratory: Negative for cough, shortness of breath, sleep disturbances due to breathing, snoring, sputum production and wheezing.    Endocrine: Negative for cold intolerance, heat intolerance, polydipsia and polyuria.   Hematologic/Lymphatic: Negative for adenopathy and bleeding problem. Does not bruise/bleed easily.   Skin: Negative for flushing and itching.   Musculoskeletal: Negative for back pain, falls, joint pain, joint swelling, muscle weakness and neck pain.   Gastrointestinal: Negative for abdominal pain, dysphagia, heartburn, nausea and vomiting.   Genitourinary: Negative for dysuria and frequency.   Neurological: Negative for disturbances in coordination, dizziness, light-headedness, loss of balance, numbness and weakness.   Psychiatric/Behavioral: Negative for altered mental status and memory loss. The patient is not nervous/anxious.    Allergic/Immunologic: Negative for hives and persistent infections.          Objective:         /54   Pulse 72   Temp 98.2 °F (36.8 °C)   Resp 16   Ht 152.4 cm (60\")   Wt 70.8 kg (156 lb)   BMI 30.47 kg/m²     Physical Exam   Constitutional: She is oriented to person, place, and time. She appears well-developed and well-nourished.   HENT:   Head: Normocephalic and atraumatic.   Eyes: Pupils are equal, round, and reactive to light. " Conjunctivae and EOM are normal.   Neck: Neck supple. No thyromegaly present.   Cardiovascular: Normal rate, regular rhythm, S1 normal, S2 normal, normal heart sounds and intact distal pulses. PMI is not displaced. Exam reveals no gallop, no S3, no S4, no distant heart sounds, no friction rub, no midsystolic click and no opening snap.   No murmur heard.  Pulses:       Carotid pulses are 2+ on the right side, and 2+ on the left side.       Radial pulses are 2+ on the right side, and 2+ on the left side.        Femoral pulses are 2+ on the right side, and 2+ on the left side.       Dorsalis pedis pulses are 2+ on the right side, and 2+ on the left side.        Posterior tibial pulses are 2+ on the right side, and 2+ on the left side.   Pulmonary/Chest: Effort normal and breath sounds normal. No respiratory distress. She has no wheezes. She has no rales.   Distant breath sounds bilaterally with few basilar rhonchi   Abdominal: Soft. Bowel sounds are normal. She exhibits no distension and no mass. There is no tenderness.   Musculoskeletal: Normal range of motion. She exhibits no edema.   Trace lower extremity edema   Neurological: She is alert and oriented to person, place, and time.   Skin: Skin is warm and dry. No erythema.   Psychiatric: She has a normal mood and affect.       In-Office Procedure(s):  Procedures    ASCVD RIsk Score::  The 10-year ASCVD risk score (Thomlevon SHAIKH Jr., et al., 2013) is: 7.7%    Values used to calculate the score:      Age: 74 years      Sex: Female      Is Non- : Yes      Diabetic: No      Tobacco smoker: No      Systolic Blood Pressure: 100 mmHg      Is BP treated: Yes      HDL Cholesterol: 62 mg/dL      Total Cholesterol: 154 mg/dL        Assessment/Plan:         1. Coronary arteriosclerosis in native artery  Presently stable free of angina    2. H/O heart artery stent  Presently stable    3. S/P CABG (coronary artery bypass graft)  Presently stable    4. Essential  hypertension  Controlled    5. Nonsustained ventricular tachycardia (CMS/HCC)  No recurrence    6. Respiratory insufficiency  Multifactorial including age weight deconditioning, mild to moderate pulmonary hypertension, mild aortic insufficiency and possibly reactive airway disease    7. Asthma, cough variant  Presently stable    8. Hypokalemia  Continue to monitor and supplement    Ms. Greenwood is free of angina and appears near euvolemic.  Her respiratory status is multifactorial as indicated above.  I reviewed her echocardiogram and recent coronary angiogram with Ms. Greenwood today she is demonstrated preserved left ventricular function no significant valvular abnormality.  She has mild to moderate pulmonary hypertension with mild aortic insufficiency.  She does have underlying coronary heart disease as indicated above but no significant targets for revascularization.  Recommend continue her current medical regimen including pulmonary evaluation and may want to consider pulmonary rehab.  We will see her in follow-up in 3 months sooner if needed       Mal Moser MD  07/22/20  .

## 2020-07-24 DIAGNOSIS — I10 ESSENTIAL HYPERTENSION: ICD-10-CM

## 2020-07-24 RX ORDER — SPIRONOLACTONE 25 MG/1
25 TABLET ORAL DAILY
Qty: 90 TABLET | Refills: 1 | Status: SHIPPED | OUTPATIENT
Start: 2020-07-24 | End: 2021-01-04

## 2020-07-24 NOTE — TELEPHONE ENCOUNTER
Patient would like refill on spironolactone (ALDACTONE) 25 MG tablet sent to [facility]. States she is taking one pill a day so directions need to be changed.

## 2020-07-29 ENCOUNTER — INFUSION (OUTPATIENT)
Dept: ONCOLOGY | Facility: HOSPITAL | Age: 75
End: 2020-07-29

## 2020-07-29 VITALS
RESPIRATION RATE: 18 BRPM | HEIGHT: 61 IN | HEART RATE: 66 BPM | TEMPERATURE: 96.9 F | OXYGEN SATURATION: 95 % | BODY MASS INDEX: 29.07 KG/M2 | WEIGHT: 154 LBS | DIASTOLIC BLOOD PRESSURE: 77 MMHG | SYSTOLIC BLOOD PRESSURE: 127 MMHG

## 2020-07-29 DIAGNOSIS — M81.0 AGE-RELATED OSTEOPOROSIS WITHOUT CURRENT PATHOLOGICAL FRACTURE: Primary | ICD-10-CM

## 2020-07-29 PROCEDURE — 25010000002 DENOSUMAB 60 MG/ML SOLUTION PREFILLED SYRINGE: Performed by: INTERNAL MEDICINE

## 2020-07-29 PROCEDURE — 96372 THER/PROPH/DIAG INJ SC/IM: CPT

## 2020-07-29 RX ADMIN — DENOSUMAB 60 MG: 60 INJECTION SUBCUTANEOUS at 13:57

## 2020-07-29 NOTE — NURSING NOTE
Arrived for prolia injection. Indication and side effects reviewed. Denies recent dental work. Labs and medications verified. Prolia administered in L arm without incidence. Instructed to call prescribing MD for any concerns or questions and instructed on how to schedule future appts.  Pt vu and discharged ambulatory.

## 2020-08-04 ENCOUNTER — OFFICE VISIT (OUTPATIENT)
Dept: INTERNAL MEDICINE | Facility: CLINIC | Age: 75
End: 2020-08-04

## 2020-08-04 VITALS — TEMPERATURE: 96.9 F | WEIGHT: 156 LBS | BODY MASS INDEX: 30.63 KG/M2 | HEIGHT: 60 IN

## 2020-08-04 DIAGNOSIS — E87.6 HYPOKALEMIA: ICD-10-CM

## 2020-08-04 DIAGNOSIS — Z12.31 VISIT FOR SCREENING MAMMOGRAM: ICD-10-CM

## 2020-08-04 DIAGNOSIS — R91.1 NODULE OF UPPER LOBE OF RIGHT LUNG: ICD-10-CM

## 2020-08-04 DIAGNOSIS — R51.9 CHRONIC DAILY HEADACHE: ICD-10-CM

## 2020-08-04 DIAGNOSIS — I10 ESSENTIAL HYPERTENSION: Primary | ICD-10-CM

## 2020-08-04 PROCEDURE — 90670 PCV13 VACCINE IM: CPT | Performed by: INTERNAL MEDICINE

## 2020-08-04 PROCEDURE — 99214 OFFICE O/P EST MOD 30 MIN: CPT | Performed by: INTERNAL MEDICINE

## 2020-08-04 PROCEDURE — G0009 ADMIN PNEUMOCOCCAL VACCINE: HCPCS | Performed by: INTERNAL MEDICINE

## 2020-08-04 RX ORDER — ISOSORBIDE MONONITRATE 60 MG/1
TABLET, EXTENDED RELEASE ORAL
Qty: 90 TABLET | Refills: 1 | Status: SHIPPED | OUTPATIENT
Start: 2020-08-04 | End: 2021-02-01

## 2020-08-04 NOTE — PROGRESS NOTES
Assessment and Plan  Marleny was seen today for asthma.    Diagnoses and all orders for this visit:    Essential hypertension  Comments:  doing great    Chronic daily headache  Comments:  improved!  D/c butalbital    Nodule of upper lobe of right lung  Comments:  has CT scan scheduled.     Hypokalemia  Comments:  reduce K supplement to 4 daily given increase in spironolactone.     Visit for screening mammogram  -     Mammo Screening Bilateral With CAD      F/U and Patient Instructions    Return in about 3 months (around 11/4/2020).  There are no Patient Instructions on file for this visit.    Subjective    Marleny Greenwood is a 74 y.o. female being seen in our office today for Asthma     History of the Present Illness  HPI Here for reg f/u - she as been feeling better lately.  No new issues.  She has been having some forgetfulness.  Wonders about Prevagen.  She has cut back on the butalbital a lot, much less migraine.  She takes Anacin prn headache about 1-2 per week.      Patient History        Significant Past History  The following portions of the patient's history were reviewed and updated as appropriate:PMHroutine: Social history , Allergies, Current Medications, Active Problem List and Health Maintenance              Social History  She  reports that she has never smoked. She has never used smokeless tobacco. She reports that she does not drink alcohol or use drugs.                         Review of Symptoms  Review of Systems   Constitution: Negative.   Cardiovascular: Negative.    Respiratory: Shortness of breath: some stable exertion, resolves quickly.    Musculoskeletal: Negative.    Neurological: Headaches: decreased.   Psychiatric/Behavioral: Negative.      Objective  Vital Signs         BP Readings from Last 1 Encounters:   07/29/20 127/77     Wt Readings from Last 3 Encounters:   08/04/20 70.8 kg (156 lb)   07/29/20 69.9 kg (154 lb)   07/22/20 70.8 kg (156 lb)   Body mass index is 30.47 kg/m².           Physical Exam   Physical Exam   Constitutional: No distress.   Cardiovascular: Normal rate and regular rhythm.   Pulmonary/Chest: Effort normal and breath sounds normal.   Musculoskeletal: She exhibits no edema.     Data Reviewed    Recent Results (from the past 2016 hour(s))   Basic metabolic panel    Collection Time: 06/04/20 10:21 AM   Result Value Ref Range    Glucose 113 (H) 65 - 99 mg/dL    BUN 14 8 - 23 mg/dL    Creatinine 1.20 (H) 0.57 - 1.00 mg/dL    eGFR Non African Am 44 (L) >60 mL/min/1.73    eGFR African Am 53 (L) >60 mL/min/1.73    BUN/Creatinine Ratio 11.7 7.0 - 25.0    Sodium 142 136 - 145 mmol/L    Potassium 3.0 (L) 3.5 - 5.2 mmol/L    Chloride 101 98 - 107 mmol/L    Total CO2 31.5 (H) 22.0 - 29.0 mmol/L    Calcium 9.4 8.6 - 10.5 mg/dL   Basic metabolic panel    Collection Time: 06/16/20  1:10 PM   Result Value Ref Range    Glucose 100 (H) 65 - 99 mg/dL    BUN 26 (H) 8 - 23 mg/dL    Creatinine 1.66 (H) 0.57 - 1.00 mg/dL    eGFR Non African Am 30 (L) >60 mL/min/1.73    eGFR African Am 37 (L) >60 mL/min/1.73    BUN/Creatinine Ratio 15.7 7.0 - 25.0    Sodium 137 136 - 145 mmol/L    Potassium 5.2 3.5 - 5.2 mmol/L    Chloride 102 98 - 107 mmol/L    Total CO2 25.5 22.0 - 29.0 mmol/L    Calcium 9.9 8.6 - 10.5 mg/dL   Comprehensive metabolic panel    Collection Time: 07/22/20 10:06 AM   Result Value Ref Range    Glucose 94 65 - 99 mg/dL    BUN 18 8 - 23 mg/dL    Creatinine 1.36 (H) 0.57 - 1.00 mg/dL    eGFR Non African Am 38 (L) >60 mL/min/1.73    eGFR  Am 46 (L) >60 mL/min/1.73    BUN/Creatinine Ratio 13.2 7.0 - 25.0    Sodium 141 136 - 145 mmol/L    Potassium 4.5 3.5 - 5.2 mmol/L    Chloride 105 98 - 107 mmol/L    Total CO2 24.7 22.0 - 29.0 mmol/L    Calcium 9.5 8.6 - 10.5 mg/dL    Total Protein 6.6 6.0 - 8.5 g/dL    Albumin 4.00 3.50 - 5.20 g/dL    Globulin 2.6 gm/dL    A/G Ratio 1.5 g/dL    Total Bilirubin 0.3 0.0 - 1.2 mg/dL    Alkaline Phosphatase 71 39 - 117 U/L    AST (SGOT) 12 1 - 32 U/L     ALT (SGPT) 8 1 - 33 U/L   Lipid panel    Collection Time: 07/22/20 10:06 AM   Result Value Ref Range    Total Cholesterol 152 0 - 200 mg/dL    Triglycerides 80 0 - 150 mg/dL    HDL Cholesterol 58 40 - 60 mg/dL    VLDL Cholesterol 16 mg/dL    LDL Cholesterol  78 0 - 100 mg/dL   CBC & Differential    Collection Time: 07/22/20 10:06 AM   Result Value Ref Range    WBC 5.76 3.40 - 10.80 10*3/mm3    RBC 4.44 3.77 - 5.28 10*6/mm3    Hemoglobin 12.8 12.0 - 15.9 g/dL    Hematocrit 36.9 34.0 - 46.6 %    MCV 83.1 79.0 - 97.0 fL    MCH 28.8 26.6 - 33.0 pg    MCHC 34.7 31.5 - 35.7 g/dL    RDW 14.6 12.3 - 15.4 %    Platelets 217 140 - 450 10*3/mm3    Neutrophil Rel % 61.1 42.7 - 76.0 %    Lymphocyte Rel % 22.4 19.6 - 45.3 %    Monocyte Rel % 11.5 5.0 - 12.0 %    Eosinophil Rel % 4.0 0.3 - 6.2 %    Basophil Rel % 0.5 0.0 - 1.5 %    Neutrophils Absolute 3.52 1.70 - 7.00 10*3/mm3    Lymphocytes Absolute 1.29 0.70 - 3.10 10*3/mm3    Monocytes Absolute 0.66 0.10 - 0.90 10*3/mm3    Eosinophils Absolute 0.23 0.00 - 0.40 10*3/mm3    Basophils Absolute 0.03 0.00 - 0.20 10*3/mm3    Immature Granulocyte Rel % 0.5 0.0 - 0.5 %    Immature Grans Absolute 0.03 0.00 - 0.05 10*3/mm3    nRBC 0.0 0.0 - 0.2 /100 WBC

## 2020-08-24 ENCOUNTER — HOSPITAL ENCOUNTER (OUTPATIENT)
Dept: CT IMAGING | Facility: HOSPITAL | Age: 75
Discharge: HOME | End: 2020-08-24
Admitting: INTERNAL MEDICINE

## 2020-08-24 DIAGNOSIS — R91.8 PULMONARY INFILTRATE: ICD-10-CM

## 2020-08-24 PROCEDURE — 71250 CT THORAX DX C-: CPT

## 2020-08-24 RX ORDER — RANOLAZINE 500 MG/1
TABLET, EXTENDED RELEASE ORAL
Qty: 180 TABLET | Refills: 2 | Status: SHIPPED | OUTPATIENT
Start: 2020-08-24 | End: 2021-05-25

## 2020-09-21 ENCOUNTER — TELEPHONE (OUTPATIENT)
Dept: CARDIOLOGY | Facility: CLINIC | Age: 75
End: 2020-09-21

## 2020-09-21 NOTE — TELEPHONE ENCOUNTER
CNA PT    PT calling this morning to say she is SOA and wheezing and wants to be seen within the next couple of days.  Her SOA is the same since she last saw DR Moser in July but the Wheezing is new and has been going on for the last few days.

## 2020-09-23 ENCOUNTER — RESULTS ENCOUNTER (OUTPATIENT)
Dept: CARDIOLOGY | Facility: CLINIC | Age: 75
End: 2020-09-23

## 2020-09-23 ENCOUNTER — OFFICE VISIT (OUTPATIENT)
Dept: CARDIOLOGY | Facility: CLINIC | Age: 75
End: 2020-09-23

## 2020-09-23 VITALS
DIASTOLIC BLOOD PRESSURE: 80 MMHG | BODY MASS INDEX: 32 KG/M2 | HEART RATE: 72 BPM | WEIGHT: 163 LBS | HEIGHT: 60 IN | SYSTOLIC BLOOD PRESSURE: 128 MMHG | RESPIRATION RATE: 20 BRPM | OXYGEN SATURATION: 92 %

## 2020-09-23 DIAGNOSIS — I35.1 MILD AORTIC INSUFFICIENCY: ICD-10-CM

## 2020-09-23 DIAGNOSIS — R06.89 RESPIRATORY INSUFFICIENCY: ICD-10-CM

## 2020-09-23 DIAGNOSIS — Z95.5 H/O HEART ARTERY STENT: ICD-10-CM

## 2020-09-23 DIAGNOSIS — R06.89 RESPIRATORY INSUFFICIENCY: Primary | ICD-10-CM

## 2020-09-23 DIAGNOSIS — I25.10 CORONARY ARTERIOSCLEROSIS IN NATIVE ARTERY: ICD-10-CM

## 2020-09-23 DIAGNOSIS — Z95.1 S/P CABG (CORONARY ARTERY BYPASS GRAFT): ICD-10-CM

## 2020-09-23 DIAGNOSIS — J45.991 ASTHMA, COUGH VARIANT: ICD-10-CM

## 2020-09-23 DIAGNOSIS — I10 ESSENTIAL HYPERTENSION: ICD-10-CM

## 2020-09-23 DIAGNOSIS — I47.29 NONSUSTAINED VENTRICULAR TACHYCARDIA (HCC): ICD-10-CM

## 2020-09-23 DIAGNOSIS — I27.20 PULMONARY HYPERTENSION (HCC): ICD-10-CM

## 2020-09-23 PROCEDURE — 99214 OFFICE O/P EST MOD 30 MIN: CPT | Performed by: INTERNAL MEDICINE

## 2020-09-23 RX ORDER — FUROSEMIDE 40 MG/1
60 TABLET ORAL DAILY
Qty: 180 TABLET | Refills: 1 | Status: SHIPPED | OUTPATIENT
Start: 2020-09-23 | End: 2020-10-02 | Stop reason: SDUPTHER

## 2020-09-23 NOTE — PROGRESS NOTES
[facility]        Subjective:     Encounter Date:09/23/2020      Patient ID: Marleny Greenwood is a 74 y.o. female.      HPI: I saw Marleny Greenwood today for cardiovascular continued care.  She is a very sweet 74-year-old female who observes the CDC guidelines for social distancing.  She remains free of fever or cough but does report increased shortness of breath.  She denies any change in her sense of smell or cortex Ms. Greenwood has had a 7 pound weight increase and has increased dyspnea on exertion and occasional wheezing.  She has mild lower extremity edema.  She has a history of coronary heart disease status post coronary artery bypass surgery.  Her last echocardiogram from 11/20/2019 revealed preserved left ventricular function, grade 1 diastolic dysfunction, mild aortic mitral insufficiency.  There was mild pulmonary hypertension with a right ventricular systolic pressure 35 to 45 mmHg.  She has a history of hypertension which is controlled today 128/80.  Her O2 saturation on room air is 92%.  She is been maintained on furosemide 40 mg once a day currently and Spironolactone 25 mg daily.      The following portions of the patient's history were reviewed and updated as appropriate: allergies, current medications, past family history, past medical history, past social history, past surgical history and problem list.    Problem List:  Patient Active Problem List   Diagnosis   • Asthma, cough variant   • Coronary arteriosclerosis in native artery   • Essential hypertension   • Chronic daily headache   • Nodule of upper lobe of right lung   • Nonsustained ventricular tachycardia (CMS/HCC)   • History of cardiac cath   • History of loop recorder   • H/O heart artery stent   • S/P CABG (coronary artery bypass graft)   • H/O echocardiogram   • History of nuclear stress test   • Age-related osteoporosis without current pathological fracture   • Respiratory insufficiency   • Hypokalemia   • Pulmonary  hypertension (CMS/HCC)   • Mild aortic insufficiency       Past Medical History:  Past Medical History:   Diagnosis Date   • Anemia    • Asthma    • Coronary artery disease    • Cough    • Dyspnea    • Esophageal reflux    • H/O echocardiogram 6/11/2019 6/05/18 - Normal LV size and function.  EF 60-65%.  Mild concentric LVH.  Midl AR, MR, and TR.   • H/O heart artery stent 6/11/2019    3/22/12 - VISION stent to the SVG of the RCA reducing a total occlusion to 0% residual narrowing.   • Headache    • History of cardiac cath 6/11/2019   • History of loop recorder 6/11/2019    5/20/15 - [employer] LINQ implant.   • History of nuclear stress test 6/11/2019    10/09/17 - Abnormal study.  EF 46%.   • Hyperlipidemia LDL goal <100 6/11/2019   • Hypokalemia    • Migraine    • Moderate osteopenia    • Nonsustained ventricular tachycardia (CMS/HCC) 6/11/2019   • NSVT (nonsustained ventricular tachycardia) (CMS/HCC)    • Pneumonia    • Postmenopausal atrophic vaginitis    • Pre-syncope    • Respiratory insufficiency 6/11/2019   • S/P CABG (coronary artery bypass graft) 6/11/2019 4/12/11 - LIMA to the LAD and SVG to the posterior descending artery.   • Seasonal allergies    • Shoulder dislocation    • Status post placement of implantable loop recorder 05/20/2015      Successful loop/LINQ implantation by Dr. Geo Wilson.   • Vitamin D deficiency        Past Surgical History:  Past Surgical History:   Procedure Laterality Date   • APPENDECTOMY     • BRONCHOSCOPY N/A 8/7/2019    Procedure: BRONCHOSCOPY WITH BRONCHOALVEOLAR LAVAGE;  Surgeon: Thomas Sheridan MD;  Location: [facility] ENDOSCOPY;  Service: Pulmonary   • CARDIAC CATHETERIZATION  06/02/2014    History of Cardiac Cath Procedure Outcome: Successful   • CARDIAC CATHETERIZATION N/A 11/19/2019    Procedure: Right and Left Heart Cath lv pressures;  Surgeon: Mal Moser MD;  Location: [facility] CATH INVASIVE LOCATION;  Service: Cardiology   • CARDIAC CATHETERIZATION N/A  2019    Procedure: Saphenous Vein Graft;  Surgeon: Mal Moser MD;  Location: [facility] CATH INVASIVE LOCATION;  Service: Cardiology   • CARDIAC CATHETERIZATION N/A 2019    Procedure: Coronary angiography;  Surgeon: Mal Moser MD;  Location: [facility] INVASIVE LOCATION;  Service: Cardiology   • CATARACT EXTRACTION     •  SECTION     • CHOLECYSTECTOMY     • COLONOSCOPY  2009    q-10   • CORONARY ANGIOPLASTY WITH STENT PLACEMENT  2012     [facility] by Dr. Roman.   • CORONARY ARTERY BYPASS GRAFT Bilateral 2011    with a left internal mammary graft to the LAD and saphenous vein graft to the posterior descending artery   • ENDOSCOPY       Diagnostic Esophagogastroduodenoscopy   • HYSTERECTOMY     • NERVE BLOCK       Nerve Block Transforaminal Epidural Lumbar   • OTHER SURGICAL HISTORY  2015    Patient-Activated Cardiac Event Recorder - From 2015 to 2015       Social History:  Social History     Socioeconomic History   • Marital status:      Spouse name: Not on file   • Number of children: Not on file   • Years of education: Not on file   • Highest education level: Not on file   Tobacco Use   • Smoking status: Never Smoker   • Smokeless tobacco: Never Used   Substance and Sexual Activity   • Alcohol use: No     Frequency: Never   • Drug use: No   • Sexual activity: Defer       Allergies:  Allergies   Allergen Reactions   • Adhesive Tape Unknown (See Comments)     Rash and itchy skin   • Cardizem [Diltiazem] Unknown (See Comments)     Pt is unknown why she can not take this medication    • Levaquin [Levofloxacin] Cough         ROS:  Review of Systems   Constitution: Positive for weight gain. Negative for malaise/fatigue.   HENT: Negative for hearing loss and nosebleeds.    Eyes: Negative for double vision and visual disturbance.   Cardiovascular: Positive for dyspnea on exertion and leg swelling. Negative for chest pain,  "claudication, near-syncope, orthopnea, palpitations, paroxysmal nocturnal dyspnea and syncope.   Respiratory: Negative for cough, shortness of breath, sleep disturbances due to breathing, snoring, sputum production and wheezing.    Endocrine: Negative for cold intolerance, heat intolerance, polydipsia and polyuria.   Hematologic/Lymphatic: Negative for adenopathy and bleeding problem. Does not bruise/bleed easily.   Skin: Negative for flushing and itching.   Musculoskeletal: Negative for back pain, falls, joint pain, joint swelling, muscle weakness and neck pain.   Gastrointestinal: Negative for abdominal pain, dysphagia, heartburn, nausea and vomiting.   Genitourinary: Negative for dysuria and frequency.   Neurological: Negative for disturbances in coordination, dizziness, light-headedness, loss of balance, numbness and weakness.   Psychiatric/Behavioral: Negative for altered mental status and memory loss. The patient is not nervous/anxious.    Allergic/Immunologic: Negative for hives and persistent infections.          Objective:         /80 (BP Location: Left arm, Patient Position: Sitting, Cuff Size: Large Adult)   Pulse 72   Resp 20   Ht 152.4 cm (60\")   Wt 73.9 kg (163 lb)   SpO2 92%   BMI 31.83 kg/m²     Constitutional:       Appearance: Well-developed.   Eyes:      Conjunctiva/sclera: Conjunctivae normal.      Pupils: Pupils are equal, round, and reactive to light.   HENT:      Head: Normocephalic and atraumatic.   Neck:      Musculoskeletal: Neck supple.      Thyroid: No thyromegaly.   Pulmonary:      Effort: Pulmonary effort is normal. No respiratory distress.      Breath sounds: Normal breath sounds. No wheezing. No rales.   Cardiovascular:      Normal rate. Regular rhythm.      Murmurs: There is a grade 1/4 high frequency blowing decrescendo, early diastolic murmur at the URSB, radiating to the apex.      S4 Gallop. No S3 gallop. Midsystolic click click.   Edema:     Peripheral edema " present.     Pretibial: bilateral trace edema of the pretibial area.  Abdominal:      General: Bowel sounds are normal. There is no distension.      Palpations: Abdomen is soft. There is no abdominal mass.      Tenderness: There is no abdominal tenderness.   Musculoskeletal: Normal range of motion.   Skin:     General: Skin is warm and dry.      Findings: No erythema.   Neurological:      Mental Status: Alert and oriented to person, place, and time.         In-Office Procedure(s):  Procedures    ASCVD RIsk Score::  The 10-year ASCVD risk score (Thomlevon SHAIKH Jr., et al., 2013) is: 11.1%    Values used to calculate the score:      Age: 74 years      Sex: Female      Is Non- : Yes      Diabetic: No      Tobacco smoker: No      Systolic Blood Pressure: 128 mmHg      Is BP treated: Yes      HDL Cholesterol: 58 mg/dL      Total Cholesterol: 152 mg/dL        Assessment/Plan:         1. Coronary arteriosclerosis in native artery  Stable free of angina  - furosemide (LASIX) 40 MG tablet; Take 1.5 tablets by mouth Daily.  Dispense: 180 tablet; Refill: 1    2. Respiratory insufficiency  Multifactorial including age weight deconditioning diastolic dysfunction pulmonary hypertension and volume excess  - Basic Metabolic Panel; Future  - Basic Metabolic Panel; Future    3. S/P CABG (coronary artery bypass graft)  Stable    4. H/O heart artery stent  Stable    5. Essential hypertension  Controlled    6. Mild aortic insufficiency  Stable    7. Nonsustained ventricular tachycardia (CMS/HCC)  Stable    8. Pulmonary hypertension (CMS/HCC)  Stable    9. Asthma, cough variant  No wheezing noted    Ms. Greenwood reports respiratory insufficiency weight gain and mild lower extremity edema.  Of counseled her on salt restriction is close to 2000 mg a day as possible.  I will advance her furosemide to 40 mg twice daily until she loses 5 pounds at which point we will reduce it to 60 mg once daily.  We will obtain a basic  metabolic panel today and in 1 week.  I will see her in follow-up in 4 weeks.  She will contact me if her symptoms do not significantly improved.  Should she develop wheezing she will use her albuterol respiratory neb.           Mal Moser MD  09/23/20  .

## 2020-09-30 ENCOUNTER — RESULTS ENCOUNTER (OUTPATIENT)
Dept: CARDIOLOGY | Facility: CLINIC | Age: 75
End: 2020-09-30

## 2020-09-30 DIAGNOSIS — R06.89 RESPIRATORY INSUFFICIENCY: ICD-10-CM

## 2020-10-02 DIAGNOSIS — E87.6 HYPOKALEMIA: ICD-10-CM

## 2020-10-02 DIAGNOSIS — I25.10 CORONARY ARTERIOSCLEROSIS IN NATIVE ARTERY: ICD-10-CM

## 2020-10-02 DIAGNOSIS — E87.70 HYPERVOLEMIA, UNSPECIFIED HYPERVOLEMIA TYPE: Primary | ICD-10-CM

## 2020-10-02 RX ORDER — FUROSEMIDE 40 MG/1
TABLET ORAL
Qty: 180 TABLET | Refills: 1 | Status: SHIPPED | OUTPATIENT
Start: 2020-10-02 | End: 2021-03-22

## 2020-10-02 RX ORDER — POTASSIUM CHLORIDE 1500 MG/1
TABLET, FILM COATED, EXTENDED RELEASE ORAL
Qty: 180 TABLET | Refills: 1 | Status: SHIPPED | OUTPATIENT
Start: 2020-10-02 | End: 2020-10-27 | Stop reason: SDUPTHER

## 2020-10-02 NOTE — TELEPHONE ENCOUNTER
Spoke with pt. SOA is about the same and she's lost 2lbs. Per CNA, increase Lasix to 60mg BID, K to 20meq BID until you lose 5lb then back to QD for both. Recheck BMP in 1 week. [place]

## 2020-10-09 ENCOUNTER — RESULTS ENCOUNTER (OUTPATIENT)
Dept: CARDIOLOGY | Facility: CLINIC | Age: 75
End: 2020-10-09

## 2020-10-09 DIAGNOSIS — E87.6 HYPOKALEMIA: ICD-10-CM

## 2020-10-09 DIAGNOSIS — E87.70 HYPERVOLEMIA, UNSPECIFIED HYPERVOLEMIA TYPE: ICD-10-CM

## 2020-10-26 ENCOUNTER — HOSPITAL ENCOUNTER (OUTPATIENT)
Dept: MAMMOGRAPHY | Facility: HOSPITAL | Age: 75
Discharge: HOME | End: 2020-10-26
Admitting: INTERNAL MEDICINE

## 2020-10-26 PROCEDURE — 77067 SCR MAMMO BI INCL CAD: CPT

## 2020-10-27 DIAGNOSIS — E87.6 HYPOKALEMIA: ICD-10-CM

## 2020-10-27 RX ORDER — POTASSIUM CHLORIDE 1500 MG/1
40 TABLET, FILM COATED, EXTENDED RELEASE ORAL 2 TIMES DAILY
Qty: 360 TABLET | Refills: 1 | Status: SHIPPED | OUTPATIENT
Start: 2020-10-27 | End: 2021-05-03

## 2020-10-29 ENCOUNTER — RESULTS ENCOUNTER (OUTPATIENT)
Dept: CARDIOLOGY | Facility: CLINIC | Age: 75
End: 2020-10-29

## 2020-10-29 DIAGNOSIS — E87.6 HYPOKALEMIA: ICD-10-CM

## 2020-11-02 ENCOUNTER — OFFICE VISIT (OUTPATIENT)
Dept: CARDIOLOGY | Facility: CLINIC | Age: 75
End: 2020-11-02

## 2020-11-02 VITALS
HEART RATE: 58 BPM | HEIGHT: 60 IN | BODY MASS INDEX: 29.84 KG/M2 | WEIGHT: 152 LBS | SYSTOLIC BLOOD PRESSURE: 128 MMHG | DIASTOLIC BLOOD PRESSURE: 80 MMHG

## 2020-11-02 DIAGNOSIS — I25.10 CORONARY ARTERIOSCLEROSIS IN NATIVE ARTERY: Primary | ICD-10-CM

## 2020-11-02 DIAGNOSIS — R07.9 CHEST PAIN, UNSPECIFIED TYPE: ICD-10-CM

## 2020-11-02 DIAGNOSIS — Z95.5 H/O HEART ARTERY STENT: ICD-10-CM

## 2020-11-02 DIAGNOSIS — R06.89 RESPIRATORY INSUFFICIENCY: ICD-10-CM

## 2020-11-02 DIAGNOSIS — J45.991 ASTHMA, COUGH VARIANT: ICD-10-CM

## 2020-11-02 DIAGNOSIS — I10 ESSENTIAL HYPERTENSION: ICD-10-CM

## 2020-11-02 DIAGNOSIS — I27.20 PULMONARY HYPERTENSION (HCC): ICD-10-CM

## 2020-11-02 DIAGNOSIS — I35.1 MILD AORTIC INSUFFICIENCY: ICD-10-CM

## 2020-11-02 DIAGNOSIS — R07.2 PRECORDIAL PAIN: ICD-10-CM

## 2020-11-02 DIAGNOSIS — R53.83 FATIGUE, UNSPECIFIED TYPE: ICD-10-CM

## 2020-11-02 DIAGNOSIS — Z95.1 S/P CABG (CORONARY ARTERY BYPASS GRAFT): ICD-10-CM

## 2020-11-02 DIAGNOSIS — I47.29 NONSUSTAINED VENTRICULAR TACHYCARDIA (HCC): ICD-10-CM

## 2020-11-02 PROCEDURE — 99214 OFFICE O/P EST MOD 30 MIN: CPT | Performed by: INTERNAL MEDICINE

## 2020-11-02 NOTE — PROGRESS NOTES
[facility]        Subjective:     Encounter Date:11/02/2020      Patient ID: Marleny Greenwood is a 75 y.o. female.      HPI: I saw Marleny Greenwood today for continued cardiovascular care.  She is a very pleasant 75-year-old female who observes the CDC guidelines for social distancing.  She is free of fever cough or increased shortness of breath.  She does not report any change in her sense of smell or taste.  Ms. Greenwood reports generalized fatigue following most activities.  She has dyspnea on exertion and occasionally at rest but this has been chronic and is apparently stable.  She reports occasional chest discomfort.  She sleeps with 2 pillows free of orthopnea or PND and does not have any current lower extremity edema.  She denies syncope near syncope or palpitations.  Advised her to restrict her salt is close to 2000 mg a day as possible and increase furosemide to 40 mg twice daily her weight is decreased from 163 pounds to 152 pounds.  She has prior evidence of diastolic dysfunction and pulmonary hypertension.  She is coronary heart disease status post bypass surgery and previous percutaneous coronary intervention.  She has known dyslipidemia and remains on atorvastatin 40 mg daily.Her last echocardiogram from 11/20/2019 revealed preserved left ventricular function, grade 1 diastolic dysfunction, mild aortic insufficiency, trace to mild mitral insufficiency, right ventricular systolic pressure 35 to 45 mmHg.  His blood pressure in the office today is 128/80.    The following portions of the patient's history were reviewed and updated as appropriate: allergies, current medications, past family history, past medical history, past social history, past surgical history and problem list.    Problem List:  Patient Active Problem List   Diagnosis   • Asthma, cough variant   • Coronary arteriosclerosis in native artery   • Essential hypertension   • Chronic daily headache   • Nodule of upper lobe of  right lung   • Nonsustained ventricular tachycardia (CMS/HCC)   • History of cardiac cath   • History of loop recorder   • H/O heart artery stent   • S/P CABG (coronary artery bypass graft)   • H/O echocardiogram   • History of nuclear stress test   • Age-related osteoporosis without current pathological fracture   • Respiratory insufficiency   • Hypokalemia   • Pulmonary hypertension (CMS/HCC)   • Mild aortic insufficiency   • Precordial pain   • Fatigue       Past Medical History:  Past Medical History:   Diagnosis Date   • Anemia    • Asthma    • Coronary artery disease    • Cough    • Dyspnea    • Esophageal reflux    • H/O echocardiogram 6/11/2019 6/05/18 - Normal LV size and function.  EF 60-65%.  Mild concentric LVH.  Midl AR, MR, and TR.   • H/O heart artery stent 6/11/2019    3/22/12 - VISION stent to the SVG of the RCA reducing a total occlusion to 0% residual narrowing.   • Headache    • History of cardiac cath 6/11/2019   • History of loop recorder 6/11/2019    5/20/15 - [employer] LINQ implant.   • History of nuclear stress test 6/11/2019    10/09/17 - Abnormal study.  EF 46%.   • Hyperlipidemia LDL goal <100 6/11/2019   • Hypokalemia    • Migraine    • Moderate osteopenia    • Nonsustained ventricular tachycardia (CMS/HCC) 6/11/2019   • NSVT (nonsustained ventricular tachycardia) (CMS/HCC)    • Pneumonia    • Postmenopausal atrophic vaginitis    • Pre-syncope    • Respiratory insufficiency 6/11/2019   • S/P CABG (coronary artery bypass graft) 6/11/2019 4/12/11 - LIMA to the LAD and SVG to the posterior descending artery.   • Seasonal allergies    • Shoulder dislocation    • Status post placement of implantable loop recorder 05/20/2015      Successful loop/LINQ implantation by Dr. Geo Wilson.   • Vitamin D deficiency        Past Surgical History:  Past Surgical History:   Procedure Laterality Date   • APPENDECTOMY     • BRONCHOSCOPY N/A 8/7/2019    Procedure: BRONCHOSCOPY WITH BRONCHOALVEOLAR  LAVAGE;  Surgeon: Thomas Sheridan MD;  Location: [facility] ENDOSCOPY;  Service: Pulmonary   • CARDIAC CATHETERIZATION  2014    History of Cardiac Cath Procedure Outcome: Successful   • CARDIAC CATHETERIZATION N/A 2019    Procedure: Right and Left Heart Cath lv pressures;  Surgeon: Mal Moser MD;  [facility];  Service: Cardiology   • CARDIAC CATHETERIZATION N/A 2019    Procedure: Saphenous Vein Graft;  Surgeon: Mal Moser MD;  [facility];  Service: Cardiology   • CARDIAC CATHETERIZATION N/A 2019    Procedure: Coronary angiography;  Surgeon: Mal Moser MD;  Location: [facility] CATH INVASIVE LOCATION;  Service: Cardiology   • CATARACT EXTRACTION     •  SECTION     • CHOLECYSTECTOMY     • COLONOSCOPY  2009    q-10   • CORONARY ANGIOPLASTY WITH STENT PLACEMENT  2012     [facility] by Dr. Roman.   • CORONARY ARTERY BYPASS GRAFT Bilateral 2011    with a left internal mammary graft to the LAD and saphenous vein graft to the posterior descending artery   • ENDOSCOPY       Diagnostic Esophagogastroduodenoscopy   • HYSTERECTOMY     • NERVE BLOCK       Nerve Block Transforaminal Epidural Lumbar   • OTHER SURGICAL HISTORY  2015    Patient-Activated Cardiac Event Recorder - From 2015 to 2015       Social History:  Social History     Socioeconomic History   • Marital status:      Spouse name: Not on file   • Number of children: Not on file   • Years of education: Not on file   • Highest education level: Not on file   Tobacco Use   • Smoking status: Never Smoker   • Smokeless tobacco: Never Used   Substance and Sexual Activity   • Alcohol use: No     Frequency: Never   • Drug use: No   • Sexual activity: Defer       Allergies:  Allergies   Allergen Reactions   • Adhesive Tape Unknown (See Comments)     Rash and itchy skin   • Cardizem [Diltiazem] Unknown (See  "Comments)     Pt is unknown why she can not take this medication    • Levaquin [Levofloxacin] Cough         ROS:  Review of Systems   Constitution: Positive for malaise/fatigue.   HENT: Negative for hearing loss and nosebleeds.    Eyes: Negative for double vision and visual disturbance.   Cardiovascular: Positive for dyspnea on exertion. Negative for chest pain, claudication, near-syncope, orthopnea, palpitations, paroxysmal nocturnal dyspnea and syncope.   Respiratory: Negative for cough, shortness of breath, sleep disturbances due to breathing, snoring, sputum production and wheezing.    Endocrine: Negative for cold intolerance, heat intolerance, polydipsia and polyuria.   Hematologic/Lymphatic: Negative for adenopathy and bleeding problem. Does not bruise/bleed easily.   Skin: Negative for flushing and itching.   Musculoskeletal: Negative for back pain, falls, joint pain, joint swelling, muscle weakness and neck pain.   Gastrointestinal: Negative for abdominal pain, dysphagia, heartburn, nausea and vomiting.   Genitourinary: Negative for dysuria and frequency.   Neurological: Negative for disturbances in coordination, dizziness, light-headedness, loss of balance, numbness and weakness.   Psychiatric/Behavioral: Negative for altered mental status and memory loss. The patient is not nervous/anxious.    Allergic/Immunologic: Negative for hives and persistent infections.          Objective:         /80   Pulse 58   Ht 152.4 cm (60\")   Wt 68.9 kg (152 lb)   BMI 29.69 kg/m²     Constitutional:       Appearance: Well-developed.   Eyes:      Conjunctiva/sclera: Conjunctivae normal.      Pupils: Pupils are equal, round, and reactive to light.   HENT:      Head: Normocephalic and atraumatic.   Neck:      Musculoskeletal: Neck supple.      Thyroid: No thyromegaly.   Pulmonary:      Effort: Pulmonary effort is normal. No respiratory distress.      Breath sounds: Normal breath sounds. No wheezing. No rales. "   Cardiovascular:      Normal rate. Regular rhythm.      Murmurs: There is a grade 1/4 high frequency blowing decrescendo, early diastolic murmur at the URSB, radiating to the apex.      S4 Gallop. No S3 gallop. Midsystolic click click.   Edema:     Peripheral edema absent.   Abdominal:      General: Bowel sounds are normal. There is no distension.      Palpations: Abdomen is soft. There is no abdominal mass.      Tenderness: There is no abdominal tenderness.   Musculoskeletal: Normal range of motion.   Skin:     General: Skin is warm and dry.      Findings: No erythema.   Neurological:      Mental Status: Alert and oriented to person, place, and time.         In-Office Procedure(s):  Procedures    ASCVD RIsk Score::  The 10-year ASCVD risk score (Thom SHAIKH JrPorfirio, et al., 2013) is: 11.7%    Values used to calculate the score:      Age: 75 years      Sex: Female      Is Non- : Yes      Diabetic: No      Tobacco smoker: No      Systolic Blood Pressure: 128 mmHg      Is BP treated: Yes      HDL Cholesterol: 58 mg/dL      Total Cholesterol: 152 mg/dL        Assessment/Plan:         1. Coronary arteriosclerosis in native artery  Occasional chest discomfort    2. S/P CABG (coronary artery bypass graft)  Chest discomfort    3. H/O heart artery stent  Chest discomfort    4. Mild aortic insufficiency  Stable    5. Essential hypertension  Controlled    6. Pulmonary hypertension (CMS/HCC)  Mild    7. Nonsustained ventricular tachycardia (CMS/HCC)  Stable    8. Asthma, cough variant  Stable    9. Respiratory insufficiency  Multifactorial age, weight, deconditioning, diastolic dysfunction, pulmonary hypertension    10. Precordial pain  Primarily atypical character    11. Fatigue, unspecified type  Persistent        We will proceed with noninvasive cardiovascular evaluation to include echocardiogram and Lexiscan Cardiolite noninvasive ischemic assessment.  I have once again counseled Ms. Greenwood to continue to  restrict her salt intake is close to 2000 mg a day as possible, weigh herself daily and restrict her fluids to approximately 48 ounces daily.  She will continue her current medical regimen.  I will arrange for follow-up within 2 months sooner if needed.           Mal Moser MD  11/02/20  .

## 2020-11-10 ENCOUNTER — OFFICE VISIT (OUTPATIENT)
Dept: INTERNAL MEDICINE | Facility: CLINIC | Age: 75
End: 2020-11-10

## 2020-11-10 VITALS
HEIGHT: 60 IN | RESPIRATION RATE: 15 BRPM | TEMPERATURE: 97.3 F | SYSTOLIC BLOOD PRESSURE: 124 MMHG | BODY MASS INDEX: 28.27 KG/M2 | HEART RATE: 65 BPM | WEIGHT: 144 LBS | DIASTOLIC BLOOD PRESSURE: 74 MMHG | OXYGEN SATURATION: 94 %

## 2020-11-10 DIAGNOSIS — I10 ESSENTIAL HYPERTENSION: ICD-10-CM

## 2020-11-10 DIAGNOSIS — R51.9 CHRONIC DAILY HEADACHE: Primary | ICD-10-CM

## 2020-11-10 DIAGNOSIS — I25.10 CORONARY ARTERIOSCLEROSIS IN NATIVE ARTERY: ICD-10-CM

## 2020-11-10 PROCEDURE — 99213 OFFICE O/P EST LOW 20 MIN: CPT | Performed by: INTERNAL MEDICINE

## 2020-11-10 RX ORDER — INFLUENZA A VIRUS A/MICHIGAN/45/2015 X-275 (H1N1) ANTIGEN (FORMALDEHYDE INACTIVATED), INFLUENZA A VIRUS A/SINGAPORE/INFIMH-16-0019/2016 IVR-186 (H3N2) ANTIGEN (FORMALDEHYDE INACTIVATED), INFLUENZA B VIRUS B/PHUKET/3073/2013 ANTIGEN (FORMALDEHYDE INACTIVATED), AND INFLUENZA B VIRUS B/MARYLAND/15/2016 BX-69A ANTIGEN (FORMALDEHYDE INACTIVATED) 60; 60; 60; 60 UG/.7ML; UG/.7ML; UG/.7ML; UG/.7ML
INJECTION, SUSPENSION INTRAMUSCULAR
COMMUNITY
Start: 2020-09-01 | End: 2021-06-21

## 2020-11-10 NOTE — PROGRESS NOTES
Assessment and Plan  Diagnoses and all orders for this visit:    1. Chronic daily headache (Primary)  Comments:  did not renew butalbital at this time, to monitor as it is stress related.    2. Essential hypertension  Comments:  controlled, watch weight loss.     3. Coronary arteriosclerosis in native artery  Comments:  work-up per Dr. Moser      F/U and Patient Instructions    Return in about 4 months (around 3/10/2021) for Recheck.  There are no Patient Instructions on file for this visit.    Subjective    Marleny Greenwood is a 75 y.o. female being seen in our office today for Hyperlipidemia     History of the Present Illness  HPI  Here for f/u- she has been staying with her daughter daily in the ICU at - she feels completely exhausted.  She occ has some CP but no increase SOB. She is having the CP evaluated by Dr. Moser- testing pending.   Headaches are worse again- as a result of the stress.    Patient History        Significant Past History  The following portions of the patient's history were reviewed and updated as appropriate:PMHroutine: Social history , Allergies, Current Medications, Active Problem List and Health Maintenance              Social History  She  reports that she has never smoked. She has never used smokeless tobacco. She reports that she does not drink alcohol or use drugs.                         Review of Symptoms  Review of Systems   Constitution: Weight loss: stress.   HENT: Negative for congestion.    Cardiovascular: Positive for chest pain. Dyspnea on exertion: stable.   Respiratory: Negative for cough.    Musculoskeletal: Negative for falls.   Gastrointestinal: Negative for change in bowel habit.   Neurological: Negative.    Psychiatric/Behavioral: Nervous/anxious: over current situation.      Objective  Vital Signs         BP Readings from Last 1 Encounters:   11/10/20 124/74     Wt Readings from Last 3 Encounters:   11/10/20 65.3 kg (144 lb)   11/02/20 68.9 kg (152 lb)    09/23/20 73.9 kg (163 lb)   Body mass index is 28.12 kg/m².          Physical Exam   Physical Exam  Constitutional:       Appearance: Normal appearance.   Cardiovascular:      Rate and Rhythm: Normal rate and regular rhythm.   Pulmonary:      Effort: Pulmonary effort is normal.   Musculoskeletal:      Right lower leg: No edema.      Left lower leg: No edema.   Neurological:      Mental Status: She is alert.       Data Reviewed    No results found for this or any previous visit (from the past 2016 hour(s)).

## 2020-12-09 ENCOUNTER — TELEPHONE (OUTPATIENT)
Dept: INTERNAL MEDICINE | Facility: CLINIC | Age: 75
End: 2020-12-09

## 2020-12-09 NOTE — TELEPHONE ENCOUNTER
PATIENT CALLING TO INFORM DR. BARBOSA  THAT HER DAUGHTER ROBLES SARAH PASSED AWAY THE NIGHT OF DEC 7TH.        PATIENT CAN BE REACHED AT: [phone]

## 2020-12-18 ENCOUNTER — APPOINTMENT (OUTPATIENT)
Dept: CARDIOLOGY | Facility: HOSPITAL | Age: 75
End: 2020-12-18

## 2020-12-18 ENCOUNTER — APPOINTMENT (OUTPATIENT)
Dept: NUCLEAR MEDICINE | Facility: HOSPITAL | Age: 75
End: 2020-12-18

## 2020-12-22 ENCOUNTER — TELEPHONE (OUTPATIENT)
Dept: INTERNAL MEDICINE | Facility: CLINIC | Age: 75
End: 2020-12-22

## 2020-12-22 DIAGNOSIS — R51.9 CHRONIC DAILY HEADACHE: Primary | ICD-10-CM

## 2020-12-22 RX ORDER — CODEINE/BUTALBITAL/ASA/CAFFEIN 30-50-325
1 CAPSULE ORAL EVERY 4 HOURS PRN
Qty: 120 CAPSULE | Refills: 0 | Status: SHIPPED | OUTPATIENT
Start: 2020-12-22 | End: 2021-04-22 | Stop reason: SDUPTHER

## 2020-12-22 NOTE — TELEPHONE ENCOUNTER
Spoke with PT, She states Verena  on Dec 7th and since then her Headaches have come back terrible.    She would like to know if you will give her some Butabital

## 2020-12-22 NOTE — TELEPHONE ENCOUNTER
PATIENT CALLED IN AND STATED THAT THE HEADACHES HAVE COME BACK SINCE HER DAUGHTER PASSED AWAY,  SHE WOULD LIKE TO KNOW IF THE MEDICATION THAT SHE WAS GIVEN IN THE PAST COULD BE CALLED IN FOR HER.     [facility] [address] - [address] [place] [address] AT [facility] - [phone] [facility] [phone] FX    PLEASE ADVISE IF THIS CAN BE DONE     [phone] [place]  [phone] CELL

## 2021-01-02 DIAGNOSIS — I10 ESSENTIAL HYPERTENSION: ICD-10-CM

## 2021-01-04 ENCOUNTER — TELEPHONE (OUTPATIENT)
Dept: INTERNAL MEDICINE | Facility: CLINIC | Age: 76
End: 2021-01-04

## 2021-01-04 RX ORDER — SPIRONOLACTONE 25 MG/1
TABLET ORAL
Qty: 90 TABLET | Refills: 1 | Status: SHIPPED | OUTPATIENT
Start: 2021-01-04 | End: 2021-07-01

## 2021-01-04 NOTE — TELEPHONE ENCOUNTER
1 pm tomorrow 30 minutes  
Appointment given   
Appointment?  
PATIENT CALLED STATING THAT SHE IS STILL HAVING SEVERE HEADACHES AND MEDICATION IS NOT HELPING    PATIENT IS REQUESTING A NEW MEDICATION    : [phone]  
Blade Sims DO:   Gen: covered in feces. answering questions with  at side.   HEENT: PERRL, EOMI, atraumatic  Neck: supple  Heart: RRR, S1S2  Lungs: CTA bl. no w/r/r  Abd: soft, nontender, nondistended  Ext: No deformity. No erythema. No calf tenderness or edema.   Neuro: Grossly intact.

## 2021-01-05 ENCOUNTER — OFFICE VISIT (OUTPATIENT)
Dept: INTERNAL MEDICINE | Facility: CLINIC | Age: 76
End: 2021-01-05

## 2021-01-05 VITALS
WEIGHT: 141 LBS | BODY MASS INDEX: 27.68 KG/M2 | HEIGHT: 60 IN | SYSTOLIC BLOOD PRESSURE: 130 MMHG | HEART RATE: 74 BPM | TEMPERATURE: 96.8 F | DIASTOLIC BLOOD PRESSURE: 72 MMHG

## 2021-01-05 DIAGNOSIS — I10 ESSENTIAL HYPERTENSION: ICD-10-CM

## 2021-01-05 DIAGNOSIS — R51.9 CHRONIC DAILY HEADACHE: Primary | ICD-10-CM

## 2021-01-05 PROCEDURE — 99214 OFFICE O/P EST MOD 30 MIN: CPT | Performed by: INTERNAL MEDICINE

## 2021-01-05 NOTE — PROGRESS NOTES
"Chief Complaint  Headache    Subjective          Marleny Greenwood presents to [facility] for   History of Present Illness here to discuss headaches- they had been doing great- hadn't taken the butalbital for a long time.  Then, her daughter got very ill and .  Since then, they have come back- there is no change in the features of the headache- the butalbital gives her a few hours relief but then they come back. She feels like they are related to the grief- seems that when she can distract herself, she feels better.    She is able to sleep well.     Objective   Vital Signs:   /72   Pulse 74   Temp 96.8 °F (36 °C)   Ht 152.4 cm (60\")   Wt 64 kg (141 lb)   BMI 27.54 kg/m²     Physical Exam  Constitutional:       General: She is not in acute distress.  HENT:      Head: Normocephalic.   Eyes:      Conjunctiva/sclera: Conjunctivae normal.   Cardiovascular:      Rate and Rhythm: Normal rate and regular rhythm.   Pulmonary:      Effort: Pulmonary effort is normal.   Neurological:      General: No focal deficit present.      Mental Status: Mental status is at baseline.        Result Review :                 Assessment and Plan    Problem List Items Addressed This Visit        Cardiac and Vasculature    Essential hypertension       Neuro    Chronic daily headache - Primary          Follow Up   Return for Keep previously scheduled appointment.  Patient was given instructions and counseling regarding her condition or for health maintenance advice. Please see specific information pulled into the AVS if appropriate.       "

## 2021-01-07 PROBLEM — I25.10 CORONARY ARTERIOSCLEROSIS IN NATIVE ARTERY: Chronic | Status: ACTIVE | Noted: 2019-03-25

## 2021-01-07 PROBLEM — I35.1 MILD AORTIC INSUFFICIENCY: Chronic | Status: ACTIVE | Noted: 2020-07-22

## 2021-01-07 PROBLEM — R07.2 PRECORDIAL PAIN: Chronic | Status: ACTIVE | Noted: 2020-11-02

## 2021-01-07 PROBLEM — Z95.1 S/P CABG (CORONARY ARTERY BYPASS GRAFT): Chronic | Status: ACTIVE | Noted: 2019-06-11

## 2021-01-07 PROBLEM — I47.29 NONSUSTAINED VENTRICULAR TACHYCARDIA: Chronic | Status: ACTIVE | Noted: 2019-06-11

## 2021-01-07 PROBLEM — J45.991 ASTHMA, COUGH VARIANT: Chronic | Status: ACTIVE | Noted: 2019-03-25

## 2021-01-07 PROBLEM — Z95.5 H/O HEART ARTERY STENT: Chronic | Status: ACTIVE | Noted: 2019-06-11

## 2021-01-07 PROBLEM — R06.89 RESPIRATORY INSUFFICIENCY: Chronic | Status: ACTIVE | Noted: 2020-07-22

## 2021-01-07 PROBLEM — I10 ESSENTIAL HYPERTENSION: Chronic | Status: ACTIVE | Noted: 2019-03-25

## 2021-01-07 PROBLEM — I27.20 PULMONARY HYPERTENSION: Chronic | Status: ACTIVE | Noted: 2020-07-22

## 2021-01-07 PROBLEM — R53.83 FATIGUE: Chronic | Status: ACTIVE | Noted: 2020-11-02

## 2021-01-07 PROBLEM — I51.89 GRADE I DIASTOLIC DYSFUNCTION: Chronic | Status: ACTIVE | Noted: 2021-01-07

## 2021-01-08 ENCOUNTER — OFFICE VISIT (OUTPATIENT)
Dept: CARDIOLOGY | Facility: CLINIC | Age: 76
End: 2021-01-08

## 2021-01-08 VITALS
HEIGHT: 60 IN | HEART RATE: 66 BPM | RESPIRATION RATE: 18 BRPM | BODY MASS INDEX: 28.7 KG/M2 | WEIGHT: 146.2 LBS | DIASTOLIC BLOOD PRESSURE: 80 MMHG | SYSTOLIC BLOOD PRESSURE: 124 MMHG

## 2021-01-08 DIAGNOSIS — R53.83 FATIGUE, UNSPECIFIED TYPE: Chronic | ICD-10-CM

## 2021-01-08 DIAGNOSIS — I25.10 CORONARY ARTERIOSCLEROSIS IN NATIVE ARTERY: Primary | Chronic | ICD-10-CM

## 2021-01-08 DIAGNOSIS — Z95.1 S/P CABG (CORONARY ARTERY BYPASS GRAFT): Chronic | ICD-10-CM

## 2021-01-08 DIAGNOSIS — R07.2 PRECORDIAL PAIN: Chronic | ICD-10-CM

## 2021-01-08 DIAGNOSIS — I10 ESSENTIAL HYPERTENSION: Chronic | ICD-10-CM

## 2021-01-08 DIAGNOSIS — I27.20 PULMONARY HYPERTENSION (HCC): Chronic | ICD-10-CM

## 2021-01-08 DIAGNOSIS — R06.89 RESPIRATORY INSUFFICIENCY: Chronic | ICD-10-CM

## 2021-01-08 DIAGNOSIS — Z95.5 H/O HEART ARTERY STENT: Chronic | ICD-10-CM

## 2021-01-08 DIAGNOSIS — J45.991 ASTHMA, COUGH VARIANT: Chronic | ICD-10-CM

## 2021-01-08 DIAGNOSIS — I47.29 NONSUSTAINED VENTRICULAR TACHYCARDIA (HCC): Chronic | ICD-10-CM

## 2021-01-08 DIAGNOSIS — I35.1 MILD AORTIC INSUFFICIENCY: Chronic | ICD-10-CM

## 2021-01-08 DIAGNOSIS — I51.89 GRADE I DIASTOLIC DYSFUNCTION: Chronic | ICD-10-CM

## 2021-01-08 PROCEDURE — 99214 OFFICE O/P EST MOD 30 MIN: CPT | Performed by: INTERNAL MEDICINE

## 2021-01-08 NOTE — PROGRESS NOTES
"Chief Complaint  Follow-up    Subjective    History of Present Illness      Marleny Greenwood presents to [facility] for continued cardiovascular care.  She is a very sweet 75-year-old female who observes the CDC guidelines during the coronavirus pandemic.  Ms. Greenwood remains free of fever cough or increased shortness of breath.  She does not report any change in her sense of smell or taste.  Ms. Greenwood is melancholy today.  She informs me that her 53-year-old daughter with type 1 diabetes diet.  Ms. Greenwood does not report any current chest pain pressure or tightness.  She has her baseline dyspnea on exertion which she does not feel is progressive.  She is free of orthopnea or PND no significant lower extremity edema.  She denies syncope near syncope or palpitations at present.  She has a history of hypertension which is controlled.  She has a prior history of coronary heart disease status post coronary artery bypass surgery as well as percutaneous coronary intervention with stent deployment.  She underwent bypass surgery on 4/12/2011 with a left internal mammary graft to the LAD and saphenous vein graft to the posterior descending branch of the right coronary artery.  She underwent a vision bare-metal stent to the saphenous vein graft to the right coronary artery on 3/22/2012.  She has a history of hyperlipidemia and remains on atorvastatin 40 mg daily.  She remains on dual antiplatelet therapy.  She tolerates her medical regimen well no reported side effects.    Objective   Vital Signs:   /80 (BP Location: Left arm, Patient Position: Sitting)   Pulse 66   Resp 18   Ht 152.4 cm (60\")   Wt 66.3 kg (146 lb 3.2 oz)   BMI 28.55 kg/m²     Constitutional:       Appearance: Well-developed.   Eyes:      Conjunctiva/sclera: Conjunctivae normal.      Pupils: Pupils are equal, round, and reactive to light.   HENT:      Head: Normocephalic and atraumatic.   Neck:      Musculoskeletal: " Neck supple.      Thyroid: No thyromegaly.   Pulmonary:      Effort: Pulmonary effort is normal. No respiratory distress.      Breath sounds: Normal breath sounds. No wheezing. No rales.   Cardiovascular:      Normal rate. Regular rhythm.      Murmurs: There is a high frequency blowing decrescendo, early diastolic murmur at the URSB, radiating to the apex.      S4 Gallop. No S3 gallop. Midsystolic click click.   Edema:     Peripheral edema absent.   Abdominal:      General: Bowel sounds are normal. There is no distension.      Palpations: Abdomen is soft. There is no abdominal mass.      Tenderness: There is no abdominal tenderness.   Musculoskeletal: Normal range of motion.   Skin:     General: Skin is warm and dry.      Findings: No erythema.   Neurological:      Mental Status: Alert and oriented to person, place, and time.         Result Review :   The following data was reviewed by: Mal Moser MD on 01/08/2021:  Common labs    Common Labsle 6/4/20 6/16/20 7/22/20 7/22/20 7/22/20      1006 1006 1006   Glucose 113 (A) 100 (A) 94     BUN 14 26 (A) 18     Creatinine 1.20 (A) 1.66 (A) 1.36 (A)     eGFR Non African Am 44 (A) 30 (A) 38 (A)     eGFR  Am 53 (A) 37 (A) 46 (A)     Sodium 142 137 141     Potassium 3.0 (A) 5.2 4.5     Chloride 101 102 105     Calcium 9.4 9.9 9.5     Total Protein   6.6     Albumin   4.00     Total Bilirubin   0.3     Alkaline Phosphatase   71     AST (SGOT)   12     ALT (SGPT)   8     WBC     5.76   Hemoglobin     12.8   Hematocrit     36.9   Platelets     217   Total Cholesterol    152    Triglycerides    80    HDL Cholesterol    58    LDL Cholesterol     78    (A) Abnormal value       Comments are available for some flowsheets but are not being displayed.           Data reviewed: Cardiology studies I reviewed coronary angiography and right left heart catheterization from 11/19/2019.  This demonstrated a PA pressure of 34/12, left ventricular end-diastolic pressure 10 mmHg, left  main normal, LAD mid total occlusion, D1 90%, D2 100%, circumflex 30 to 40% in the mid body, mid marginal branch totally occluded, right coronary artery dominant mid subtotal occlusion with right to right and left to right collaterals.  LIMA to the LAD atretic, saphenous vein graft to the right coronary artery totally occluded, saphenous vein graft to diagonal branch 30% stenosis.  Echocardiogram reviewed from 11/20/2019 revealed left ventricular ejection fraction 74%, with diastolic dysfunction grade 1A, mild aortic insufficiency, right ventricular systolic pressure 35 to 45 mmHg          Assessment and Plan    1. Coronary arteriosclerosis in native artery  Stable free of angina    2. S/P CABG (coronary artery bypass graft)  Stable    3. H/O heart artery stent  Stable controlled    4. Essential hypertension  Controlled    5. Grade I diastolic dysfunction  Compensated    6. Mild aortic insufficiency  Compensated    7. Pulmonary hypertension (CMS/HCC)  Stable    8. Nonsustained ventricular tachycardia (CMS/HCC)  Asymptomatic    9. Fatigue, unspecified type  Stable    10. Precordial pain  Resolved    11. Respiratory insufficiency  Multifactorial but stable    12. Asthma, cough variant  Stable    Ms. Greenwood is clearly sad with the loss of her dog from a cardiovascular standpoint she currently reports she is doing well free of any significant chest discomfort or dyspnea.  She appears euvolemic on physical exam.  She is tolerating her medications well no reported side effects.  We will continue her current regimen I will plan to see her in follow-up in 3 months of course sooner if needed.  She will contact me if there is any development of chest discomfort edema or shortness of air.        Follow Up   No follow-ups on file.  Patient was given instructions and counseling regarding her condition or for health maintenance advice. Please see specific information pulled into the AVS if appropriate.

## 2021-02-01 RX ORDER — ISOSORBIDE MONONITRATE 60 MG/1
TABLET, EXTENDED RELEASE ORAL
Qty: 90 TABLET | Refills: 1 | Status: SHIPPED | OUTPATIENT
Start: 2021-02-01 | End: 2021-08-02

## 2021-02-02 RX ORDER — CLOPIDOGREL BISULFATE 75 MG/1
TABLET ORAL
Qty: 90 TABLET | Refills: 3 | Status: SHIPPED | OUTPATIENT
Start: 2021-02-02 | End: 2022-01-31

## 2021-02-25 ENCOUNTER — TELEPHONE (OUTPATIENT)
Dept: INTERNAL MEDICINE | Facility: CLINIC | Age: 76
End: 2021-02-25

## 2021-02-25 RX ORDER — OMEPRAZOLE 20 MG/1
20 CAPSULE, DELAYED RELEASE ORAL DAILY
Qty: 90 CAPSULE | Refills: 4 | Status: SHIPPED | OUTPATIENT
Start: 2021-02-25 | End: 2022-03-15

## 2021-02-25 NOTE — TELEPHONE ENCOUNTER
Caller: Marleny Greenwood    Relationship: Self    Best call back number:     Medication needed:   Requested Prescriptions     Pending Prescriptions Disp Refills   • omeprazole (priLOSEC) 20 MG capsule 90 capsule 4     Sig: Take 1 capsule by mouth Daily.       When do you need the refill by:     What details did the patient provide when requesting the medication:     Does the patient have less than a 3 day supply:  [x] Yes  [] No    What is the patient's preferred pharmacy:      [facility]  HOME DELIVERY  [address] [facility]  [phone]

## 2021-02-26 ENCOUNTER — TRANSCRIBE ORDERS (OUTPATIENT)
Dept: SLEEP MEDICINE | Facility: HOSPITAL | Age: 76
End: 2021-02-26

## 2021-02-26 DIAGNOSIS — Z01.818 OTHER SPECIFIED PRE-OPERATIVE EXAMINATION: Primary | ICD-10-CM

## 2021-03-11 ENCOUNTER — OFFICE VISIT (OUTPATIENT)
Dept: INTERNAL MEDICINE | Facility: CLINIC | Age: 76
End: 2021-03-11

## 2021-03-11 VITALS
BODY MASS INDEX: 27.88 KG/M2 | SYSTOLIC BLOOD PRESSURE: 118 MMHG | DIASTOLIC BLOOD PRESSURE: 74 MMHG | HEART RATE: 76 BPM | TEMPERATURE: 97.1 F | WEIGHT: 142 LBS | HEIGHT: 60 IN

## 2021-03-11 DIAGNOSIS — J45.991 ASTHMA, COUGH VARIANT: Chronic | ICD-10-CM

## 2021-03-11 DIAGNOSIS — I10 ESSENTIAL HYPERTENSION: Chronic | ICD-10-CM

## 2021-03-11 DIAGNOSIS — R51.9 CHRONIC DAILY HEADACHE: ICD-10-CM

## 2021-03-11 DIAGNOSIS — E78.49 OTHER HYPERLIPIDEMIA: Primary | ICD-10-CM

## 2021-03-11 PROBLEM — R07.2 PRECORDIAL PAIN: Chronic | Status: RESOLVED | Noted: 2020-11-02 | Resolved: 2021-03-11

## 2021-03-11 PROBLEM — E87.6 HYPOKALEMIA: Status: RESOLVED | Noted: 2020-07-22 | Resolved: 2021-03-11

## 2021-03-11 PROCEDURE — 99213 OFFICE O/P EST LOW 20 MIN: CPT | Performed by: INTERNAL MEDICINE

## 2021-03-11 NOTE — PROGRESS NOTES
"Chief Complaint  Hypertension    Subjective          Marleny Greenwood presents to [facility]  History of Present Illness  Here for reg f/u- her BP was up yesterday at dentist - over 170/100.  Previous was 140/80 at pulmonary.  F/u echo scheduled- no new issues. She does have some good BP readings.   Cough about the same, saw pulmonary this week, ok'd her takinghydromet prn- usually about 3-5 weekly.      Objective   Vital Signs:   /74   Pulse 76   Temp 97.1 °F (36.2 °C)   Ht 152.4 cm (60\")   Wt 64.4 kg (142 lb)   BMI 27.73 kg/m²     Physical Exam  Constitutional:       Appearance: Normal appearance.   Cardiovascular:      Rate and Rhythm: Normal rate and regular rhythm.   Pulmonary:      Effort: Pulmonary effort is normal.      Breath sounds: Normal breath sounds.   Musculoskeletal:      Right lower leg: No edema.      Left lower leg: No edema.        Result Review :       Data reviewed: Consultant notes pulmonary          Assessment and Plan    Diagnoses and all orders for this visit:    1. Other hyperlipidemia (Primary)  Comments:  check labs with f/u  Orders:  -     Comprehensive Metabolic Panel; Future  -     Lipid Panel With / Chol / HDL Ratio; Future    2. Essential hypertension  Comments:  doing great here, no change for now.     3. Chronic daily headache  Comments:  stable    4. Asthma, cough variant  Comments:  stable, reviewed pulm records        Follow Up   Return in about 3 months (around 6/11/2021) for Medicare Wellness, Lab Before FUP.  Patient was given instructions and counseling regarding her condition or for health maintenance advice. Please see specific information pulled into the AVS if appropriate.       "

## 2021-03-12 ENCOUNTER — LAB (OUTPATIENT)
Dept: LAB | Facility: HOSPITAL | Age: 76
End: 2021-03-12

## 2021-03-12 DIAGNOSIS — Z01.818 OTHER SPECIFIED PRE-OPERATIVE EXAMINATION: ICD-10-CM

## 2021-03-12 PROCEDURE — C9803 HOPD COVID-19 SPEC COLLECT: HCPCS

## 2021-03-12 PROCEDURE — U0005 INFEC AGEN DETEC AMPLI PROBE: HCPCS

## 2021-03-12 PROCEDURE — U0004 COV-19 TEST NON-CDC HGH THRU: HCPCS

## 2021-03-13 LAB — SARS-COV-2 ORF1AB RESP QL NAA+PROBE: NOT DETECTED

## 2021-03-15 ENCOUNTER — APPOINTMENT (OUTPATIENT)
Dept: NUCLEAR MEDICINE | Facility: HOSPITAL | Age: 76
End: 2021-03-15

## 2021-03-15 ENCOUNTER — HOSPITAL ENCOUNTER (OUTPATIENT)
Dept: NUCLEAR MEDICINE | Facility: HOSPITAL | Age: 76
End: 2021-03-15

## 2021-03-22 ENCOUNTER — APPOINTMENT (OUTPATIENT)
Dept: CARDIOLOGY | Facility: HOSPITAL | Age: 76
End: 2021-03-22

## 2021-03-22 ENCOUNTER — HOSPITAL ENCOUNTER (OUTPATIENT)
Dept: CARDIOLOGY | Facility: HOSPITAL | Age: 76
Discharge: HOME | End: 2021-03-22
Admitting: INTERNAL MEDICINE

## 2021-03-22 VITALS
SYSTOLIC BLOOD PRESSURE: 121 MMHG | HEIGHT: 60 IN | WEIGHT: 142 LBS | DIASTOLIC BLOOD PRESSURE: 79 MMHG | BODY MASS INDEX: 27.88 KG/M2 | HEART RATE: 80 BPM

## 2021-03-22 DIAGNOSIS — I35.1 MILD AORTIC INSUFFICIENCY: ICD-10-CM

## 2021-03-22 DIAGNOSIS — I27.20 PULMONARY HYPERTENSION (HCC): ICD-10-CM

## 2021-03-22 DIAGNOSIS — R06.89 RESPIRATORY INSUFFICIENCY: ICD-10-CM

## 2021-03-22 DIAGNOSIS — I25.10 CORONARY ARTERIOSCLEROSIS IN NATIVE ARTERY: ICD-10-CM

## 2021-03-22 DIAGNOSIS — E87.70 HYPERVOLEMIA, UNSPECIFIED HYPERVOLEMIA TYPE: ICD-10-CM

## 2021-03-22 DIAGNOSIS — R07.9 CHEST PAIN, UNSPECIFIED TYPE: ICD-10-CM

## 2021-03-22 LAB
AORTIC ARCH: 2.3 CM
ASCENDING AORTA: 2.7 CM
BH CV ECHO HCM PROVOKED PEAK GRADIENT: 19 MMHG
BH CV ECHO MEAS - ACS: 1.8 CM
BH CV ECHO MEAS - AI DEC SLOPE: 215 CM/SEC^2
BH CV ECHO MEAS - AI MAX PG: 59.9 MMHG
BH CV ECHO MEAS - AI MAX VEL: 387 CM/SEC
BH CV ECHO MEAS - AI P1/2T: 527.2 MSEC
BH CV ECHO MEAS - AO ARCH DIAM (PROXIMAL TRANS.): 2.3 CM
BH CV ECHO MEAS - AO MAX PG (FULL): 4.6 MMHG
BH CV ECHO MEAS - AO MAX PG: 10.9 MMHG
BH CV ECHO MEAS - AO MEAN PG (FULL): 2 MMHG
BH CV ECHO MEAS - AO MEAN PG: 6 MMHG
BH CV ECHO MEAS - AO ROOT AREA (BSA CORRECTED): 2
BH CV ECHO MEAS - AO ROOT AREA: 8.6 CM^2
BH CV ECHO MEAS - AO ROOT DIAM: 3.3 CM
BH CV ECHO MEAS - AO V2 MAX: 165 CM/SEC
BH CV ECHO MEAS - AO V2 MEAN: 118 CM/SEC
BH CV ECHO MEAS - AO V2 VTI: 31.2 CM
BH CV ECHO MEAS - ASC AORTA: 2.7 CM
BH CV ECHO MEAS - AVA(I,A): 2.6 CM^2
BH CV ECHO MEAS - AVA(I,D): 2.6 CM^2
BH CV ECHO MEAS - AVA(V,A): 2.4 CM^2
BH CV ECHO MEAS - AVA(V,D): 2.4 CM^2
BH CV ECHO MEAS - BSA(HAYCOCK): 1.7 M^2
BH CV ECHO MEAS - BSA: 1.6 M^2
BH CV ECHO MEAS - BZI_BMI: 27.7 KILOGRAMS/M^2
BH CV ECHO MEAS - BZI_METRIC_HEIGHT: 152.4 CM
BH CV ECHO MEAS - BZI_METRIC_WEIGHT: 64.4 KG
BH CV ECHO MEAS - EDV(CUBED): 59.3 ML
BH CV ECHO MEAS - EDV(MOD-SP2): 47 ML
BH CV ECHO MEAS - EDV(MOD-SP4): 55 ML
BH CV ECHO MEAS - EDV(TEICH): 65.9 ML
BH CV ECHO MEAS - EF(CUBED): 70.4 %
BH CV ECHO MEAS - EF(MOD-BP): 66 %
BH CV ECHO MEAS - EF(MOD-SP2): 68.1 %
BH CV ECHO MEAS - EF(MOD-SP4): 67.3 %
BH CV ECHO MEAS - EF(TEICH): 62.7 %
BH CV ECHO MEAS - ESV(CUBED): 17.6 ML
BH CV ECHO MEAS - ESV(MOD-SP2): 15 ML
BH CV ECHO MEAS - ESV(MOD-SP4): 18 ML
BH CV ECHO MEAS - ESV(TEICH): 24.6 ML
BH CV ECHO MEAS - FS: 33.3 %
BH CV ECHO MEAS - IVS/LVPW: 1.1
BH CV ECHO MEAS - IVSD: 1.1 CM
BH CV ECHO MEAS - LAT PEAK E' VEL: 6.9 CM/SEC
BH CV ECHO MEAS - LV DIASTOLIC VOL/BSA (35-75): 34.1 ML/M^2
BH CV ECHO MEAS - LV MASS(C)D: 131 GRAMS
BH CV ECHO MEAS - LV MASS(C)DI: 81.2 GRAMS/M^2
BH CV ECHO MEAS - LV MAX PG: 6.3 MMHG
BH CV ECHO MEAS - LV MEAN PG: 4 MMHG
BH CV ECHO MEAS - LV SYSTOLIC VOL/BSA (12-30): 11.2 ML/M^2
BH CV ECHO MEAS - LV V1 MAX: 125 CM/SEC
BH CV ECHO MEAS - LV V1 MEAN: 91.6 CM/SEC
BH CV ECHO MEAS - LV V1 VTI: 25.5 CM
BH CV ECHO MEAS - LVIDD: 3.9 CM
BH CV ECHO MEAS - LVIDS: 2.6 CM
BH CV ECHO MEAS - LVLD AP2: 7.1 CM
BH CV ECHO MEAS - LVLD AP4: 6.8 CM
BH CV ECHO MEAS - LVLS AP2: 6.6 CM
BH CV ECHO MEAS - LVLS AP4: 6 CM
BH CV ECHO MEAS - LVOT AREA (M): 3.1 CM^2
BH CV ECHO MEAS - LVOT AREA: 3.1 CM^2
BH CV ECHO MEAS - LVOT DIAM: 2 CM
BH CV ECHO MEAS - LVPWD: 1 CM
BH CV ECHO MEAS - MED PEAK E' VEL: 4.9 CM/SEC
BH CV ECHO MEAS - MV A DUR: 0.19 SEC
BH CV ECHO MEAS - MV A MAX VEL: 93 CM/SEC
BH CV ECHO MEAS - MV DEC SLOPE: 197 CM/SEC^2
BH CV ECHO MEAS - MV DEC TIME: 0.35 SEC
BH CV ECHO MEAS - MV E MAX VEL: 60 CM/SEC
BH CV ECHO MEAS - MV E/A: 0.65
BH CV ECHO MEAS - MV MAX PG: 3.5 MMHG
BH CV ECHO MEAS - MV MEAN PG: 1 MMHG
BH CV ECHO MEAS - MV P1/2T MAX VEL: 74.7 CM/SEC
BH CV ECHO MEAS - MV P1/2T: 111.1 MSEC
BH CV ECHO MEAS - MV V2 MAX: 93.3 CM/SEC
BH CV ECHO MEAS - MV V2 MEAN: 53.2 CM/SEC
BH CV ECHO MEAS - MV V2 VTI: 27.5 CM
BH CV ECHO MEAS - MVA P1/2T LCG: 2.9 CM^2
BH CV ECHO MEAS - MVA(P1/2T): 2 CM^2
BH CV ECHO MEAS - MVA(VTI): 2.9 CM^2
BH CV ECHO MEAS - PA ACC TIME: 0.1 SEC
BH CV ECHO MEAS - PA MAX PG (FULL): 1.6 MMHG
BH CV ECHO MEAS - PA MAX PG: 2.8 MMHG
BH CV ECHO MEAS - PA PR(ACCEL): 34.9 MMHG
BH CV ECHO MEAS - PA V2 MAX: 84.2 CM/SEC
BH CV ECHO MEAS - PULM A REVS DUR: 0.16 SEC
BH CV ECHO MEAS - PULM A REVS VEL: 48.4 CM/SEC
BH CV ECHO MEAS - PULM DIAS VEL: 36.4 CM/SEC
BH CV ECHO MEAS - PULM S/D: 1.5
BH CV ECHO MEAS - PULM SYS VEL: 54 CM/SEC
BH CV ECHO MEAS - PVA(V,A): 2.3 CM^2
BH CV ECHO MEAS - PVA(V,D): 2.3 CM^2
BH CV ECHO MEAS - QP/QS: 0.51
BH CV ECHO MEAS - RAP SYSTOLE: 3 MMHG
BH CV ECHO MEAS - RV MAX PG: 1.2 MMHG
BH CV ECHO MEAS - RV MEAN PG: 1 MMHG
BH CV ECHO MEAS - RV V1 MAX: 55.5 CM/SEC
BH CV ECHO MEAS - RV V1 MEAN: 40.8 CM/SEC
BH CV ECHO MEAS - RV V1 VTI: 11.9 CM
BH CV ECHO MEAS - RVOT AREA: 3.5 CM^2
BH CV ECHO MEAS - RVOT DIAM: 2.1 CM
BH CV ECHO MEAS - RVSP: 27.4 MMHG
BH CV ECHO MEAS - SI(AO): 165.4 ML/M^2
BH CV ECHO MEAS - SI(CUBED): 25.9 ML/M^2
BH CV ECHO MEAS - SI(LVOT): 49.6 ML/M^2
BH CV ECHO MEAS - SI(MOD-SP2): 19.8 ML/M^2
BH CV ECHO MEAS - SI(MOD-SP4): 22.9 ML/M^2
BH CV ECHO MEAS - SI(TEICH): 25.6 ML/M^2
BH CV ECHO MEAS - SV(AO): 266.9 ML
BH CV ECHO MEAS - SV(CUBED): 41.7 ML
BH CV ECHO MEAS - SV(LVOT): 80.1 ML
BH CV ECHO MEAS - SV(MOD-SP2): 32 ML
BH CV ECHO MEAS - SV(MOD-SP4): 37 ML
BH CV ECHO MEAS - SV(RVOT): 41.2 ML
BH CV ECHO MEAS - SV(TEICH): 41.3 ML
BH CV ECHO MEAS - TAPSE (>1.6): 1.5 CM
BH CV ECHO MEAS - TR MAX VEL: 247 CM/SEC
BH CV ECHO MEASUREMENTS AVERAGE E/E' RATIO: 10.17
BH CV XLRA - RV BASE: 2.4 CM
BH CV XLRA - RV LENGTH: 6.6 CM
BH CV XLRA - RV MID: 2.3 CM
BH CV XLRA - TDI S': 6.5 CM/SEC
LEFT ATRIUM VOLUME INDEX: 19 ML/M2
MAXIMAL PREDICTED HEART RATE: 146 BPM
SINUS: 3 CM
STJ: 2.8 CM
STRESS TARGET HR: 124 BPM

## 2021-03-22 PROCEDURE — 93306 TTE W/DOPPLER COMPLETE: CPT

## 2021-03-22 PROCEDURE — 93306 TTE W/DOPPLER COMPLETE: CPT | Performed by: INTERNAL MEDICINE

## 2021-03-22 RX ORDER — FUROSEMIDE 40 MG/1
TABLET ORAL
Qty: 135 TABLET | Refills: 3 | Status: SHIPPED | OUTPATIENT
Start: 2021-03-22 | End: 2022-02-24

## 2021-04-06 RX ORDER — ATORVASTATIN CALCIUM 40 MG/1
TABLET, FILM COATED ORAL
Qty: 90 TABLET | Refills: 3 | Status: SHIPPED | OUTPATIENT
Start: 2021-04-06 | End: 2022-04-01

## 2021-04-16 ENCOUNTER — OFFICE VISIT (OUTPATIENT)
Dept: CARDIOLOGY | Facility: CLINIC | Age: 76
End: 2021-04-16

## 2021-04-16 VITALS
WEIGHT: 145 LBS | DIASTOLIC BLOOD PRESSURE: 80 MMHG | HEART RATE: 72 BPM | HEIGHT: 60 IN | SYSTOLIC BLOOD PRESSURE: 138 MMHG | BODY MASS INDEX: 28.47 KG/M2

## 2021-04-16 DIAGNOSIS — I10 ESSENTIAL HYPERTENSION: Chronic | ICD-10-CM

## 2021-04-16 DIAGNOSIS — J45.991 ASTHMA, COUGH VARIANT: Chronic | ICD-10-CM

## 2021-04-16 DIAGNOSIS — Z95.5 H/O HEART ARTERY STENT: Chronic | ICD-10-CM

## 2021-04-16 DIAGNOSIS — I51.89 GRADE I DIASTOLIC DYSFUNCTION: Chronic | ICD-10-CM

## 2021-04-16 DIAGNOSIS — I35.1 MILD AORTIC INSUFFICIENCY: Chronic | ICD-10-CM

## 2021-04-16 DIAGNOSIS — I27.20 PULMONARY HYPERTENSION (HCC): Chronic | ICD-10-CM

## 2021-04-16 DIAGNOSIS — Z95.1 S/P CABG (CORONARY ARTERY BYPASS GRAFT): Primary | Chronic | ICD-10-CM

## 2021-04-16 DIAGNOSIS — I47.29 NONSUSTAINED VENTRICULAR TACHYCARDIA (HCC): Chronic | ICD-10-CM

## 2021-04-16 PROCEDURE — 99214 OFFICE O/P EST MOD 30 MIN: CPT | Performed by: INTERNAL MEDICINE

## 2021-04-16 NOTE — PROGRESS NOTES
"Chief Complaint  Coronary Artery Disease    Subjective    History of Present Illness      I saw Marleny Greenwood today for continued cardiovascular care.  She is a very pleasant 75-year-old female who observes the CDC guidelines during the coronavirus pandemic.  She has received her COVID-19 vaccine.  Ms. Greenwood denies chest pain pressure tightness.  Her respiratory status is stable and she is free of orthopnea or PND and no significant lower extremity edema.  She remains free of syncope near syncope or any palpitations.  Review of her echocardiogram obtained 3/22/2021 reveals preserved left ventricular function, grade 1 diastolic dysfunction with mild mitral and aortic insufficiency.    Objective   Vital Signs:   /80   Pulse 72   Ht 152.4 cm (60\")   Wt 65.8 kg (145 lb)   BMI 28.32 kg/m²     Constitutional:       Appearance: Well-developed.   Eyes:      Conjunctiva/sclera: Conjunctivae normal.      Pupils: Pupils are equal, round, and reactive to light.   HENT:      Head: Normocephalic and atraumatic.   Neck:      Thyroid: No thyromegaly.   Pulmonary:      Effort: Pulmonary effort is normal. No respiratory distress.      Breath sounds: Normal breath sounds. No wheezing. No rales.   Cardiovascular:      Normal rate. Regular rhythm.      Murmurs: There is a grade 1/4 high frequency blowing decrescendo, early diastolic murmur at the URSB, radiating to the apex.      S4 Gallop. No S3 gallop. Midsystolic click click.   Abdominal:      General: Bowel sounds are normal. There is no distension.      Palpations: Abdomen is soft. There is no abdominal mass.      Tenderness: There is no abdominal tenderness.   Musculoskeletal: Normal range of motion.      Cervical back: Neck supple. Skin:     General: Skin is warm and dry.      Findings: No erythema.   Neurological:      Mental Status: Alert and oriented to person, place, and time.         Result Review :     Common labs    Common Labsle 6/4/20 6/16/20 7/22/20 7/22/20 " 7/22/20      1006 1006 1006   Glucose 113 (A) 100 (A) 94     BUN 14 26 (A) 18     Creatinine 1.20 (A) 1.66 (A) 1.36 (A)     eGFR Non African Am 44 (A) 30 (A) 38 (A)     eGFR  Am 53 (A) 37 (A) 46 (A)     Sodium 142 137 141     Potassium 3.0 (A) 5.2 4.5     Chloride 101 102 105     Calcium 9.4 9.9 9.5     Total Protein   6.6     Albumin   4.00     Total Bilirubin   0.3     Alkaline Phosphatase   71     AST (SGOT)   12     ALT (SGPT)   8     WBC     5.76   Hemoglobin     12.8   Hematocrit     36.9   Platelets     217   Total Cholesterol    152    Triglycerides    80    HDL Cholesterol    58    LDL Cholesterol     78    (A) Abnormal value       Comments are available for some flowsheets but are not being displayed.           Data reviewed: Cardiology studies Echocardiogram 3/22/2021          Assessment and Plan    1. S/P CABG (coronary artery bypass graft)  Stable free of angina    2. Nonsustained ventricular tachycardia (CMS/HCC)  Stable    3. Mild aortic insufficiency  Compensated    4. H/O heart artery stent  Stable    5. Essential hypertension  Controlled    6. Grade I diastolic dysfunction  Stable    7. Pulmonary hypertension (CMS/HCC)  Stable    8. Asthma, cough variant  Stable    Ms. Greenwood reports stable activity level and feeling well.  She is free of any chest discomfort and is euvolemic on physical examination.  She will continue her current medical regimen.  I will see her in follow-up in 3 to 4 months she is aware the importance of salt restriction is close to 2000 mg a day as possible.        Follow Up   No follow-ups on file.  Patient was given instructions and counseling regarding her condition or for health maintenance advice. Please see specific information pulled into the AVS if appropriate.

## 2021-04-22 DIAGNOSIS — R51.9 CHRONIC DAILY HEADACHE: ICD-10-CM

## 2021-04-22 RX ORDER — CODEINE/BUTALBITAL/ASA/CAFFEIN 30-50-325
1 CAPSULE ORAL EVERY 4 HOURS PRN
Qty: 120 CAPSULE | Refills: 0 | Status: SHIPPED | OUTPATIENT
Start: 2021-04-22 | End: 2021-08-31 | Stop reason: SDUPTHER

## 2021-04-22 RX ORDER — PROMETHAZINE HYDROCHLORIDE 25 MG/1
25 TABLET ORAL EVERY 6 HOURS PRN
Qty: 90 TABLET | Refills: 3 | Status: SHIPPED | OUTPATIENT
Start: 2021-04-22 | End: 2021-05-03 | Stop reason: SDUPTHER

## 2021-04-29 RX ORDER — ERGOCALCIFEROL 1.25 MG/1
CAPSULE ORAL
Qty: 12 CAPSULE | Refills: 3 | Status: SHIPPED | OUTPATIENT
Start: 2021-04-29 | End: 2022-03-31

## 2021-05-03 DIAGNOSIS — E87.6 HYPOKALEMIA: ICD-10-CM

## 2021-05-03 RX ORDER — PROMETHAZINE HYDROCHLORIDE 25 MG/1
25 TABLET ORAL EVERY 6 HOURS PRN
Qty: 90 TABLET | Refills: 3 | Status: SHIPPED | OUTPATIENT
Start: 2021-05-03 | End: 2022-03-09 | Stop reason: SDUPTHER

## 2021-05-03 RX ORDER — POTASSIUM CHLORIDE 1500 MG/1
TABLET, FILM COATED, EXTENDED RELEASE ORAL
Qty: 270 TABLET | Refills: 3 | Status: SHIPPED | OUTPATIENT
Start: 2021-05-03 | End: 2022-04-29

## 2021-05-03 NOTE — TELEPHONE ENCOUNTER
Caller: Marleny Greenwood    Relationship: Self    Best call back number: [phone]     Medication needed:   Requested Prescriptions     Pending Prescriptions Disp Refills   • promethazine (PHENERGAN) 25 MG tablet 90 tablet 3     Sig: Take 1 tablet by mouth Every 6 (Six) Hours As Needed for Nausea or Vomiting.       When do you need the refill by: 05/03/21    What additional details did the patient provide when requesting the medication: HAS LESS THAN A 3 DAY SUPPLY.      Does the patient have less than a 3 day supply:  [x] Yes  [] No    What is the patient's preferred pharmacy: [facility] - [address] [phone] [facility] [phone]

## 2021-05-25 RX ORDER — RANOLAZINE 500 MG/1
TABLET, EXTENDED RELEASE ORAL
Qty: 180 TABLET | Refills: 3 | Status: SHIPPED | OUTPATIENT
Start: 2021-05-25 | End: 2022-04-29

## 2021-05-25 RX ORDER — CARVEDILOL 25 MG/1
TABLET ORAL
Qty: 180 TABLET | Refills: 3 | Status: SHIPPED | OUTPATIENT
Start: 2021-05-25 | End: 2021-09-24

## 2021-06-10 DIAGNOSIS — E78.49 OTHER HYPERLIPIDEMIA: ICD-10-CM

## 2021-06-15 LAB
ALBUMIN SERPL-MCNC: 4 G/DL (ref 3.5–5.2)
ALBUMIN/GLOB SERPL: 1.7 G/DL
ALP SERPL-CCNC: 70 U/L (ref 39–117)
ALT SERPL-CCNC: 7 U/L (ref 1–33)
AST SERPL-CCNC: 10 U/L (ref 1–32)
BILIRUB SERPL-MCNC: 0.3 MG/DL (ref 0–1.2)
BUN SERPL-MCNC: 13 MG/DL (ref 8–23)
BUN/CREAT SERPL: 13.4 (ref 7–25)
CALCIUM SERPL-MCNC: 8.9 MG/DL (ref 8.6–10.5)
CHLORIDE SERPL-SCNC: 106 MMOL/L (ref 98–107)
CHOLEST SERPL-MCNC: 166 MG/DL (ref 0–200)
CHOLEST/HDLC SERPL: 2.72 {RATIO}
CO2 SERPL-SCNC: 27.6 MMOL/L (ref 22–29)
CREAT SERPL-MCNC: 0.97 MG/DL (ref 0.57–1)
GLOBULIN SER CALC-MCNC: 2.4 GM/DL
GLUCOSE SERPL-MCNC: 98 MG/DL (ref 65–99)
HDLC SERPL-MCNC: 61 MG/DL (ref 40–60)
LDLC SERPL CALC-MCNC: 90 MG/DL (ref 0–100)
POTASSIUM SERPL-SCNC: 3.1 MMOL/L (ref 3.5–5.2)
PROT SERPL-MCNC: 6.4 G/DL (ref 6–8.5)
SODIUM SERPL-SCNC: 146 MMOL/L (ref 136–145)
TRIGL SERPL-MCNC: 79 MG/DL (ref 0–150)
VLDLC SERPL CALC-MCNC: 15 MG/DL (ref 5–40)

## 2021-06-16 LAB
Lab: NORMAL
MAGNESIUM SERPL-MCNC: 1.8 MG/DL (ref 1.6–2.4)
WRITTEN AUTHORIZATION: NORMAL

## 2021-06-21 ENCOUNTER — OFFICE VISIT (OUTPATIENT)
Dept: INTERNAL MEDICINE | Facility: CLINIC | Age: 76
End: 2021-06-21

## 2021-06-21 VITALS
SYSTOLIC BLOOD PRESSURE: 130 MMHG | BODY MASS INDEX: 28.51 KG/M2 | WEIGHT: 145.2 LBS | HEART RATE: 74 BPM | HEIGHT: 60 IN | TEMPERATURE: 97.8 F | DIASTOLIC BLOOD PRESSURE: 58 MMHG

## 2021-06-21 DIAGNOSIS — N28.9 RENAL INSUFFICIENCY, MILD: ICD-10-CM

## 2021-06-21 DIAGNOSIS — E78.49 OTHER HYPERLIPIDEMIA: ICD-10-CM

## 2021-06-21 DIAGNOSIS — Z00.00 MEDICARE ANNUAL WELLNESS VISIT, SUBSEQUENT: ICD-10-CM

## 2021-06-21 DIAGNOSIS — Z12.11 SCREEN FOR COLON CANCER: Primary | ICD-10-CM

## 2021-06-21 DIAGNOSIS — M81.0 AGE-RELATED OSTEOPOROSIS WITHOUT CURRENT PATHOLOGICAL FRACTURE: ICD-10-CM

## 2021-06-21 DIAGNOSIS — D50.9 IRON DEFICIENCY ANEMIA, UNSPECIFIED IRON DEFICIENCY ANEMIA TYPE: ICD-10-CM

## 2021-06-21 PROCEDURE — G0439 PPPS, SUBSEQ VISIT: HCPCS | Performed by: INTERNAL MEDICINE

## 2021-06-21 NOTE — PROGRESS NOTES
The ABCs of the Annual Wellness Visit  Subsequent Medicare Wellness Visit    Chief Complaint   Patient presents with   • Medicare Wellness-subsequent       Subjective   History of Present Illness:  Marleny Greenwood is a 75 y.o. female who presents for a Subsequent Medicare Wellness Visit.  She hadn't been taking the potassium- forgot about it. She has since resumed.   She is feeling better.  No new issues.  Still grieving her daughter.     HEALTH RISK ASSESSMENT    Recent Hospitalizations:  No hospitalization(s) within the last year.    Current Medical Providers:  Patient Care Team:  Liz Zaragoza MD as PCP - General (Internal Medicine)  Mal Moser MD as Consulting Physician (Cardiology)    Smoking Status:  Social History     Tobacco Use   Smoking Status Never Smoker   Smokeless Tobacco Never Used       Alcohol Consumption:  Social History     Substance and Sexual Activity   Alcohol Use No       Depression Screen:   PHQ-2/PHQ-9 Depression Screening 6/21/2021   Little interest or pleasure in doing things 0   Feeling down, depressed, or hopeless 0   Total Score 0       Fall Risk Screen:  STEADI Fall Risk Assessment was completed, and patient is at LOW risk for falls.Assessment completed on:6/21/2021    Health Habits and Functional and Cognitive Screening:  Functional & Cognitive Status 6/21/2021   Do you have difficulty preparing food and eating? No   Do you have difficulty bathing yourself, getting dressed or grooming yourself? No   Do you have difficulty using the toilet? No   Do you have difficulty moving around from place to place? No   Do you have trouble with steps or getting out of a bed or a chair? No   Current Diet Well Balanced Diet   Dental Exam Not up to date   Eye Exam Up to date   Exercise (times per week) 0 times per week   Current Exercises Include No Regular Exercise   Do you need help using the phone?  No   Are you deaf or do you have serious difficulty hearing?  No   Do you need help with  transportation? No   Do you need help shopping? No   Do you need help preparing meals?  No   Do you need help with housework?  No   Do you need help with laundry? No   Do you need help taking your medications? No   Do you need help managing money? No   Do you ever drive or ride in a car without wearing a seat belt? No   Have you felt unusual stress, anger or loneliness in the last month? No   Who do you live with? Alone   If you need help, do you have trouble finding someone available to you? No   Have you been bothered in the last four weeks by sexual problems? No   Do you have difficulty concentrating, remembering or making decisions? No         Does the patient have evidence of cognitive impairment? No    Asprin use counseling:Does not need ASA (and currently is not on it)    Age-appropriate Screening Schedule:  Refer to the list below for future screening recommendations based on patient's age, sex and/or medical conditions. Orders for these recommended tests are listed in the plan section. The patient has been provided with a written plan.    Health Maintenance   Topic Date Due   • DXA SCAN  Never done   • TDAP/TD VACCINES (1 - Tdap) Never done   • INFLUENZA VACCINE  08/01/2021   • LIPID PANEL  06/14/2022   • MAMMOGRAM  10/26/2022   • ZOSTER VACCINE  Completed          The following portions of the patient's history were reviewed and updated as appropriate: allergies, current medications, past family history, past medical history, past social history, past surgical history and problem list.    Outpatient Medications Prior to Visit   Medication Sig Dispense Refill   • albuterol sulfate  (90 Base) MCG/ACT inhaler Inhale 2 puffs Every 4 (Four) Hours As Needed for Wheezing. 1 inhaler 2   • amLODIPine (NORVASC) 10 MG tablet Take 10 mg by mouth Daily.     • aspirin 81 MG EC tablet Take 81 mg by mouth Daily.     • atorvastatin (LIPITOR) 40 MG tablet TAKE 1 TABLET DAILY 90 tablet 3   •  butalbital-aspirin-caffeine-codeine (Fiorinal/Codeine #3) -60-30 MG capsule Take 1 capsule by mouth Every 4 (Four) Hours As Needed for Headache. 120 capsule 0   • carvedilol (COREG) 25 MG tablet TAKE 1 TABLET TWICE A  tablet 3   • clopidogrel (PLAVIX) 75 MG tablet TAKE 1 TABLET DAILY 90 tablet 3   • Denosumab (PROLIA SC) Inject  under the skin into the appropriate area as directed. One injection q6 months     • ferrous sulfate 325 (65 FE) MG tablet TAKE ONE TABLET BY MOUTH DAILY WITH BREAKFAST 90 tablet 2   • furosemide (LASIX) 40 MG tablet TAKE ONE AND ONE-HALF TABLETS DAILY (Patient taking differently: 40 mg Daily.) 135 tablet 3   • HYDROMET 5-1.5 MG/5ML syrup      • isosorbide mononitrate (IMDUR) 60 MG 24 hr tablet TAKE 1 TABLET DAILY 90 tablet 1   • loratadine (CLARITIN) 10 MG tablet Take 10 mg by mouth. Takes p.r.n.     • Multiple Vitamins-Minerals (OCUVITE PO) Take 1 tablet by mouth Daily.     • nitroglycerin (NITROSTAT) 0.4 MG SL tablet Place 1 tablet under the tongue Every 5 (Five) Minutes As Needed for Chest Pain. Take no more than 3 doses in 15 minutes. 25 tablet 3   • omeprazole (priLOSEC) 20 MG capsule Take 1 capsule by mouth Daily. 90 capsule 4   • polyethylene glycol (MIRALAX) packet Take 17 g by mouth Daily.     • potassium chloride ER (K-TAB) 20 MEQ tablet controlled-release ER tablet TAKE 3 TABLETS DAILY 270 tablet 3   • promethazine (PHENERGAN) 25 MG tablet Take 1 tablet by mouth Every 6 (Six) Hours As Needed for Nausea or Vomiting. 90 tablet 3   • ranolazine (RANEXA) 500 MG 12 hr tablet TAKE 1 TABLET TWICE A  tablet 3   • spironolactone (ALDACTONE) 25 MG tablet TAKE 1 TABLET DAILY 90 tablet 1   • vitamin D (ERGOCALCIFEROL) 1.25 MG (22430 UT) capsule capsule TAKE 1 CAPSULE ONCE A WEEK 12 capsule 3   • Fluzone High-Dose Quadrivalent 0.7 ML suspension prefilled syringe        No facility-administered medications prior to visit.       Patient Active Problem List   Diagnosis   •  "Asthma, cough variant   • Coronary arteriosclerosis in native artery   • Essential hypertension   • Chronic daily headache   • Nodule of upper lobe of right lung   • Nonsustained ventricular tachycardia (CMS/HCC)   • History of cardiac cath   • History of loop recorder   • H/O heart artery stent   • S/P CABG (coronary artery bypass graft)   • H/O echocardiogram   • History of nuclear stress test   • Age-related osteoporosis without current pathological fracture   • Pulmonary hypertension (CMS/HCC)   • Mild aortic insufficiency   • Grade I diastolic dysfunction       Advanced Care Planning:  ACP discussion was held with the patient during this visit. Patient does not have an advance directive, declines further assistance.    Review of Systems   Constitutional: Negative for unexpected weight change.   HENT: Negative for congestion and trouble swallowing.    Respiratory: Negative for cough and shortness of breath.    Cardiovascular: Negative for chest pain and leg swelling.   Gastrointestinal: Negative for abdominal pain.   Endocrine: Negative for cold intolerance.   Genitourinary: Negative for difficulty urinating.   Musculoskeletal: Negative for arthralgias, back pain and gait problem.   Neurological: Positive for headaches. Negative for syncope.   Psychiatric/Behavioral: Dysphoric mood: related to daughters death.       Compared to one year ago, the patient feels her physical health is the same.  Compared to one year ago, the patient feels her mental health is the same.    Reviewed chart for potential of high risk medication in the elderly: yes  Reviewed chart for potential of harmful drug interactions in the elderly:yes    Objective         Vitals:    06/21/21 1129   BP: 130/58   Pulse: 74   Temp: 97.8 °F (36.6 °C)   Weight: 65.9 kg (145 lb 3.2 oz)   Height: 152.4 cm (60\")       Body mass index is 28.36 kg/m².  Discussed the patient's BMI with her. The BMI is above average; BMI management plan is " completed.    Physical Exam  Constitutional:       Appearance: Normal appearance.   Cardiovascular:      Rate and Rhythm: Normal rate and regular rhythm.   Pulmonary:      Effort: Pulmonary effort is normal.      Breath sounds: Normal breath sounds.   Musculoskeletal:      Right lower leg: No edema.      Left lower leg: No edema.   Psychiatric:         Mood and Affect: Mood normal.         Thought Content: Thought content normal.         Lab Results   Component Value Date    GLU 98 06/14/2021    CHLPL 166 06/14/2021    TRIG 79 06/14/2021    HDL 61 (H) 06/14/2021    LDL 90 06/14/2021    VLDL 15 06/14/2021        Assessment/Plan   Medicare Risks and Personalized Health Plan  CMS Preventative Services Quick Reference  Colon Cancer Screening    The above risks/problems have been discussed with the patient.  Pertinent information has been shared with the patient in the After Visit Summary.  Follow up plans and orders are seen below in the Assessment/Plan Section.    Diagnoses and all orders for this visit:    1. Screen for colon cancer (Primary)  Comments:  discussed c-scope which she would like to avoid if possible.  Agrees to start with Cologard.  Orders:  -     Cologuard - Stool, Per Rectum; Future    2. Renal insufficiency, mild  Comments:  stable  Orders:  -     Comprehensive Metabolic Panel; Future  -     Lipid Panel With / Chol / HDL Ratio; Future  -     TSH; Future    3. Age-related osteoporosis without current pathological fracture  Comments:  Prolia due, check Dexa.   Orders:  -     DEXA Bone Density Axial    4. Other hyperlipidemia  Comments:  Reviewed labs    5. Iron deficiency anemia, unspecified iron deficiency anemia type  Comments:  check at f/u, has been much better.   Orders:  -     CBC & Differential; Future    6. Medicare annual wellness visit, subsequent  Comments:  Doing better - no changes made.  Reviewed her meds, labs.  Recheck with labs      Follow Up:  Return in about 6 months (around  12/21/2021) for Recheck, Lab Before FUP.     An After Visit Summary and PPPS were given to the patient.

## 2021-07-01 DIAGNOSIS — I10 ESSENTIAL HYPERTENSION: ICD-10-CM

## 2021-07-01 RX ORDER — SPIRONOLACTONE 25 MG/1
TABLET ORAL
Qty: 90 TABLET | Refills: 3 | Status: SHIPPED | OUTPATIENT
Start: 2021-07-01 | End: 2022-06-27

## 2021-07-25 NOTE — PROGRESS NOTES
Subjective   Chief Complaint   Patient presents with   • Back Pain       History of Present Illness   75 y.o. female presents with cc as above ongoing times 3 weeks. Felt like muscle spasms in her back that sometimes radiated into her side. Worse with movement--sitting down , getting up from a seated position, getting up from bed. She thought about going to the ER but couldn't get dressed to go. Used Icy Hot which didn't. Heating soothed it but it didn't resolved. Anacin didn't help. Unable to take Tylenol or Ibuprofen.     Initially had sharp pains lasting seconds. Time has helped she is sore today but the spasms have stopped. This happened several months ago as well. Followed by Dr. Ortiz for low back pain with radiculopathy. She has spoken with him many times and he is glad to see her. Denies numbness or tingling in her legs. No weakness. No B/B incontinence.     Patient Active Problem List   Diagnosis   • Asthma, cough variant   • Coronary arteriosclerosis in native artery   • Essential hypertension   • Chronic daily headache   • Nodule of upper lobe of right lung   • Nonsustained ventricular tachycardia (CMS/HCC)   • History of cardiac cath   • History of loop recorder   • H/O heart artery stent   • S/P CABG (coronary artery bypass graft)   • H/O echocardiogram   • History of nuclear stress test   • Age-related osteoporosis without current pathological fracture   • Pulmonary hypertension (CMS/HCC)   • Mild aortic insufficiency   • Grade I diastolic dysfunction       Allergies   Allergen Reactions   • Adhesive Tape Itching and Rash   • Cardizem [Diltiazem] Unknown (See Comments)     Pt is unknown why she can not take this medication    • Levaquin [Levofloxacin] Cough       Current Outpatient Medications on File Prior to Visit   Medication Sig Dispense Refill   • albuterol sulfate  (90 Base) MCG/ACT inhaler Inhale 2 puffs Every 4 (Four) Hours As Needed for Wheezing. 1 inhaler 2   • amLODIPine (NORVASC) 10  MG tablet Take 10 mg by mouth Daily.     • aspirin 81 MG EC tablet Take 81 mg by mouth Daily.     • atorvastatin (LIPITOR) 40 MG tablet TAKE 1 TABLET DAILY 90 tablet 3   • butalbital-aspirin-caffeine-codeine (Fiorinal/Codeine #3) -85-30 MG capsule Take 1 capsule by mouth Every 4 (Four) Hours As Needed for Headache. 120 capsule 0   • carvedilol (COREG) 25 MG tablet TAKE 1 TABLET TWICE A  tablet 3   • clopidogrel (PLAVIX) 75 MG tablet TAKE 1 TABLET DAILY 90 tablet 3   • Denosumab (PROLIA SC) Inject  under the skin into the appropriate area as directed. One injection q6 months     • ferrous sulfate 325 (65 FE) MG tablet TAKE ONE TABLET BY MOUTH DAILY WITH BREAKFAST 90 tablet 2   • furosemide (LASIX) 40 MG tablet TAKE ONE AND ONE-HALF TABLETS DAILY (Patient taking differently: 40 mg Daily.) 135 tablet 3   • HYDROMET 5-1.5 MG/5ML syrup      • isosorbide mononitrate (IMDUR) 60 MG 24 hr tablet TAKE 1 TABLET DAILY 90 tablet 1   • loratadine (CLARITIN) 10 MG tablet Take 10 mg by mouth. Takes p.r.n.     • Multiple Vitamins-Minerals (OCUVITE PO) Take 1 tablet by mouth Daily.     • nitroglycerin (NITROSTAT) 0.4 MG SL tablet Place 1 tablet under the tongue Every 5 (Five) Minutes As Needed for Chest Pain. Take no more than 3 doses in 15 minutes. 25 tablet 3   • omeprazole (priLOSEC) 20 MG capsule Take 1 capsule by mouth Daily. 90 capsule 4   • polyethylene glycol (MIRALAX) packet Take 17 g by mouth Daily.     • potassium chloride ER (K-TAB) 20 MEQ tablet controlled-release ER tablet TAKE 3 TABLETS DAILY 270 tablet 3   • promethazine (PHENERGAN) 25 MG tablet Take 1 tablet by mouth Every 6 (Six) Hours As Needed for Nausea or Vomiting. 90 tablet 3   • ranolazine (RANEXA) 500 MG 12 hr tablet TAKE 1 TABLET TWICE A  tablet 3   • spironolactone (ALDACTONE) 25 MG tablet TAKE 1 TABLET DAILY 90 tablet 3   • vitamin D (ERGOCALCIFEROL) 1.25 MG (65381 UT) capsule capsule TAKE 1 CAPSULE ONCE A WEEK 12 capsule 3     No  current facility-administered medications on file prior to visit.       Past Medical History:   Diagnosis Date   • Anemia    • Asthma    • Coronary artery disease    • Cough    • Dyspnea    • Esophageal reflux    • H/O echocardiogram 6/11/2019 6/05/18 - Normal LV size and function.  EF 60-65%.  Mild concentric LVH.  Midl AR, MR, and TR.   • H/O heart artery stent 6/11/2019    3/22/12 - VISION stent to the SVG of the RCA reducing a total occlusion to 0% residual narrowing.   • Headache    • History of cardiac cath 6/11/2019   • History of loop recorder 6/11/2019    5/20/15 - [employer] LINQ implant.   • History of nuclear stress test 6/11/2019    10/09/17 - Abnormal study.  EF 46%.   • Hyperlipidemia LDL goal <100 6/11/2019   • Hypokalemia    • Migraine    • Moderate osteopenia    • Nonsustained ventricular tachycardia (CMS/HCC) 6/11/2019   • NSVT (nonsustained ventricular tachycardia) (CMS/HCC)    • Pneumonia    • Postmenopausal atrophic vaginitis    • Pre-syncope    • Respiratory insufficiency 6/11/2019   • S/P CABG (coronary artery bypass graft) 6/11/2019 4/12/11 - LIMA to the LAD and SVG to the posterior descending artery.   • Seasonal allergies    • Shoulder dislocation    • Status post placement of implantable loop recorder 05/20/2015      Successful loop/LINQ implantation by Dr. Geo Wilson.   • Vitamin D deficiency        Family History   Problem Relation Age of Onset   • Heart disease Other    • Breast cancer Other    • Breast cancer Mother    • Hypertension Mother        Social History     Socioeconomic History   • Marital status:      Spouse name: Not on file   • Number of children: Not on file   • Years of education: Not on file   • Highest education level: Not on file   Tobacco Use   • Smoking status: Never Smoker   • Smokeless tobacco: Never Used   Substance and Sexual Activity   • Alcohol use: No   • Drug use: No   • Sexual activity: Defer       Past Surgical History:   Procedure Laterality  Date   • APPENDECTOMY     • BRONCHOSCOPY N/A 2019    Procedure: BRONCHOSCOPY WITH BRONCHOALVEOLAR LAVAGE;  Surgeon: Thomas Sheridan MD;  Location: [facility] ENDOSCOPY;  Service: Pulmonary   • CARDIAC CATHETERIZATION  2014    History of Cardiac Cath Procedure Outcome: Successful   • CARDIAC CATHETERIZATION N/A 2019    Procedure: Right and Left Heart Cath lv pressures;  Surgeon: Mal Moser MD;  Location: [facility] CATH INVASIVE LOCATION;  Service: Cardiology   • CARDIAC CATHETERIZATION N/A 2019    Procedure: Saphenous Vein Graft;  Surgeon: Mal Moser MD;  Location: [facility] CATH INVASIVE LOCATION;  Service: Cardiology   • CARDIAC CATHETERIZATION N/A 2019    Procedure: Coronary angiography;  Surgeon: Mal Moser MD;  Location: [facility] CATH INVASIVE LOCATION;  Service: Cardiology   • CATARACT EXTRACTION     •  SECTION     • CHOLECYSTECTOMY     • COLONOSCOPY  2009    q-10   • CORONARY ANGIOPLASTY WITH STENT PLACEMENT  2012     [facility] by Dr. Roman.   • CORONARY ARTERY BYPASS GRAFT Bilateral 2011    with a left internal mammary graft to the LAD and saphenous vein graft to the posterior descending artery   • ENDOSCOPY       Diagnostic Esophagogastroduodenoscopy   • HYSTERECTOMY     • NERVE BLOCK       Nerve Block Transforaminal Epidural Lumbar   • OTHER SURGICAL HISTORY  2015    Patient-Activated Cardiac Event Recorder - From 2015 to 2015       The following portions of the patient's history were reviewed and updated as appropriate: problem list, allergies, current medications, past medical history and past social history.    Review of Systems    Immunization History   Administered Date(s) Administered   • COVID-19 (PFIZER) 2021, 2021   • Fluzone High Dose =>65 Years ([employer] ONLY) 2018, 2019, 2020   • PEDS-Pneumococcal Conjugate (PCV7) 2015   • Pneumococcal Conjugate 13-Valent  "(PCV13) 08/04/2020   • Shingrix 09/01/2020, 11/05/2020       Objective   Vitals:    07/26/21 1100   BP: 114/72   Pulse: 80   Resp: 16   Temp: 97.3 °F (36.3 °C)   Weight: 68 kg (150 lb)   Height: 152.4 cm (60\")     Body mass index is 29.29 kg/m².  Physical Exam  Vitals reviewed.   Constitutional:       Appearance: Normal appearance. She is well-developed.   HENT:      Head: Normocephalic and atraumatic.   Cardiovascular:      Rate and Rhythm: Normal rate and regular rhythm.      Heart sounds: Normal heart sounds, S1 normal and S2 normal.   Pulmonary:      Effort: Pulmonary effort is normal.      Breath sounds: Normal breath sounds.   Musculoskeletal:      Comments: Ambulates without assistance; no clubbing, cyanosis or edema. Negative straight leg raises. Decreased flexion.          Skin:     General: Skin is warm and dry.   Neurological:      Mental Status: She is alert.      Deep Tendon Reflexes: Reflexes normal.      Comments: Strength 5/5 lower extremities.    Psychiatric:         Mood and Affect: Mood normal.         Behavior: Behavior normal.         Procedures    Assessment/Plan   Diagnoses and all orders for this visit:    1. Chronic left-sided low back pain without sciatica (Primary)  Comments:  Improving with time. Advised Salon Pas patch at this time and discussed PT. No muscle relaxer at this time but willing to readdress.     2. Sacroiliitis, not elsewhere classified (CMS/HCC)  Comments:  Followed by Dr. Lange for pain managment.     Records reviewed include previous OV with Dr. Zaragoza.     Return for Next scheduled follow up.           "

## 2021-07-26 ENCOUNTER — OFFICE VISIT (OUTPATIENT)
Dept: INTERNAL MEDICINE | Facility: CLINIC | Age: 76
End: 2021-07-26

## 2021-07-26 VITALS
RESPIRATION RATE: 16 BRPM | TEMPERATURE: 97.3 F | BODY MASS INDEX: 29.45 KG/M2 | HEART RATE: 80 BPM | SYSTOLIC BLOOD PRESSURE: 114 MMHG | DIASTOLIC BLOOD PRESSURE: 72 MMHG | HEIGHT: 60 IN | WEIGHT: 150 LBS

## 2021-07-26 DIAGNOSIS — G89.29 CHRONIC LEFT-SIDED LOW BACK PAIN WITHOUT SCIATICA: Primary | ICD-10-CM

## 2021-07-26 DIAGNOSIS — M46.1 SACROILIITIS, NOT ELSEWHERE CLASSIFIED (HCC): ICD-10-CM

## 2021-07-26 DIAGNOSIS — M54.50 CHRONIC LEFT-SIDED LOW BACK PAIN WITHOUT SCIATICA: Primary | ICD-10-CM

## 2021-07-26 PROCEDURE — 99213 OFFICE O/P EST LOW 20 MIN: CPT | Performed by: NURSE PRACTITIONER

## 2021-07-28 RX ORDER — FERROUS SULFATE 325(65) MG
TABLET ORAL
Qty: 90 TABLET | Refills: 2 | Status: SHIPPED | OUTPATIENT
Start: 2021-07-28 | End: 2022-11-29

## 2021-08-02 RX ORDER — ISOSORBIDE MONONITRATE 60 MG/1
TABLET, EXTENDED RELEASE ORAL
Qty: 90 TABLET | Refills: 3 | Status: SHIPPED | OUTPATIENT
Start: 2021-08-02 | End: 2022-07-28

## 2021-08-18 ENCOUNTER — OFFICE VISIT (OUTPATIENT)
Dept: CARDIOLOGY | Facility: CLINIC | Age: 76
End: 2021-08-18

## 2021-08-18 VITALS
SYSTOLIC BLOOD PRESSURE: 108 MMHG | HEIGHT: 60 IN | DIASTOLIC BLOOD PRESSURE: 72 MMHG | WEIGHT: 153 LBS | HEART RATE: 68 BPM | BODY MASS INDEX: 30.04 KG/M2

## 2021-08-18 DIAGNOSIS — I47.29 NONSUSTAINED VENTRICULAR TACHYCARDIA (HCC): ICD-10-CM

## 2021-08-18 DIAGNOSIS — I10 ESSENTIAL HYPERTENSION: ICD-10-CM

## 2021-08-18 DIAGNOSIS — I25.10 CORONARY ARTERIOSCLEROSIS IN NATIVE ARTERY: Primary | ICD-10-CM

## 2021-08-18 DIAGNOSIS — I27.20 PULMONARY HYPERTENSION (HCC): ICD-10-CM

## 2021-08-18 DIAGNOSIS — Z95.5 H/O HEART ARTERY STENT: ICD-10-CM

## 2021-08-18 DIAGNOSIS — R06.02 SOB (SHORTNESS OF BREATH): ICD-10-CM

## 2021-08-18 DIAGNOSIS — E78.5 HYPERLIPIDEMIA, UNSPECIFIED HYPERLIPIDEMIA TYPE: ICD-10-CM

## 2021-08-18 DIAGNOSIS — Z95.1 S/P CABG (CORONARY ARTERY BYPASS GRAFT): ICD-10-CM

## 2021-08-18 PROCEDURE — 99214 OFFICE O/P EST MOD 30 MIN: CPT | Performed by: INTERNAL MEDICINE

## 2021-08-18 RX ORDER — NITROGLYCERIN 0.4 MG/1
0.4 TABLET SUBLINGUAL
Qty: 25 TABLET | Refills: 3 | Status: SHIPPED | OUTPATIENT
Start: 2021-08-18 | End: 2022-03-23 | Stop reason: SDUPTHER

## 2021-08-18 NOTE — PROGRESS NOTES
"Chief Complaint  Coronary Artery Disease    Subjective    History of Present Illness      I saw Marleny Greenwood today for continued cardiovascular care. She is a very sweet 75-year-old female who reports progressive dyspnea on exertion. She is free of chest pain pressure or tightness. She does not report orthopnea or PND and no lower extremity edema. She sleeps with two pillows. She denies syncope near syncope or significant palpitations. She does have a history of asthma and is followed by pulmonary medicine.Review of her echocardiogram from 3/22/2021 reveals preserved left ventricular function with a left ventricular ejection fraction of 66%, grade 1 diastolic dysfunction with mild mitral aortic and tricuspid regurgitation. Right ventricular systolic pressure was less than 35 mmHg.    Objective   Vital Signs:   /72   Pulse 68   Ht 152.4 cm (60\")   Wt 69.4 kg (153 lb)   BMI 29.88 kg/m²     Constitutional:       Appearance: Well-developed.   Eyes:      Conjunctiva/sclera: Conjunctivae normal.      Pupils: Pupils are equal, round, and reactive to light.   HENT:      Head: Normocephalic and atraumatic.   Neck:      Thyroid: No thyromegaly.   Pulmonary:      Effort: Pulmonary effort is normal. No respiratory distress.      Breath sounds: Decreased air movement present. No wheezing. No rales.   Cardiovascular:      Normal rate. Regular rhythm.      Murmurs: There is a grade 1/4 high frequency blowing decrescendo, early diastolic murmur at the URSB, radiating to the apex.      S4 Gallop. No S3 gallop. No rub.   Edema:     Peripheral edema absent.   Abdominal:      General: Bowel sounds are normal. There is no distension.      Palpations: Abdomen is soft. There is no abdominal mass.      Tenderness: There is no abdominal tenderness.   Musculoskeletal: Normal range of motion.      Cervical back: Neck supple. Skin:     General: Skin is warm and dry.      Findings: No erythema.   Neurological:      Mental Status: " Alert and oriented to person, place, and time.         Result Review :     Common labs    Common Labsle 6/14/21 6/14/21    1032 1032   Glucose  98   BUN  13   Creatinine  0.97   eGFR Non  Am  56 (A)   eGFR African Am  68   Sodium  146 (A)   Potassium  3.1 (A)   Chloride  106   Calcium  8.9   Total Protein  6.4   Albumin  4.00   Total Bilirubin  0.3   Alkaline Phosphatase  70   AST (SGOT)  10   ALT (SGPT)  7   Total Cholesterol 166    Triglycerides 79    HDL Cholesterol 61 (A)    LDL Cholesterol  90    (A) Abnormal value       Comments are available for some flowsheets but are not being displayed.                     Assessment and Plan    1. Coronary arteriosclerosis in native artery  Free of angina  - nitroglycerin (NITROSTAT) 0.4 MG SL tablet; Place 1 tablet under the tongue Every 5 (Five) Minutes As Needed for Chest Pain. Take no more than 3 doses in 15 minutes.  Dispense: 25 tablet; Refill: 3  - Basic Metabolic Panel; Future    2. H/O heart artery stent  Stable    3. S/P CABG (coronary artery bypass graft)  Stable    4. Essential hypertension  Controlled    5. Hyperlipidemia, unspecified hyperlipidemia type  Controlled    6. Pulmonary hypertension (CMS/HCC)  Stable    7. Nonsustained ventricular tachycardia (CMS/HCC)  Stable    8. SOB (shortness of breath)  Multifactorial including age deconditioning diastolic dysfunction with possible component of reactive airway disease.   - BNP ([facility] Only); Future      Ms. Greenwood is scheduled to see Dr. Thomas Sheridan of pulmonary medicine the first week of September. In the meantime I will advance her furosemide from 40 mg twice daily to 60 mg twice daily for 3 days. Today I will obtain a basic metabolic panel and a BN P. She will return her furosemide to 40 mg twice daily after 3 days. I will tentatively plan to see him in follow-up in 3 months sooner if needed.    Follow Up   No follow-ups on file.  Patient was given instructions and counseling regarding her  condition or for health maintenance advice. Please see specific information pulled into the AVS if appropriate.

## 2021-08-24 ENCOUNTER — TELEPHONE (OUTPATIENT)
Dept: CARDIOLOGY | Facility: CLINIC | Age: 76
End: 2021-08-24

## 2021-08-24 NOTE — TELEPHONE ENCOUNTER
Pt called to report that there has been no change in her shortness of breath since you reduced her Lasix from 40mg BID to 60mg QD on 8/18/2021.    Pt reports no swelling and no other symptoms. She does not check her BP at home.    Please advise.  Mariposa

## 2021-08-25 ENCOUNTER — TELEPHONE (OUTPATIENT)
Dept: INTERNAL MEDICINE | Facility: CLINIC | Age: 76
End: 2021-08-25

## 2021-08-25 RX ORDER — ALBUTEROL SULFATE 90 UG/1
2 AEROSOL, METERED RESPIRATORY (INHALATION) EVERY 4 HOURS PRN
Qty: 18 G | Refills: 2 | Status: SHIPPED | OUTPATIENT
Start: 2021-08-25 | End: 2022-10-12

## 2021-08-25 NOTE — TELEPHONE ENCOUNTER
Caller: Marleny Greenwood    Relationship: Self    Best call back number: [phone] (H)    What medications are you currently taking:   Current Outpatient Medications on File Prior to Visit   Medication Sig Dispense Refill   • albuterol sulfate  (90 Base) MCG/ACT inhaler Inhale 2 puffs Every 4 (Four) Hours As Needed for Wheezing. 1 inhaler 2   • amLODIPine (NORVASC) 10 MG tablet Take 10 mg by mouth Daily.     • aspirin 81 MG EC tablet Take 81 mg by mouth Daily.     • atorvastatin (LIPITOR) 40 MG tablet TAKE 1 TABLET DAILY 90 tablet 3   • butalbital-aspirin-caffeine-codeine (Fiorinal/Codeine #3) -24-30 MG capsule Take 1 capsule by mouth Every 4 (Four) Hours As Needed for Headache. 120 capsule 0   • carvedilol (COREG) 25 MG tablet TAKE 1 TABLET TWICE A  tablet 3   • clopidogrel (PLAVIX) 75 MG tablet TAKE 1 TABLET DAILY 90 tablet 3   • Denosumab (PROLIA SC) Inject  under the skin into the appropriate area as directed. One injection q6 months     • FeroSul 325 (65 Fe) MG tablet TAKE ONE TABLET BY MOUTH DAILY WITH BREAKFAST 90 tablet 2   • furosemide (LASIX) 40 MG tablet TAKE ONE AND ONE-HALF TABLETS DAILY (Patient taking differently: Take 40 mg by mouth 2 (Two) Times a Day.) 135 tablet 3   • HYDROMET 5-1.5 MG/5ML syrup      • isosorbide mononitrate (IMDUR) 60 MG 24 hr tablet TAKE 1 TABLET DAILY 90 tablet 3   • loratadine (CLARITIN) 10 MG tablet Take 10 mg by mouth. Takes p.r.n.     • Multiple Vitamins-Minerals (OCUVITE PO) Take 1 tablet by mouth Daily.     • nitroglycerin (NITROSTAT) 0.4 MG SL tablet Place 1 tablet under the tongue Every 5 (Five) Minutes As Needed for Chest Pain. Take no more than 3 doses in 15 minutes. 25 tablet 3   • omeprazole (priLOSEC) 20 MG capsule Take 1 capsule by mouth Daily. 90 capsule 4   • polyethylene glycol (MIRALAX) packet Take 17 g by mouth Daily.     • potassium chloride ER (K-TAB) 20 MEQ tablet controlled-release ER tablet TAKE 3 TABLETS DAILY 270 tablet 3   •  promethazine (PHENERGAN) 25 MG tablet Take 1 tablet by mouth Every 6 (Six) Hours As Needed for Nausea or Vomiting. 90 tablet 3   • ranolazine (RANEXA) 500 MG 12 hr tablet TAKE 1 TABLET TWICE A  tablet 3   • spironolactone (ALDACTONE) 25 MG tablet TAKE 1 TABLET DAILY 90 tablet 3   • vitamin D (ERGOCALCIFEROL) 1.25 MG (55766 UT) capsule capsule TAKE 1 CAPSULE ONCE A WEEK 12 capsule 3     No current facility-administered medications on file prior to visit.          When did you start taking these medications:     Which medication are you concerned about: albuterol sulfate  (90 Base) MCG/ACT inhaler    Who prescribed you this medication: DR BARBOSA    What are your concerns: PATIENT STATES DOES NOT TATES RIGHT WOULD LIEK TO KNOW IF SHE IS ABLE TO RECEIVE A NEW ONE    How long have you had these concerns: 2 WEEKS           Principal Discharge DX:	Cough  Secondary Diagnosis:	Abdominal pain  Secondary Diagnosis:	Pregnancy

## 2021-08-31 ENCOUNTER — TELEPHONE (OUTPATIENT)
Dept: INTERNAL MEDICINE | Facility: CLINIC | Age: 76
End: 2021-08-31

## 2021-08-31 DIAGNOSIS — R51.9 CHRONIC DAILY HEADACHE: ICD-10-CM

## 2021-08-31 RX ORDER — CODEINE/BUTALBITAL/ASA/CAFFEIN 30-50-325
1 CAPSULE ORAL EVERY 4 HOURS PRN
Qty: 120 CAPSULE | Refills: 1 | Status: SHIPPED | OUTPATIENT
Start: 2021-08-31 | End: 2022-07-29 | Stop reason: SDUPTHER

## 2021-08-31 NOTE — TELEPHONE ENCOUNTER
PT states the change in her Lasix has not helped.   She is still SOA and tired.   Her APT with Pulm is 9/1/21.

## 2021-09-01 ENCOUNTER — TELEPHONE (OUTPATIENT)
Dept: CARDIOLOGY | Facility: CLINIC | Age: 76
End: 2021-09-01

## 2021-09-01 NOTE — TELEPHONE ENCOUNTER
CNA pt    Patient called, states she was seen on  8/18/21 and her Lasix was decreased to see if it would help her SOB. States it has not helped and would like to know what to do?    CB# [phone]

## 2021-09-01 NOTE — TELEPHONE ENCOUNTER
Spoke with pt because the below message states that you decreased her Lasix when she was actually supposed to increase it. She still has SOA of course.    I confirmed with pt, she misunderstood and she has only been taking 40mg AM and 20mg PM. She was supposed to be taking 60mg BID X 3 days and then resume her normal 40mg BID.    Of note, pt did see pulm today and she reports a PFT was done in office. Dr. Thomas Sheridan told pt her lungs were fine and ordered no further testing.    Would you like pt to try what you initially instructed her to do with her Lasix now?    Thanks,  Mariposa

## 2021-09-02 NOTE — TELEPHONE ENCOUNTER
Pt informed, she wrote down the dosage & will call us on Tues to report how she is doing, since we won't be open on Monday due to the holiday

## 2021-09-02 NOTE — TELEPHONE ENCOUNTER
Yes I want her to increase her furosemide to 60 mg twice a day for 3 days and report back if there was any improvement in her respiratory status.

## 2021-09-09 NOTE — TELEPHONE ENCOUNTER
I called pt back and she reports that she forgot that she did talk to us last week. She did the increased dose of Lasix for 3 days but there was no improvement in her shortness of breath. She has no swelling in her feet and ankles. She reports she hasn't had any swelling for a very long time.    She does not check her BP/HR at home because her machine is broken.     Of note, pt seems very confused about her medications and her symptoms.    Pt is back to taking 40mg BID. Please advise.    Thanks,  Mariposa

## 2021-09-09 NOTE — TELEPHONE ENCOUNTER
Patient called and stated that she has not spoken to anybody about this issue.     She would also like the results of her blood work.     Please adviseOtis

## 2021-09-09 NOTE — TELEPHONE ENCOUNTER
Please advise as to if the pt needs to do anything else given there has been no improvement.    Thank you,  Mariposa

## 2021-09-11 NOTE — TELEPHONE ENCOUNTER
Continue current medical regimen please monitor blood pressure either in the office per primary care or at a drugstore and report back

## 2021-09-24 ENCOUNTER — OFFICE VISIT (OUTPATIENT)
Dept: CARDIOLOGY | Facility: CLINIC | Age: 76
End: 2021-09-24

## 2021-09-24 VITALS
HEART RATE: 72 BPM | DIASTOLIC BLOOD PRESSURE: 90 MMHG | BODY MASS INDEX: 30.23 KG/M2 | HEIGHT: 60 IN | WEIGHT: 154 LBS | SYSTOLIC BLOOD PRESSURE: 152 MMHG

## 2021-09-24 DIAGNOSIS — I27.20 PULMONARY HYPERTENSION (HCC): ICD-10-CM

## 2021-09-24 DIAGNOSIS — I25.10 CORONARY ARTERIOSCLEROSIS IN NATIVE ARTERY: ICD-10-CM

## 2021-09-24 DIAGNOSIS — J45.991 ASTHMA, COUGH VARIANT: ICD-10-CM

## 2021-09-24 DIAGNOSIS — I47.29 NONSUSTAINED VENTRICULAR TACHYCARDIA (HCC): ICD-10-CM

## 2021-09-24 DIAGNOSIS — I35.1 MILD AORTIC INSUFFICIENCY: ICD-10-CM

## 2021-09-24 DIAGNOSIS — Z95.5 H/O HEART ARTERY STENT: ICD-10-CM

## 2021-09-24 DIAGNOSIS — R06.89 RESPIRATORY INSUFFICIENCY: Primary | ICD-10-CM

## 2021-09-24 DIAGNOSIS — I10 ESSENTIAL HYPERTENSION: ICD-10-CM

## 2021-09-24 DIAGNOSIS — Z95.1 S/P CABG (CORONARY ARTERY BYPASS GRAFT): ICD-10-CM

## 2021-09-24 PROCEDURE — 99214 OFFICE O/P EST MOD 30 MIN: CPT | Performed by: INTERNAL MEDICINE

## 2021-09-24 RX ORDER — CARVEDILOL 12.5 MG/1
12.5 TABLET ORAL 2 TIMES DAILY WITH MEALS
COMMUNITY
End: 2022-06-30

## 2021-09-24 RX ORDER — HYDRALAZINE HYDROCHLORIDE 25 MG/1
25 TABLET, FILM COATED ORAL 2 TIMES DAILY
Qty: 60 TABLET | Refills: 5 | Status: SHIPPED | OUTPATIENT
Start: 2021-09-24 | End: 2021-10-15 | Stop reason: SDUPTHER

## 2021-09-24 NOTE — PROGRESS NOTES
Chief Complaint  No chief complaint on file.    Subjective    History of Present Illness      I saw Marleny Greenwood today for continued cardiovascular care. She is a pleasant 75-year-old female whose primary complaint is that of generalized fatigue and dyspnea on exertion. She remains free of chest pain pressure or tightness. She does not report orthopnea or lower extremity edema. She does not experience syncope near syncope or any significant palpitations. She has known coronary heart disease status post revascularization by bypass surgery and percutaneous. She has a history of hypertension and pulmonary hypertension. Echocardiogram from 3/22/2021 reveals left ventricular ejection fraction 66%, grade 1 diastolic dysfunction with mild aortic mitral and tricuspid regurgitation. Right ventricular systolic pressure was less than 35 mmHg.    Objective   Vital Signs:   There were no vitals taken for this visit.    Constitutional:       Appearance: Well-developed.   Eyes:      Conjunctiva/sclera: Conjunctivae normal.      Pupils: Pupils are equal, round, and reactive to light.   HENT:      Head: Normocephalic and atraumatic.   Neck:      Thyroid: No thyromegaly.   Pulmonary:      Effort: Pulmonary effort is normal. No respiratory distress.      Breath sounds: Normal breath sounds. No wheezing. No rales.   Cardiovascular:      Normal rate. Regular rhythm.      Murmurs: There is a grade 1/4 high frequency blowing decrescendo, early diastolic murmur at the URSB, radiating to the apex.      S4 Gallop. No S3 gallop. No rub.   Edema:     Peripheral edema absent.   Abdominal:      General: Bowel sounds are normal. There is no distension.      Palpations: Abdomen is soft. There is no abdominal mass.      Tenderness: There is no abdominal tenderness.   Musculoskeletal: Normal range of motion.      Cervical back: Neck supple. Skin:     General: Skin is warm and dry.      Findings: No erythema.   Neurological:      Mental Status:  Alert and oriented to person, place, and time.         Result Review :     Common labs    Common Labsle 6/14/21 6/14/21    1032 1032   Glucose  98   BUN  13   Creatinine  0.97   eGFR Non  Am  56 (A)   eGFR African Am  68   Sodium  146 (A)   Potassium  3.1 (A)   Chloride  106   Calcium  8.9   Total Protein  6.4   Albumin  4.00   Total Bilirubin  0.3   Alkaline Phosphatase  70   AST (SGOT)  10   ALT (SGPT)  7   Total Cholesterol 166    Triglycerides 79    HDL Cholesterol 61 (A)    LDL Cholesterol  90    (A) Abnormal value       Comments are available for some flowsheets but are not being displayed.                     Assessment and Plan    1. Respiratory insufficiency  Multifactorial including age weight deconditioning hypertension and perhaps medications    2. Pulmonary hypertension (CMS/HCC)  Stable    3. Asthma, cough variant  Stable    4. Coronary arteriosclerosis in native artery  Stable    5. S/P CABG (coronary artery bypass graft)  Stable    6. H/O heart artery stent  Stable    7. Mild aortic insufficiency  Compensated    8. Nonsustained ventricular tachycardia (CMS/HCC)  Stable    9. Essential hypertension  Target less than 130/80    Ms. Greenwood's primary complaint is dyspnea on exertion and increased fatigue. She does not report symptoms of angina and is euvolemic on physical examination. I have encouraged her to continue to restrict her salt and fluid intake. I will reduce her carvedilol to 12.5 mg twice daily. I will advance hydralazine to 25 mg twice daily. Ms. Greenwood will monitor her blood pressure and symptoms and report back over the next 7 to 10 days. I will plan to see her in follow-up within 6 weeks. We will continue to advance hydralazine to maintain her blood pressure under 130/80.        Follow Up   No follow-ups on file.  Patient was given instructions and counseling regarding her condition or for health maintenance advice. Please see specific information pulled into the AVS if  appropriate.

## 2021-09-30 ENCOUNTER — HOSPITAL ENCOUNTER (OUTPATIENT)
Dept: BONE DENSITY | Facility: HOSPITAL | Age: 76
Discharge: HOME | End: 2021-09-30
Admitting: INTERNAL MEDICINE

## 2021-09-30 PROCEDURE — 77080 DXA BONE DENSITY AXIAL: CPT

## 2021-10-05 ENCOUNTER — TELEPHONE (OUTPATIENT)
Dept: CARDIOLOGY | Facility: CLINIC | Age: 76
End: 2021-10-05

## 2021-10-05 DIAGNOSIS — R06.89 RESPIRATORY INSUFFICIENCY: ICD-10-CM

## 2021-10-05 DIAGNOSIS — I10 ESSENTIAL HYPERTENSION: ICD-10-CM

## 2021-10-05 RX ORDER — HYDRALAZINE HYDROCHLORIDE 50 MG/1
50 TABLET, FILM COATED ORAL 2 TIMES DAILY
Qty: 60 TABLET | Refills: 5 | Status: CANCELLED | OUTPATIENT
Start: 2021-10-05

## 2021-10-15 ENCOUNTER — TELEPHONE (OUTPATIENT)
Dept: CARDIOLOGY | Facility: CLINIC | Age: 76
End: 2021-10-15

## 2021-10-15 DIAGNOSIS — R06.89 RESPIRATORY INSUFFICIENCY: ICD-10-CM

## 2021-10-15 DIAGNOSIS — I10 ESSENTIAL HYPERTENSION: ICD-10-CM

## 2021-10-15 RX ORDER — HYDRALAZINE HYDROCHLORIDE 50 MG/1
50 TABLET, FILM COATED ORAL 2 TIMES DAILY
Qty: 180 TABLET | Refills: 3 | Status: ON HOLD | OUTPATIENT
Start: 2021-10-15 | End: 2023-01-20 | Stop reason: SDUPTHER

## 2021-10-15 NOTE — TELEPHONE ENCOUNTER
10/15/21-Dr Mal Moser  Pt: Marleny Greenwood  : 1945  P: [phone]  Reason for Call:  Pt. States that Dr Mal Moser upped her HydrALAZINE from 25mg bid to 50mg bid. Pt. States that she needs new Rx. Sent to [facility] on [address]  P: [phone]  F:[phone]

## 2021-10-25 ENCOUNTER — TELEPHONE (OUTPATIENT)
Dept: INTERNAL MEDICINE | Facility: CLINIC | Age: 76
End: 2021-10-25

## 2021-10-25 NOTE — TELEPHONE ENCOUNTER
Called spoke with PT, she is having left shoulder/arm pain that comes and goes over the last few months.  She seen Dr. Valencia last month and everything was good.    PT said it a sharp pain that last for about 30mins and then goes away.    She is concerned because she keeps having them  will be on left then right the next time.    I think she wants to be seen this week for it?

## 2021-10-25 NOTE — TELEPHONE ENCOUNTER
Caller: Marleny Greenwood    Relationship: Self    Best call back number: [phone]    Who are you requesting to speak with (clinical staff, provider,  specific staff member): ABHAY    What was the call regarding: PATIENT STATES THAT SHE HAS AN URGENT PRIVATE MATTER AND WOULD LIKE TO SPEAK TO ABHAY.     Do you require a callback: YES

## 2021-10-26 ENCOUNTER — OFFICE VISIT (OUTPATIENT)
Dept: INTERNAL MEDICINE | Facility: CLINIC | Age: 76
End: 2021-10-26

## 2021-10-26 VITALS
HEART RATE: 72 BPM | SYSTOLIC BLOOD PRESSURE: 94 MMHG | BODY MASS INDEX: 29.06 KG/M2 | DIASTOLIC BLOOD PRESSURE: 52 MMHG | WEIGHT: 148 LBS | HEIGHT: 60 IN | TEMPERATURE: 97.1 F

## 2021-10-26 DIAGNOSIS — M54.12 LEFT CERVICAL RADICULOPATHY: Primary | ICD-10-CM

## 2021-10-26 PROCEDURE — 99214 OFFICE O/P EST MOD 30 MIN: CPT | Performed by: INTERNAL MEDICINE

## 2021-10-26 RX ORDER — CYCLOBENZAPRINE HCL 10 MG
10 TABLET ORAL 3 TIMES DAILY PRN
Qty: 30 TABLET | Refills: 0 | Status: SHIPPED | OUTPATIENT
Start: 2021-10-26 | End: 2021-12-21

## 2021-10-26 NOTE — PROGRESS NOTES
"Chief Complaint  Pain (Left shoulder, arm neck )    Subjective          Marleny Greenwood presents to [facility]  History of Present Illness  Has had similar pain 2 other times this year- starts on L side, shoulder- can radiate down arm or chest wall- usually lasts 5-10 days.  Using Icy Hot, heating pads, etc.  This time, she says the pain started in the L deltoid up to neck.  She has tried to not more her head and shoulder around much since Friday.     Objective   Vital Signs:   BP 94/52   Pulse 72   Temp 97.1 °F (36.2 °C)   Ht 152.4 cm (60\")   Wt 67.1 kg (148 lb)   BMI 28.90 kg/m²     Physical Exam  Constitutional:       General: She is not in acute distress.     Appearance: Normal appearance.   Cardiovascular:      Rate and Rhythm: Normal rate.   Musculoskeletal:      Right shoulder: Normal.      Left shoulder: No tenderness or bony tenderness. Decreased range of motion.      Cervical back: Muscular tenderness: L>R.        Result Review :                 Assessment and Plan    Diagnoses and all orders for this visit:    1. Left cervical radiculopathy (Primary)  Comments:  will try muscle relaxer and PT- I feel this is likely nerve from cervical disc and muscle tension.   Orders:  -     cyclobenzaprine (FLEXERIL) 10 MG tablet; Take 1 tablet by mouth 3 (Three) Times a Day As Needed for Muscle Spasms.  Dispense: 30 tablet; Refill: 0  -     Ambulatory Referral to Physical Therapy Evaluate and treat        Follow Up   No follow-ups on file.  Patient was given instructions and counseling regarding her condition or for health maintenance advice. Please see specific information pulled into the AVS if appropriate.       "

## 2021-10-27 NOTE — TELEPHONE ENCOUNTER
Caller: Marleny Greenwood     Relationship to Patient: SELF    Phone Number: [phone]     Reason for Call: PATIENT CALLED SAYING SHE WOULD LIKE TO GO TO PHYSICAL THERAPY AT [facility]

## 2021-11-19 NOTE — PROGRESS NOTES
"Chief Complaint  No chief complaint on file.    Subjective    History of Present Illness      I saw Marleny Greenwood today for cardiovascular care.  She is a very pleasant 76-year-old female.  She has coronary heart disease and is status post coronary artery bypass surgery as well as previous percutaneous coronary intervention.  She denies chest pain pressure tightness.  Her primary complaint was that of generalized fatigue and dyspnea on exertion.  She is markedly improved.  We reduced her carvedilol to 12.5 mg twice daily and advanced her hydralazine to 50 mg twice daily.  She does not report orthopnea or PND no lower extremity edema.  She is free of syncope near syncope or any significant palpitations.  Her last echocardiogram from 3/22/2021 revealed left ventricular ejection fraction 66%, mild aortic mitral and tricuspid regurgitation right ventricular systolic pressure was less than 35 mmHg.  She underwent right left heart catheterization 11/19/2019 which revealed PA pressure 34/12, PA mean of 12 and left ventricular end-diastolic pressure of 10.  She demonstrated three-vessel coronary artery disease.  LIMA to the LAD was atretic, saphenous vein graft to the mid diagonal was patent and saphenous vein graft to the right coronary artery was occluded the right right and left to right collaterals to diffusely diseased right coronary artery left circumflex free of any significant obstructive disease with the exception of a totally occluded mid marginal branch.  Objective   Vital Signs:   /74   Pulse 80   Ht 152.4 cm (60\")   Wt 68.8 kg (151 lb 9.6 oz)   BMI 29.61 kg/m²     Constitutional:       Appearance: Well-developed.   Eyes:      Conjunctiva/sclera: Conjunctivae normal.      Pupils: Pupils are equal, round, and reactive to light.   HENT:      Head: Normocephalic and atraumatic.   Neck:      Thyroid: No thyromegaly.   Pulmonary:      Effort: Pulmonary effort is normal. No respiratory distress.      Breath " sounds: Normal breath sounds. No wheezing. No rales.   Cardiovascular:      Normal rate. Regular rhythm.      Murmurs: There is a grade 1/4 high frequency blowing decrescendo, early diastolic murmur at the URSB, radiating to the apex.      No gallop. No S3 and S4 gallop. No rub.   Edema:     Peripheral edema absent.   Abdominal:      General: Bowel sounds are normal. There is no distension.      Palpations: Abdomen is soft. There is no abdominal mass.      Tenderness: There is no abdominal tenderness.   Musculoskeletal: Normal range of motion.      Cervical back: Neck supple. Skin:     General: Skin is warm and dry.      Findings: No erythema.   Neurological:      Mental Status: Alert and oriented to person, place, and time.         Result Review :     Common labs    Common Labsle 6/14/21 6/14/21    1032 1032   Glucose  98   BUN  13   Creatinine  0.97   eGFR Non  Am  56 (A)   eGFR African Am  68   Sodium  146 (A)   Potassium  3.1 (A)   Chloride  106   Calcium  8.9   Total Protein  6.4   Albumin  4.00   Total Bilirubin  0.3   Alkaline Phosphatase  70   AST (SGOT)  10   ALT (SGPT)  7   Total Cholesterol 166    Triglycerides 79    HDL Cholesterol 61 (A)    LDL Cholesterol  90    (A) Abnormal value       Comments are available for some flowsheets but are not being displayed.                     Assessment and Plan    1. Coronary arteriosclerosis in native artery  Stable free of angina    2. S/P CABG (coronary artery bypass graft)  Stable    3. H/O heart artery stent  Stable    4. Mild aortic insufficiency  Compensated    5. Essential hypertension  Controlled    6. Grade I diastolic dysfunction  Euvolemic    7. Nonsustained ventricular tachycardia (HCC)  Stable    Marleny is much improved.  She reports improved respiratory status and remains free of chest pain pressure tightness.  She is euvolemic on physical examination does not experience orthopnea PND or lower extremity edema.  I have encouraged her to follow  her current medical regimen.  I have counseled her to continue salt restriction observing low-cholesterol low saturated fat diet.  I will see her in follow-up in 4 months sooner if needed.        Follow Up   No follow-ups on file.  Patient was given instructions and counseling regarding her condition or for health maintenance advice. Please see specific information pulled into the AVS if appropriate.

## 2021-11-22 ENCOUNTER — OFFICE VISIT (OUTPATIENT)
Dept: CARDIOLOGY | Facility: CLINIC | Age: 76
End: 2021-11-22

## 2021-11-22 VITALS
HEART RATE: 80 BPM | DIASTOLIC BLOOD PRESSURE: 74 MMHG | HEIGHT: 60 IN | SYSTOLIC BLOOD PRESSURE: 120 MMHG | BODY MASS INDEX: 29.76 KG/M2 | WEIGHT: 151.6 LBS

## 2021-11-22 DIAGNOSIS — I51.89 GRADE I DIASTOLIC DYSFUNCTION: ICD-10-CM

## 2021-11-22 DIAGNOSIS — I47.29 NONSUSTAINED VENTRICULAR TACHYCARDIA (HCC): ICD-10-CM

## 2021-11-22 DIAGNOSIS — Z95.5 H/O HEART ARTERY STENT: ICD-10-CM

## 2021-11-22 DIAGNOSIS — I10 ESSENTIAL HYPERTENSION: ICD-10-CM

## 2021-11-22 DIAGNOSIS — I35.1 MILD AORTIC INSUFFICIENCY: ICD-10-CM

## 2021-11-22 DIAGNOSIS — Z95.1 S/P CABG (CORONARY ARTERY BYPASS GRAFT): ICD-10-CM

## 2021-11-22 DIAGNOSIS — I25.10 CORONARY ARTERIOSCLEROSIS IN NATIVE ARTERY: Primary | ICD-10-CM

## 2021-11-22 PROBLEM — I27.20 PULMONARY HYPERTENSION: Chronic | Status: RESOLVED | Noted: 2020-07-22 | Resolved: 2021-11-22

## 2021-11-22 PROCEDURE — 99214 OFFICE O/P EST MOD 30 MIN: CPT | Performed by: INTERNAL MEDICINE

## 2021-12-21 ENCOUNTER — OFFICE VISIT (OUTPATIENT)
Dept: INTERNAL MEDICINE | Facility: CLINIC | Age: 76
End: 2021-12-21

## 2021-12-21 VITALS
BODY MASS INDEX: 30.23 KG/M2 | WEIGHT: 154 LBS | HEIGHT: 60 IN | SYSTOLIC BLOOD PRESSURE: 136 MMHG | DIASTOLIC BLOOD PRESSURE: 74 MMHG | HEART RATE: 76 BPM | TEMPERATURE: 97.2 F

## 2021-12-21 DIAGNOSIS — N28.9 RENAL INSUFFICIENCY, MILD: ICD-10-CM

## 2021-12-21 DIAGNOSIS — Z12.11 SCREEN FOR COLON CANCER: Primary | ICD-10-CM

## 2021-12-21 DIAGNOSIS — E78.49 OTHER HYPERLIPIDEMIA: ICD-10-CM

## 2021-12-21 DIAGNOSIS — Z95.5 H/O HEART ARTERY STENT: Chronic | ICD-10-CM

## 2021-12-21 DIAGNOSIS — I10 ESSENTIAL HYPERTENSION: Chronic | ICD-10-CM

## 2021-12-21 PROCEDURE — 99214 OFFICE O/P EST MOD 30 MIN: CPT | Performed by: INTERNAL MEDICINE

## 2021-12-21 NOTE — PROGRESS NOTES
"Chief Complaint  Hyperlipidemia    Subjective          Marleny Greenwood presents to [facility]  History of Present Illness  Needs to have some dental work done- 3 teeth extracted- they need guidance for her Plavix. Has been on for years for stent.     Didn't receive Cologard after ordered in June- still interested in doing it.   Did PT for her neck and shoulder- really helped a lot.     Objective   Vital Signs:   /74   Pulse 76   Temp 97.2 °F (36.2 °C)   Ht 152.4 cm (60\")   Wt 69.9 kg (154 lb)   BMI 30.08 kg/m²     Physical Exam  Constitutional:       Appearance: Normal appearance.   Cardiovascular:      Rate and Rhythm: Normal rate and regular rhythm.   Pulmonary:      Effort: Pulmonary effort is normal.      Breath sounds: Normal breath sounds.   Musculoskeletal:      Right lower leg: No edema.      Left lower leg: No edema.   Neurological:      General: No focal deficit present.   Psychiatric:         Mood and Affect: Mood normal.         Thought Content: Thought content normal.        Result Review :                 Assessment and Plan    Diagnoses and all orders for this visit:    1. Screen for colon cancer (Primary)  Comments:  send Cologard in again  Orders:  -     Cologuard - Stool, Per Rectum; Future    2. H/O heart artery stent  Comments:  no issues- can hold for dental proceduire.     3. Essential hypertension  Comments:  doing great, no change.     4. Renal insufficiency, mild  Comments:  better this time, avoids nsaids    5. Other hyperlipidemia  Comments:  LDL up a bit- says she takes atorvastatin reg. Will recheck and increase if remains up.  Orders:  -     Comprehensive Metabolic Panel; Future  -     Lipid Panel With / Chol / HDL Ratio; Future        Follow Up   Return in about 6 months (around 6/21/2022) for Medicare Wellness, Lab Before FUP.  Patient was given instructions and counseling regarding her condition or for health maintenance advice. Please see " specific information pulled into the AVS if appropriate.

## 2022-01-31 RX ORDER — CLOPIDOGREL BISULFATE 75 MG/1
TABLET ORAL
Qty: 90 TABLET | Refills: 3 | Status: SHIPPED | OUTPATIENT
Start: 2022-01-31 | End: 2023-01-24

## 2022-02-04 ENCOUNTER — TELEPHONE (OUTPATIENT)
Dept: INTERNAL MEDICINE | Facility: CLINIC | Age: 77
End: 2022-02-04

## 2022-02-08 ENCOUNTER — TELEPHONE (OUTPATIENT)
Dept: INTERNAL MEDICINE | Facility: CLINIC | Age: 77
End: 2022-02-08

## 2022-02-24 DIAGNOSIS — E87.70 HYPERVOLEMIA, UNSPECIFIED HYPERVOLEMIA TYPE: ICD-10-CM

## 2022-02-24 RX ORDER — FUROSEMIDE 40 MG/1
40 TABLET ORAL 2 TIMES DAILY
Qty: 180 TABLET | Refills: 1 | Status: SHIPPED | OUTPATIENT
Start: 2022-02-24 | End: 2022-08-05

## 2022-03-02 ENCOUNTER — TELEPHONE (OUTPATIENT)
Dept: INTERNAL MEDICINE | Facility: CLINIC | Age: 77
End: 2022-03-02

## 2022-03-09 RX ORDER — PROMETHAZINE HYDROCHLORIDE 25 MG/1
25 TABLET ORAL EVERY 6 HOURS PRN
Qty: 90 TABLET | Refills: 3 | Status: SHIPPED | OUTPATIENT
Start: 2022-03-09 | End: 2023-03-08

## 2022-03-09 NOTE — TELEPHONE ENCOUNTER
Caller: Marleny rGeenwood    Relationship: Self    Best call back number: [phone]*    Requested Prescriptions:   Requested Prescriptions     Pending Prescriptions Disp Refills   • promethazine (PHENERGAN) 25 MG tablet 90 tablet 3     Sig: Take 1 tablet by mouth Every 6 (Six) Hours As Needed for Nausea or Vomiting.        Pharmacy where request should be sent: [facility] - [address] [phone] [facility] [phone]      Additional details provided by patient: PATIENT REQUESTING A REFILL.    Does the patient have less than a 3 day supply:  [] Yes  [x] No    Arnold Menendez   03/09/22 11:11 EST

## 2022-03-15 RX ORDER — OMEPRAZOLE 20 MG/1
CAPSULE, DELAYED RELEASE ORAL
Qty: 90 CAPSULE | Refills: 3 | Status: SHIPPED | OUTPATIENT
Start: 2022-03-15

## 2022-03-22 NOTE — PROGRESS NOTES
"Chief Complaint  No chief complaint on file.    Subjective    History of Present Illness      I saw Marleny Greenwood today for continued cardiovascular care.  She is a sweet 76-year-old female with known coronary heart disease.  She is undergone previous revascularization with coronary artery bypass surgery as well as percutaneously.  She presents today reporting a 4-week history of chest discomfort.  She describes this as a fullness.  There is no radiation.  There can be associated shortness of breath.  Symptoms occur at rest and at times with exertion.  She is also noted increased dyspnea on exertion.  She does not experience orthopnea or PND no lower extremity edema.  She is free of syncope near syncope or significant palpitations.  Her blood pressure is well controlled.  She tolerates her medications well without significant side effects.  She remains on dual antiplatelet therapy, statin therapy, isosorbide and hydralazine, furosemide as well as Raynaud's.    Objective   Vital Signs:   /62   Pulse 72   Ht 152.4 cm (60\")   Wt 68.9 kg (152 lb)   BMI 29.69 kg/m²     Constitutional:       Appearance: Well-developed.   Eyes:      Conjunctiva/sclera: Conjunctivae normal.      Pupils: Pupils are equal, round, and reactive to light.   HENT:      Head: Normocephalic and atraumatic.   Neck:      Thyroid: No thyromegaly.   Pulmonary:      Effort: Pulmonary effort is normal. No respiratory distress.      Breath sounds: Normal breath sounds. No wheezing. No rales.   Cardiovascular:      Normal rate. Regular rhythm.      Murmurs: There is a grade 1/4 high frequency blowing decrescendo, early diastolic murmur at the URSB, radiating to the apex.      No gallop. No S3 and S4 gallop. No rub.   Edema:     Peripheral edema absent.   Abdominal:      General: Bowel sounds are normal. There is no distension.      Palpations: Abdomen is soft. There is no abdominal mass.      Tenderness: There is no abdominal tenderness. "   Musculoskeletal: Normal range of motion.      Cervical back: Neck supple. Skin:     General: Skin is warm and dry.      Findings: No erythema.   Neurological:      Mental Status: Alert and oriented to person, place, and time.         Result Review :{ Labs  Result Review  Imaging  Med Tab  Media :23}     Common labs    Common Labsle 6/14/21 6/14/21 12/14/21 12/14/21 12/14/21    1032 1032 1140 1140 1140   Glucose  98  83    BUN  13  13    Creatinine  0.97  1.01 (A)    eGFR Non African Am  56 (A)  55 (A)    eGFR African Am  68  63    Sodium  146 (A)  144    Potassium  3.1 (A)  3.4 (A)    Chloride  106  105    Calcium  8.9  9.0    Total Protein  6.4  6.4    Albumin  4.00  3.9    Total Bilirubin  0.3  0.2    Alkaline Phosphatase  70  85    AST (SGOT)  10  16    ALT (SGPT)  7  11    WBC   5.4     Hemoglobin   12.4     Hematocrit   38.4     Platelets   212     Total Cholesterol 166    194   Triglycerides 79    124   HDL Cholesterol 61 (A)    57   LDL Cholesterol  90    115 (A)   (A) Abnormal value       Comments are available for some flowsheets but are not being displayed.                     Assessment and Plan    1. Coronary arteriosclerosis in native artery  Chest pain consistent with angina  - Stress Test With Myocardial Perfusion One Day; Future  - nitroglycerin (NITROSTAT) 0.4 MG SL tablet; Place 1 tablet under the tongue Every 5 (Five) Minutes As Needed for Chest Pain. Take no more than 3 doses in 15 minutes.  Dispense: 25 tablet; Refill: 3    2. S/P CABG (coronary artery bypass graft)      3. H/O heart artery stent      4. Mild aortic insufficiency  Compensated    5. Essential hypertension  Controlled    6. Grade I diastolic dysfunction  Stable    7. Other hyperlipidemia  Controlled    8. Nonsustained ventricular tachycardia (HCC)  Stable    9. History of loop recorder  Battery dead    10. Renal insufficiency, mild  Continue to monitor    Marleny reports recent development of chest discomfort respiratory  insufficiency concerning for angina class II-III.  She remains on optimal medical management.  Her weight is stable her blood pressure and lipids are controlled.  We will proceed to Lexiscan Cardiolite noninvasive ischemic assessment I will arrange for early follow-up within 1 month.    Follow Up   No follow-ups on file.  Patient was given instructions and counseling regarding her condition or for health maintenance advice. Please see specific information pulled into the AVS if appropriate.

## 2022-03-23 ENCOUNTER — OFFICE VISIT (OUTPATIENT)
Dept: CARDIOLOGY | Facility: CLINIC | Age: 77
End: 2022-03-23

## 2022-03-23 VITALS
HEART RATE: 72 BPM | HEIGHT: 60 IN | WEIGHT: 152 LBS | BODY MASS INDEX: 29.84 KG/M2 | SYSTOLIC BLOOD PRESSURE: 120 MMHG | DIASTOLIC BLOOD PRESSURE: 62 MMHG

## 2022-03-23 DIAGNOSIS — E78.49 OTHER HYPERLIPIDEMIA: ICD-10-CM

## 2022-03-23 DIAGNOSIS — I51.89 GRADE I DIASTOLIC DYSFUNCTION: ICD-10-CM

## 2022-03-23 DIAGNOSIS — R07.9 CHRONIC CHEST PAIN WITH HIGH RISK FOR CAD: ICD-10-CM

## 2022-03-23 DIAGNOSIS — Z95.5 H/O HEART ARTERY STENT: ICD-10-CM

## 2022-03-23 DIAGNOSIS — R06.02 SHORTNESS OF BREATH: ICD-10-CM

## 2022-03-23 DIAGNOSIS — I35.1 MILD AORTIC INSUFFICIENCY: ICD-10-CM

## 2022-03-23 DIAGNOSIS — I25.10 CORONARY ARTERIOSCLEROSIS IN NATIVE ARTERY: Primary | ICD-10-CM

## 2022-03-23 DIAGNOSIS — Z91.89 CHRONIC CHEST PAIN WITH HIGH RISK FOR CAD: ICD-10-CM

## 2022-03-23 DIAGNOSIS — G89.29 CHRONIC CHEST PAIN WITH HIGH RISK FOR CAD: ICD-10-CM

## 2022-03-23 DIAGNOSIS — Z98.890 HISTORY OF LOOP RECORDER: ICD-10-CM

## 2022-03-23 DIAGNOSIS — N28.9 RENAL INSUFFICIENCY, MILD: ICD-10-CM

## 2022-03-23 DIAGNOSIS — Z95.1 S/P CABG (CORONARY ARTERY BYPASS GRAFT): ICD-10-CM

## 2022-03-23 DIAGNOSIS — I47.29 NONSUSTAINED VENTRICULAR TACHYCARDIA: ICD-10-CM

## 2022-03-23 DIAGNOSIS — I10 ESSENTIAL HYPERTENSION: ICD-10-CM

## 2022-03-23 PROCEDURE — 99214 OFFICE O/P EST MOD 30 MIN: CPT | Performed by: INTERNAL MEDICINE

## 2022-03-23 RX ORDER — NITROGLYCERIN 0.4 MG/1
0.4 TABLET SUBLINGUAL
Qty: 25 TABLET | Refills: 3 | Status: SHIPPED | OUTPATIENT
Start: 2022-03-23

## 2022-03-31 RX ORDER — ERGOCALCIFEROL 1.25 MG/1
CAPSULE ORAL
Qty: 12 CAPSULE | Refills: 3 | Status: SHIPPED | OUTPATIENT
Start: 2022-03-31

## 2022-04-01 RX ORDER — ATORVASTATIN CALCIUM 40 MG/1
TABLET, FILM COATED ORAL
Qty: 90 TABLET | Refills: 3 | Status: SHIPPED | OUTPATIENT
Start: 2022-04-01 | End: 2023-03-27

## 2022-04-06 ENCOUNTER — HOSPITAL ENCOUNTER (OUTPATIENT)
Dept: NUCLEAR MEDICINE | Facility: HOSPITAL | Age: 77
Discharge: HOME | End: 2022-04-06

## 2022-04-06 DIAGNOSIS — R06.02 SHORTNESS OF BREATH: ICD-10-CM

## 2022-04-06 DIAGNOSIS — R07.9 CHRONIC CHEST PAIN WITH HIGH RISK FOR CAD: ICD-10-CM

## 2022-04-06 DIAGNOSIS — I25.10 CORONARY ARTERIOSCLEROSIS IN NATIVE ARTERY: ICD-10-CM

## 2022-04-06 DIAGNOSIS — Z91.89 CHRONIC CHEST PAIN WITH HIGH RISK FOR CAD: ICD-10-CM

## 2022-04-06 DIAGNOSIS — G89.29 CHRONIC CHEST PAIN WITH HIGH RISK FOR CAD: ICD-10-CM

## 2022-04-06 DIAGNOSIS — Z95.1 S/P CABG (CORONARY ARTERY BYPASS GRAFT): ICD-10-CM

## 2022-04-06 DIAGNOSIS — Z95.5 H/O HEART ARTERY STENT: ICD-10-CM

## 2022-04-06 PROCEDURE — 78452 HT MUSCLE IMAGE SPECT MULT: CPT

## 2022-04-06 PROCEDURE — 0 TECHNETIUM SESTAMIBI: Performed by: INTERNAL MEDICINE

## 2022-04-06 PROCEDURE — 78452 HT MUSCLE IMAGE SPECT MULT: CPT | Performed by: INTERNAL MEDICINE

## 2022-04-06 PROCEDURE — A9500 TC99M SESTAMIBI: HCPCS | Performed by: INTERNAL MEDICINE

## 2022-04-06 PROCEDURE — 93017 CV STRESS TEST TRACING ONLY: CPT

## 2022-04-06 PROCEDURE — 25010000002 REGADENOSON 0.4 MG/5ML SOLUTION: Performed by: INTERNAL MEDICINE

## 2022-04-06 PROCEDURE — 93018 CV STRESS TEST I&R ONLY: CPT | Performed by: INTERNAL MEDICINE

## 2022-04-06 RX ADMIN — REGADENOSON 0.4 MG: 0.08 INJECTION, SOLUTION INTRAVENOUS at 09:45

## 2022-04-06 RX ADMIN — TECHNETIUM TC 99M SESTAMIBI 1 DOSE: 1 INJECTION INTRAVENOUS at 07:54

## 2022-04-06 RX ADMIN — TECHNETIUM TC 99M SESTAMIBI 1 DOSE: 1 INJECTION INTRAVENOUS at 09:45

## 2022-04-10 LAB
BH CV REST NUCLEAR ISOTOPE DOSE: 10.4 MCI
BH CV STRESS BP STAGE 1: ABNORMAL
BH CV STRESS COMMENTS STAGE 1: ABNORMAL
BH CV STRESS DOSE REGADENOSON STAGE 1: 0.4
BH CV STRESS DURATION MIN STAGE 1: 0
BH CV STRESS DURATION SEC STAGE 1: 10
BH CV STRESS HR STAGE 1: 108
BH CV STRESS NUCLEAR ISOTOPE DOSE: 32.1 MCI
BH CV STRESS PROTOCOL 1: ABNORMAL
BH CV STRESS RECOVERY BP: ABNORMAL MMHG
BH CV STRESS RECOVERY HR: 91 BPM
BH CV STRESS STAGE 1: 1
MAXIMAL PREDICTED HEART RATE: 145 BPM
PERCENT MAX PREDICTED HR: 74.48 %
STRESS BASELINE BP: ABNORMAL MMHG
STRESS BASELINE HR: 76 BPM
STRESS PERCENT HR: 88 %
STRESS POST EXERCISE DUR SEC: 10 SEC
STRESS POST PEAK BP: ABNORMAL MMHG
STRESS POST PEAK HR: 108 BPM
STRESS TARGET HR: 123 BPM

## 2022-04-29 DIAGNOSIS — E87.6 HYPOKALEMIA: ICD-10-CM

## 2022-04-29 RX ORDER — CARVEDILOL 12.5 MG/1
12.5 TABLET ORAL 2 TIMES DAILY
Qty: 180 TABLET | Refills: 3 | Status: SHIPPED | OUTPATIENT
Start: 2022-04-29 | End: 2023-03-24

## 2022-04-29 RX ORDER — RANOLAZINE 500 MG/1
TABLET, EXTENDED RELEASE ORAL
Qty: 180 TABLET | Refills: 3 | Status: SHIPPED | OUTPATIENT
Start: 2022-04-29

## 2022-04-29 RX ORDER — POTASSIUM CHLORIDE 1500 MG/1
TABLET, FILM COATED, EXTENDED RELEASE ORAL
Qty: 270 TABLET | Refills: 1 | Status: SHIPPED | OUTPATIENT
Start: 2022-04-29 | End: 2022-10-26

## 2022-05-10 ENCOUNTER — TELEPHONE (OUTPATIENT)
Dept: INTERNAL MEDICINE | Facility: CLINIC | Age: 77
End: 2022-05-10

## 2022-05-10 RX ORDER — CYCLOBENZAPRINE HCL 10 MG
10 TABLET ORAL 3 TIMES DAILY PRN
Qty: 30 TABLET | Refills: 1 | Status: SHIPPED | OUTPATIENT
Start: 2022-05-10 | End: 2023-03-24

## 2022-05-10 NOTE — TELEPHONE ENCOUNTER
Called spoke with PT you seen her for this back in Oct.  She was given flexeril would like to know if you will give her a refill for this

## 2022-05-10 NOTE — TELEPHONE ENCOUNTER
Caller: Marleny Greenwood    Relationship: Self    Best call back number: [phone]     What medication are you requesting: BACK PAIN     What are your current symptoms: BACK PIAN     How long have you been experiencing symptoms: STARTED Sunday   Have you had these symptoms before:    [x] Yes  [] No    Have you been treated for these symptoms before:   [x] Yes  [] No    If a prescription is needed, what is your preferred pharmacy and phone number: [facility] [address] - [phone] [facility] [phone]      Additional notes: N/A

## 2022-05-12 NOTE — PROGRESS NOTES
"Chief Complaint  Follow-up    Subjective    History of Present Illness      I saw Marleny Greenwood today for continued cardiovascular care.  Marleny is a sweet 76-year-old female who is free of angina.  She does report dyspnea on exertion which is persistent but nonprogressive.  She sleeps with 2 pillows free of orthopnea or PND no significant lower extremity edema.  She denies syncope near syncope or significant palpitations.  She underwent nuclear stress test 4/6/2022 which demonstrated left ventricular ejection fraction 80% small area of ischemia in the inferior septal wall.I reviewed her coronary angiography from 11/19/2019.  The right coronary artery and the saphenous vein graft to the right coronary artery was occluded.  The posterior left ventricular branch was fragmented and demonstrate collaterals left to right.  She has not taken her medication as of yet this morning and her blood pressure is elevated 152/84.  Her BMI is 30.08    Objective   Vital Signs:   /84   Pulse 79   Ht 152.4 cm (60\")   Wt 69.9 kg (154 lb)   BMI 30.08 kg/m²     Constitutional:       Appearance: Well-developed.   Eyes:      Conjunctiva/sclera: Conjunctivae normal.      Pupils: Pupils are equal, round, and reactive to light.   HENT:      Head: Normocephalic and atraumatic.   Neck:      Thyroid: No thyromegaly.   Pulmonary:      Effort: Pulmonary effort is normal. No respiratory distress.      Breath sounds: Normal breath sounds. No wheezing. No rales.   Cardiovascular:      Normal rate. Regular rhythm.      Murmurs: There is a grade 1/4 high frequency blowing decrescendo, early diastolic murmur at the URSB, radiating to the apex.      No gallop. No S3 and S4 gallop. No rub.   Edema:     Peripheral edema absent.   Abdominal:      General: Bowel sounds are normal. There is no distension.      Palpations: Abdomen is soft. There is no abdominal mass.      Tenderness: There is no abdominal tenderness.   Musculoskeletal: Normal range " of motion.      Cervical back: Neck supple. Skin:     General: Skin is warm and dry.      Findings: No erythema.   Neurological:      Mental Status: Alert and oriented to person, place, and time.         Result Review :     Common labs    Common Labsle 6/14/21 6/14/21 12/14/21 12/14/21 12/14/21    1032 1032 1140 1140 1140   Glucose  98  83    BUN  13  13    Creatinine  0.97  1.01 (A)    eGFR Non African Am  56 (A)  55 (A)    eGFR African Am  68  63    Sodium  146 (A)  144    Potassium  3.1 (A)  3.4 (A)    Chloride  106  105    Calcium  8.9  9.0    Total Protein  6.4  6.4    Albumin  4.00  3.9    Total Bilirubin  0.3  0.2    Alkaline Phosphatase  70  85    AST (SGOT)  10  16    ALT (SGPT)  7  11    WBC   5.4     Hemoglobin   12.4     Hematocrit   38.4     Platelets   212     Total Cholesterol 166    194   Triglycerides 79    124   HDL Cholesterol 61 (A)    57   LDL Cholesterol  90    115 (A)   (A) Abnormal value       Comments are available for some flowsheets but are not being displayed.                     Assessment and Plan    1. Coronary arteriosclerosis in native artery  Free of angina    2. S/P CABG (coronary artery bypass graft)  Free of angina    3. H/O heart artery stent  Free of angina    4. Mild aortic insufficiency  Compensated    5. Essential hypertension  Target less than 130/80     6. Grade I diastolic dysfunction  Near euvolemic    7. Other hyperlipidemia  Continue atorvastatin 40 mg daily    8.  Chronic dyspnea on exertion    Marleny reports persistent but stable dyspnea on exertion.  I will advance furosemide to 60 mg twice daily.  I have encouraged her to restrict her salt and fluid intake and follow her medical regimen.  I will obtain a basic metabolic panel today and in 10 days.  I will see her in follow-up in 4 weeks sooner if needed.    Follow Up   No follow-ups on file.  Patient was given instructions and counseling regarding her condition or for health maintenance advice. Please see specific  information pulled into the AVS if appropriate.

## 2022-05-13 ENCOUNTER — OFFICE VISIT (OUTPATIENT)
Dept: CARDIOLOGY | Facility: CLINIC | Age: 77
End: 2022-05-13

## 2022-05-13 VITALS
HEIGHT: 60 IN | DIASTOLIC BLOOD PRESSURE: 84 MMHG | WEIGHT: 154 LBS | HEART RATE: 79 BPM | SYSTOLIC BLOOD PRESSURE: 152 MMHG | BODY MASS INDEX: 30.23 KG/M2

## 2022-05-13 DIAGNOSIS — Z95.1 S/P CABG (CORONARY ARTERY BYPASS GRAFT): ICD-10-CM

## 2022-05-13 DIAGNOSIS — Z95.5 H/O HEART ARTERY STENT: ICD-10-CM

## 2022-05-13 DIAGNOSIS — I47.29 NONSUSTAINED VENTRICULAR TACHYCARDIA: ICD-10-CM

## 2022-05-13 DIAGNOSIS — N28.9 RENAL INSUFFICIENCY, MILD: ICD-10-CM

## 2022-05-13 DIAGNOSIS — I10 ESSENTIAL HYPERTENSION: ICD-10-CM

## 2022-05-13 DIAGNOSIS — Z98.890 HISTORY OF LOOP RECORDER: ICD-10-CM

## 2022-05-13 DIAGNOSIS — I35.1 MILD AORTIC INSUFFICIENCY: ICD-10-CM

## 2022-05-13 DIAGNOSIS — I25.10 CORONARY ARTERIOSCLEROSIS IN NATIVE ARTERY: Primary | ICD-10-CM

## 2022-05-13 DIAGNOSIS — E78.49 OTHER HYPERLIPIDEMIA: ICD-10-CM

## 2022-05-13 DIAGNOSIS — I51.89 GRADE I DIASTOLIC DYSFUNCTION: ICD-10-CM

## 2022-05-13 PROCEDURE — 99214 OFFICE O/P EST MOD 30 MIN: CPT | Performed by: INTERNAL MEDICINE

## 2022-06-14 NOTE — PROGRESS NOTES
Chief Complaint  No chief complaint on file.    Subjective    History of Present Illness      I saw Marleny Greenwood today for cardiovascular care.  She is a very pleasant 76-year-old female who reports persistent and limiting dyspnea on exertion and fatigue.  She does not report any distinct chest pain pressure or tightness.  She underwent nuclear stress testing 4/6/2022 demonstrating preserved left ventricular function with evidence of a small area of ischemia in the inferior septal wall.  She has known coronary heart disease.  She underwent coronary bypass surgery in 2011 with a LIMA to the LAD and a saphenous vein graft to the PDA branch of the right coronary artery.  Following this she presented to [facility] with an acute inferior wall infarction.  She was found to have occlusion of the saphenous vein graft to the PDA and underwent deployment of a Vision stent to the saphenous vein graft of the right coronary artery reducing a total occlusion to 0% residual narrowing 3/22/12.  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  She underwent surgical repair with a placement of a saphenous vein graft to the LAD as the LIMA to the LAD was found to be atretic.Coronary angiography performed 11/19/2019 revealed the left main free of disease, LAD mid total occlusion, first diagonal proximal 90%, second diagonal 100%, left circumflex mid 40% in the mid marginal branch totally occluded, right coronary artery dominant mid subtotal occlusion with right right left to right collaterals.  The LIMA to the LAD was atretic.  The saphenous vein graft to the right coronary artery was totally occluded, the saphenous vein graft to the LAD had a proximal 30% stenosis.  She underwent nuclear stress testing 4/6/2022 which revealed left ventricular ejection fraction 80% but there was evidence of moderate size ischemia in the inferior septal  "region.    She denies orthopnea or PND no lower extremity edema.  She is free of syncope near syncope or palpitation.  Echocardiogram 3/22/2021 demonstrates left ventricular ejection fraction 66% mild aortic mitral tricuspid regurgitation, right ventricular systolic pressure less than 35 mmHg.  There is grade 1 diastolic dysfunction.  Objective   Vital Signs:   /84   Pulse 76   Ht 152.4 cm (60\")   Wt 70.3 kg (155 lb)   BMI 30.27 kg/m²     Constitutional:       Appearance: Well-developed.   Eyes:      Conjunctiva/sclera: Conjunctivae normal.      Pupils: Pupils are equal, round, and reactive to light.   HENT:      Head: Normocephalic and atraumatic.   Neck:      Thyroid: No thyromegaly.   Pulmonary:      Effort: Pulmonary effort is normal. No respiratory distress.      Breath sounds: Normal breath sounds. No wheezing. No rales.   Cardiovascular:      Normal rate. Regular rhythm.      Murmurs: There is a grade 1/4 high frequency blowing decrescendo, early diastolic murmur at the URSB, radiating to the apex.      S4 Gallop. No S3 gallop. No rub.   Edema:     Peripheral edema absent.   Abdominal:      General: Bowel sounds are normal. There is no distension.      Palpations: Abdomen is soft. There is no abdominal mass.      Tenderness: There is no abdominal tenderness.   Musculoskeletal: Normal range of motion.      Cervical back: Neck supple. Skin:     General: Skin is warm and dry.      Findings: No erythema.   Neurological:      Mental Status: Alert and oriented to person, place, and time.         Result Review :     Common labs    Common Labsle 12/14/21 12/14/21 12/14/21    1140 1140 1140   Glucose  83    BUN  13    Creatinine  1.01 (A)    eGFR Non  Am  55 (A)    eGFR African Am  63    Sodium  144    Potassium  3.4 (A)    Chloride  105    Calcium  9.0    Total Protein  6.4    Albumin  3.9    Total Bilirubin  0.2    Alkaline Phosphatase  85    AST (SGOT)  16    ALT (SGPT)  11    WBC 5.4   "   Hemoglobin 12.4     Hematocrit 38.4     Platelets 212     Total Cholesterol   194   Triglycerides   124   HDL Cholesterol   57   LDL Cholesterol    115 (A)   (A) Abnormal value       Comments are available for some flowsheets but are not being displayed.                     Assessment and Plan    1. Coronary arteriosclerosis in native artery  Persistent fatigue and limiting dyspnea on exertion, anginal equivalent  - Case Request Cath Lab: Cardiac Catheterization/Vascular Study    2. H/O heart artery stent  Vision stent deployment to the occluded saphenous vein graft to the PDA    3. S/P CABG (coronary artery bypass graft)  2011 LIMA to the LAD and saphenous vein graft to the RCA PDA branch, 2012 repair of LV pseudoaneurysm and VSD with saphenous vein graft to the LAD    4. Mild aortic insufficiency  Compensated    5. Essential hypertension  Stable    6. Grade I diastolic dysfunction  Stable    7. Other hyperlipidemia  Stable    8. Nonsustained ventricular tachycardia (HCC)  Stable    9. Abnormal nuclear stress test  Moderate inferior septal ischemia  - Case Request Cath Lab: Cardiac Catheterization/Vascular Study    10. Anginal equivalent (HCC)  Persistent exertional fatigue and limiting dyspnea      Marleny reports persistent symptoms of fatigue and dyspnea on exertion.  We will proceed to coronary angiography.  Risk and benefits of been explained patient understands and is prepared to proceed.  I spent 45 minutes caring for Marleny on this date of service. This time includes time spent by me in the following activities:preparing for the visit, reviewing tests, performing a medically appropriate examination and/or evaluation , counseling and educating the patient/family/caregiver, ordering medications, tests, or procedures, referring and communicating with other health care professionals , documenting information in the medical record, independently interpreting results and communicating that information with the  patient/family/caregiver and care coordination  Follow Up   No follow-ups on file.  Patient was given instructions and counseling regarding her condition or for health maintenance advice. Please see specific information pulled into the AVS if appropriate.

## 2022-06-15 ENCOUNTER — OFFICE VISIT (OUTPATIENT)
Dept: CARDIOLOGY | Facility: CLINIC | Age: 77
End: 2022-06-15

## 2022-06-15 VITALS
BODY MASS INDEX: 30.43 KG/M2 | HEIGHT: 60 IN | HEART RATE: 76 BPM | SYSTOLIC BLOOD PRESSURE: 122 MMHG | DIASTOLIC BLOOD PRESSURE: 84 MMHG | WEIGHT: 155 LBS

## 2022-06-15 DIAGNOSIS — R94.39 ABNORMAL NUCLEAR STRESS TEST: ICD-10-CM

## 2022-06-15 DIAGNOSIS — I47.29 NONSUSTAINED VENTRICULAR TACHYCARDIA: ICD-10-CM

## 2022-06-15 DIAGNOSIS — I20.8 ANGINAL EQUIVALENT: ICD-10-CM

## 2022-06-15 DIAGNOSIS — Z98.890 HISTORY OF LOOP RECORDER: ICD-10-CM

## 2022-06-15 DIAGNOSIS — I51.89 GRADE I DIASTOLIC DYSFUNCTION: ICD-10-CM

## 2022-06-15 DIAGNOSIS — I25.10 CORONARY ARTERIOSCLEROSIS IN NATIVE ARTERY: Primary | ICD-10-CM

## 2022-06-15 DIAGNOSIS — E78.49 OTHER HYPERLIPIDEMIA: ICD-10-CM

## 2022-06-15 DIAGNOSIS — I10 ESSENTIAL HYPERTENSION: ICD-10-CM

## 2022-06-15 DIAGNOSIS — Z95.5 H/O HEART ARTERY STENT: ICD-10-CM

## 2022-06-15 DIAGNOSIS — Z95.1 S/P CABG (CORONARY ARTERY BYPASS GRAFT): ICD-10-CM

## 2022-06-15 DIAGNOSIS — I35.1 MILD AORTIC INSUFFICIENCY: ICD-10-CM

## 2022-06-15 PROBLEM — I20.89 ANGINAL EQUIVALENT: Status: ACTIVE | Noted: 2022-06-15

## 2022-06-15 PROCEDURE — 99215 OFFICE O/P EST HI 40 MIN: CPT | Performed by: INTERNAL MEDICINE

## 2022-06-16 DIAGNOSIS — E78.49 OTHER HYPERLIPIDEMIA: ICD-10-CM

## 2022-06-17 LAB
ALBUMIN SERPL-MCNC: 3.6 G/DL (ref 3.7–4.7)
ALBUMIN/GLOB SERPL: 1.4 {RATIO} (ref 1.2–2.2)
ALP SERPL-CCNC: 94 IU/L (ref 44–121)
ALT SERPL-CCNC: 8 IU/L (ref 0–32)
AST SERPL-CCNC: 13 IU/L (ref 0–40)
BILIRUB SERPL-MCNC: <0.2 MG/DL (ref 0–1.2)
BUN SERPL-MCNC: 17 MG/DL (ref 8–27)
BUN/CREAT SERPL: 16 (ref 12–28)
CALCIUM SERPL-MCNC: 8.9 MG/DL (ref 8.7–10.3)
CHLORIDE SERPL-SCNC: 106 MMOL/L (ref 96–106)
CHOLEST SERPL-MCNC: 167 MG/DL (ref 100–199)
CHOLEST/HDLC SERPL: 3.2 RATIO (ref 0–4.4)
CO2 SERPL-SCNC: 24 MMOL/L (ref 20–29)
CREAT SERPL-MCNC: 1.05 MG/DL (ref 0.57–1)
EGFRCR SERPLBLD CKD-EPI 2021: 55 ML/MIN/1.73
GLOBULIN SER CALC-MCNC: 2.6 G/DL (ref 1.5–4.5)
GLUCOSE SERPL-MCNC: 89 MG/DL (ref 65–99)
HDLC SERPL-MCNC: 52 MG/DL
LDLC SERPL CALC-MCNC: 97 MG/DL (ref 0–99)
POTASSIUM SERPL-SCNC: 3.8 MMOL/L (ref 3.5–5.2)
PROT SERPL-MCNC: 6.2 G/DL (ref 6–8.5)
SODIUM SERPL-SCNC: 144 MMOL/L (ref 134–144)
TRIGL SERPL-MCNC: 99 MG/DL (ref 0–149)
VLDLC SERPL CALC-MCNC: 18 MG/DL (ref 5–40)

## 2022-06-21 ENCOUNTER — TRANSCRIBE ORDERS (OUTPATIENT)
Dept: ADMINISTRATIVE | Facility: HOSPITAL | Age: 77
End: 2022-06-21

## 2022-06-21 DIAGNOSIS — Z01.818 OTHER SPECIFIED PRE-OPERATIVE EXAMINATION: Primary | ICD-10-CM

## 2022-06-24 DIAGNOSIS — I25.10 CORONARY ARTERIOSCLEROSIS IN NATIVE ARTERY: ICD-10-CM

## 2022-06-24 DIAGNOSIS — Z01.818 PREOP TESTING: Primary | ICD-10-CM

## 2022-06-27 DIAGNOSIS — I10 ESSENTIAL HYPERTENSION: ICD-10-CM

## 2022-06-27 RX ORDER — SPIRONOLACTONE 25 MG/1
TABLET ORAL
Qty: 90 TABLET | Refills: 1 | Status: SHIPPED | OUTPATIENT
Start: 2022-06-27 | End: 2022-12-28 | Stop reason: SDUPTHER

## 2022-06-29 ENCOUNTER — LAB (OUTPATIENT)
Dept: LAB | Facility: HOSPITAL | Age: 77
End: 2022-06-29

## 2022-06-29 DIAGNOSIS — Z01.818 PREOP TESTING: ICD-10-CM

## 2022-06-29 DIAGNOSIS — Z01.818 OTHER SPECIFIED PRE-OPERATIVE EXAMINATION: ICD-10-CM

## 2022-06-29 DIAGNOSIS — I25.10 CORONARY ARTERIOSCLEROSIS IN NATIVE ARTERY: ICD-10-CM

## 2022-06-29 LAB
ALBUMIN SERPL-MCNC: 4.1 G/DL (ref 3.5–5.2)
ALBUMIN/GLOB SERPL: 1.5 G/DL
ALP SERPL-CCNC: 94 U/L (ref 39–117)
ALT SERPL W P-5'-P-CCNC: 7 U/L (ref 1–33)
ANION GAP SERPL CALCULATED.3IONS-SCNC: 11 MMOL/L (ref 5–15)
AST SERPL-CCNC: 12 U/L (ref 1–32)
BASOPHILS # BLD AUTO: 0.05 10*3/MM3 (ref 0–0.2)
BASOPHILS NFR BLD AUTO: 0.8 % (ref 0–1.5)
BILIRUB SERPL-MCNC: 0.2 MG/DL (ref 0–1.2)
BUN SERPL-MCNC: 18 MG/DL (ref 8–23)
BUN/CREAT SERPL: 18 (ref 7–25)
CALCIUM SPEC-SCNC: 9 MG/DL (ref 8.6–10.5)
CHLORIDE SERPL-SCNC: 106 MMOL/L (ref 98–107)
CO2 SERPL-SCNC: 26 MMOL/L (ref 22–29)
CREAT SERPL-MCNC: 1 MG/DL (ref 0.57–1)
DEPRECATED RDW RBC AUTO: 41.8 FL (ref 37–54)
EGFRCR SERPLBLD CKD-EPI 2021: 58.9 ML/MIN/1.73
EOSINOPHIL # BLD AUTO: 0.17 10*3/MM3 (ref 0–0.4)
EOSINOPHIL NFR BLD AUTO: 2.7 % (ref 0.3–6.2)
ERYTHROCYTE [DISTWIDTH] IN BLOOD BY AUTOMATED COUNT: 14 % (ref 12.3–15.4)
GLOBULIN UR ELPH-MCNC: 2.8 GM/DL
GLUCOSE SERPL-MCNC: 95 MG/DL (ref 65–99)
HCT VFR BLD AUTO: 37.9 % (ref 34–46.6)
HGB BLD-MCNC: 12.6 G/DL (ref 12–15.9)
IMM GRANULOCYTES # BLD AUTO: 0.03 10*3/MM3 (ref 0–0.05)
IMM GRANULOCYTES NFR BLD AUTO: 0.5 % (ref 0–0.5)
INR PPP: 1.01 (ref 0.9–1.1)
LYMPHOCYTES # BLD AUTO: 1.71 10*3/MM3 (ref 0.7–3.1)
LYMPHOCYTES NFR BLD AUTO: 27.5 % (ref 19.6–45.3)
MCH RBC QN AUTO: 27.6 PG (ref 26.6–33)
MCHC RBC AUTO-ENTMCNC: 33.2 G/DL (ref 31.5–35.7)
MCV RBC AUTO: 83.1 FL (ref 79–97)
MONOCYTES # BLD AUTO: 0.7 10*3/MM3 (ref 0.1–0.9)
MONOCYTES NFR BLD AUTO: 11.3 % (ref 5–12)
NEUTROPHILS NFR BLD AUTO: 3.55 10*3/MM3 (ref 1.7–7)
NEUTROPHILS NFR BLD AUTO: 57.2 % (ref 42.7–76)
NRBC BLD AUTO-RTO: 0 /100 WBC (ref 0–0.2)
PLATELET # BLD AUTO: 235 10*3/MM3 (ref 140–450)
PMV BLD AUTO: 9.7 FL (ref 6–12)
POTASSIUM SERPL-SCNC: 3.8 MMOL/L (ref 3.5–5.2)
PROT SERPL-MCNC: 6.9 G/DL (ref 6–8.5)
PROTHROMBIN TIME: 13.2 SECONDS (ref 11.7–14.2)
RBC # BLD AUTO: 4.56 10*6/MM3 (ref 3.77–5.28)
SARS-COV-2 ORF1AB RESP QL NAA+PROBE: NOT DETECTED
SODIUM SERPL-SCNC: 143 MMOL/L (ref 136–145)
WBC NRBC COR # BLD: 6.21 10*3/MM3 (ref 3.4–10.8)

## 2022-06-29 PROCEDURE — 85610 PROTHROMBIN TIME: CPT

## 2022-06-29 PROCEDURE — U0005 INFEC AGEN DETEC AMPLI PROBE: HCPCS

## 2022-06-29 PROCEDURE — C9803 HOPD COVID-19 SPEC COLLECT: HCPCS

## 2022-06-29 PROCEDURE — 85025 COMPLETE CBC W/AUTO DIFF WBC: CPT

## 2022-06-29 PROCEDURE — 36415 COLL VENOUS BLD VENIPUNCTURE: CPT

## 2022-06-29 PROCEDURE — 80053 COMPREHEN METABOLIC PANEL: CPT

## 2022-06-29 PROCEDURE — U0004 COV-19 TEST NON-CDC HGH THRU: HCPCS

## 2022-06-30 ENCOUNTER — OFFICE VISIT (OUTPATIENT)
Dept: INTERNAL MEDICINE | Facility: CLINIC | Age: 77
End: 2022-06-30

## 2022-06-30 VITALS
TEMPERATURE: 97.3 F | HEART RATE: 76 BPM | HEIGHT: 60 IN | DIASTOLIC BLOOD PRESSURE: 90 MMHG | WEIGHT: 155 LBS | BODY MASS INDEX: 30.43 KG/M2 | SYSTOLIC BLOOD PRESSURE: 148 MMHG

## 2022-06-30 DIAGNOSIS — E78.49 OTHER HYPERLIPIDEMIA: ICD-10-CM

## 2022-06-30 DIAGNOSIS — R94.39 ABNORMAL NUCLEAR STRESS TEST: Primary | ICD-10-CM

## 2022-06-30 DIAGNOSIS — R51.9 CHRONIC DAILY HEADACHE: ICD-10-CM

## 2022-06-30 DIAGNOSIS — I10 ESSENTIAL HYPERTENSION: Chronic | ICD-10-CM

## 2022-06-30 DIAGNOSIS — Z00.00 MEDICARE ANNUAL WELLNESS VISIT, SUBSEQUENT: ICD-10-CM

## 2022-06-30 PROCEDURE — 1159F MED LIST DOCD IN RCRD: CPT | Performed by: INTERNAL MEDICINE

## 2022-06-30 PROCEDURE — 1170F FXNL STATUS ASSESSED: CPT | Performed by: INTERNAL MEDICINE

## 2022-06-30 PROCEDURE — G0439 PPPS, SUBSEQ VISIT: HCPCS | Performed by: INTERNAL MEDICINE

## 2022-06-30 NOTE — PROGRESS NOTES
The ABCs of the Annual Wellness Visit  Subsequent Medicare Wellness Visit    Chief Complaint   Patient presents with   • Medicare Wellness-subsequent      Subjective    History of Present Illness:  Marleny Greenwood is a 76 y.o. female who presents for a Subsequent Medicare Wellness Visit.  Has had some increased SOB lately, has cardiac cath tomorrow-   Doing well from her spinal stenosis- last inj 12/2020  Taking butalbital prn - usually no more than 1x per week.     The following portions of the patient's history were reviewed and   updated as appropriate: allergies, current medications, past family history, past medical history, past social history, past surgical history and problem list.    Compared to one year ago, the patient feels her physical   health is the same.    Compared to one year ago, the patient feels her mental   health is the same.    Recent Hospitalizations:  She was not admitted to the hospital during the last year.       Current Medical Providers:  Patient Care Team:  Liz Zaragoza MD as PCP - General (Internal Medicine)  Mal Moser MD as Consulting Physician (Cardiology)    Outpatient Medications Prior to Visit   Medication Sig Dispense Refill   • albuterol sulfate  (90 Base) MCG/ACT inhaler Inhale 2 puffs Every 4 (Four) Hours As Needed for Wheezing. 18 g 2   • amLODIPine (NORVASC) 10 MG tablet Take 10 mg by mouth Daily.     • aspirin 81 MG EC tablet Take 81 mg by mouth Daily.     • atorvastatin (LIPITOR) 40 MG tablet TAKE 1 TABLET DAILY 90 tablet 3   • butalbital-aspirin-caffeine-codeine (Fiorinal/Codeine #3) -20-30 MG capsule Take 1 capsule by mouth Every 4 (Four) Hours As Needed for Headache. 120 capsule 1   • carvedilol (COREG) 12.5 MG tablet Take 1 tablet by mouth 2 (Two) Times a Day. 180 tablet 3   • clopidogrel (PLAVIX) 75 MG tablet TAKE 1 TABLET DAILY 90 tablet 3   • cyclobenzaprine (FLEXERIL) 10 MG tablet Take 1 tablet by mouth 3 (Three) Times a Day As Needed for  Muscle Spasms. 30 tablet 1   • Denosumab (PROLIA SC) Inject  under the skin into the appropriate area as directed. One injection q6 months     • FeroSul 325 (65 Fe) MG tablet TAKE ONE TABLET BY MOUTH DAILY WITH BREAKFAST 90 tablet 2   • furosemide (LASIX) 40 MG tablet Take 1 tablet by mouth 2 (Two) Times a Day. (Patient taking differently: Take 60 mg by mouth 2 (Two) Times a Day.) 180 tablet 1   • hydrALAZINE (APRESOLINE) 50 MG tablet Take 1 tablet by mouth 2 (two) times a day. 180 tablet 3   • HYDROcodone-homatropine (HYCODAN) 5-1.5 MG/5ML syrup Take 5 mL by mouth Every 6 (Six) Hours As Needed for Cough.     • isosorbide mononitrate (IMDUR) 60 MG 24 hr tablet TAKE 1 TABLET DAILY 90 tablet 3   • Multiple Vitamins-Minerals (PRESERVISION AREDS 2 PO) Take  by mouth.     • nitroglycerin (NITROSTAT) 0.4 MG SL tablet Place 1 tablet under the tongue Every 5 (Five) Minutes As Needed for Chest Pain. Take no more than 3 doses in 15 minutes. 25 tablet 3   • omeprazole (priLOSEC) 20 MG capsule TAKE 1 CAPSULE DAILY 90 capsule 3   • polyethylene glycol (MIRALAX) packet Take 17 g by mouth Daily.     • potassium chloride ER (K-TAB) 20 MEQ tablet controlled-release ER tablet TAKE 3 TABLETS DAILY 270 tablet 1   • promethazine (PHENERGAN) 25 MG tablet Take 1 tablet by mouth Every 6 (Six) Hours As Needed for Nausea or Vomiting. 90 tablet 3   • ranolazine (RANEXA) 500 MG 12 hr tablet TAKE 1 TABLET TWICE A  tablet 3   • spironolactone (ALDACTONE) 25 MG tablet TAKE 1 TABLET DAILY 90 tablet 1   • vitamin D (ERGOCALCIFEROL) 1.25 MG (01348 UT) capsule capsule TAKE 1 CAPSULE ONCE WEEKLY 12 capsule 3   • carvedilol (COREG) 12.5 MG tablet Take 12.5 mg by mouth 2 (Two) Times a Day With Meals.     • Multiple Vitamins-Minerals (OCUVITE PO) Take 1 tablet by mouth Daily.       No facility-administered medications prior to visit.       Opioid medication/s are on active medication list.  and I have evaluated her active treatment plan and pain  "score trends (see table).  There were no vitals filed for this visit.  I have reviewed the chart for potential of high risk medication and harmful drug interactions in the elderly.            Aspirin is on active medication list. Aspirin use is indicated based on review of current medical condition/s. Pros and cons of this therapy have been discussed today. Benefits of this medication outweigh potential harm.  Patient has been encouraged to continue taking this medication.  .      Patient Active Problem List   Diagnosis   • Asthma, cough variant   • Coronary arteriosclerosis in native artery   • Essential hypertension   • Chronic daily headache   • Nodule of upper lobe of right lung   • Other hyperlipidemia   • Nonsustained ventricular tachycardia (HCC)   • History of cardiac cath   • History of loop recorder   • H/O heart artery stent   • S/P CABG (coronary artery bypass graft)   • H/O echocardiogram   • History of nuclear stress test   • Age-related osteoporosis without current pathological fracture   • Mild aortic insufficiency   • Grade I diastolic dysfunction   • Renal insufficiency, mild   • Abnormal nuclear stress test   • Anginal equivalent (HCC)     Advance Care Planning  Advance Directive is not on file.  ACP discussion was held with the patient during this visit. Patient has an advance directive (not in EMR), copy requested.    Review of Systems   HENT: Negative for congestion and trouble swallowing.    Respiratory: Positive for shortness of breath. Negative for cough.    Cardiovascular: Negative for chest pain, palpitations and leg swelling.   Gastrointestinal: Negative for abdominal pain.   Genitourinary: Negative for difficulty urinating.   Psychiatric/Behavioral: Negative for dysphoric mood.        Objective    Vitals:    06/30/22 1332   BP: 148/90   Pulse: 76   Temp: 97.3 °F (36.3 °C)   Weight: 70.3 kg (155 lb)   Height: 152.4 cm (60\")     Estimated body mass index is 30.27 kg/m² as calculated from " "the following:    Height as of this encounter: 152.4 cm (60\").    Weight as of this encounter: 70.3 kg (155 lb).    BMI is >= 30 and <35. (Class 1 Obesity). The following options were offered after discussion;: exercise counseling/recommendations      Does the patient have evidence of cognitive impairment? No    Physical Exam  Constitutional:       Appearance: Normal appearance.   Cardiovascular:      Rate and Rhythm: Normal rate and regular rhythm.   Pulmonary:      Effort: Pulmonary effort is normal.      Breath sounds: Normal breath sounds.   Musculoskeletal:      Right lower leg: No edema.      Left lower leg: No edema.   Neurological:      General: No focal deficit present.   Psychiatric:         Mood and Affect: Mood normal.         Thought Content: Thought content normal.       Lab Results   Component Value Date    CHLPL 167 06/16/2022    TRIG 99 06/16/2022    HDL 52 06/16/2022    LDL 97 06/16/2022    VLDL 18 06/16/2022            HEALTH RISK ASSESSMENT    Smoking Status:  Social History     Tobacco Use   Smoking Status Never Smoker   Smokeless Tobacco Never Used     Alcohol Consumption:  Social History     Substance and Sexual Activity   Alcohol Use No     Fall Risk Screen:    STEADI Fall Risk Assessment was completed, and patient is at LOW risk for falls.Assessment completed on:6/30/2022    Depression Screening:  PHQ-2/PHQ-9 Depression Screening 6/30/2022   Retired PHQ-9 Total Score -   Retired Total Score -   Little Interest or Pleasure in Doing Things 0-->not at all   Feeling Down, Depressed or Hopeless 0-->not at all   PHQ-9: Brief Depression Severity Measure Score 0       Health Habits and Functional and Cognitive Screening:  Functional & Cognitive Status 6/30/2022   Do you have difficulty preparing food and eating? No   Do you have difficulty bathing yourself, getting dressed or grooming yourself? No   Do you have difficulty using the toilet? No   Do you have difficulty moving around from place to " place? No   Do you have trouble with steps or getting out of a bed or a chair? No   Current Diet Well Balanced Diet   Dental Exam Up to date   Eye Exam Up to date   Exercise (times per week) 0 times per week   Current Exercises Include No Regular Exercise   Do you need help using the phone?  No   Are you deaf or do you have serious difficulty hearing?  No   Do you need help with transportation? No   Do you need help shopping? No   Do you need help preparing meals?  No   Do you need help with housework?  No   Do you need help with laundry? No   Do you need help taking your medications? No   Do you need help managing money? No   Do you ever drive or ride in a car without wearing a seat belt? No   Have you felt unusual stress, anger or loneliness in the last month? No   Who do you live with? Spouse   If you need help, do you have trouble finding someone available to you? No   Have you been bothered in the last four weeks by sexual problems? No   Do you have difficulty concentrating, remembering or making decisions? No       Age-appropriate Screening Schedule:  Refer to the list below for future screening recommendations based on patient's age, sex and/or medical conditions. Orders for these recommended tests are listed in the plan section. The patient has been provided with a written plan.    Health Maintenance   Topic Date Due   • TDAP/TD VACCINES (1 - Tdap) Never done   • INFLUENZA VACCINE  10/01/2022   • LIPID PANEL  06/16/2023   • DXA SCAN  09/30/2023   • ZOSTER VACCINE  Completed              Assessment & Plan   CMS Preventative Services Quick Reference  Risk Factors Identified During Encounter  None Identified  The above risks/problems have been discussed with the patient.  Follow up actions/plans if indicated are seen below in the Assessment/Plan Section.  Pertinent information has been shared with the patient in the After Visit Summary.    Diagnoses and all orders for this visit:    1. Abnormal nuclear stress  test (Primary)  Comments:  reviewed records- will follow with cath tomorrow    2. Essential hypertension  Comments:  a little high today- anxious about tomorrow- will recheck tomorrow and call if remains elevated.     3. Other hyperlipidemia  Comments:  last check LDL high, recheck with f/u, may need to increase atorvastatin    4. Chronic daily headache  Comments:  improved-     5. Medicare annual wellness visit, subsequent  Comments:  needs TDap, at pharmacy.  Otherwise doing much better- no change- follow cath. Walk for exercise as tolerated.         Follow Up:   Return in about 4 months (around 10/30/2022) for Recheck, Lab Before FUP.     An After Visit Summary and PPPS were made available to the patient.

## 2022-07-01 ENCOUNTER — HOSPITAL ENCOUNTER (OUTPATIENT)
Facility: HOSPITAL | Age: 77
Setting detail: HOSPITAL OUTPATIENT SURGERY
Discharge: HOME | End: 2022-07-01
Attending: INTERNAL MEDICINE | Admitting: INTERNAL MEDICINE

## 2022-07-01 VITALS
HEIGHT: 60 IN | TEMPERATURE: 97.5 F | DIASTOLIC BLOOD PRESSURE: 70 MMHG | RESPIRATION RATE: 16 BRPM | BODY MASS INDEX: 30.23 KG/M2 | SYSTOLIC BLOOD PRESSURE: 120 MMHG | OXYGEN SATURATION: 97 % | HEART RATE: 68 BPM | WEIGHT: 154 LBS

## 2022-07-01 DIAGNOSIS — R94.39 ABNORMAL NUCLEAR STRESS TEST: ICD-10-CM

## 2022-07-01 DIAGNOSIS — I25.10 CORONARY ARTERIOSCLEROSIS IN NATIVE ARTERY: ICD-10-CM

## 2022-07-01 PROCEDURE — 25010000002 HEPARIN (PORCINE) PER 1000 UNITS: Performed by: INTERNAL MEDICINE

## 2022-07-01 PROCEDURE — 93459 L HRT ART/GRFT ANGIO: CPT | Performed by: INTERNAL MEDICINE

## 2022-07-01 PROCEDURE — C1887 CATHETER, GUIDING: HCPCS | Performed by: INTERNAL MEDICINE

## 2022-07-01 PROCEDURE — 0 IOPAMIDOL PER 1 ML: Performed by: INTERNAL MEDICINE

## 2022-07-01 PROCEDURE — C1894 INTRO/SHEATH, NON-LASER: HCPCS | Performed by: INTERNAL MEDICINE

## 2022-07-01 PROCEDURE — C1769 GUIDE WIRE: HCPCS | Performed by: INTERNAL MEDICINE

## 2022-07-01 PROCEDURE — 25010000002 MIDAZOLAM PER 1 MG: Performed by: INTERNAL MEDICINE

## 2022-07-01 PROCEDURE — 25010000002 FENTANYL CITRATE (PF) 50 MCG/ML SOLUTION: Performed by: INTERNAL MEDICINE

## 2022-07-01 RX ORDER — LIDOCAINE HYDROCHLORIDE 20 MG/ML
INJECTION, SOLUTION INFILTRATION; PERINEURAL AS NEEDED
Status: DISCONTINUED | OUTPATIENT
Start: 2022-07-01 | End: 2022-07-01 | Stop reason: HOSPADM

## 2022-07-01 RX ORDER — FENTANYL CITRATE 50 UG/ML
INJECTION, SOLUTION INTRAMUSCULAR; INTRAVENOUS AS NEEDED
Status: DISCONTINUED | OUTPATIENT
Start: 2022-07-01 | End: 2022-07-01 | Stop reason: HOSPADM

## 2022-07-01 RX ORDER — ACETAMINOPHEN 325 MG/1
650 TABLET ORAL EVERY 4 HOURS PRN
Status: DISCONTINUED | OUTPATIENT
Start: 2022-07-01 | End: 2022-07-01 | Stop reason: HOSPADM

## 2022-07-01 RX ORDER — ONDANSETRON 4 MG/1
4 TABLET, FILM COATED ORAL EVERY 6 HOURS PRN
Status: DISCONTINUED | OUTPATIENT
Start: 2022-07-01 | End: 2022-07-01 | Stop reason: HOSPADM

## 2022-07-01 RX ORDER — SODIUM CHLORIDE 0.9 % (FLUSH) 0.9 %
10 SYRINGE (ML) INJECTION EVERY 12 HOURS SCHEDULED
Status: DISCONTINUED | OUTPATIENT
Start: 2022-07-01 | End: 2022-07-01 | Stop reason: HOSPADM

## 2022-07-01 RX ORDER — SODIUM CHLORIDE 9 MG/ML
75 INJECTION, SOLUTION INTRAVENOUS CONTINUOUS
Status: DISCONTINUED | OUTPATIENT
Start: 2022-07-01 | End: 2022-07-01 | Stop reason: HOSPADM

## 2022-07-01 RX ORDER — SODIUM CHLORIDE 0.9 % (FLUSH) 0.9 %
10 SYRINGE (ML) INJECTION AS NEEDED
Status: DISCONTINUED | OUTPATIENT
Start: 2022-07-01 | End: 2022-07-01 | Stop reason: HOSPADM

## 2022-07-01 RX ORDER — MIDAZOLAM HYDROCHLORIDE 1 MG/ML
INJECTION INTRAMUSCULAR; INTRAVENOUS AS NEEDED
Status: DISCONTINUED | OUTPATIENT
Start: 2022-07-01 | End: 2022-07-01 | Stop reason: HOSPADM

## 2022-07-01 RX ORDER — HYDROCODONE BITARTRATE AND ACETAMINOPHEN 5; 325 MG/1; MG/1
1 TABLET ORAL EVERY 4 HOURS PRN
Status: DISCONTINUED | OUTPATIENT
Start: 2022-07-01 | End: 2022-07-01 | Stop reason: HOSPADM

## 2022-07-01 RX ORDER — ONDANSETRON 2 MG/ML
4 INJECTION INTRAMUSCULAR; INTRAVENOUS EVERY 6 HOURS PRN
Status: DISCONTINUED | OUTPATIENT
Start: 2022-07-01 | End: 2022-07-01 | Stop reason: HOSPADM

## 2022-07-01 RX ORDER — SODIUM CHLORIDE 9 MG/ML
50 INJECTION, SOLUTION INTRAVENOUS CONTINUOUS
Status: DISCONTINUED | OUTPATIENT
Start: 2022-07-01 | End: 2022-07-01 | Stop reason: HOSPADM

## 2022-07-01 RX ADMIN — SODIUM CHLORIDE 75 ML/HR: 9 INJECTION, SOLUTION INTRAVENOUS at 08:34

## 2022-07-01 NOTE — DISCHARGE INSTRUCTIONS
[facility]  [address] [place] [address]    Coronary Angiogram (Radial/Ulnar Approach) After Care    Refer to this sheet in the next few weeks. These instructions provide you with information on caring for yourself after your procedure. Your caregiver may also give you more specific instructions. Your treatment has been planned according to current medical practices, but problems sometimes occur. Call your caregiver if you have any problems or questions after your procedure.    Home Care Instructions:  You may shower the day after the procedure. Remove the bandage (dressing) and gently wash the site with plain soap and water. Gently pat the site dry. You may apply a band aid daily for 2 days if desired.    Do not apply powder or lotion to the site.  Do not submerge the affected site in water for 3 to 5 days or until the site is completely healed.   Do not lift, push or pull anything over 5 pounds for 5 days after your procedure or as directed by your physician.  As a reference, a gallon of milk weighs 8 pounds.   Inspect the site at least twice daily. You may notice some bruising at the site and it may be tender for 1 to 2 weeks.     Increase your fluid intake for the next 2 days.    Keep arm elevated for 24 hours. For the remainder of the day, keep your arm in “Pledge of Allegiance” position when up and about.     You may drive 24 hours after the procedure unless otherwise instructed by your caregiver.  Do not operate machinery or power tools for 24 hours.  A responsible adult should be with you for the first 24 hours after you arrive home. Do not make any important legal decisions or sign legal papers for 24 hours.  Do not drink alcohol for 24 hours.    Metformin or any medications containing Metformin should not be taken for 48 hours after your procedure.      Call Your Doctor if:   You have unusual pain at the radial/ulnar (wrist) site.  You have redness, warmth, swelling, or pain at the  radial/ulnar (wrist) site.  You have drainage (other than a small amount of blood on the dressing).  `You have chills or a fever > 101.  Your arm becomes pale or dark, cool, tingly, or numb.  You develop chest pain, shortness of breath, feel faint or pass out.    You have heavy bleeding from the site, hold pressure on the site for 20 minutes.  If the bleeding stops, apply a fresh bandage and call your cardiologist.  However, if you        continue to have bleeding, call 911 and continue to apply pressure to the site.   You have any symptoms of a stroke.  Remember BE FAST  B-balance. Sudden trouble walking or loss of balance.  E-eyes.  Sudden changes in how you see or a sudden onset of a very bad headache.   F-face. Sudden weakness or loss of feeling of the face or facial droop on one side.   A-arms Sudden weakness or numbness in one arm.  One arm drifts down if they are both held out in front of you. This happens suddenly and usually on one side of the body.   S-speech.  Sudden trouble speaking, slurred speech or trouble understanding what are saying.   T-time  Time to call emergency services.  Write down the symptoms and the time they started.

## 2022-07-07 ENCOUNTER — TELEPHONE (OUTPATIENT)
Dept: INTERNAL MEDICINE | Facility: CLINIC | Age: 77
End: 2022-07-07

## 2022-07-07 NOTE — TELEPHONE ENCOUNTER
Spoke with patient- she started feeling bad Saturday evening, tested positive at home Monday. Went Tuesday to the  on Tuesday and was called today with positive results. She states she is overall feeling fine and has no cough, SOA or fever. I advised her to treat sx and to call if worsens. Do you advise anything else?

## 2022-07-07 NOTE — TELEPHONE ENCOUNTER
Caller: Marleny Greenwood    Relationship: Self    Best call back number:[phone]    Who are you requesting to speak with (clinical staff, provider,  specific staff member): DR. BARBOSA    What was the call regarding: PATIENT WOULD NOT TELL THE HUB WHAT IT WAS REGARDING BUT REQUESTS CALL TODAY OR TOMORROW.     Do you require a callback: YES

## 2022-07-28 RX ORDER — ISOSORBIDE MONONITRATE 60 MG/1
TABLET, EXTENDED RELEASE ORAL
Qty: 90 TABLET | Refills: 3 | Status: SHIPPED | OUTPATIENT
Start: 2022-07-28

## 2022-07-28 NOTE — PROGRESS NOTES
"Chief Complaint  Coronary Artery Disease (6 wk Heart cath f/u )    Subjective    History of Present Illness      I saw Marleny Greenwood today for cardiovascular care.  She is a pleasant 76-year-old female who reports continued and chronic dyspnea on exertion.  She is free of orthopnea or PND no lower extremity edema.  She does not report chest pain pressure or tightness.  She denies syncope near syncope or palpitations.  She does have a history of asthma.  Echocardiogram3/22/2021 revealed left ventricular ejection fraction 66%, grade 1 diastolic dysfunction, mild aortic mitral tricuspid regurgitation.  Right ventricular systolic pressure was less than 35 mmHg.  She underwent nuclear stress testing 4/6/2022 which again demonstrated preserved left ventricular contractility there was a small to moderate sized area of possible ischemia in the inferior septal region.  She subsequently underwent coronary angiography 7/1/2022.  This demonstrated normal left main, LAD with a mid total occlusion, D1 60%.  LIMA to the LAD is atretic.  There is a saphenous vein graft to the LAD which is patent and the distal to apical LAD is small in caliber with 90% stenosis not suitable for intervention.  Circumflex has 40% stenosis, right coronary artery diffusely diseased with a chronic total occlusion in the distal segment.  Saphenous vein graft to the right coronary artery is occluded.  Left ventricular end-diastolic pressure was 13.  Recommendation was continued medical treatment.    Objective   Vital Signs:   /80   Pulse 83   Ht 152.4 cm (60\")   Wt 69.9 kg (154 lb)   BMI 30.08 kg/m²     Constitutional:       Appearance: Well-developed.   Eyes:      Conjunctiva/sclera: Conjunctivae normal.      Pupils: Pupils are equal, round, and reactive to light.   HENT:      Head: Normocephalic and atraumatic.   Neck:      Thyroid: No thyromegaly.   Pulmonary:      Effort: Pulmonary effort is normal. No respiratory distress.      Breath " sounds: Normal breath sounds. No wheezing. No rales.   Cardiovascular:      Normal rate. Regular rhythm.      Murmurs: There is a grade 1/4 high frequency blowing decrescendo, early diastolic murmur at the URSB, radiating to the apex.      S4 Gallop. No S3 gallop. No rub.   Edema:     Peripheral edema absent.   Abdominal:      General: Bowel sounds are normal. There is no distension.      Palpations: Abdomen is soft. There is no abdominal mass.      Tenderness: There is no abdominal tenderness.   Musculoskeletal: Normal range of motion.      Cervical back: Neck supple. Skin:     General: Skin is warm and dry.      Findings: No erythema.   Neurological:      Mental Status: Alert and oriented to person, place, and time.         Result Review :     Common labs    Common Labsle 12/14/21 12/14/21 12/14/21 6/16/22 6/16/22 6/29/22 6/29/22    1140 1140 1140 1052 1052 1018 1018   Glucose  83  89   95   BUN  13  17   18   Creatinine  1.01 (A)  1.05 (A)   1.00   eGFR Non African Am  55 (A)        eGFR African Am  63        Sodium  144  144   143   Potassium  3.4 (A)  3.8   3.8   Chloride  105  106   106   Calcium  9.0  8.9   9.0   Total Protein  6.4  6.2      Albumin  3.9  3.6 (A)   4.10   Total Bilirubin  0.2  <0.2   0.2   Alkaline Phosphatase  85  94   94   AST (SGOT)  16  13   12   ALT (SGPT)  11  8   7   WBC 5.4     6.21    Hemoglobin 12.4     12.6    Hematocrit 38.4     37.9    Platelets 212     235    Total Cholesterol   194  167     Triglycerides   124  99     HDL Cholesterol   57  52     LDL Cholesterol    115 (A)  97     (A) Abnormal value       Comments are available for some flowsheets but are not being displayed.                     Assessment and Plan    1. Coronary arteriosclerosis in native artery  Free of angina    2. S/P CABG (coronary artery bypass graft)  Free of angina    3. H/O heart artery stent  Free of angina    4. Mild aortic insufficiency  Compensated    5. Essential hypertension  Controlled    6.  Grade I diastolic dysfunction  Compensated    7. Other hyperlipidemia  Stable    8. Respiratory insufficiency  Persistent    Marleny is free of angina but does report persistent dyspnea on exertion.  I reviewed her coronary angiography and echocardiogram with her.  Her blood pressure is adequately controlled and she is on optimal medical management.  She is due to undergo pulmonary evaluation in the near future.  I do not have an etiology from the cardiovascular standpoint for her dyspnea on exertion.  I will plan to see him in follow-up in 6 months sooner if needed.        Follow Up   No follow-ups on file.  Patient was given instructions and counseling regarding her condition or for health maintenance advice. Please see specific information pulled into the AVS if appropriate.

## 2022-07-29 ENCOUNTER — TELEPHONE (OUTPATIENT)
Dept: INTERNAL MEDICINE | Facility: CLINIC | Age: 77
End: 2022-07-29

## 2022-07-29 ENCOUNTER — OFFICE VISIT (OUTPATIENT)
Dept: CARDIOLOGY | Facility: CLINIC | Age: 77
End: 2022-07-29

## 2022-07-29 VITALS
BODY MASS INDEX: 30.23 KG/M2 | HEIGHT: 60 IN | HEART RATE: 83 BPM | WEIGHT: 154 LBS | SYSTOLIC BLOOD PRESSURE: 135 MMHG | DIASTOLIC BLOOD PRESSURE: 80 MMHG

## 2022-07-29 DIAGNOSIS — Z95.5 H/O HEART ARTERY STENT: ICD-10-CM

## 2022-07-29 DIAGNOSIS — I25.10 CORONARY ARTERIOSCLEROSIS IN NATIVE ARTERY: Primary | ICD-10-CM

## 2022-07-29 DIAGNOSIS — E78.49 OTHER HYPERLIPIDEMIA: ICD-10-CM

## 2022-07-29 DIAGNOSIS — I35.1 MILD AORTIC INSUFFICIENCY: ICD-10-CM

## 2022-07-29 DIAGNOSIS — I10 ESSENTIAL HYPERTENSION: ICD-10-CM

## 2022-07-29 DIAGNOSIS — R51.9 CHRONIC DAILY HEADACHE: ICD-10-CM

## 2022-07-29 DIAGNOSIS — R06.89 RESPIRATORY INSUFFICIENCY: ICD-10-CM

## 2022-07-29 DIAGNOSIS — I51.89 GRADE I DIASTOLIC DYSFUNCTION: ICD-10-CM

## 2022-07-29 DIAGNOSIS — Z95.1 S/P CABG (CORONARY ARTERY BYPASS GRAFT): ICD-10-CM

## 2022-07-29 PROCEDURE — 99214 OFFICE O/P EST MOD 30 MIN: CPT | Performed by: INTERNAL MEDICINE

## 2022-08-02 RX ORDER — CODEINE/BUTALBITAL/ASA/CAFFEIN 30-50-325
1 CAPSULE ORAL EVERY 4 HOURS PRN
Qty: 120 CAPSULE | Refills: 1 | Status: SHIPPED | OUTPATIENT
Start: 2022-08-02 | End: 2022-12-15 | Stop reason: SDUPTHER

## 2022-08-05 DIAGNOSIS — E87.70 HYPERVOLEMIA, UNSPECIFIED HYPERVOLEMIA TYPE: ICD-10-CM

## 2022-08-05 RX ORDER — FUROSEMIDE 40 MG/1
TABLET ORAL
Qty: 180 TABLET | Refills: 1 | Status: SHIPPED | OUTPATIENT
Start: 2022-08-05 | End: 2023-02-07 | Stop reason: SDUPTHER

## 2022-08-10 ENCOUNTER — OFFICE VISIT (OUTPATIENT)
Dept: INTERNAL MEDICINE | Facility: CLINIC | Age: 77
End: 2022-08-10

## 2022-08-10 ENCOUNTER — TELEPHONE (OUTPATIENT)
Dept: INTERNAL MEDICINE | Facility: CLINIC | Age: 77
End: 2022-08-10

## 2022-08-10 VITALS
HEIGHT: 60 IN | BODY MASS INDEX: 30.23 KG/M2 | OXYGEN SATURATION: 96 % | RESPIRATION RATE: 10 BRPM | TEMPERATURE: 99.1 F | SYSTOLIC BLOOD PRESSURE: 152 MMHG | WEIGHT: 154 LBS | DIASTOLIC BLOOD PRESSURE: 84 MMHG | HEART RATE: 90 BPM

## 2022-08-10 DIAGNOSIS — B34.9 VIRAL ILLNESS: Primary | ICD-10-CM

## 2022-08-10 PROCEDURE — 99213 OFFICE O/P EST LOW 20 MIN: CPT | Performed by: INTERNAL MEDICINE

## 2022-08-10 NOTE — TELEPHONE ENCOUNTER
Patient called wanting to speak with Liz (MA) patient states she is not feeling well and has noticed she is sleeping a lot lately,  first of July she had Covid and it seems like it went away and now she feels like she may have it again, because she feels the same way  She did when she had Covid, please advise?  [phone]

## 2022-08-10 NOTE — PROGRESS NOTES
"Chief Complaint  Generalized Body Aches    Subjective        Marleny Greenwood presents to [facility]  History of Present Illness  Hasn't felt well for a few days- like she did with flu in the past.  Body aches, nofever, no n/v.  Eating and drinking normally.  No sinus congestion.  Taking aspirin prn.  She had Covid early July- did not take any antiviral meds. Has been doing well since then.     Objective   Vital Signs:  /84   Pulse 90   Temp 99.1 °F (37.3 °C)   Resp 10   Ht 152.4 cm (60\")   Wt 69.9 kg (154 lb)   SpO2 96%   BMI 30.08 kg/m²   Estimated body mass index is 30.08 kg/m² as calculated from the following:    Height as of this encounter: 152.4 cm (60\").    Weight as of this encounter: 69.9 kg (154 lb).          Physical Exam  Constitutional:       Appearance: She is ill-appearing.   HENT:      Nose: No congestion or rhinorrhea.      Mouth/Throat:      Mouth: Mucous membranes are moist.      Pharynx: No posterior oropharyngeal erythema.   Eyes:      Conjunctiva/sclera: Conjunctivae normal.   Cardiovascular:      Rate and Rhythm: Normal rate and regular rhythm.   Pulmonary:      Effort: Pulmonary effort is normal.      Breath sounds: Normal breath sounds.   Abdominal:      Tenderness: There is no abdominal tenderness.   Musculoskeletal:      Right lower leg: No edema.      Left lower leg: No edema.   Lymphadenopathy:      Cervical: No cervical adenopathy.        Result Review :                Assessment and Plan   Diagnoses and all orders for this visit:    1. Viral illness (Primary)  Comments:  Covid, flu, strep negative- certainly sounds like a viral illness- recommend rest, fluids, treat symptoms and reassess if changes/fails to improve.              Follow Up   No follow-ups on file.  Patient was given instructions and counseling regarding her condition or for health maintenance advice. Please see specific information pulled into the AVS if appropriate.       "

## 2022-10-12 RX ORDER — ALBUTEROL SULFATE 90 UG/1
AEROSOL, METERED RESPIRATORY (INHALATION)
Qty: 18 G | Refills: 2 | Status: SHIPPED | OUTPATIENT
Start: 2022-10-12

## 2022-10-26 DIAGNOSIS — E87.6 HYPOKALEMIA: ICD-10-CM

## 2022-10-26 RX ORDER — POTASSIUM CHLORIDE 1500 MG/1
TABLET, FILM COATED, EXTENDED RELEASE ORAL
Qty: 270 TABLET | Refills: 1 | Status: SHIPPED | OUTPATIENT
Start: 2022-10-26

## 2022-11-02 ENCOUNTER — OFFICE VISIT (OUTPATIENT)
Dept: INTERNAL MEDICINE | Facility: CLINIC | Age: 77
End: 2022-11-02

## 2022-11-02 VITALS — HEIGHT: 60 IN | WEIGHT: 149 LBS | BODY MASS INDEX: 29.25 KG/M2

## 2022-11-02 DIAGNOSIS — R25.2 MUSCLE CRAMPS: Primary | ICD-10-CM

## 2022-11-02 DIAGNOSIS — E78.49 OTHER HYPERLIPIDEMIA: Chronic | ICD-10-CM

## 2022-11-02 DIAGNOSIS — N28.9 RENAL INSUFFICIENCY, MILD: ICD-10-CM

## 2022-11-02 DIAGNOSIS — I10 ESSENTIAL HYPERTENSION: Chronic | ICD-10-CM

## 2022-11-02 PROCEDURE — 99214 OFFICE O/P EST MOD 30 MIN: CPT | Performed by: INTERNAL MEDICINE

## 2022-11-02 NOTE — PROGRESS NOTES
"Chief Complaint  Hypertension    Subjective        Marleny Greenwood presents to [facility] PRIMARY [facility]  History of Present IllnessHere for reg f/u- no CP, SOB issues.  Has some intermittent episodes of cramping- started in the back of her leg, now can have in thumb and neck at times.  Lasts just a few seconds and doesn't come right back.     Had heart cath and lung scans due to some ROJAS- everything negative.   Headaches and butalbital use @ the same.     Objective   Vital Signs:  Ht 152.4 cm (60\")   Wt 67.6 kg (149 lb)   BMI 29.10 kg/m²   Estimated body mass index is 29.1 kg/m² as calculated from the following:    Height as of this encounter: 152.4 cm (60\").    Weight as of this encounter: 67.6 kg (149 lb).          Physical Exam  Constitutional:       Appearance: Normal appearance.   Cardiovascular:      Rate and Rhythm: Normal rate and regular rhythm.   Pulmonary:      Effort: Pulmonary effort is normal.   Musculoskeletal:         General: No tenderness. Normal range of motion.      Right lower leg: No edema.      Left lower leg: No edema.        Result Review :  The following data was reviewed by: Liz Zaragoza MD on 11/02/2022:  Common labs    Common Labs 6/16/22 6/16/22 6/29/22 6/29/22 10/26/22 10/26/22    1052 1052 1018 1018 1056 1056   Glucose 89   95 94    BUN 17   18 21    Creatinine 1.05 (A)   1.00 1.21 (A)    Sodium 144   143 140    Potassium 3.8   3.8 4.0    Chloride 106   106 105    Calcium 8.9   9.0 9.1    Total Protein 6.2    6.1    Albumin 3.6 (A)   4.10 3.80    Total Bilirubin <0.2   0.2 0.3    Alkaline Phosphatase 94   94 57    AST (SGOT) 13   12 15    ALT (SGPT) 8   7 16    WBC   6.21      Hemoglobin   12.6      Hematocrit   37.9      Platelets   235      Total Cholesterol  167    192   Triglycerides  99    90   HDL Cholesterol  52    74 (A)   LDL Cholesterol   97    102 (A)   (A) Abnormal value       Comments are available for some flowsheets but are not being displayed.    "        Data reviewed: Radiologic studies CT and Cardiology studies cath          Assessment and Plan   Diagnoses and all orders for this visit:    1. Muscle cramps (Primary)  Comments:  uncertain cause= remain hydrated, exercise/stretch.    2. Essential hypertension  Comments:  excellent control    3. Renal insufficiency, mild  Comments:  stable- no change    4. Other hyperlipidemia  Comments:  labs reviewed- chol just above recommended, could consider increase atorvastatin or add Zetia- d/w pt             Follow Up   No follow-ups on file.  Patient was given instructions and counseling regarding her condition or for health maintenance advice. Please see specific information pulled into the AVS if appropriate.

## 2022-11-29 RX ORDER — FERROUS SULFATE 325(65) MG
TABLET ORAL
Qty: 90 TABLET | Refills: 2 | Status: SHIPPED | OUTPATIENT
Start: 2022-11-29

## 2022-12-15 DIAGNOSIS — R51.9 CHRONIC DAILY HEADACHE: ICD-10-CM

## 2022-12-15 RX ORDER — CODEINE/BUTALBITAL/ASA/CAFFEIN 30-50-325
1 CAPSULE ORAL EVERY 4 HOURS PRN
Qty: 120 CAPSULE | Refills: 1 | Status: SHIPPED | OUTPATIENT
Start: 2022-12-15

## 2022-12-15 NOTE — TELEPHONE ENCOUNTER
Caller: Marleny Greenwood    Relationship: Self    Best call back number: [phone]  Requested Prescriptions:   Requested Prescriptions     Pending Prescriptions Disp Refills   • butalbital-aspirin-caffeine-codeine (Fiorinal/Codeine #3) -74-30 MG capsule 120 capsule 1     Sig: Take 1 capsule by mouth Every 4 (Four) Hours As Needed for Headache.        Pharmacy where request should be sent: [facility] [id number] [address] [phone] [facility] [phone] FX         Does the patient have less than a 3 day supply:  [x] Yes  [] No    Would you like a call back once the refill request has been completed: [x] Yes [] No    If the office needs to give you a call back, can they leave a voicemail: [x] Yes [] No    Mike Emmanuel Rep   12/15/22 15:26 EST

## 2022-12-28 DIAGNOSIS — I10 ESSENTIAL HYPERTENSION: ICD-10-CM

## 2022-12-29 RX ORDER — SPIRONOLACTONE 25 MG/1
25 TABLET ORAL DAILY
Qty: 90 TABLET | Refills: 1 | Status: SHIPPED | OUTPATIENT
Start: 2022-12-29

## 2023-01-19 ENCOUNTER — OFFICE VISIT (OUTPATIENT)
Dept: INTERNAL MEDICINE | Facility: CLINIC | Age: 78
End: 2023-01-19
Payer: MEDICARE

## 2023-01-19 ENCOUNTER — APPOINTMENT (OUTPATIENT)
Dept: GENERAL RADIOLOGY | Facility: HOSPITAL | Age: 78
End: 2023-01-19
Payer: MEDICARE

## 2023-01-19 ENCOUNTER — HOSPITAL ENCOUNTER (OUTPATIENT)
Facility: HOSPITAL | Age: 78
Setting detail: OBSERVATION
Discharge: HOME | End: 2023-01-20
Attending: EMERGENCY MEDICINE | Admitting: EMERGENCY MEDICINE
Payer: MEDICARE

## 2023-01-19 ENCOUNTER — TELEPHONE (OUTPATIENT)
Dept: INTERNAL MEDICINE | Facility: CLINIC | Age: 78
End: 2023-01-19

## 2023-01-19 VITALS — HEIGHT: 60 IN | WEIGHT: 152 LBS | BODY MASS INDEX: 29.84 KG/M2

## 2023-01-19 DIAGNOSIS — I10 ESSENTIAL HYPERTENSION: ICD-10-CM

## 2023-01-19 DIAGNOSIS — R06.89 RESPIRATORY INSUFFICIENCY: ICD-10-CM

## 2023-01-19 DIAGNOSIS — R07.9 EXERTIONAL CHEST PAIN: Primary | ICD-10-CM

## 2023-01-19 PROBLEM — R06.09 EXERTIONAL DYSPNEA: Status: ACTIVE | Noted: 2023-01-19

## 2023-01-19 LAB
ALBUMIN SERPL-MCNC: 3.8 G/DL (ref 3.5–5.2)
ALBUMIN/GLOB SERPL: 1.7 G/DL
ALP SERPL-CCNC: 55 U/L (ref 39–117)
ALT SERPL W P-5'-P-CCNC: <5 U/L (ref 1–33)
ANION GAP SERPL CALCULATED.3IONS-SCNC: 13.3 MMOL/L (ref 5–15)
AST SERPL-CCNC: 9 U/L (ref 1–32)
BASOPHILS # BLD AUTO: 0.05 10*3/MM3 (ref 0–0.2)
BASOPHILS NFR BLD AUTO: 0.9 % (ref 0–1.5)
BILIRUB SERPL-MCNC: 0.2 MG/DL (ref 0–1.2)
BUN SERPL-MCNC: 14 MG/DL (ref 8–23)
BUN/CREAT SERPL: 13.1 (ref 7–25)
CALCIUM SPEC-SCNC: 9.2 MG/DL (ref 8.6–10.5)
CHLORIDE SERPL-SCNC: 107 MMOL/L (ref 98–107)
CHOLEST SERPL-MCNC: 160 MG/DL (ref 0–200)
CO2 SERPL-SCNC: 23.7 MMOL/L (ref 22–29)
CREAT SERPL-MCNC: 1.07 MG/DL (ref 0.57–1)
DEPRECATED RDW RBC AUTO: 43.3 FL (ref 37–54)
EGFRCR SERPLBLD CKD-EPI 2021: 53.9 ML/MIN/1.73
EOSINOPHIL # BLD AUTO: 0.2 10*3/MM3 (ref 0–0.4)
EOSINOPHIL NFR BLD AUTO: 3.6 % (ref 0.3–6.2)
ERYTHROCYTE [DISTWIDTH] IN BLOOD BY AUTOMATED COUNT: 13.4 % (ref 12.3–15.4)
GLOBULIN UR ELPH-MCNC: 2.3 GM/DL
GLUCOSE SERPL-MCNC: 97 MG/DL (ref 65–99)
HCT VFR BLD AUTO: 38.5 % (ref 34–46.6)
HDLC SERPL-MCNC: 58 MG/DL (ref 40–60)
HGB BLD-MCNC: 12.5 G/DL (ref 12–15.9)
IMM GRANULOCYTES # BLD AUTO: 0.01 10*3/MM3 (ref 0–0.05)
IMM GRANULOCYTES NFR BLD AUTO: 0.2 % (ref 0–0.5)
LDLC SERPL CALC-MCNC: 86 MG/DL (ref 0–100)
LDLC/HDLC SERPL: 1.47 {RATIO}
LYMPHOCYTES # BLD AUTO: 1.33 10*3/MM3 (ref 0.7–3.1)
LYMPHOCYTES NFR BLD AUTO: 24.1 % (ref 19.6–45.3)
MCH RBC QN AUTO: 28.7 PG (ref 26.6–33)
MCHC RBC AUTO-ENTMCNC: 32.5 G/DL (ref 31.5–35.7)
MCV RBC AUTO: 88.5 FL (ref 79–97)
MONOCYTES # BLD AUTO: 0.55 10*3/MM3 (ref 0.1–0.9)
MONOCYTES NFR BLD AUTO: 9.9 % (ref 5–12)
NEUTROPHILS NFR BLD AUTO: 3.39 10*3/MM3 (ref 1.7–7)
NEUTROPHILS NFR BLD AUTO: 61.3 % (ref 42.7–76)
NRBC BLD AUTO-RTO: 0 /100 WBC (ref 0–0.2)
NT-PROBNP SERPL-MCNC: 509 PG/ML (ref 0–1800)
PLATELET # BLD AUTO: 186 10*3/MM3 (ref 140–450)
PMV BLD AUTO: 10.2 FL (ref 6–12)
POTASSIUM SERPL-SCNC: 3.1 MMOL/L (ref 3.5–5.2)
PROT SERPL-MCNC: 6.1 G/DL (ref 6–8.5)
QT INTERVAL: 425 MS
QT INTERVAL: 456 MS
RBC # BLD AUTO: 4.35 10*6/MM3 (ref 3.77–5.28)
SODIUM SERPL-SCNC: 144 MMOL/L (ref 136–145)
TRIGL SERPL-MCNC: 83 MG/DL (ref 0–150)
TROPONIN T SERPL-MCNC: <0.01 NG/ML (ref 0–0.03)
TROPONIN T SERPL-MCNC: <0.01 NG/ML (ref 0–0.03)
VLDLC SERPL-MCNC: 16 MG/DL (ref 5–40)
WBC NRBC COR # BLD: 5.53 10*3/MM3 (ref 3.4–10.8)

## 2023-01-19 PROCEDURE — 83880 ASSAY OF NATRIURETIC PEPTIDE: CPT | Performed by: EMERGENCY MEDICINE

## 2023-01-19 PROCEDURE — 84484 ASSAY OF TROPONIN QUANT: CPT | Performed by: EMERGENCY MEDICINE

## 2023-01-19 PROCEDURE — 93010 ELECTROCARDIOGRAM REPORT: CPT | Performed by: INTERNAL MEDICINE

## 2023-01-19 PROCEDURE — 93005 ELECTROCARDIOGRAM TRACING: CPT | Performed by: EMERGENCY MEDICINE

## 2023-01-19 PROCEDURE — G0378 HOSPITAL OBSERVATION PER HR: HCPCS

## 2023-01-19 PROCEDURE — 93000 ELECTROCARDIOGRAM COMPLETE: CPT | Performed by: INTERNAL MEDICINE

## 2023-01-19 PROCEDURE — 99285 EMERGENCY DEPT VISIT HI MDM: CPT

## 2023-01-19 PROCEDURE — 85025 COMPLETE CBC W/AUTO DIFF WBC: CPT | Performed by: EMERGENCY MEDICINE

## 2023-01-19 PROCEDURE — 71045 X-RAY EXAM CHEST 1 VIEW: CPT

## 2023-01-19 PROCEDURE — 80053 COMPREHEN METABOLIC PANEL: CPT | Performed by: EMERGENCY MEDICINE

## 2023-01-19 PROCEDURE — 84484 ASSAY OF TROPONIN QUANT: CPT | Performed by: NURSE PRACTITIONER

## 2023-01-19 PROCEDURE — 80061 LIPID PANEL: CPT | Performed by: NURSE PRACTITIONER

## 2023-01-19 PROCEDURE — 93005 ELECTROCARDIOGRAM TRACING: CPT | Performed by: NURSE PRACTITIONER

## 2023-01-19 PROCEDURE — 99213 OFFICE O/P EST LOW 20 MIN: CPT | Performed by: INTERNAL MEDICINE

## 2023-01-19 RX ORDER — ASPIRIN 81 MG/1
81 TABLET ORAL DAILY
Status: DISCONTINUED | OUTPATIENT
Start: 2023-01-20 | End: 2023-01-20 | Stop reason: HOSPADM

## 2023-01-19 RX ORDER — HYDRALAZINE HYDROCHLORIDE 50 MG/1
50 TABLET, FILM COATED ORAL EVERY 12 HOURS SCHEDULED
Status: DISCONTINUED | OUTPATIENT
Start: 2023-01-19 | End: 2023-01-20

## 2023-01-19 RX ORDER — SODIUM CHLORIDE 0.9 % (FLUSH) 0.9 %
10 SYRINGE (ML) INJECTION AS NEEDED
Status: DISCONTINUED | OUTPATIENT
Start: 2023-01-19 | End: 2023-01-20 | Stop reason: HOSPADM

## 2023-01-19 RX ORDER — POTASSIUM CHLORIDE 750 MG/1
40 TABLET, FILM COATED, EXTENDED RELEASE ORAL AS NEEDED
Status: DISCONTINUED | OUTPATIENT
Start: 2023-01-19 | End: 2023-01-20 | Stop reason: HOSPADM

## 2023-01-19 RX ORDER — FUROSEMIDE 40 MG/1
40 TABLET ORAL 2 TIMES DAILY
Status: DISCONTINUED | OUTPATIENT
Start: 2023-01-19 | End: 2023-01-20 | Stop reason: HOSPADM

## 2023-01-19 RX ORDER — SODIUM CHLORIDE 0.9 % (FLUSH) 0.9 %
10 SYRINGE (ML) INJECTION EVERY 12 HOURS SCHEDULED
Status: DISCONTINUED | OUTPATIENT
Start: 2023-01-19 | End: 2023-01-20 | Stop reason: HOSPADM

## 2023-01-19 RX ORDER — CYCLOBENZAPRINE HCL 10 MG
10 TABLET ORAL 3 TIMES DAILY PRN
Status: DISCONTINUED | OUTPATIENT
Start: 2023-01-19 | End: 2023-01-20 | Stop reason: HOSPADM

## 2023-01-19 RX ORDER — RANOLAZINE 500 MG/1
500 TABLET, EXTENDED RELEASE ORAL 2 TIMES DAILY
Status: DISCONTINUED | OUTPATIENT
Start: 2023-01-19 | End: 2023-01-20 | Stop reason: HOSPADM

## 2023-01-19 RX ORDER — ALBUTEROL SULFATE 2.5 MG/3ML
2.5 SOLUTION RESPIRATORY (INHALATION) EVERY 6 HOURS PRN
Status: DISCONTINUED | OUTPATIENT
Start: 2023-01-19 | End: 2023-01-20 | Stop reason: HOSPADM

## 2023-01-19 RX ORDER — ASPIRIN 81 MG/1
243 TABLET, CHEWABLE ORAL ONCE
Status: COMPLETED | OUTPATIENT
Start: 2023-01-19 | End: 2023-01-19

## 2023-01-19 RX ORDER — POTASSIUM CHLORIDE 750 MG/1
20 TABLET, FILM COATED, EXTENDED RELEASE ORAL
Status: DISCONTINUED | OUTPATIENT
Start: 2023-01-20 | End: 2023-01-20 | Stop reason: HOSPADM

## 2023-01-19 RX ORDER — ATORVASTATIN CALCIUM 20 MG/1
40 TABLET, FILM COATED ORAL NIGHTLY
Status: DISCONTINUED | OUTPATIENT
Start: 2023-01-19 | End: 2023-01-20 | Stop reason: HOSPADM

## 2023-01-19 RX ORDER — AMLODIPINE BESYLATE 10 MG/1
10 TABLET ORAL DAILY
Status: DISCONTINUED | OUTPATIENT
Start: 2023-01-20 | End: 2023-01-20 | Stop reason: HOSPADM

## 2023-01-19 RX ORDER — POTASSIUM CHLORIDE 7.45 MG/ML
10 INJECTION INTRAVENOUS
Status: DISCONTINUED | OUTPATIENT
Start: 2023-01-19 | End: 2023-01-20 | Stop reason: HOSPADM

## 2023-01-19 RX ORDER — POTASSIUM CHLORIDE 1.5 G/1.77G
40 POWDER, FOR SOLUTION ORAL AS NEEDED
Status: DISCONTINUED | OUTPATIENT
Start: 2023-01-19 | End: 2023-01-20 | Stop reason: HOSPADM

## 2023-01-19 RX ORDER — SPIRONOLACTONE 25 MG/1
25 TABLET ORAL NIGHTLY
Status: DISCONTINUED | OUTPATIENT
Start: 2023-01-19 | End: 2023-01-20 | Stop reason: HOSPADM

## 2023-01-19 RX ORDER — ISOSORBIDE MONONITRATE 30 MG/1
60 TABLET, EXTENDED RELEASE ORAL DAILY
Status: DISCONTINUED | OUTPATIENT
Start: 2023-01-20 | End: 2023-01-20 | Stop reason: HOSPADM

## 2023-01-19 RX ORDER — CLOPIDOGREL BISULFATE 75 MG/1
75 TABLET ORAL DAILY
Status: DISCONTINUED | OUTPATIENT
Start: 2023-01-20 | End: 2023-01-20 | Stop reason: HOSPADM

## 2023-01-19 RX ORDER — SODIUM CHLORIDE 9 MG/ML
40 INJECTION, SOLUTION INTRAVENOUS AS NEEDED
Status: DISCONTINUED | OUTPATIENT
Start: 2023-01-19 | End: 2023-01-20 | Stop reason: HOSPADM

## 2023-01-19 RX ORDER — PANTOPRAZOLE SODIUM 40 MG/1
40 TABLET, DELAYED RELEASE ORAL
Status: DISCONTINUED | OUTPATIENT
Start: 2023-01-20 | End: 2023-01-20 | Stop reason: HOSPADM

## 2023-01-19 RX ADMIN — ATORVASTATIN CALCIUM 40 MG: 20 TABLET, FILM COATED ORAL at 22:39

## 2023-01-19 RX ADMIN — ASPIRIN 243 MG: 81 TABLET, CHEWABLE ORAL at 13:10

## 2023-01-19 RX ADMIN — SPIRONOLACTONE 25 MG: 25 TABLET, FILM COATED ORAL at 22:39

## 2023-01-19 RX ADMIN — Medication 10 ML: at 16:02

## 2023-01-19 RX ADMIN — Medication 10 ML: at 22:40

## 2023-01-19 RX ADMIN — HYDRALAZINE HYDROCHLORIDE 50 MG: 50 TABLET, FILM COATED ORAL at 23:44

## 2023-01-19 RX ADMIN — RANOLAZINE 500 MG: 500 TABLET, FILM COATED, EXTENDED RELEASE ORAL at 23:44

## 2023-01-19 NOTE — TELEPHONE ENCOUNTER
Caller: Marleny Greenwood    Relationship: Self    Best call back number: [phone]  Who are you requesting to speak with (clinical staff, provider,  specific staff member): ABHAY BARBOSA    What was the call regarding: PATIENT WOULD LIKE TO SPEAK WITH ABHAY BARBOSA.    SHE DID NOT SAY WHAT THIS IS IN REGARDS TO.    Do you require a callback: YES

## 2023-01-19 NOTE — ED TRIAGE NOTES
Patient to er from pcp office with soa and abnormal EKG. Patient reported soa with walking or any activity. Patient alert x 4. Patient reported she is on plavix. Patient has mask on in triage along with staff.

## 2023-01-19 NOTE — CASE MANAGEMENT/SOCIAL WORK
Discharge Planning Assessment  [facility]     Patient Name: Marleny Greenwood  MRN: [id number]  Today's Date: 1/19/2023    Admit Date: 1/19/2023    Plan: Plans to return home at d/cROBERT Dorsey RN   Discharge Needs Assessment     Row Name 01/19/23 1502       Living Environment    People in Home spouse    Name(s) of People in Home Claude    Current Living Arrangements home    Primary Care Provided by self    Provides Primary Care For no one    Family Caregiver if Needed spouse    Family Caregiver Names Claude    Quality of Family Relationships supportive    Able to Return to Prior Arrangements yes       Resource/Environmental Concerns    Resource/Environmental Concerns none       Transition Planning    Patient/Family Anticipates Transition to home with family    Patient/Family Anticipated Services at Transition none    Transportation Anticipated family or friend will provide       Discharge Needs Assessment    Equipment Currently Used at Home wheelchair;walker, standard;cane, straight;shower chair;bp cuff  stair lift    Concerns to be Addressed no discharge needs identified    Anticipated Changes Related to Illness none    Equipment Needed After Discharge none    Provided Post Acute Provider List? N/A    Provided Post Acute Provider Quality & Resource List? N/A               Discharge Plan     Row Name 01/19/23 1504       Plan    Plan Plans to return home at elizabeth/Keven Dorsey RN    Patient/Family in Agreement with Plan yes    Provided Post Acute Provider List? N/A    Provided Post Acute Provider Quality & Resource List? N/A    Plan Comments Spoke w/ patient and spouse at bedside w/ patient's permission. PPE used. Introduced self and explained role. All info on facehseet, including PCP as STEVO Zaragoza, verified. Lives in single story home w/ basement w/  Claude. Has stair lift to go to [facility] from basement when needed. Independent w/ ADLs. uses W/C, walker, straight cane, B/P cuff and shower chair at home. denies need for any  further DME or community resources at d/c. Uses [facility]-and is able to  and pay for meds. To return home at d/c, w/ 's assist as needed;  will transport;agreeable w/ plan. CM will continue to follow- STEVO Dorsey RN              Continued Care and Services - Admitted Since 1/19/2023    Coordination has not been started for this encounter.          Demographic Summary     Row Name 01/19/23 1501       General Information    Admission Type observation    Arrived From emergency department    Required Notices Provided Observation Status Notice    Referral Source admission list    Reason for Consult discharge planning    Preferred Language English               Functional Status     Row Name 01/19/23 1501       Functional Status    Usual Activity Tolerance moderate    Current Activity Tolerance moderate       Functional Status, IADL    Medications independent    Meal Preparation independent    Housekeeping independent    Laundry independent    Shopping independent       Mental Status    General Appearance WDL WDL       Mental Status Summary    Recent Changes in Mental Status/Cognitive Functioning no changes               Psychosocial     Row Name 01/19/23 1502       Behavior WDL    Behavior WDL WDL       Emotion Mood WDL    Emotion/Mood/Affect WDL WDL       Speech WDL    Speech WDL WDL       Perceptual State WDL    Perceptual State WDL WDL       Thought Process WDL    Thought Process WDL WDL       Intellectual Performance WDL    Intellectual Performance WDL WDL               Abuse/Neglect    No documentation.                Legal    No documentation.                Substance Abuse    No documentation.                Patient Forms    No documentation.                   Sadaf Dorsey RN

## 2023-01-19 NOTE — ED NOTES
"Nursing report ED to floor  Marleny Greenwood  77 y.o.  female    HPI :   Chief Complaint   Patient presents with    Shortness of Breath    Abnormal ECG       Admitting doctor:   Piotr Lopez MD    Admitting diagnosis:   The encounter diagnosis was Exertional chest pain.    Code status:   Current Code Status       Date Active Code Status Order ID Comments User Context       Prior            Allergies:   Adhesive tape, Cardizem [diltiazem], and Levaquin [levofloxacin]    Isolation:   No active isolations    Intake and Output  No intake or output data in the 24 hours ending 01/19/23 1408    Weight:       01/19/23  1244   Weight: 68.9 kg (152 lb)       Most recent vitals:   Vitals:    01/19/23 1226 01/19/23 1243 01/19/23 1244 01/19/23 1246   BP:  (!) 151/104  163/92   Pulse: 71   63   Resp: 16      Temp: 97.5 °F (36.4 °C)      TempSrc: Tympanic      SpO2: 96%   93%   Weight:   68.9 kg (152 lb)    Height:   152.4 cm (60\")        Active LDAs/IV Access:   Lines, Drains & Airways       Active LDAs       Name Placement date Placement time Site Days    Peripheral IV 01/19/23 1239 Left Antecubital 01/19/23  1239  Antecubital  less than 1                    Labs (abnormal labs have a star):   Labs Reviewed   COMPREHENSIVE METABOLIC PANEL - Abnormal; Notable for the following components:       Result Value    Creatinine 1.07 (*)     Potassium 3.1 (*)     eGFR 53.9 (*)     All other components within normal limits    Narrative:     GFR Normal >60  Chronic Kidney Disease <60  Kidney Failure <15    The GFR formula is only valid for adults with stable renal function between ages 18 and 70.   BNP (IN-HOUSE) - Normal    Narrative:     Among patients with dyspnea, NT-proBNP is highly sensitive for the detection of acute congestive heart failure. In addition NT-proBNP of <300 pg/ml effectively rules out acute congestive heart failure with 99% negative predictive value.    Results may be falsely decreased if patient taking Biotin.   "   TROPONIN (IN-HOUSE) - Normal    Narrative:     Troponin T Reference Range:  <= 0.03 ng/mL-   Negative for AMI  >0.03 ng/mL-     Abnormal for myocardial necrosis.  Clinicians would have to utilize clinical acumen, EKG, Troponin and serial changes to determine if it is an Acute Myocardial Infarction or myocardial injury due to an underlying chronic condition.       Results may be falsely decreased if patient taking Biotin.     CBC WITH AUTO DIFFERENTIAL - Normal   LIPID PANEL   TROPONIN (IN-HOUSE)   CBC AND DIFFERENTIAL    Narrative:     The following orders were created for panel order CBC & Differential.  Procedure                               Abnormality         Status                     ---------                               -----------         ------                     CBC Auto Differential[[id number]]        Normal              Final result                 Please view results for these tests on the individual orders.       EKG:   ECG 12 Lead Chest Pain   Preliminary Result   HEART RATE= 70  bpm   RR Interval= 857  ms   IA Interval= 176  ms   P Horizontal Axis= -13  deg   P Front Axis= 59  deg   QRSD Interval= 97  ms   QT Interval= 425  ms   QRS Axis= 7  deg   T Wave Axis= -67  deg   - ABNORMAL ECG -   Sinus rhythm   Inferior infarct, age indeterminate   Abnrm T, consider ischemia, anterolateral lds   Baseline wander in lead(s) V3,V4   Electronically Signed By:    Date and Time of Study: 2023-01-19 12:33:13      ECG 12 Lead    (Results Pending)   ECG 12 Lead    (Results Pending)       Meds given in ED:   Medications   sodium chloride 0.9 % flush 10 mL (has no administration in time range)   sodium chloride 0.9 % flush 10 mL (has no administration in time range)   sodium chloride 0.9 % flush 10 mL (has no administration in time range)   sodium chloride 0.9 % infusion 40 mL (has no administration in time range)   aspirin chewable tablet 243 mg (243 mg Oral Given 1/19/23 1310)       Imaging results:  XR Chest 1  View    Result Date: 1/19/2023  1. Mild cardiomegaly. 2. No other significant findings are noted.  This report was finalized on 1/19/2023 1:25 PM by Dr. Ricardo Thornton M.D.       Ambulatory status:   - with assist    Social issues:   Social History     Socioeconomic History    Marital status:    Tobacco Use    Smoking status: Never    Smokeless tobacco: Never   Substance and Sexual Activity    Alcohol use: No    Drug use: No    Sexual activity: Defer       NIH Stroke Scale:         Mattie Young RN  01/19/23 14:08 EST

## 2023-01-19 NOTE — ED PROVIDER NOTES
" EMERGENCY DEPARTMENT ENCOUNTER    Room Number:  11/11  Date seen:  1/19/2023  PCP: Liz Zaragoza MD      HPI:  Chief Complaint: Shortness of breath  A complete HPI/ROS/PMH/PSH/SH/FH are unobtainable due to: None  Context: Marleny Greenwood is a 77 y.o. female who presents to the ED c/o shortness of breath.  Onset over the past week.  She is normally exertion although it has been significantly worse over the past week where she cannot walk across the room without becoming winded.  This occurs intermittently.  She also feels that she has \"something in my chest\" but does not endorse having a true pain.  She started noticing this yesterday morning.          PAST MEDICAL HISTORY  Active Ambulatory Problems     Diagnosis Date Noted   • Asthma, cough variant 03/25/2019   • Coronary arteriosclerosis in native artery 03/25/2019   • Essential hypertension 03/25/2019   • Chronic daily headache 03/25/2019   • Nodule of upper lobe of right lung 05/28/2019   • Other hyperlipidemia 06/11/2019   • Nonsustained ventricular tachycardia 06/11/2019   • History of cardiac cath 06/11/2019   • History of loop recorder 06/11/2019   • H/O heart artery stent 06/11/2019   • S/P CABG (coronary artery bypass graft) 06/11/2019   • H/O echocardiogram 06/11/2019   • History of nuclear stress test 06/11/2019   • Age-related osteoporosis without current pathological fracture 12/02/2019   • Mild aortic insufficiency 07/22/2020   • Grade I diastolic dysfunction 01/07/2021   • Renal insufficiency, mild 12/21/2021   • Abnormal nuclear stress test 06/15/2022   • Anginal equivalent (HCC) 06/15/2022     Resolved Ambulatory Problems     Diagnosis Date Noted   • Congestive heart failure (CHF) (HCC) 06/11/2019   • Pericardial constriction 11/01/2019   • Acute diastolic congestive heart failure (HCC) 03/11/2020   • Hypokalemia 07/22/2020   • Pulmonary hypertension (HCC) 07/22/2020   • Precordial pain 11/02/2020     Past Medical History:   Diagnosis Date   • " Anemia    • Asthma    • Coronary artery disease    • Cough    • Dyspnea    • Esophageal reflux    • Headache    • Hyperlipidemia LDL goal <100 2019   • Migraine    • Moderate osteopenia    • NSVT (nonsustained ventricular tachycardia)    • Pneumonia    • Postmenopausal atrophic vaginitis    • Pre-syncope    • Respiratory insufficiency 2019   • Seasonal allergies    • Shoulder dislocation    • Status post placement of implantable loop recorder 2015   • Vitamin D deficiency          PAST SURGICAL HISTORY  Past Surgical History:   Procedure Laterality Date   • APPENDECTOMY     • BRONCHOSCOPY N/A 2019    Procedure: BRONCHOSCOPY WITH BRONCHOALVEOLAR LAVAGE;  Surgeon: Thomas Sheridan MD;  Location: [facility] ENDOSCOPY;  Service: Pulmonary   • CARDIAC CATHETERIZATION  2014    History of Cardiac Cath Procedure Outcome: Successful   • CARDIAC CATHETERIZATION N/A 2019    Procedure: Right and Left Heart Cath lv pressures;  Surgeon: Mal Moser MD;  Location: [facility] CATH INVASIVE LOCATION;  Service: Cardiology   • CARDIAC CATHETERIZATION N/A 2019    Procedure: Saphenous Vein Graft;  Surgeon: Mal Moser MD;  Location: [facility] CATH INVASIVE LOCATION;  Service: Cardiology   • CARDIAC CATHETERIZATION N/A 2019    Procedure: Coronary angiography;  Surgeon: Mal Moser MD;  Location: [facility] CATH INVASIVE LOCATION;  Service: Cardiology   • CARDIAC CATHETERIZATION Left 2022    Procedure: Coronary Angiography;  Surgeon: Marco A Muir MD;  Location: [facility] CATH INVASIVE LOCATION;  Service: Cardiology;  Laterality: Left;   • CARDIAC CATHETERIZATION N/A 2022    Procedure: Left Heart Cath;  Surgeon: Marco A Muir MD;  Location: [facility] CATH INVASIVE LOCATION;  Service: Cardiology;  Laterality: N/A;   • CATARACT EXTRACTION     •  SECTION     • CHOLECYSTECTOMY     • COLONOSCOPY  2009    q-10   • CORONARY ANGIOPLASTY WITH STENT PLACEMENT  2012      [facility] by Dr. Roman.   • CORONARY ARTERY BYPASS GRAFT Bilateral 04/12/2011    with a left internal mammary graft to the LAD and saphenous vein graft to the posterior descending artery   • ENDOSCOPY       Diagnostic Esophagogastroduodenoscopy   • HYSTERECTOMY     • NERVE BLOCK       Nerve Block Transforaminal Epidural Lumbar   • OTHER SURGICAL HISTORY  04/07/2015    Patient-Activated Cardiac Event Recorder - From March 14, 2015 to April 7, 2015         FAMILY HISTORY  Family History   Problem Relation Age of Onset   • Heart disease Other    • Breast cancer Other    • Breast cancer Mother    • Hypertension Mother          SOCIAL HISTORY  Social History     Socioeconomic History   • Marital status:    Tobacco Use   • Smoking status: Never   • Smokeless tobacco: Never   Substance and Sexual Activity   • Alcohol use: No   • Drug use: No   • Sexual activity: Defer         ALLERGIES  Adhesive tape, Cardizem [diltiazem], and Levaquin [levofloxacin]        REVIEW OF SYSTEMS  Review of Systems     All systems reviewed and negative except for those discussed in HPI.       PHYSICAL EXAM  ED Triage Vitals   Temp Heart Rate Resp BP SpO2   01/19/23 1226 01/19/23 1226 01/19/23 1226 01/19/23 1243 01/19/23 1226   97.5 °F (36.4 °C) 71 16 (!) 151/104 96 %      Temp src Heart Rate Source Patient Position BP Location FiO2 (%)   01/19/23 1226 -- -- -- --   Tympanic           Physical Exam      GENERAL: no acute distress  HENT: nares patent  EYES: no scleral icterus  CV: regular rhythm, normal rate, 2+ radial pulses bilaterally  RESPIRATORY: normal effort, clear to auscultation bilaterally  ABDOMEN: soft, nontender  MUSCULOSKELETAL: no deformity, no reproducible chest wall tenderness  NEURO: alert, moves all extremities, follows commands  PSYCH:  calm, cooperative  SKIN: warm, dry    Vital signs and nursing notes reviewed.          LAB RESULTS  Recent Results (from the past 24 hour(s))   ECG 12 Lead Chest Pain     Collection Time: 01/19/23 12:33 PM   Result Value Ref Range    QT Interval 425 ms   Comprehensive Metabolic Panel    Collection Time: 01/19/23 12:41 PM    Specimen: Blood   Result Value Ref Range    Glucose 97 65 - 99 mg/dL    BUN 14 8 - 23 mg/dL    Creatinine 1.07 (H) 0.57 - 1.00 mg/dL    Sodium 144 136 - 145 mmol/L    Potassium 3.1 (L) 3.5 - 5.2 mmol/L    Chloride 107 98 - 107 mmol/L    CO2 23.7 22.0 - 29.0 mmol/L    Calcium 9.2 8.6 - 10.5 mg/dL    Total Protein 6.1 6.0 - 8.5 g/dL    Albumin 3.8 3.5 - 5.2 g/dL    ALT (SGPT) <5 1 - 33 U/L    AST (SGOT) 9 1 - 32 U/L    Alkaline Phosphatase 55 39 - 117 U/L    Total Bilirubin 0.2 0.0 - 1.2 mg/dL    Globulin 2.3 gm/dL    A/G Ratio 1.7 g/dL    BUN/Creatinine Ratio 13.1 7.0 - 25.0    Anion Gap 13.3 5.0 - 15.0 mmol/L    eGFR 53.9 (L) >60.0 mL/min/1.73   BNP    Collection Time: 01/19/23 12:41 PM    Specimen: Blood   Result Value Ref Range    proBNP 509.0 0.0 - 1,800.0 pg/mL   Troponin    Collection Time: 01/19/23 12:41 PM    Specimen: Blood   Result Value Ref Range    Troponin T <0.010 0.000 - 0.030 ng/mL   CBC Auto Differential    Collection Time: 01/19/23 12:41 PM    Specimen: Blood   Result Value Ref Range    WBC 5.53 3.40 - 10.80 10*3/mm3    RBC 4.35 3.77 - 5.28 10*6/mm3    Hemoglobin 12.5 12.0 - 15.9 g/dL    Hematocrit 38.5 34.0 - 46.6 %    MCV 88.5 79.0 - 97.0 fL    MCH 28.7 26.6 - 33.0 pg    MCHC 32.5 31.5 - 35.7 g/dL    RDW 13.4 12.3 - 15.4 %    RDW-SD 43.3 37.0 - 54.0 fl    MPV 10.2 6.0 - 12.0 fL    Platelets 186 140 - 450 10*3/mm3    Neutrophil % 61.3 42.7 - 76.0 %    Lymphocyte % 24.1 19.6 - 45.3 %    Monocyte % 9.9 5.0 - 12.0 %    Eosinophil % 3.6 0.3 - 6.2 %    Basophil % 0.9 0.0 - 1.5 %    Immature Grans % 0.2 0.0 - 0.5 %    Neutrophils, Absolute 3.39 1.70 - 7.00 10*3/mm3    Lymphocytes, Absolute 1.33 0.70 - 3.10 10*3/mm3    Monocytes, Absolute 0.55 0.10 - 0.90 10*3/mm3    Eosinophils, Absolute 0.20 0.00 - 0.40 10*3/mm3    Basophils, Absolute 0.05 0.00 -  0.20 10*3/mm3    Immature Grans, Absolute 0.01 0.00 - 0.05 10*3/mm3    nRBC 0.0 0.0 - 0.2 /100 WBC       Ordered the above labs and reviewed the results.        RADIOLOGY  XR Chest 1 View    Result Date: 1/19/2023  XR CHEST 1 VW-  01/19/2023  HISTORY: Shortness of breath.  Heart size is mildly enlarged. Lungs appear clear. Sternotomy wires are seen. A loop recorder overlies the left hemithorax. Bones and soft tissues are otherwise unremarkable.      1. Mild cardiomegaly. 2. No other significant findings are noted.  This report was finalized on 1/19/2023 1:25 PM by Dr. Ricardo Thornton M.D.        Ordered the above noted radiological studies. Reviewed by me in PACS.          PROCEDURES  Procedures        MEDICATIONS GIVEN IN ER  Medications   sodium chloride 0.9 % flush 10 mL (has no administration in time range)   aspirin chewable tablet 243 mg (243 mg Oral Given 1/19/23 1310)           MEDICAL DECISION MAKING, PROGRESS, and CONSULTS    All labs have been independently reviewed by me.  All radiology studies have been reviewed by me and discussed with radiologist dictating the report.   EKG's independently viewed and interpreted by me.  Discussion below represents my analysis of pertinent findings related to patient's condition, differential diagnosis, treatment plan and final disposition.      Orders placed during this visit:  Orders Placed This Encounter   Procedures   • XR Chest 1 View   • Comprehensive Metabolic Panel   • BNP   • Troponin   • CBC Auto Differential   • Monitor Blood Pressure   • Cardiac Monitoring   • Pulse Oximetry, Continuous   • ECG 12 Lead Chest Pain   • Insert Peripheral IV   • CBC & Differential           Differential diagnosis:    Differential diagnosis includes but not limited to acute coronary syndrome, pulmonary embolism, thoracic aortic dissection, pneumonia, pneumothorax, musculoskeletal pain, GERD or esophageal spasm, anxiety, myocarditis/pericarditis, esophageal rupture,  pancreatitis.     History: 1  EC  Age: 3  Risk factors: 2    HEART score: 6          Independent interpretation of labs, radiology studies, and discussions with consultants:  ED Course as of [phone]   Thu 2023   1241 EKG interpreted by myself.  Time 12:33 PM.  Sinus rhythm.  Heart rate 70.  Normal intervals and axis.  Prolonged R wave progression.  T wave inversions in V3 through V6.  T wave inversions also in the inferior leads.  Inferior T wave inversions are new compared to EKG from 2019. [TD]   1334 Troponin T: <0.010 [TD]   1335 proBNP: 509.0 [TD]   1335 WBC: 5.53 [TD]   1335 Hemoglobin: 12.5 [TD]   1352 I discussed the case with ABDON Sweeney with [facility] medicine.  We reviewed patient's labs, history, imaging.  She will admit [TD]      ED Course User Index  [TD] Amish Jones II, MD            PPE: The patient wore a mask throughout the entire encounter. I wore a well-fitting mask.    DIAGNOSIS  Final diagnoses:   Exertional chest pain       DISPOSITION  Admit      Latest Documented Vital Signs:  As of 13:57 EST  BP- 163/92 HR- 63 Temp- 97.5 °F (36.4 °C) (Tympanic) O2 sat- 93%      --    Please note that portions of this were completed with a voice recognition program.       Note Disclaimer: At [facility], we believe that sharing information builds trust and better relationships. You are receiving this note because you are receiving care at [facility] or recently visited. It is possible you will see health information before a provider has talked with you about it. This kind of information can be easy to misunderstand. To help you fully understand what it means for your health, we urge you to discuss this note with your provider.       Amish Jones II, MD  [phone]

## 2023-01-19 NOTE — PROGRESS NOTES
"Chief Complaint  Headache and Shortness of Breath    Subjective        Marleny Greenwood presents to [facility]  History of Present Illness  Here with a headache on and off since last Friday. Seems different than her normal migraines.  This doesn't bother her as much as a worsening SOB.  This has been getting worse for the last several days.  She has to rest just going to the bathroom.  She hasn't been getting chest pains but she starting having pressure this am.     Objective   Vital Signs:  Ht 152.4 cm (60\")   Wt 68.9 kg (152 lb)   BMI 29.69 kg/m²   Estimated body mass index is 29.69 kg/m² as calculated from the following:    Height as of this encounter: 152.4 cm (60\").    Weight as of this encounter: 68.9 kg (152 lb).             Physical Exam  Constitutional:       General: She is not in acute distress.  Cardiovascular:      Rate and Rhythm: Normal rate and regular rhythm.   Pulmonary:      Effort: Pulmonary effort is normal.      Breath sounds: Normal breath sounds.   Chest:      Chest wall: No tenderness.   Musculoskeletal:      Right lower leg: No edema.      Left lower leg: No edema.        Result Review :              ECG 12 Lead    Date/Time: 1/19/2023 4:48 PM  Performed by: Liz Zaragoza MD  Authorized by: Liz Zaragoza MD   Comparison: compared with previous ECG from 6/11/2019  Rhythm: sinus rhythm  T inversion: V3, V4 and V5    Clinical impression: abnormal EKG              Assessment and Plan   Diagnoses and all orders for this visit:    1. Exertional chest pain (Primary)  Comments:  strong history of CAD- EKG concerning, will send to ER. Pt and  in agreement. spoke to triage nurse.     Other orders  -     ECG 12 Lead             Follow Up   No follow-ups on file.  Patient was given instructions and counseling regarding her condition or for health maintenance advice. Please see specific information pulled into the AVS if appropriate.       "

## 2023-01-20 ENCOUNTER — READMISSION MANAGEMENT (OUTPATIENT)
Dept: CALL CENTER | Facility: HOSPITAL | Age: 78
End: 2023-01-20
Payer: MEDICARE

## 2023-01-20 VITALS
DIASTOLIC BLOOD PRESSURE: 93 MMHG | OXYGEN SATURATION: 96 % | RESPIRATION RATE: 18 BRPM | HEIGHT: 62 IN | BODY MASS INDEX: 28.05 KG/M2 | HEART RATE: 89 BPM | WEIGHT: 152.4 LBS | TEMPERATURE: 98.3 F | SYSTOLIC BLOOD PRESSURE: 169 MMHG

## 2023-01-20 LAB
ANION GAP SERPL CALCULATED.3IONS-SCNC: 7 MMOL/L (ref 5–15)
BUN SERPL-MCNC: 17 MG/DL (ref 8–23)
BUN/CREAT SERPL: 16.8 (ref 7–25)
CALCIUM SPEC-SCNC: 8.5 MG/DL (ref 8.6–10.5)
CHLORIDE SERPL-SCNC: 107 MMOL/L (ref 98–107)
CO2 SERPL-SCNC: 28 MMOL/L (ref 22–29)
CREAT SERPL-MCNC: 1.01 MG/DL (ref 0.57–1)
DEPRECATED RDW RBC AUTO: 40.7 FL (ref 37–54)
EGFRCR SERPLBLD CKD-EPI 2021: 57.8 ML/MIN/1.73
ERYTHROCYTE [DISTWIDTH] IN BLOOD BY AUTOMATED COUNT: 13 % (ref 12.3–15.4)
GLUCOSE SERPL-MCNC: 92 MG/DL (ref 65–99)
HCT VFR BLD AUTO: 34.8 % (ref 34–46.6)
HGB BLD-MCNC: 11.4 G/DL (ref 12–15.9)
MAGNESIUM SERPL-MCNC: 3.3 MG/DL (ref 1.6–2.4)
MCH RBC QN AUTO: 28 PG (ref 26.6–33)
MCHC RBC AUTO-ENTMCNC: 32.8 G/DL (ref 31.5–35.7)
MCV RBC AUTO: 85.5 FL (ref 79–97)
PLATELET # BLD AUTO: 179 10*3/MM3 (ref 140–450)
PMV BLD AUTO: 10 FL (ref 6–12)
POTASSIUM SERPL-SCNC: 2.8 MMOL/L (ref 3.5–5.2)
POTASSIUM SERPL-SCNC: 3.5 MMOL/L (ref 3.5–5.2)
QT INTERVAL: 497 MS
RBC # BLD AUTO: 4.07 10*6/MM3 (ref 3.77–5.28)
SODIUM SERPL-SCNC: 142 MMOL/L (ref 136–145)
WBC NRBC COR # BLD: 5.93 10*3/MM3 (ref 3.4–10.8)

## 2023-01-20 PROCEDURE — 0 POTASSIUM CHLORIDE 10 MEQ/100ML SOLUTION

## 2023-01-20 PROCEDURE — 80048 BASIC METABOLIC PNL TOTAL CA: CPT | Performed by: NURSE PRACTITIONER

## 2023-01-20 PROCEDURE — G0378 HOSPITAL OBSERVATION PER HR: HCPCS

## 2023-01-20 PROCEDURE — 84132 ASSAY OF SERUM POTASSIUM: CPT

## 2023-01-20 PROCEDURE — 96375 TX/PRO/DX INJ NEW DRUG ADDON: CPT

## 2023-01-20 PROCEDURE — 93010 ELECTROCARDIOGRAM REPORT: CPT | Performed by: INTERNAL MEDICINE

## 2023-01-20 PROCEDURE — 25010000002 HYDRALAZINE PER 20 MG

## 2023-01-20 PROCEDURE — 96374 THER/PROPH/DIAG INJ IV PUSH: CPT

## 2023-01-20 PROCEDURE — 85027 COMPLETE CBC AUTOMATED: CPT | Performed by: NURSE PRACTITIONER

## 2023-01-20 PROCEDURE — 25010000002 ONDANSETRON PER 1 MG: Performed by: PHYSICIAN ASSISTANT

## 2023-01-20 PROCEDURE — 93005 ELECTROCARDIOGRAM TRACING: CPT | Performed by: NURSE PRACTITIONER

## 2023-01-20 PROCEDURE — 99214 OFFICE O/P EST MOD 30 MIN: CPT | Performed by: INTERNAL MEDICINE

## 2023-01-20 PROCEDURE — 83735 ASSAY OF MAGNESIUM: CPT | Performed by: NURSE PRACTITIONER

## 2023-01-20 RX ORDER — HYDRALAZINE HYDROCHLORIDE 20 MG/ML
10 INJECTION INTRAMUSCULAR; INTRAVENOUS ONCE
Status: COMPLETED | OUTPATIENT
Start: 2023-01-20 | End: 2023-01-20

## 2023-01-20 RX ORDER — HYDRALAZINE HYDROCHLORIDE 50 MG/1
50 TABLET, FILM COATED ORAL 3 TIMES DAILY
Qty: 90 TABLET | Refills: 0 | Status: SHIPPED | OUTPATIENT
Start: 2023-01-20 | End: 2023-02-22 | Stop reason: SDUPTHER

## 2023-01-20 RX ORDER — ACETAMINOPHEN 325 MG/1
650 TABLET ORAL EVERY 6 HOURS PRN
Status: DISCONTINUED | OUTPATIENT
Start: 2023-01-20 | End: 2023-01-20 | Stop reason: HOSPADM

## 2023-01-20 RX ORDER — HYDRALAZINE HYDROCHLORIDE 50 MG/1
50 TABLET, FILM COATED ORAL EVERY 8 HOURS SCHEDULED
Status: DISCONTINUED | OUTPATIENT
Start: 2023-01-20 | End: 2023-01-20 | Stop reason: HOSPADM

## 2023-01-20 RX ORDER — AMLODIPINE BESYLATE 10 MG/1
10 TABLET ORAL DAILY
Qty: 30 TABLET | Refills: 0 | Status: SHIPPED | OUTPATIENT
Start: 2023-01-20 | End: 2023-02-22 | Stop reason: SDUPTHER

## 2023-01-20 RX ORDER — ONDANSETRON 2 MG/ML
4 INJECTION INTRAMUSCULAR; INTRAVENOUS EVERY 6 HOURS PRN
Status: DISCONTINUED | OUTPATIENT
Start: 2023-01-20 | End: 2023-01-20 | Stop reason: HOSPADM

## 2023-01-20 RX ADMIN — AMLODIPINE BESYLATE 10 MG: 10 TABLET ORAL at 06:10

## 2023-01-20 RX ADMIN — HYDRALAZINE HYDROCHLORIDE 50 MG: 50 TABLET, FILM COATED ORAL at 15:09

## 2023-01-20 RX ADMIN — ACETAMINOPHEN 650 MG: 325 TABLET, FILM COATED ORAL at 04:40

## 2023-01-20 RX ADMIN — POTASSIUM CHLORIDE 40 MEQ: 750 TABLET, EXTENDED RELEASE ORAL at 05:55

## 2023-01-20 RX ADMIN — POTASSIUM CHLORIDE 20 MEQ: 750 TABLET, EXTENDED RELEASE ORAL at 11:52

## 2023-01-20 RX ADMIN — HYDRALAZINE HYDROCHLORIDE 10 MG: 20 INJECTION INTRAMUSCULAR; INTRAVENOUS at 04:28

## 2023-01-20 RX ADMIN — POTASSIUM CHLORIDE 20 MEQ: 750 TABLET, EXTENDED RELEASE ORAL at 11:53

## 2023-01-20 RX ADMIN — ASPIRIN 81 MG: 81 TABLET, COATED ORAL at 12:54

## 2023-01-20 RX ADMIN — Medication 10 ML: at 09:15

## 2023-01-20 RX ADMIN — ACETAMINOPHEN 650 MG: 325 TABLET, FILM COATED ORAL at 11:53

## 2023-01-20 RX ADMIN — CLOPIDOGREL 75 MG: 75 TABLET, FILM COATED ORAL at 12:54

## 2023-01-20 RX ADMIN — ISOSORBIDE MONONITRATE 60 MG: 30 TABLET, EXTENDED RELEASE ORAL at 06:11

## 2023-01-20 RX ADMIN — HYDRALAZINE HYDROCHLORIDE 50 MG: 50 TABLET, FILM COATED ORAL at 06:11

## 2023-01-20 RX ADMIN — PANTOPRAZOLE SODIUM 40 MG: 40 TABLET, DELAYED RELEASE ORAL at 07:52

## 2023-01-20 RX ADMIN — ONDANSETRON 4 MG: 2 INJECTION INTRAMUSCULAR; INTRAVENOUS at 09:14

## 2023-01-20 RX ADMIN — POTASSIUM CHLORIDE 10 MEQ: 7.46 INJECTION, SOLUTION INTRAVENOUS at 10:09

## 2023-01-20 RX ADMIN — FUROSEMIDE 40 MG: 40 TABLET ORAL at 06:12

## 2023-01-20 RX ADMIN — RANOLAZINE 500 MG: 500 TABLET, FILM COATED, EXTENDED RELEASE ORAL at 12:54

## 2023-01-20 NOTE — CONSULTS
Referring Provider:       Patient Care Team:  Liz Zaragoza MD as PCP - General (Internal Medicine)  Mal Moser MD as Consulting Physician (Cardiology)      Reason for Consultation:   Dyspnea  Chest heaviness  Poorly controlled essential hypertension      History of present illness:    77-year-old female patient of Dr. Meehan with a medical history of coronary artery disease, CABG, patent SVG to LAD along with chronic total occlusion of the RCA and occluded SVG to RCA in July 2022 who presented with report of shortness of air and chest discomfort.  She has not been taking her blood pressure as of late and she has had severely elevated blood pressure during her stay.  EKG shows no new findings of ischemia or infarction.  Cardiac biomarkers have remained negative.  Blood pressure upon arrival was 213/90.  She states she has not been taking her blood pressure at home.    Review of Systems  All other systems reviewed and negative.     Past Medical History:   Past Medical History:   Diagnosis Date   • Anemia    • Asthma    • Coronary artery disease    • Cough    • Dyspnea    • Esophageal reflux    • H/O echocardiogram 6/11/2019 6/05/18 - Normal LV size and function.  EF 60-65%.  Mild concentric LVH.  Midl AR, MR, and TR.   • H/O heart artery stent 6/11/2019    3/22/12 - VISION stent to the SVG of the RCA reducing a total occlusion to 0% residual narrowing.   • Headache    • History of cardiac cath 6/11/2019   • History of loop recorder 6/11/2019    5/20/15 - [employer] LINQ implant.   • History of nuclear stress test 6/11/2019    10/09/17 - Abnormal study.  EF 46%.   • Hyperlipidemia LDL goal <100 6/11/2019   • Hypokalemia    • Migraine    • Moderate osteopenia    • Nonsustained ventricular tachycardia 6/11/2019   • NSVT (nonsustained ventricular tachycardia)    • Pneumonia    • Postmenopausal atrophic vaginitis    • Pre-syncope    • Respiratory insufficiency 6/11/2019   • S/P CABG (coronary artery bypass  graft) 2019 - LIMA to the LAD and SVG to the posterior descending artery.   • Seasonal allergies    • Shoulder dislocation    • Status post placement of implantable loop recorder 2015      Successful loop/LINQ implantation by Dr. Geo Wilson.   • Vitamin D deficiency        Past Surgical History:   Past Surgical History:   Procedure Laterality Date   • APPENDECTOMY     • BRONCHOSCOPY N/A 2019    Procedure: BRONCHOSCOPY WITH BRONCHOALVEOLAR LAVAGE;  Surgeon: Thomas Sheridan MD;  [facility];  Service: Pulmonary   • CARDIAC CATHETERIZATION  2014    History of Cardiac Cath Procedure Outcome: Successful   • CARDIAC CATHETERIZATION N/A 2019    Procedure: Right and Left Heart Cath lv pressures;  Surgeon: Mal Moser MD;  Location:  [facility];  Service: Cardiology   • CARDIAC CATHETERIZATION N/A 2019    Procedure: Saphenous Vein Graft;  Surgeon: Mal Moser MD;  Location:  [facility];  Service: Cardiology   • CARDIAC CATHETERIZATION N/A 2019    Procedure: Coronary angiography;  Surgeon: Mal Moser MD;  Location:  [facility];  Service: Cardiology   • CARDIAC CATHETERIZATION Left 2022    Procedure: Coronary Angiography;  Surgeon: Marco A Muir MD;  Location:  [facility];  Service: Cardiology;  Laterality: Left;   • CARDIAC CATHETERIZATION N/A 2022    Procedure: Left Heart Cath;  Surgeon: Marco A Muir MD;  Location:  [facility];  Service: Cardiology;  Laterality: N/A;   • CATARACT EXTRACTION     •  SECTION     • CHOLECYSTECTOMY     • COLONOSCOPY  2009    q-10   • CORONARY ANGIOPLASTY WITH STENT PLACEMENT  2012     [facility] by Dr. Roman.   • CORONARY ARTERY BYPASS GRAFT Bilateral 2011    with a left internal mammary graft to the LAD and saphenous vein graft to the posterior descending  artery   • ENDOSCOPY       Diagnostic Esophagogastroduodenoscopy   • HYSTERECTOMY     • NERVE BLOCK       Nerve Block Transforaminal Epidural Lumbar   • OTHER SURGICAL HISTORY  04/07/2015    Patient-Activated Cardiac Event Recorder - From March 14, 2015 to April 7, 2015       Family History:   Family History   Problem Relation Age of Onset   • Heart disease Other    • Breast cancer Other    • Breast cancer Mother    • Hypertension Mother        Social History:   Social History     Tobacco Use   • Smoking status: Never   • Smokeless tobacco: Never   Vaping Use   • Vaping Use: Never used   Substance Use Topics   • Alcohol use: No   • Drug use: No       Home Medications:   Medications Prior to Admission   Medication Sig Dispense Refill Last Dose   • albuterol sulfate  (90 Base) MCG/ACT inhaler INHALE TWO PUFFS BY MOUTH EVERY 4 HOURS AS NEEDED FOR WHEEZING 18 g 2 1/18/2023 at 0800   • amLODIPine (NORVASC) 10 MG tablet Take 10 mg by mouth Daily.   1/19/2023 at 0800   • aspirin 81 MG EC tablet Take 81 mg by mouth Daily.   1/19/2023 at 1200   • atorvastatin (LIPITOR) 40 MG tablet TAKE 1 TABLET DAILY 90 tablet 3 1/18/2023 at 2000   • carvedilol (COREG) 12.5 MG tablet Take 1 tablet by mouth 2 (Two) Times a Day. 180 tablet 3 1/19/2023 at 0800   • clopidogrel (PLAVIX) 75 MG tablet TAKE 1 TABLET DAILY 90 tablet 3 1/19/2023 at 0800   • cyclobenzaprine (FLEXERIL) 10 MG tablet Take 1 tablet by mouth 3 (Three) Times a Day As Needed for Muscle Spasms. 30 tablet 1 Past Month   • FeroSul 325 (65 Fe) MG tablet TAKE ONE TABLET BY MOUTH DAILY WITH BREAKFAST 90 tablet 2 1/18/2023 at 2000   • furosemide (LASIX) 40 MG tablet TAKE 1 TABLET TWICE A  tablet 1 1/18/2023 at 1800   • hydrALAZINE (APRESOLINE) 50 MG tablet Take 1 tablet by mouth 2 (two) times a day. 180 tablet 3 1/19/2023 at 0800   • HYDROcodone-homatropine (HYCODAN) 5-1.5 MG/5ML syrup Take 5 mL by mouth Every 6 (Six) Hours As Needed for Cough.   1/18/2023   •  isosorbide mononitrate (IMDUR) 60 MG 24 hr tablet TAKE 1 TABLET DAILY 90 tablet 3 1/19/2023 at 0800   • Multiple Vitamins-Minerals (PRESERVISION AREDS 2 PO) Take  by mouth.   Past Week   • omeprazole (priLOSEC) 20 MG capsule TAKE 1 CAPSULE DAILY 90 capsule 3 1/19/2023 at 0800   • promethazine (PHENERGAN) 25 MG tablet Take 1 tablet by mouth Every 6 (Six) Hours As Needed for Nausea or Vomiting. 90 tablet 3 Past Month   • ranolazine (RANEXA) 500 MG 12 hr tablet TAKE 1 TABLET TWICE A  tablet 3 1/19/2023 at 0800   • spironolactone (ALDACTONE) 25 MG tablet Take 1 tablet by mouth Daily. 90 tablet 1 1/18/2023 at 1800   • vitamin D (ERGOCALCIFEROL) 1.25 MG (16885 UT) capsule capsule TAKE 1 CAPSULE ONCE WEEKLY 12 capsule 3 1/15/2023 at 0800   • butalbital-aspirin-caffeine-codeine (Fiorinal/Codeine #3) -77-30 MG capsule Take 1 capsule by mouth Every 4 (Four) Hours As Needed for Headache. 120 capsule 1 More than a month   • Denosumab (PROLIA SC) Inject  under the skin into the appropriate area as directed. One injection q6 months   Unknown   • nitroglycerin (NITROSTAT) 0.4 MG SL tablet Place 1 tablet under the tongue Every 5 (Five) Minutes As Needed for Chest Pain. Take no more than 3 doses in 15 minutes. 25 tablet 3 Unknown   • polyethylene glycol (MIRALAX) packet Take 17 g by mouth Daily.   Unknown   • potassium chloride ER (K-TAB) 20 MEQ tablet controlled-release ER tablet TAKE 3 TABLETS DAILY 270 tablet 1 Unknown       Current Medications:   Current Facility-Administered Medications:   •  acetaminophen (TYLENOL) tablet 650 mg, 650 mg, Oral, Q6H PRN, Edna Auguste APRN, 650 mg at 01/20/23 0440  •  albuterol (PROVENTIL) nebulizer solution 0.083% 2.5 mg/3mL, 2.5 mg, Nebulization, Q6H PRN, Edna Auguste, APRN  •  amLODIPine (NORVASC) tablet 10 mg, 10 mg, Oral, Daily, Edna Auguste APRN, 10 mg at 01/20/23 0610  •  aspirin EC tablet 81 mg, 81 mg, Oral, Daily, Edna Auguste, ABDON  •  atorvastatin (LIPITOR)  tablet 40 mg, 40 mg, Oral, Nightly, Yoli Augustehy S, APRN, 40 mg at 01/19/23 2239  •  clopidogrel (PLAVIX) tablet 75 mg, 75 mg, Oral, Daily, Yoli Augustehy S, APRN  •  cyclobenzaprine (FLEXERIL) tablet 10 mg, 10 mg, Oral, TID PRN, MolinaYolihy S, APRN  •  furosemide (LASIX) tablet 40 mg, 40 mg, Oral, BID, Molina, Edna S, APRN, 40 mg at 01/20/23 0612  •  hydrALAZINE (APRESOLINE) tablet 50 mg, 50 mg, Oral, Q12H, Molina, Edna S, APRN, 50 mg at 01/20/23 0611  •  isosorbide mononitrate (IMDUR) 24 hr tablet 60 mg, 60 mg, Oral, Daily, Molina, Edna S, APRN, 60 mg at 01/20/23 0611  •  ondansetron (ZOFRAN) injection 4 mg, 4 mg, Intravenous, Q6H PRN, Trista Leon PA, 4 mg at 01/20/23 0914  •  pantoprazole (PROTONIX) EC tablet 40 mg, 40 mg, Oral, Q AM, Yoli Augustehy S, APRN, 40 mg at 01/20/23 0752  •  potassium chloride (K-DUR,KLOR-CON) ER tablet 20 mEq, 20 mEq, Oral, TID With Meals, Edna Auguste S, APRN  •  potassium chloride (K-DUR,KLOR-CON) ER tablet 40 mEq, 40 mEq, Oral, PRN, 40 mEq at 01/20/23 0555 **OR** potassium chloride (KLOR-CON) packet 40 mEq, 40 mEq, Oral, PRN **OR** potassium chloride 10 mEq in 100 mL IVPB, 10 mEq, Intravenous, Q1H PRN, Edna Auguste S, APRN, Last Rate: 100 mL/hr at 01/20/23 1009, 10 mEq at 01/20/23 1009  •  ranolazine (RANEXA) 12 hr tablet 500 mg, 500 mg, Oral, BID, Yoli Augustehy S, APRN, 500 mg at 01/19/23 2344  •  [COMPLETED] Insert Peripheral IV, , , Once **AND** sodium chloride 0.9 % flush 10 mL, 10 mL, Intravenous, PRN, Amish Jones II, MD  •  sodium chloride 0.9 % flush 10 mL, 10 mL, Intravenous, Q12H, Ariane Guillen APRN, 10 mL at 01/20/23 0915  •  sodium chloride 0.9 % flush 10 mL, 10 mL, Intravenous, PRN, Ariane Guillen, APRN  •  sodium chloride 0.9 % infusion 40 mL, 40 mL, Intravenous, PRN, Ariane Guillen, APRN  •  spironolactone (ALDACTONE) tablet 25 mg, 25 mg, Oral, Nightly, Edna Auguste, ABDON, 25 mg at 01/19/23 6175     Allergies: Adhesive tape, Cardizem [diltiazem], and Levaquin  "[levofloxacin]      Vital Signs   Temp:  [97.5 °F (36.4 °C)-98.8 °F (37.1 °C)] 98.8 °F (37.1 °C)  Heart Rate:  [56-89] 84  Resp:  [16-18] 18  BP: (151-213)/() 186/94  Flowsheet Rows    Flowsheet Row First Filed Value   Admission Height 152.4 cm (60\") Documented at 01/19/2023 1244   Admission Weight 68.9 kg (152 lb) Documented at 01/19/2023 1244          General Appearance:    Alert, cooperative, in no acute distress   Head:    Normocephalic, without obvious abnormality, atraumatic       Neck:   No adenopathy, supple, no thyromegaly, no carotid bruit, no    JVD   Lungs:     Clear to auscultation bilaterally, no wheezes, rales, or     rhonchi    Heart:    Normal rate, regular rhythm, no murmur, no rub, no gallop   Chest Wall:   Sternotomy   Abdomen:     Normal bowel sounds, soft, nontender, nondistended,            no rebound tenderness   Extremities:   No cyanosis, clubbing, or edema   Pulses:   Pulses palpable and equal bilaterally   Skin:   No bleeding or rash   Lymph nodes:   No cervical adenopathy   Neurologic:   Cranial nerves 2 - 12 grossly intact, sensation intact               Results Review: I personally viewed and interpreted the patient's EKG/Telemetry data    Results from last 7 days   Lab Units 01/20/23  0437   WBC 10*3/mm3 5.93   HEMOGLOBIN g/dL 11.4*   HEMATOCRIT % 34.8   PLATELETS 10*3/mm3 179     Results from last 7 days   Lab Units 01/20/23  0437   SODIUM mmol/L 142   POTASSIUM mmol/L 2.8*   CHLORIDE mmol/L 107   CO2 mmol/L 28.0   BUN mg/dL 17   CREATININE mg/dL 1.01*   GLUCOSE mg/dL 92   CALCIUM mg/dL 8.5*     Lab Results   Lab Value Date/Time    TROPONINT <0.010 01/19/2023 1830    TROPONINT <0.010 01/19/2023 1241           Assessment & Plan   1.  Coronary artery disease with prior CABG  2.  Essential hypertension: Poorly controlled.  Blood pressure improved this morning.  3.  Chronic dyspnea  4.  Essential hypertension    -So far think things look fine.  Her lab work looks good.  Her cardiac " biomarkers are negative.  Her EKG is unchanged from prior  -I have reviewed her prior work-up which was done less than 1 year ago.  I did a heart catheterization on her with a patent SVG to LAD along with just mild circumflex disease and no bypass to that vessel.  The RCA is chronically occluded along with the graft but does feel faintly.  I do not think there is an indication for repeat heart catheterization.  -Blood pressure is poorly controlled.  My initial recommendation would be to increase the hydralazine to every 8 hours at the current dose  -She has had mildly elevated creatinine as an outpatient but it is fine here.  I think we can consider an ARB in the future.    She needs to call for follow-up with me in a couple of weeks.    I discussed the patient's findings and my recommendations with the patient

## 2023-01-20 NOTE — PLAN OF CARE
Problem: Fall Injury Risk  Goal: Absence of Fall and Fall-Related Injury  Outcome: Met  Intervention: Identify and Manage Contributors  Recent Flowsheet Documentation  Taken 1/20/2023 1600 by Natalya Tabares RN  Medication Review/Management: medications reviewed  Taken 1/20/2023 1405 by Natalya Tabares RN  Medication Review/Management: medications reviewed  Taken 1/20/2023 1200 by Natalya Tabares RN  Medication Review/Management: medications reviewed  Taken 1/20/2023 1014 by Natalya Tabares RN  Medication Review/Management: medications reviewed  Taken 1/20/2023 0742 by Natalya Tabares RN  Medication Review/Management: medications reviewed  Intervention: Promote Injury-Free Environment  Recent Flowsheet Documentation  Taken 1/20/2023 1600 by Natalya Tabares RN  Safety Promotion/Fall Prevention:   activity supervised   nonskid shoes/slippers when out of bed   room organization consistent   safety round/check completed   lighting adjusted  Taken 1/20/2023 1405 by Natalya Tabares RN  Safety Promotion/Fall Prevention:   activity supervised   nonskid shoes/slippers when out of bed   room organization consistent   safety round/check completed  Taken 1/20/2023 1200 by Natalya Tabares RN  Safety Promotion/Fall Prevention:   nonskid shoes/slippers when out of bed   safety round/check completed   room organization consistent   activity supervised  Taken 1/20/2023 1014 by Natalya Tabares RN  Safety Promotion/Fall Prevention:   activity supervised   nonskid shoes/slippers when out of bed   room organization consistent   safety round/check completed  Taken 1/20/2023 0742 by Natalya Tabares, VALDEMAR  Safety Promotion/Fall Prevention:   activity supervised   nonskid shoes/slippers when out of bed   room organization consistent   safety round/check completed     Problem: Hypertension Comorbidity  Goal: Blood Pressure in Desired Range  Outcome: Met  Intervention: Maintain Blood Pressure Management  Recent Flowsheet Documentation  Taken 1/20/2023 1600 by  Natalya Tabares RN  Medication Review/Management: medications reviewed  Taken 1/20/2023 1405 by Natalya Tabares RN  Medication Review/Management: medications reviewed  Taken 1/20/2023 1200 by Natalya Tabares RN  Medication Review/Management: medications reviewed  Taken 1/20/2023 1014 by Natalya Tabares, RN  Medication Review/Management: medications reviewed  Taken 1/20/2023 0742 by Natalya Tabares RN  Medication Review/Management: medications reviewed   Goal Outcome Evaluation:

## 2023-01-20 NOTE — PROGRESS NOTES
ED OBSERVATION PROGRESS/DISCHARGE SUMMARY    Date of Admission: 1/19/2023   LOS: 0 days   PCP: Liz Zaragoza MD    Subjective  Patient feeling well this evening, voices no complaints.    Hospital Outcome: 77-year-old female admitted to the observation unit for further evaluation of dyspnea on exertion.    Patient stress test 4/6/2022 which showed preserved left ventricular contractility, small to moderate sized area of possible ischemia in the inferior septal region, patient underwent coronary angiography 7/1/2022, This demonstrated normal left main, LAD with a mid total occlusion, D1 60%.  LIMA to the LAD is atretic.  There is a saphenous vein graft to the LAD which is patent and the distal to apical LAD is small in caliber with 90% stenosis not suitable for intervention.  Circumflex has 40% stenosis, right coronary artery diffusely diseased with a chronic total occlusion in the distal segment.  Saphenous vein graft to the right coronary artery is occluded.  Left ventricular end-diastolic pressure was 13.  Recommendation was continued medical treatment.    Patient had hypokalemia with potassium 2.8 this morning.  This was replaced throughout the day and repeat potassium was 3.5.    Patient was seen by cardiology this morning.  Her blood pressure was elevated overnight but when she received her home medications this morning it improved.  We will increase hydralazine from 2 times daily to 3 times daily.  Patient will follow-up with cardiology in a few weeks.    Usual return to ER precautions discussed with patient who expresses understanding and is in agreement with plan.    ROS:  General: no fevers, chills  Respiratory: no cough, dyspnea  Cardiovascular: no chest pain, palpitations  Abdomen: No abdominal pain, nausea, vomiting, or diarrhea  Neurologic: No focal weakness    Objective   Physical Exam:  I have reviewed the vital signs.  Temp:  [97.5 °F (36.4 °C)-98.8 °F (37.1 °C)] 98.8 °F (37.1 °C)  Heart Rate:   [56-89] 84  Resp:  [16-18] 18  BP: (151-213)/() 186/94  General Appearance:    Alert, cooperative, no distress  Head:    Normocephalic, atraumatic  Eyes:    Sclerae anicteric  Neck:   Supple, no mass  Lungs: Clear to auscultation bilaterally, respirations unlabored  Heart: Regular rate and rhythm, S1 and S2 normal, no murmur, rub or gallop  Abdomen:  Soft, non-tender, bowel sounds active, nondistended  Extremities: No clubbing, cyanosis, or edema to lower extremities  Pulses:  2+ and symmetric in distal lower extremities  Skin: No rashes   Neurologic: Oriented x3, Normal strength to extremities    Results Review:    I have reviewed the labs, radiology results and diagnostic studies.    Results from last 7 days   Lab Units 01/20/23  0437   WBC 10*3/mm3 5.93   HEMOGLOBIN g/dL 11.4*   HEMATOCRIT % 34.8   PLATELETS 10*3/mm3 179     Results from last 7 days   Lab Units 01/20/23  0437 01/19/23  1241   SODIUM mmol/L 142 144   POTASSIUM mmol/L 2.8* 3.1*   CHLORIDE mmol/L 107 107   CO2 mmol/L 28.0 23.7   BUN mg/dL 17 14   CREATININE mg/dL 1.01* 1.07*   CALCIUM mg/dL 8.5* 9.2   BILIRUBIN mg/dL  --  0.2   ALK PHOS U/L  --  55   ALT (SGPT) U/L  --  <5   AST (SGOT) U/L  --  9   GLUCOSE mg/dL 92 97     Imaging Results (Last 24 Hours)     Procedure Component Value Units Date/Time    XR Chest 1 View [[id number]] Collected: 01/19/23 1320     Updated: 01/19/23 1328    Narrative:      XR CHEST 1 VW-  01/19/2023     HISTORY: Shortness of breath.     Heart size is mildly enlarged. Lungs appear clear. Sternotomy wires are  seen. A loop recorder overlies the left hemithorax. Bones and soft  tissues are otherwise unremarkable.       Impression:      1. Mild cardiomegaly.  2. No other significant findings are noted.     This report was finalized on 1/19/2023 1:25 PM by Dr. Ricardo Thornton M.D.             I have reviewed the  medications.  ---------------------------------------------------------------------------------------------  Assessment & Plan   Assessment/Problem List    Exertional dyspnea      Plan:    Dyspnea on exertion   CAD status post CABG, stent   -Cardiac monitoring  -Vital signs every 4 hours  -Continuous pulse oximetry  -Troponin less than 0.010, trend  -EKG sinus rhythm  -Cardiology consulted, no ischemic work-up today  -Better blood pressure control    Hypertension, poorly controlled  -Continue Norvasc, Lasix, spironolactone, Coreg, and Imdur  -Increase hydralazine from twice daily to 3 times daily  -Blood pressure log at home  -Follow-up with cardiology    Hypokalemia  -Potassium 2.8 this morning  -Potassium replacement protocol initiated  -Repeat 3.5    Chronic congestive heart failure  -Continue Lasix  -Continue Coreg      Disposition: Home    Follow-up after Discharge: PCP, cardiology    57 minutes has been spent by [facility] providers in the care of this patient while under observation status     This note will serve as discharge summary    USAMA William 01/20/23 09:01 EST

## 2023-01-20 NOTE — PLAN OF CARE
Goal Outcome Evaluation:   Pt came in PCP office c/o increased sob w/exertion. Pt has a loop recorder but stated it's not working needs battery. Pt on the monitor varies between NSR to SB high 50's. Will continue to monitor. Pt blood pressure elevated systolic 160's 190's. Team aware. Pt received a 1 time dose of IV Hydralazine 10 mg . Pt c/o of headache, received 650 mg Tylenol oral. Will continue to monitor. Other vital signs stable. Pt K 2.8 pt receiving K protocol and has scheduled daily K. Pt NSR on the monitor. Rechecked pt blood pressure still elevated systolic 200's team aware. Per team go head and give pt morning blood pressure medication. Pt received 50 mg Hydralazine oral, 10 mg Amlodipine oral, Imdur 60 mg oral, and 40 mg Lasix oral.  Will continue to monitor. Pt stand by assist, gets sob w/exertion. No c/o chest pain, dizziness or nausea.NPO since midnight. Consult for Cardiology to see pt in the morning. Will continue to monitor.

## 2023-01-20 NOTE — PROGRESS NOTES
MD Attestation Note    I supervised care provided by the midlevel provider.    The LYNETTE and I have discussed this patient's history, physical exam, and treatment plan. I have reviewed the documentation and personally had a face to face interaction with the patient  I affirm the documentation and agree with the treatment and plan. I provided a substantive portion of the care of this patient.  I personally performed the physical exam, in its entirety.  My personal findings are documented in below:    77-year-old female with history of coronary artery disease status post CABG, hypertension, hyperlipidemia, presents with dyspnea.  Patient complains of dyspnea with exertion.  No chest pain, no nausea vomiting, no diaphoresis.    On exam:  General: NAD.  Head: NCAT.  ENT: EOMI, PERRLA, MMM.  Neck: Supple, trachea midline.  Cardiac: regular rate and rhythm.  Lungs: CTAB.  Abdomen: Soft, NTTP.   Extremities: Moves all extremities well  Skin: Warm, dry.    Plan: 77-year-old female with history of coronary artery disease status post CABG, hypertension, hyperlipidemia, presents with dyspnea.  EKG showed LVH with anterior lateral T wave inversions.  Chest x-ray showed cardiomegaly but otherwise negative for acute pathology.  Serial troponins negative.  Patient admitted to observation center, cardiology consulted, monitoring, serial troponins, n.p.o. after midnight.

## 2023-01-21 NOTE — OUTREACH NOTE
Prep Survey    Flowsheet Row Responses   [facility] patient discharged from? [place]   Is LACE score < 7 ? Yes   Eligibility [facility]   Date of Admission 01/19/23   Date of Discharge 01/20/23   Discharge Disposition Home or Self Care   Discharge diagnosis Exertional dyspnea   Does the patient have one of the following disease processes/diagnoses(primary or secondary)? Other   Does the patient have Home health ordered? No   Is there a DME ordered? No   Prep survey completed? Yes          MAAME ROTH - Registered Nurse

## 2023-01-23 ENCOUNTER — TRANSITIONAL CARE MANAGEMENT TELEPHONE ENCOUNTER (OUTPATIENT)
Dept: CALL CENTER | Facility: HOSPITAL | Age: 78
End: 2023-01-23
Payer: MEDICARE

## 2023-01-23 NOTE — OUTREACH NOTE
Call Center TCM Note    Flowsheet Row Responses   [facility] patient discharged from? [place]   Does the patient have one of the following disease processes/diagnoses(primary or secondary)? Other   TCM attempt successful? Yes   Call start time 1444   Call end time 1446   Discharge diagnosis Exertional dyspnea   Meds reviewed with patient/caregiver? Yes   Is the patient having any side effects they believe may be caused by any medication additions or changes? No   Does the patient have all medications ordered at discharge? Yes   Is the patient taking all medications as directed (includes completed medication regime)? Yes   Comments Appt 2/2 with Dr. Zaragoza.  She is waiting to her from the cardiologist office regarding an appt.   Does the patient have an appointment with their PCP within 7 days of discharge? Yes   Psychosocial issues? No   Did the patient receive a copy of their discharge instructions? Yes   Nursing interventions Reviewed instructions with patient   What is the patient's perception of their health status since discharge? Improving   Is the patient/caregiver able to teach back signs and symptoms related to disease process for when to call PCP? Yes   Is the patient/caregiver able to teach back signs and symptoms related to disease process for when to call 911? Yes   Is the patient/caregiver able to teach back the hierarchy of who to call/visit for symptoms/problems? PCP, Specialist, Home health nurse, Urgent Care, ED, 911 Yes   If the patient is a current smoker, are they able to teach back resources for cessation? Not a smoker   Additional teach back comments States she is doing well.   TCM call completed? Yes   Call end time 1446          Nataliia Watt LPN    1/23/2023, 14:47 EST

## 2023-01-24 RX ORDER — CLOPIDOGREL BISULFATE 75 MG/1
TABLET ORAL
Qty: 90 TABLET | Refills: 3 | Status: SHIPPED | OUTPATIENT
Start: 2023-01-24

## 2023-02-07 DIAGNOSIS — E87.70 HYPERVOLEMIA, UNSPECIFIED HYPERVOLEMIA TYPE: ICD-10-CM

## 2023-02-07 RX ORDER — FUROSEMIDE 40 MG/1
40 TABLET ORAL 2 TIMES DAILY
Qty: 180 TABLET | Refills: 1 | Status: SHIPPED | OUTPATIENT
Start: 2023-02-07

## 2023-02-22 ENCOUNTER — TELEPHONE (OUTPATIENT)
Dept: INTERNAL MEDICINE | Facility: CLINIC | Age: 78
End: 2023-02-22
Payer: MEDICARE

## 2023-02-22 DIAGNOSIS — I10 ESSENTIAL HYPERTENSION: ICD-10-CM

## 2023-02-22 DIAGNOSIS — R06.89 RESPIRATORY INSUFFICIENCY: ICD-10-CM

## 2023-02-22 RX ORDER — HYDRALAZINE HYDROCHLORIDE 50 MG/1
50 TABLET, FILM COATED ORAL 3 TIMES DAILY
Qty: 90 TABLET | Refills: 2 | Status: SHIPPED | OUTPATIENT
Start: 2023-02-22 | End: 2023-03-24

## 2023-02-22 RX ORDER — AMLODIPINE BESYLATE 10 MG/1
10 TABLET ORAL DAILY
Qty: 90 TABLET | Refills: 2 | Status: SHIPPED | OUTPATIENT
Start: 2023-02-22 | End: 2023-03-24

## 2023-02-22 NOTE — TELEPHONE ENCOUNTER
Caller: Marleny Greenwood    Relationship: Self    Best call back number: [phone]    Requested Prescriptions:    amLODIPine (NORVASC) 10 MG tablet     hydrALAZINE (APRESOLINE) 50 MG tablet       Pharmacy where request should be sent:      Additional details provided by patient:PATIENT IS CALLING TO REQUEST A NEW 90 DAY PRESCRIPTION WITH REFILLS FOR THE ABOVE MEDICATIONS.  SHE STATES THEY WERE PRESCRIBED WHEN IN THE HOSPITAL.  SHE STATES SHE IS COMPLETELY OUT OF THE AMLODIPINE.    Does the patient have less than a 3 day supply:  [x] Yes  [] No    Would you like a call back once the refill request has been completed: [x] Yes [] No    If the office needs to give you a call back, can they leave a voicemail: [x] Yes [] No    Liya Mena, RegSched Rep   02/22/23 13:42 EST     PLEASE ADVISE.

## 2023-03-08 ENCOUNTER — OFFICE VISIT (OUTPATIENT)
Dept: INTERNAL MEDICINE | Facility: CLINIC | Age: 78
End: 2023-03-08
Payer: MEDICARE

## 2023-03-08 VITALS
SYSTOLIC BLOOD PRESSURE: 122 MMHG | BODY MASS INDEX: 28.52 KG/M2 | HEIGHT: 62 IN | WEIGHT: 155 LBS | HEART RATE: 74 BPM | DIASTOLIC BLOOD PRESSURE: 70 MMHG

## 2023-03-08 DIAGNOSIS — E78.49 OTHER HYPERLIPIDEMIA: ICD-10-CM

## 2023-03-08 DIAGNOSIS — M85.89 OSTEOPENIA OF MULTIPLE SITES: ICD-10-CM

## 2023-03-08 DIAGNOSIS — R06.09 EXERTIONAL DYSPNEA: ICD-10-CM

## 2023-03-08 DIAGNOSIS — N28.9 RENAL INSUFFICIENCY, MILD: Primary | ICD-10-CM

## 2023-03-08 PROBLEM — I20.89 ANGINAL EQUIVALENT: Status: RESOLVED | Noted: 2022-06-15 | Resolved: 2023-03-08

## 2023-03-08 PROBLEM — I20.8 ANGINAL EQUIVALENT: Status: RESOLVED | Noted: 2022-06-15 | Resolved: 2023-03-08

## 2023-03-08 PROCEDURE — 99214 OFFICE O/P EST MOD 30 MIN: CPT | Performed by: INTERNAL MEDICINE

## 2023-03-08 RX ORDER — PROMETHAZINE HYDROCHLORIDE 25 MG/1
TABLET ORAL
Qty: 90 TABLET | Refills: 14 | Status: SHIPPED | OUTPATIENT
Start: 2023-03-08

## 2023-03-08 NOTE — PROGRESS NOTES
Patient:   ANGEL CHAPPELL            MRN: [id number]            FIN: [id number]              Age:   78 years     Sex:  FEMALE     :  44   Associated Diagnoses:   None   Author:   RAIZA MARTIN     Subjective:  Patient overall is doing well. Currently denies any dyspnea. Alert and oriented x 4 this morning.  Objective:  I & O between:  04-SEP-2020 19:23 TO 05-SEP-2020 19:23  Med Dosing Weight:  41.7  kg   05-SEP-2020  24 Hour Intake:   308.00  ( 7.39 mL/kg )  24 Hour Output:   900.00           24 Hour Urine/Stool Output:   0.0  24 Hour Balance:   -592.00           24 Hour Urine Output:   900.00  ( 0.90 mL/kg/hr )                   Urine Count:  2.00    Stool Count:  3.00     Vitals between:   04-SEP-2020 19:23:53   TO   05-SEP-2020 19:23:53                   LAST RESULT MINIMUM MAXIMUM  Temperature 36.3 36 36.8  Heart Rate 70 70 89  Respiratory Rate 16 16 18  NISBP           146 113 170  NIDBP           69 66 90  NIMBP           95 92 108  SpO2                    99 97 100  Medications (14) Active  Scheduled: (8)  Aspirin 81 mg chew tab  81 mg 1 tab, Chewed, Daily  Atorvastatin 80 mg tab  80 mg 1 tab, Oral, Daily  Carvedilol 3.125 mg tab  3.125 mg 1 tab, Oral, Q12H  cefTRIAXone  1,000 mg 10 mL, Slow IV Push, Daily  Clopidogrel 75 mg tab  75 mg 1 tab, Oral, Daily  Furosemide 40 mg/4 mL inj SDV  40 mg 4 mL, Slow IV Push, Daily  Insulin human lispro 1 unit/0.01 mL inj  1-6 units, Subcutaneous, QID [with meals & HS]  Lisinopril 10 mg tab  10 mg 1 tab, Oral, Daily  Continuous: (0)  PRN: (6)  Acetaminophen 325 mg tab  650 mg 2 tab, Oral, Q6H  Dextrose (glucose) 40% 15 gm/37.5 gm oral gel UD  15 gm, Oral, As Directed PRN  Dextrose (glucose) 50% 25 gm/50 mL syringe  12.5 gm 25 mL, IV Push, As Directed PRN  Glucagon 1 mg/1 mL emergency kit SDV  1 mg 1 mL, IM, As Directed PRN  Ondansetron 4 mg/2 mL inj SDV  4 mg 2 mL, Slow IV Push, Q8H  Senna-docusate sodium 8.6-50 mg tab  1 tab, Oral, Q Bedtime  General:  "Chief Complaint  Hypertension    Subjective        Marleny Greenwood presents to [facility]  History of Present Illness Here for reg f/u- she is seeing a new cardiologist later this month- makes her nervous.   She is still having some dyspnea. Cardiology and pulmonary have seen her and not found anything to account for this.  She does not do much physical activity.  She takes a lot of potassium supplement but causes her stomach to cramp- she does take it all at one time.  Still takes butalbital on occ- less than daily.     Objective   Vital Signs:  /70   Pulse 74   Ht 156.2 cm (61.5\")   Wt 70.3 kg (155 lb)   BMI 28.81 kg/m²   Estimated body mass index is 28.81 kg/m² as calculated from the following:    Height as of this encounter: 156.2 cm (61.5\").    Weight as of this encounter: 70.3 kg (155 lb).             Physical Exam   Result Review :                   Assessment and Plan   Diagnoses and all orders for this visit:    1. Renal insufficiency, mild (Primary)  Comments:  mild, stable- will try taking the potassium twice a day to see if more tolerable.   Orders:  -     CBC & Differential; Future    2. Other hyperlipidemia  Comments:  tolerates atorvastatin- no change.   Orders:  -     Comprehensive Metabolic Panel; Future  -     Lipid Panel With / Chol / HDL Ratio; Future    3. Exertional dyspnea  Comments:  reassured that no cause found, encouraged her to push her activity just a little bit.     4. Osteopenia of multiple sites  Comments:  hold Prolia for now, doesn't think she ever got it and f/u scans have been acceptable              Follow Up   Return in about 3 months (around 6/8/2023) for Medicare Wellness, Lab Before FUP.  Patient was given instructions and counseling regarding her condition or for health maintenance advice. Please see specific information pulled into the AVS if appropriate.       " NAD  Chest: Bilateral inspiratory crackles.   CVS: S1 and S2 present.   Exremities: BLE edema.   Abdomen: Soft and not tender.   Labs:  Labs between:  04-SEP-2020 19:23 to 05-SEP-2020 19:23  CBC:                 WBC  HgB  Hct  Plt  MCV  RDW   05-SEP-2020 5.3  (L) 10.6  (L) 32.2  248  99.1  (H) 15.9   05-SEP-2020 4.8  (L) 10.7  (L) 32.6  245  99.4  (H) 16.1   DIFF:                 Seg  Neutroph//ABS  Lymph//ABS  Mono//ABS  EOS/ABS  05-SEP-2020 NOT APPLICABLE  53 // 2.8 28 // 1.5 14 // 0.7 3 // 0.2  05-SEP-2020 NOT APPLICABLE  51 // 2.4 28 // 1.4 15 // 0.7 5 // 0.2  BMP:                 Na  Cl  BUN  Glu   05-SEP-2020 144  107  (H) 37  (H) 174                              K  CO2  Cr  Ca                              3.7  27  (H) 1.36  9.1   BMP:                 Na  Cl  BUN  Glu   05-SEP-2020 142  (H) 108  (H) 38  (H) 195                              K  CO2  Cr  Ca                              4.0  27  (H) 1.45  9.8   CMP:                 AST  ALT  AlkPhos  Bili  Albumin   05-SEP-2020 37  10  78  0.4  (L) 1.7   05-SEP-2020 33  11  80  0.4  (L) 1.8   Other Chem:             Mg  Phos  Triglycerides  GGTP  DirectBili                           1.9           POC GLU:                 Latest Result  Latest Date  Minimum  Min Date  Maximum  Max Date                             (H) 228  05-SEP-2020 (H) 228  05-SEP-2020 (H) 155  05-SEP-2020                 Patient is a 70 YO F with a PMHx of CAD s/p CABG, HFpEF, DM, HTN, HLD, CKD, and A fib s/p watchman who presented to the ED with weakness and confusion.  #Acute metabolic encephalopathy:   -Patient with confusion and AMS prior to admission. Baseline AO x 4.  -Possibly related UTI infection.   -Currently resolved.   #Uncomplicated UTI  -UA positive. Urine culture pending.   -Will continue CTX and transitiion to oral meds on discharge.   #Acute on chronic HF:  #HFpEF s/p Pacemaker  #CAD s/p CABG  #Afib s/p Watchman  - Pitting edema +1; crackles heard at lung bases  - Troponin 0.06;  BNP 12,345  - On furosemide 40 mg/day, lisinopril 10 mg/day, and carvedilol 3.125 mg  - On ASA/plavix at home   Plan:  - Continue IV furosemide 40 mg/day and PO lisinopril 10 mg/day  - Continue on carvedilol 3.125 mg/day  - Continue to monitor  - strict I&O, fluid restriction  - keep Mg>2, K>4  #Elevated troponins  -Related to demand ischemia.   -Peaked. Will stop trending.   #Normocytic anemia:  - Hb - 10.7  Plan:  - Continue to monitor  #NIDDM:  - Hold metformin  - LDSSI  - Accuchecks, hypoglycemia protocol  #HTN:  - Continue IV furosemide 40 mg/day and PO lisinopril 10 mg/day  #Hyperlipidemia:  - Continue atorvastatin 80 mg/day  #CARLYLE on CKD:  - BUN 38; Cr 1.45  - Continue to monitor  - Avoid nephrotoxic medications  #BLE ulcers:  - Likely venous stasis ulceration  - LLE and RLE   - Wound care consulted    #Fluids: None  #Electrolytes: Replete as needed  #Diet: Cardiac Diet / Fluid restriction  #Prophylaxis: SCD's  #Isolation/Restraits: None  #Code: FULL.  #Disposition: GMF  Discussed with PHILL.  Lizzie Velazquez MD  Internal Medicine PGY-2  Please contact via [employer]

## 2023-03-24 ENCOUNTER — OFFICE VISIT (OUTPATIENT)
Dept: CARDIOLOGY | Facility: CLINIC | Age: 78
End: 2023-03-24
Payer: MEDICARE

## 2023-03-24 VITALS
HEIGHT: 62 IN | OXYGEN SATURATION: 96 % | WEIGHT: 161 LBS | SYSTOLIC BLOOD PRESSURE: 116 MMHG | DIASTOLIC BLOOD PRESSURE: 70 MMHG | HEART RATE: 74 BPM | BODY MASS INDEX: 29.63 KG/M2

## 2023-03-24 DIAGNOSIS — Z95.1 S/P CABG (CORONARY ARTERY BYPASS GRAFT): Chronic | ICD-10-CM

## 2023-03-24 DIAGNOSIS — R06.09 DYSPNEA ON EXERTION: Primary | ICD-10-CM

## 2023-03-24 DIAGNOSIS — I25.708 CORONARY ARTERY DISEASE OF BYPASS GRAFT OF NATIVE HEART WITH STABLE ANGINA PECTORIS: ICD-10-CM

## 2023-03-24 DIAGNOSIS — I10 PRIMARY HYPERTENSION: ICD-10-CM

## 2023-03-24 PROCEDURE — 3074F SYST BP LT 130 MM HG: CPT | Performed by: STUDENT IN AN ORGANIZED HEALTH CARE EDUCATION/TRAINING PROGRAM

## 2023-03-24 PROCEDURE — 99214 OFFICE O/P EST MOD 30 MIN: CPT | Performed by: STUDENT IN AN ORGANIZED HEALTH CARE EDUCATION/TRAINING PROGRAM

## 2023-03-24 PROCEDURE — 93000 ELECTROCARDIOGRAM COMPLETE: CPT | Performed by: STUDENT IN AN ORGANIZED HEALTH CARE EDUCATION/TRAINING PROGRAM

## 2023-03-24 PROCEDURE — 3078F DIAST BP <80 MM HG: CPT | Performed by: STUDENT IN AN ORGANIZED HEALTH CARE EDUCATION/TRAINING PROGRAM

## 2023-03-24 RX ORDER — CARVEDILOL 25 MG/1
25 TABLET ORAL 2 TIMES DAILY
Qty: 180 TABLET | Refills: 3 | Status: SHIPPED | OUTPATIENT
Start: 2023-03-24

## 2023-03-24 RX ORDER — AMLODIPINE BESYLATE 5 MG/1
5 TABLET ORAL DAILY
Qty: 90 TABLET | Refills: 3 | Status: SHIPPED | OUTPATIENT
Start: 2023-03-24

## 2023-03-24 NOTE — PROGRESS NOTES
Subjective:     Encounter Date:03/24/2023      Patient ID: Marleny Greenwood is a 77 y.o. female.    Chief Complaint:  Follow up of CAD    HPI:   77 y.o. female with CAD status post CABG patent SVG to LAD along with chronic total occlusion of the RCA and occluded SVG to RCA in July 2022, hypertension, hyperlipidemia who presents for follow up.  Patient has been complaining of significant shortness of breath and dyspnea on exertion and ultimately went to the ED in mid January.  At the time she was seen by Dr. Muir and at the time she was severely hypertensive with blood pressure 213/90.  Her hydralazine was increased to every 8 hours and she was discharged home.  EKG and blood work was negative for ACS.    Since then, patient notes her symptoms have mostly remained the same.  She feels significant dyspnea with walking around her home and performing some of her daily activities.  She denies any chest pressure, lower extremity edema, palpitations, orthopnea.      The following portions of the patient's history were reviewed and updated as appropriate: allergies, current medications, past family history, past medical history, past social history, past surgical history and problem list.     REVIEW OF SYSTEMS:   All systems reviewed.  Pertinent positives identified in HPI.  All other systems are negative.    Past Medical History:   Diagnosis Date   • Anemia    • Asthma    • Coronary artery disease    • Cough    • Dyspnea    • Esophageal reflux    • H/O echocardiogram 6/11/2019 6/05/18 - Normal LV size and function.  EF 60-65%.  Mild concentric LVH.  Midl AR, MR, and TR.   • H/O heart artery stent 6/11/2019    3/22/12 - VISION stent to the SVG of the RCA reducing a total occlusion to 0% residual narrowing.   • Headache    • History of cardiac cath 6/11/2019   • History of loop recorder 6/11/2019    5/20/15 - [employer] LINQ implant.   • History of nuclear stress test 6/11/2019    10/09/17 - Abnormal study.  EF 46%.    • Hyperlipidemia LDL goal <100 6/11/2019   • Hypokalemia    • Migraine    • Moderate osteopenia    • Nonsustained ventricular tachycardia 6/11/2019   • NSVT (nonsustained ventricular tachycardia)    • Pneumonia    • Postmenopausal atrophic vaginitis    • Pre-syncope    • Respiratory insufficiency 6/11/2019   • S/P CABG (coronary artery bypass graft) 6/11/2019 4/12/11 - LIMA to the LAD and SVG to the posterior descending artery.   • Seasonal allergies    • Shoulder dislocation    • Status post placement of implantable loop recorder 05/20/2015      Successful loop/LINQ implantation by Dr. Geo Wilson.   • Vitamin D deficiency        Family History   Problem Relation Age of Onset   • Heart disease Other    • Breast cancer Other    • Breast cancer Mother    • Hypertension Mother        Social History     Socioeconomic History   • Marital status:    Tobacco Use   • Smoking status: Never   • Smokeless tobacco: Never   Vaping Use   • Vaping Use: Never used   Substance and Sexual Activity   • Alcohol use: No   • Drug use: No   • Sexual activity: Defer       Allergies   Allergen Reactions   • Adhesive Tape Itching and Rash   • Cardizem [Diltiazem] Unknown (See Comments)     Pt is unknown why she can not take this medication    • Levaquin [Levofloxacin] Cough       Past Surgical History:   Procedure Laterality Date   • APPENDECTOMY     • BRONCHOSCOPY N/A 8/7/2019    Procedure: BRONCHOSCOPY WITH BRONCHOALVEOLAR LAVAGE;  Surgeon: Thomas Sheridan MD;  Location: [facility] ENDOSCOPY;  Service: Pulmonary   • CARDIAC CATHETERIZATION  06/02/2014    History of Cardiac Cath Procedure Outcome: Successful   • CARDIAC CATHETERIZATION N/A 11/19/2019    Procedure: Right and Left Heart Cath lv pressures;  Surgeon: Mal Moser MD;  Location: [facility] CATH INVASIVE LOCATION;  Service: Cardiology   • CARDIAC CATHETERIZATION N/A 11/19/2019    Procedure: Saphenous Vein Graft;  Surgeon: Mal Moser MD;  Location: [facility] CATH  INVASIVE LOCATION;  Service: Cardiology   • CARDIAC CATHETERIZATION N/A 2019    Procedure: Coronary angiography;  Surgeon: Mal Moser MD;  Location:  [facility] CATH INVASIVE LOCATION;  Service: Cardiology   • CARDIAC CATHETERIZATION Left 2022    Procedure: Coronary Angiography;  Surgeon: Marco A Muir MD;  Location:  [facility] CATH INVASIVE LOCATION;  Service: Cardiology;  Laterality: Left;   • CARDIAC CATHETERIZATION N/A 2022    Procedure: Left Heart Cath;  Surgeon: Marco A Muir MD;  Location:  [facility] CATH INVASIVE LOCATION;  Service: Cardiology;  Laterality: N/A;   • CATARACT EXTRACTION     •  SECTION     • CHOLECYSTECTOMY     • COLONOSCOPY  2009    q-10   • CORONARY ANGIOPLASTY WITH STENT PLACEMENT  2012     [facility] by Dr. Roman.   • CORONARY ARTERY BYPASS GRAFT Bilateral 2011    with a left internal mammary graft to the LAD and saphenous vein graft to the posterior descending artery   • ENDOSCOPY       Diagnostic Esophagogastroduodenoscopy   • HYSTERECTOMY     • NERVE BLOCK       Nerve Block Transforaminal Epidural Lumbar   • OTHER SURGICAL HISTORY  2015    Patient-Activated Cardiac Event Recorder - From 2015 to 2015         ECG 12 Lead    Date/Time: 3/24/2023 1:00 PM  Performed by: Marcos Hoff MD  Authorized by: Marcos Hoff MD   Comparison: compared with previous ECG from 2023  Similar to previous ECG  Rhythm: sinus rhythm  Rate: normal  Conduction: conduction normal  T inversion: II, III, aVF, V5, V6 and V4  QRS axis: normal    Clinical impression: abnormal EKG               Objective:         Vitals:    23 1046   BP: 116/70   Pulse: 74   SpO2: 96%       PHYSICAL EXAM:  GEN: well appearing, in NAD   HEENT: NCAT, EOMI, moist mucus membranes   Respiratory: CTAB, no wheezes, rales or rhonchi  CV: normal rate, regular rhythm, normal S1, S2, no murmurs, rubs, gallops, +2 radial pulses b/l  GI: soft,  nontender, nondistended  MSK: no edema  Skin: no rash, warm, dry  Heme/Lymph: no bruising or bleeding  Psych: organized thought, normal behavior and affect  Neuro: Alert and Oriented x 3, grossly normal motor function        Assessment:         (R06.09) Dyspnea on exertion - Plan: Adult Transthoracic Echo Complete w/ Color, Spectral and Contrast if Necessary Per Protocol    (I25.708) Coronary artery disease of bypass graft of native heart with stable angina pectoris (HCC)    (Z95.1) S/P CABG (coronary artery bypass graft)    (I10) Primary hypertension    77 y.o. female with CAD status post CABG patent SVG to LAD along with chronic total occlusion of the RCA and occluded SVG to RCA in July 2022, hypertension, hyperlipidemia who presents for follow up.       Plan:       #CAD status post CABG  [facility] July 2022 with patent SVG to LAD,  of RCA and occluded SVG to RCA.  He did also have a severe proximal D2 stenosis.  The vessel looks medium caliber and is a likely target of revascularization if patient symptoms do not improve with titration of medical therapy.  Significant dyspnea on exertion limiting her daily activities, will change her medications to hopefully uptitrate her antianginal therapy but maintain adequate blood pressure control.  - Increase carvedilol to 25 mg twice daily  - Continue Ranexa 500 mg twice daily, will have her follow-up and consider increase to 1000 mg twice daily if symptoms of not improved  - Decrease amlodipine to 5 mg daily  - Continue aspirin 81 mg daily, Plavix 75 mg daily    #Hypertension  Currently well controlled  As above we will decrease amlodipine and increase carvedilol, can continue hydralazine 50 mg every 8, Imdur 60 mg daily, Aldactone 25 mg daily    Dr. Zaragoza, thank you very much for referring this kind patient to me. Please call me with any questions or concerns. I will see the patient again in the office in 6 weeks.         Marcos Hoff MD  03/24/23  [facility]    Outpatient Encounter Medications as of 3/24/2023   Medication Sig Dispense Refill   • albuterol sulfate  (90 Base) MCG/ACT inhaler INHALE TWO PUFFS BY MOUTH EVERY 4 HOURS AS NEEDED FOR WHEEZING 18 g 2   • amLODIPine (NORVASC) 5 MG tablet Take 1 tablet by mouth Daily. 90 tablet 3   • aspirin 81 MG EC tablet Take 1 tablet by mouth Daily.     • atorvastatin (LIPITOR) 40 MG tablet TAKE 1 TABLET DAILY 90 tablet 3   • carvedilol (COREG) 25 MG tablet Take 1 tablet by mouth 2 (Two) Times a Day. 180 tablet 3   • clopidogrel (PLAVIX) 75 MG tablet TAKE 1 TABLET DAILY 90 tablet 3   • FeroSul 325 (65 Fe) MG tablet TAKE ONE TABLET BY MOUTH DAILY WITH BREAKFAST 90 tablet 2   • furosemide (LASIX) 40 MG tablet Take 1 tablet by mouth 2 (Two) Times a Day. 180 tablet 1   • hydrALAZINE (APRESOLINE) 50 MG tablet Take 1 tablet by mouth 3 (Three) Times a Day for 30 days. 90 tablet 2   • HYDROcodone-homatropine (HYCODAN) 5-1.5 MG/5ML syrup Take 5 mL by mouth Every 6 (Six) Hours As Needed for Cough.     • isosorbide mononitrate (IMDUR) 60 MG 24 hr tablet TAKE 1 TABLET DAILY 90 tablet 3   • Multiple Vitamins-Minerals (PRESERVISION AREDS 2 PO) Take  by mouth.     • nitroglycerin (NITROSTAT) 0.4 MG SL tablet Place 1 tablet under the tongue Every 5 (Five) Minutes As Needed for Chest Pain. Take no more than 3 doses in 15 minutes. 25 tablet 3   • omeprazole (priLOSEC) 20 MG capsule TAKE 1 CAPSULE DAILY 90 capsule 3   • polyethylene glycol (MIRALAX) packet Take 17 g by mouth Daily.     • potassium chloride ER (K-TAB) 20 MEQ tablet controlled-release ER tablet TAKE 3 TABLETS DAILY 270 tablet 1   • promethazine (PHENERGAN) 25 MG tablet TAKE 1 TABLET EVERY 6 HOURS AS NEEDED FOR NAUSEA OR VOMITING 90 tablet 14   • ranolazine (RANEXA) 500 MG 12 hr tablet TAKE 1 TABLET TWICE A  tablet 3   • spironolactone (ALDACTONE) 25 MG tablet Take 1 tablet by mouth Daily. 90 tablet 1   • vitamin D (ERGOCALCIFEROL) 1.25 MG (31716 UT) capsule  capsule TAKE 1 CAPSULE ONCE WEEKLY 12 capsule 3   • [DISCONTINUED] amLODIPine (NORVASC) 10 MG tablet Take 1 tablet by mouth Daily. 90 tablet 2   • [DISCONTINUED] carvedilol (COREG) 12.5 MG tablet Take 1 tablet by mouth 2 (Two) Times a Day. 180 tablet 3   • butalbital-aspirin-caffeine-codeine (Fiorinal/Codeine #3) -31-30 MG capsule Take 1 capsule by mouth Every 4 (Four) Hours As Needed for Headache. 120 capsule 1   • [DISCONTINUED] cyclobenzaprine (FLEXERIL) 10 MG tablet Take 1 tablet by mouth 3 (Three) Times a Day As Needed for Muscle Spasms. 30 tablet 1   • [DISCONTINUED] Denosumab (PROLIA SC) Inject  under the skin into the appropriate area as directed. One injection q6 months       No facility-administered encounter medications on file as of 3/24/2023.

## 2023-03-27 ENCOUNTER — TELEPHONE (OUTPATIENT)
Dept: INTERNAL MEDICINE | Facility: CLINIC | Age: 78
End: 2023-03-27

## 2023-03-27 RX ORDER — ATORVASTATIN CALCIUM 40 MG/1
TABLET, FILM COATED ORAL
Qty: 90 TABLET | Refills: 3 | Status: SHIPPED | OUTPATIENT
Start: 2023-03-27

## 2023-03-27 NOTE — TELEPHONE ENCOUNTER
Caller: Marleny Greenwood    Relationship to patient: Self    Best call back number:     Patient is needing: PATIENT ASKING THAT ABHAY PLEASE CALL HER BACK SO THEY CAN DISCUSS WHETHER OR NOT SHE NEEDS TO HAVE A MAMMOGRAM AND WHAT NEEDS TO BE DONE.

## 2023-04-13 ENCOUNTER — HOSPITAL ENCOUNTER (OUTPATIENT)
Dept: CARDIOLOGY | Facility: HOSPITAL | Age: 78
Discharge: HOME | End: 2023-04-13
Admitting: STUDENT IN AN ORGANIZED HEALTH CARE EDUCATION/TRAINING PROGRAM
Payer: MEDICARE

## 2023-04-13 VITALS
BODY MASS INDEX: 30.4 KG/M2 | OXYGEN SATURATION: 94 % | HEIGHT: 61 IN | HEART RATE: 84 BPM | DIASTOLIC BLOOD PRESSURE: 80 MMHG | SYSTOLIC BLOOD PRESSURE: 150 MMHG | WEIGHT: 161 LBS

## 2023-04-13 DIAGNOSIS — R06.09 DYSPNEA ON EXERTION: ICD-10-CM

## 2023-04-13 LAB
AORTIC ARCH: 3 CM
AORTIC DIMENSIONLESS INDEX: 0.8 (DI)
ASCENDING AORTA: 3.2 CM
AV HCM GRAD VALS: 34 MMHG
AV LVOT PEAK GRADIENT: 6 MMHG
BH CV ECHO LEFT VENTRICLE GLOBAL LONGITUDINAL STRAIN: -15.2 %
BH CV ECHO MEAS - ACS: 1.74 CM
BH CV ECHO MEAS - AI P1/2T: 669.1 MSEC
BH CV ECHO MEAS - AO MAX PG: 10.4 MMHG
BH CV ECHO MEAS - AO MEAN PG: 6.8 MMHG
BH CV ECHO MEAS - AO ROOT DIAM: 3.3 CM
BH CV ECHO MEAS - AO V2 MAX: 161.4 CM/SEC
BH CV ECHO MEAS - AO V2 VTI: 30.3 CM
BH CV ECHO MEAS - AVA(I,D): 1.99 CM2
BH CV ECHO MEAS - EDV(CUBED): 43.9 ML
BH CV ECHO MEAS - EDV(MOD-SP2): 81 ML
BH CV ECHO MEAS - EDV(MOD-SP4): 72 ML
BH CV ECHO MEAS - EF(MOD-BP): 71.4 %
BH CV ECHO MEAS - EF(MOD-SP2): 67.9 %
BH CV ECHO MEAS - EF(MOD-SP4): 73.6 %
BH CV ECHO MEAS - EF_3D-VOL: 56 %
BH CV ECHO MEAS - ESV(CUBED): 18.4 ML
BH CV ECHO MEAS - ESV(MOD-SP2): 26 ML
BH CV ECHO MEAS - ESV(MOD-SP4): 19 ML
BH CV ECHO MEAS - FS: 25.2 %
BH CV ECHO MEAS - IVS/LVPW: 1.04 CM
BH CV ECHO MEAS - IVSD: 1.26 CM
BH CV ECHO MEAS - LA 3D VOL INDEX: 23
BH CV ECHO MEAS - LAT PEAK E' VEL: 9.4 CM/SEC
BH CV ECHO MEAS - LV DIASTOLIC VOL/BSA (35-75): 41.8 CM2
BH CV ECHO MEAS - LV MASS(C)D: 143.2 GRAMS
BH CV ECHO MEAS - LV MAX PG: 6.1 MMHG
BH CV ECHO MEAS - LV MEAN PG: 4.1 MMHG
BH CV ECHO MEAS - LV SYSTOLIC VOL/BSA (12-30): 11 CM2
BH CV ECHO MEAS - LV V1 MAX: 123.4 CM/SEC
BH CV ECHO MEAS - LV V1 VTI: 24.4 CM
BH CV ECHO MEAS - LVIDD: 3.5 CM
BH CV ECHO MEAS - LVIDS: 2.6 CM
BH CV ECHO MEAS - LVOT AREA: 2.47 CM2
BH CV ECHO MEAS - LVOT DIAM: 1.77 CM
BH CV ECHO MEAS - LVPWD: 1.21 CM
BH CV ECHO MEAS - MED PEAK E' VEL: 7.3 CM/SEC
BH CV ECHO MEAS - MV A DUR: 0.11 SEC
BH CV ECHO MEAS - MV A MAX VEL: 106.6 CM/SEC
BH CV ECHO MEAS - MV DEC SLOPE: 244 CM/SEC2
BH CV ECHO MEAS - MV DEC TIME: 0.26 MSEC
BH CV ECHO MEAS - MV E MAX VEL: 66.5 CM/SEC
BH CV ECHO MEAS - MV E/A: 0.62
BH CV ECHO MEAS - MV MAX PG: 5.1 MMHG
BH CV ECHO MEAS - MV MEAN PG: 1.72 MMHG
BH CV ECHO MEAS - MV P1/2T: 74.3 MSEC
BH CV ECHO MEAS - MV V2 VTI: 24.9 CM
BH CV ECHO MEAS - MVA(P1/2T): 3 CM2
BH CV ECHO MEAS - MVA(VTI): 2.42 CM2
BH CV ECHO MEAS - PA ACC TIME: 0.08 SEC
BH CV ECHO MEAS - PA PR(ACCEL): 41 MMHG
BH CV ECHO MEAS - PA V2 MAX: 118.1 CM/SEC
BH CV ECHO MEAS - PULM A REVS DUR: 0.12 SEC
BH CV ECHO MEAS - PULM A REVS VEL: 48 CM/SEC
BH CV ECHO MEAS - PULM DIAS VEL: 26.7 CM/SEC
BH CV ECHO MEAS - PULM S/D: 2.2
BH CV ECHO MEAS - PULM SYS VEL: 58.7 CM/SEC
BH CV ECHO MEAS - QP/QS: 0.58
BH CV ECHO MEAS - RAP SYSTOLE: 3 MMHG
BH CV ECHO MEAS - RV MAX PG: 2 MMHG
BH CV ECHO MEAS - RV V1 MAX: 70.7 CM/SEC
BH CV ECHO MEAS - RV V1 VTI: 12.3 CM
BH CV ECHO MEAS - RVOT DIAM: 1.89 CM
BH CV ECHO MEAS - RVSP: 32 MMHG
BH CV ECHO MEAS - SI(MOD-SP2): 31.9 ML/M2
BH CV ECHO MEAS - SI(MOD-SP4): 30.8 ML/M2
BH CV ECHO MEAS - SV(LVOT): 60.3 ML
BH CV ECHO MEAS - SV(MOD-SP2): 55 ML
BH CV ECHO MEAS - SV(MOD-SP4): 53 ML
BH CV ECHO MEAS - SV(RVOT): 34.7 ML
BH CV ECHO MEAS - TAPSE (>1.6): 1.69 CM
BH CV ECHO MEAS - TR MAX PG: 29.2 MMHG
BH CV ECHO MEAS - TR MAX VEL: 270.1 CM/SEC
BH CV ECHO MEASUREMENTS AVERAGE E/E' RATIO: 7.96
BH CV VAS BP LEFT ARM: NORMAL MMHG
BH CV XLRA - RV BASE: 2.31 CM
BH CV XLRA - RV LENGTH: 7.3 CM
BH CV XLRA - RV MID: 2.05 CM
BH CV XLRA - TDI S': 8.7 CM/SEC
LEFT ATRIUM VOLUME INDEX: 17.6 ML/M2
MAXIMAL PREDICTED HEART RATE: 144 BPM
SINUS: 2.9 CM
STJ: 3.4 CM
STRESS TARGET HR: 122 BPM

## 2023-04-13 PROCEDURE — 93306 TTE W/DOPPLER COMPLETE: CPT

## 2023-04-13 PROCEDURE — 25510000001 PERFLUTREN (DEFINITY) 8.476 MG IN SODIUM CHLORIDE (PF) 0.9 % 10 ML INJECTION: Performed by: STUDENT IN AN ORGANIZED HEALTH CARE EDUCATION/TRAINING PROGRAM

## 2023-04-13 PROCEDURE — 93356 MYOCRD STRAIN IMG SPCKL TRCK: CPT | Performed by: INTERNAL MEDICINE

## 2023-04-13 PROCEDURE — 93356 MYOCRD STRAIN IMG SPCKL TRCK: CPT

## 2023-04-13 PROCEDURE — 93306 TTE W/DOPPLER COMPLETE: CPT | Performed by: INTERNAL MEDICINE

## 2023-04-13 RX ADMIN — PERFLUTREN 1.5 ML: 6.52 INJECTION, SUSPENSION INTRAVENOUS at 13:05

## 2023-04-24 DIAGNOSIS — E87.6 HYPOKALEMIA: ICD-10-CM

## 2023-04-24 RX ORDER — RANOLAZINE 500 MG/1
500 TABLET, EXTENDED RELEASE ORAL 2 TIMES DAILY
Qty: 180 TABLET | Refills: 1 | Status: SHIPPED | OUTPATIENT
Start: 2023-04-24

## 2023-04-24 RX ORDER — POTASSIUM CHLORIDE 1500 MG/1
20 TABLET, FILM COATED, EXTENDED RELEASE ORAL 3 TIMES DAILY
Qty: 270 TABLET | Refills: 1 | Status: SHIPPED | OUTPATIENT
Start: 2023-04-24

## 2023-05-05 ENCOUNTER — OFFICE VISIT (OUTPATIENT)
Dept: CARDIOLOGY | Facility: CLINIC | Age: 78
End: 2023-05-05
Payer: MEDICARE

## 2023-05-05 VITALS
HEIGHT: 61 IN | BODY MASS INDEX: 30.21 KG/M2 | HEART RATE: 73 BPM | DIASTOLIC BLOOD PRESSURE: 90 MMHG | SYSTOLIC BLOOD PRESSURE: 138 MMHG | WEIGHT: 160 LBS | OXYGEN SATURATION: 95 %

## 2023-05-05 DIAGNOSIS — I25.10 CAD, MULTIPLE VESSEL: Primary | ICD-10-CM

## 2023-05-05 DIAGNOSIS — R06.09 DOE (DYSPNEA ON EXERTION): ICD-10-CM

## 2023-05-05 DIAGNOSIS — Z95.1 S/P CABG (CORONARY ARTERY BYPASS GRAFT): Chronic | ICD-10-CM

## 2023-05-05 DIAGNOSIS — I10 PRIMARY HYPERTENSION: ICD-10-CM

## 2023-05-05 RX ORDER — RANOLAZINE 1000 MG/1
1000 TABLET, EXTENDED RELEASE ORAL 2 TIMES DAILY
Qty: 60 TABLET | Refills: 11 | Status: SHIPPED | OUTPATIENT
Start: 2023-05-05

## 2023-05-05 NOTE — PROGRESS NOTES
Subjective:     Encounter Date:03/24/2023      Patient ID: Marleny Greenwood is a 77 y.o. female.    Chief Complaint:  Follow up of CAD    HPI:   77 y.o. female with CAD status post CABG patent SVG to LAD along with chronic total occlusion of the RCA and occluded SVG to RCA in July 2022, hypertension, hyperlipidemia who presents for follow up.  Patient was last seen on 3/24/2023 and her carvedilol was increased to 25 mg twice daily and persistent symptoms of dyspnea on exertion.  Since that time, she notes that her symptoms have remained about the same.  She continues to feel dyspnea on exertion with walking around her home and performing some of her daily activities.      Prior note:  Patient has been complaining of significant shortness of breath and dyspnea on exertion and ultimately went to the ED in mid January.  At the time she was seen by Dr. Muir and at the time she was severely hypertensive with blood pressure 213/90.  Her hydralazine was increased to every 8 hours and she was discharged home.  EKG and blood work was negative for ACS.        The following portions of the patient's history were reviewed and updated as appropriate: allergies, current medications, past family history, past medical history, past social history, past surgical history and problem list.     REVIEW OF SYSTEMS:   All systems reviewed.  Pertinent positives identified in HPI.  All other systems are negative.    Past Medical History:   Diagnosis Date   • Anemia    • Asthma    • Coronary artery disease    • Cough    • Dyspnea    • Esophageal reflux    • H/O echocardiogram 6/11/2019 6/05/18 - Normal LV size and function.  EF 60-65%.  Mild concentric LVH.  Midl AR, MR, and TR.   • H/O heart artery stent 6/11/2019    3/22/12 - VISION stent to the SVG of the RCA reducing a total occlusion to 0% residual narrowing.   • Headache    • History of cardiac cath 6/11/2019   • History of loop recorder 6/11/2019    5/20/15 - [employer]  implant.   • History of nuclear stress test 6/11/2019    10/09/17 - Abnormal study.  EF 46%.   • Hyperlipidemia LDL goal <100 6/11/2019   • Hypokalemia    • Migraine    • Moderate osteopenia    • Nonsustained ventricular tachycardia 6/11/2019   • NSVT (nonsustained ventricular tachycardia)    • Pneumonia    • Postmenopausal atrophic vaginitis    • Pre-syncope    • Respiratory insufficiency 6/11/2019   • S/P CABG (coronary artery bypass graft) 6/11/2019 4/12/11 - LIMA to the LAD and SVG to the posterior descending artery.   • Seasonal allergies    • Shoulder dislocation    • Status post placement of implantable loop recorder 05/20/2015      Successful loop/LINQ implantation by Dr. Geo Wilson.   • Vitamin D deficiency        Family History   Problem Relation Age of Onset   • Heart disease Other    • Breast cancer Other    • Breast cancer Mother    • Hypertension Mother        Social History     Socioeconomic History   • Marital status:    Tobacco Use   • Smoking status: Never   • Smokeless tobacco: Never   Vaping Use   • Vaping Use: Never used   Substance and Sexual Activity   • Alcohol use: No   • Drug use: No   • Sexual activity: Defer       Allergies   Allergen Reactions   • Adhesive Tape Itching and Rash   • Cardizem [Diltiazem] Unknown (See Comments)     Pt is unknown why she can not take this medication    • Levaquin [Levofloxacin] Cough       Past Surgical History:   Procedure Laterality Date   • APPENDECTOMY     • BRONCHOSCOPY N/A 8/7/2019    Procedure: BRONCHOSCOPY WITH BRONCHOALVEOLAR LAVAGE;  Surgeon: Thomas Sheridan MD;  Location: [facility] ENDOSCOPY;  Service: Pulmonary   • CARDIAC CATHETERIZATION  06/02/2014    History of Cardiac Cath Procedure Outcome: Successful   • CARDIAC CATHETERIZATION N/A 11/19/2019    Procedure: Right and Left Heart Cath lv pressures;  Surgeon: Mal Moser MD;  Location: [facility] CATH INVASIVE LOCATION;  Service: Cardiology   • CARDIAC CATHETERIZATION N/A 11/19/2019     Procedure: Saphenous Vein Graft;  Surgeon: Mal Moser MD;  Location:  [facility] CATH INVASIVE LOCATION;  Service: Cardiology   • CARDIAC CATHETERIZATION N/A 2019    Procedure: Coronary angiography;  Surgeon: Mal Moser MD;  Location:  [facility] CATH INVASIVE LOCATION;  Service: Cardiology   • CARDIAC CATHETERIZATION Left 2022    Procedure: Coronary Angiography;  Surgeon: Marco A Muir MD;  Location:  [facility] CATH INVASIVE LOCATION;  Service: Cardiology;  Laterality: Left;   • CARDIAC CATHETERIZATION N/A 2022    Procedure: Left Heart Cath;  Surgeon: Marco A Muir MD;  Location:  [facility] CATH INVASIVE LOCATION;  Service: Cardiology;  Laterality: N/A;   • CATARACT EXTRACTION     •  SECTION     • CHOLECYSTECTOMY     • COLONOSCOPY  2009    q-10   • CORONARY ANGIOPLASTY WITH STENT PLACEMENT  2012     [facility] by Dr. Roman.   • CORONARY ARTERY BYPASS GRAFT Bilateral 2011    with a left internal mammary graft to the LAD and saphenous vein graft to the posterior descending artery   • ENDOSCOPY       Diagnostic Esophagogastroduodenoscopy   • HYSTERECTOMY     • NERVE BLOCK       Nerve Block Transforaminal Epidural Lumbar   • OTHER SURGICAL HISTORY  2015    Patient-Activated Cardiac Event Recorder - From 2015 to 2015         ECG 12 Lead    Date/Time: 2023 11:55 AM  Performed by: Marcos Hoff MD  Authorized by: Marcos Hoff MD   Comparison: compared with previous ECG from 3/4/2023  Similar to previous ECG  Rhythm: sinus rhythm  Rate: normal  Conduction: conduction normal  QRS axis: right  Other findings: non-specific ST-T wave changes    Clinical impression: abnormal EKG               Objective:         Vitals:    23 1130   BP: 138/90   Pulse: 73   SpO2: 95%       PHYSICAL EXAM:  GEN: well appearing, in NAD   HEENT: NCAT, EOMI, moist mucus membranes   Respiratory: CTAB, no wheezes, rales or rhonchi  CV: normal rate,  regular rhythm, normal S1, S2, no murmurs, rubs, gallops, +2 radial pulses b/l  GI: soft, nontender, nondistended  MSK: no edema  Skin: no rash, warm, dry  Heme/Lymph: no bruising or bleeding  Psych: organized thought, normal behavior and affect  Neuro: Alert and Oriented x 3, grossly normal motor function        Assessment:         (I25.10) CAD, multiple vessel    (Z95.1) S/P CABG (coronary artery bypass graft)    (R06.09) ROJAS (dyspnea on exertion)    (I10) Primary hypertension    77 y.o. female with CAD status post CABG patent SVG to LAD along with chronic total occlusion of the RCA and occluded SVG to RCA in July 2022, hypertension, hyperlipidemia who presents for follow up.       Plan:       #CAD status post CABG  [facility] July 2022 with patent SVG to LAD,  of RCA and occluded SVG to RCA.  She did also have a severe proximal D2 stenosis.  The vessel looks medium caliber and is a likely target of revascularization if patient symptoms do not improve with titration of medical therapy.  Significant dyspnea on exertion limiting her daily activities, will change her medications to hopefully uptitrate her antianginal therapy but maintain adequate blood pressure control.  - continue carvedilol 25 mg twice daily  - increase Ranexa to 1000 mg twice daily  - continue amlodipine to 5 mg daily  - Continue aspirin 81 mg daily, Plavix 75 mg daily  -We will have her return in 6 weeks, if symptoms are unchanged, we will pursue left heart catheterization and possible PCI.  She is agreeable to plan    #Hypertension  Currently well controlled  -Continue carvedilol and amlodipine as above, continue hydralazine 50 mg every 8 hours, Imdur 60 mg daily, Aldactone 25 mg daily    Dr. Zaragoza, thank you very much for referring this kind patient to me. Please call me with any questions or concerns. I will see the patient again in the office in 6 weeks.         Marcos Hoff MD  05/05/23  [facility]    Outpatient Encounter  Medications as of 5/5/2023   Medication Sig Dispense Refill   • albuterol sulfate  (90 Base) MCG/ACT inhaler INHALE TWO PUFFS BY MOUTH EVERY 4 HOURS AS NEEDED FOR WHEEZING 18 g 2   • amLODIPine (NORVASC) 5 MG tablet Take 1 tablet by mouth Daily. 90 tablet 3   • aspirin 81 MG EC tablet Take 1 tablet by mouth Daily.     • atorvastatin (LIPITOR) 40 MG tablet TAKE 1 TABLET DAILY 90 tablet 3   • butalbital-aspirin-caffeine-codeine (Fiorinal/Codeine #3) -96-30 MG capsule Take 1 capsule by mouth Every 4 (Four) Hours As Needed for Headache. 120 capsule 1   • carvedilol (COREG) 25 MG tablet Take 1 tablet by mouth 2 (Two) Times a Day. 180 tablet 3   • clopidogrel (PLAVIX) 75 MG tablet TAKE 1 TABLET DAILY 90 tablet 3   • FeroSul 325 (65 Fe) MG tablet TAKE ONE TABLET BY MOUTH DAILY WITH BREAKFAST 90 tablet 2   • furosemide (LASIX) 40 MG tablet Take 1 tablet by mouth 2 (Two) Times a Day. 180 tablet 1   • HYDROcodone-homatropine (HYCODAN) 5-1.5 MG/5ML syrup Take 5 mL by mouth Every 6 (Six) Hours As Needed for Cough.     • isosorbide mononitrate (IMDUR) 60 MG 24 hr tablet TAKE 1 TABLET DAILY 90 tablet 3   • Multiple Vitamins-Minerals (PRESERVISION AREDS 2 PO) Take  by mouth.     • omeprazole (priLOSEC) 20 MG capsule TAKE 1 CAPSULE DAILY 90 capsule 3   • polyethylene glycol (MIRALAX) packet Take 17 g by mouth Daily.     • potassium chloride ER (K-TAB) 20 MEQ tablet controlled-release ER tablet Take 1 tablet by mouth 3 (Three) Times a Day. 270 tablet 1   • promethazine (PHENERGAN) 25 MG tablet TAKE 1 TABLET EVERY 6 HOURS AS NEEDED FOR NAUSEA OR VOMITING 90 tablet 14   • ranolazine (RANEXA) 1000 MG 12 hr tablet Take 1 tablet by mouth 2 (Two) Times a Day. 60 tablet 11   • spironolactone (ALDACTONE) 25 MG tablet Take 1 tablet by mouth Daily. 90 tablet 1   • vitamin D (ERGOCALCIFEROL) 1.25 MG (08782 UT) capsule capsule TAKE 1 CAPSULE ONCE WEEKLY 12 capsule 3   • [DISCONTINUED] ranolazine (RANEXA) 500 MG 12 hr tablet Take 1  tablet by mouth 2 (Two) Times a Day. 180 tablet 1   • hydrALAZINE (APRESOLINE) 50 MG tablet Take 1 tablet by mouth 3 (Three) Times a Day for 30 days. 90 tablet 2   • nitroglycerin (NITROSTAT) 0.4 MG SL tablet Place 1 tablet under the tongue Every 5 (Five) Minutes As Needed for Chest Pain. Take no more than 3 doses in 15 minutes. (Patient not taking: Reported on 5/5/2023) 25 tablet 3     No facility-administered encounter medications on file as of 5/5/2023.

## 2023-06-07 ENCOUNTER — OFFICE VISIT (OUTPATIENT)
Dept: INTERNAL MEDICINE | Facility: CLINIC | Age: 78
End: 2023-06-07
Payer: MEDICARE

## 2023-06-07 VITALS
TEMPERATURE: 97.1 F | BODY MASS INDEX: 31.08 KG/M2 | WEIGHT: 164.6 LBS | SYSTOLIC BLOOD PRESSURE: 112 MMHG | DIASTOLIC BLOOD PRESSURE: 66 MMHG | HEIGHT: 61 IN

## 2023-06-07 DIAGNOSIS — Z12.31 VISIT FOR SCREENING MAMMOGRAM: ICD-10-CM

## 2023-06-07 DIAGNOSIS — E78.49 OTHER HYPERLIPIDEMIA: Chronic | ICD-10-CM

## 2023-06-07 DIAGNOSIS — I25.10 CORONARY ARTERIOSCLEROSIS IN NATIVE ARTERY: ICD-10-CM

## 2023-06-07 DIAGNOSIS — I10 ESSENTIAL HYPERTENSION: Primary | Chronic | ICD-10-CM

## 2023-06-07 DIAGNOSIS — R06.09 EXERTIONAL DYSPNEA: ICD-10-CM

## 2023-06-07 RX ORDER — ROSUVASTATIN CALCIUM 40 MG/1
40 TABLET, COATED ORAL DAILY
Qty: 90 TABLET | Refills: 1 | Status: SHIPPED | OUTPATIENT
Start: 2023-06-07

## 2023-06-07 RX ORDER — ACETAMINOPHEN, ASPIRIN AND CAFFEINE 250; 250; 65 MG/1; MG/1; MG/1
1 TABLET, FILM COATED ORAL EVERY 6 HOURS PRN
COMMUNITY
End: 2023-06-07 | Stop reason: SDUPTHER

## 2023-06-07 RX ORDER — NITROGLYCERIN 0.4 MG/1
0.4 TABLET SUBLINGUAL
Qty: 25 TABLET | Refills: 3 | Status: SHIPPED | OUTPATIENT
Start: 2023-06-07

## 2023-06-07 NOTE — PROGRESS NOTES
"Chief Complaint  No chief complaint on file.    Subjective        Marleny Greenwood presents to [facility]  History of Present Illness Increasing issues with ROJAS- thinks it is heart related but hasn't responded to any of the medication changes per Dr. Hoff. Recent respiratory eval was stable- Dr. Jameson doesn't think lung related.     Objective   Vital Signs:  /66   Temp 97.1 °F (36.2 °C)   Ht 154.9 cm (60.98\")   Wt 74.7 kg (164 lb 9.6 oz)   BMI 31.12 kg/m²   Estimated body mass index is 31.12 kg/m² as calculated from the following:    Height as of this encounter: 154.9 cm (60.98\").    Weight as of this encounter: 74.7 kg (164 lb 9.6 oz).       [unfilled]    Physical Exam  Constitutional:       General: She is not in acute distress.     Appearance: She is not ill-appearing.   Cardiovascular:      Rate and Rhythm: Normal rate and regular rhythm.   Pulmonary:      Effort: Pulmonary effort is normal.      Breath sounds: Normal breath sounds.   Musculoskeletal:      Right lower leg: No edema.      Left lower leg: No edema.   Lymphadenopathy:      Cervical: No cervical adenopathy.      Result Review :  The following data was reviewed by: Liz Zaragoza MD on 06/07/2023:  Common labs          1/19/2023    12:41 1/20/2023    04:37 1/20/2023    15:03 6/2/2023    10:26   Common Labs   Glucose 97  92   107    BUN 14  17   14    Creatinine 1.07  1.01   1.13    Sodium 144  142   142    Potassium 3.1  2.8  3.5  4.5    Chloride 107  107   109    Calcium 9.2  8.5   9.7    Total Protein    6.5    Albumin 3.8    3.8    Total Bilirubin 0.2    0.3    Alkaline Phosphatase 55    64    AST (SGOT) 9    12    ALT (SGPT) <5    13    WBC 5.53  5.93   5.77    Hemoglobin 12.5  11.4   11.9    Hematocrit 38.5  34.8   37.1    Platelets 186  179   238    Total Cholesterol 160       Total Cholesterol    194    Triglycerides 83    112    HDL Cholesterol 58    59    LDL Cholesterol  86    115  "     Data reviewed : Consultant notes cardiology             Assessment and Plan   Diagnoses and all orders for this visit:    1. Essential hypertension (Primary)  Comments:  controlled    2. Exertional dyspnea  Comments:  no clear etiology- presume cardiac altho no associated sx, signs of heart failure, etc.    3. Coronary arteriosclerosis in native artery  -     nitroglycerin (NITROSTAT) 0.4 MG SL tablet; Place 1 tablet under the tongue Every 5 (Five) Minutes As Needed for Chest Pain. Take no more than 3 doses in 15 minutes.  Dispense: 25 tablet; Refill: 3    4. Visit for screening mammogram  -     Mammo Screening Bilateral With CAD    5. Other hyperlipidemia  Comments:  goal LDL < 70, will switch to rosuvastatin 40 mg and recheck labs in 3 months.  Orders:  -     rosuvastatin (CRESTOR) 40 MG tablet; Take 1 tablet by mouth Daily.  Dispense: 90 tablet; Refill: 1  -     Comprehensive Metabolic Panel; Future  -     Lipid Panel With / Chol / HDL Ratio; Future  -     TSH; Future             Follow Up   Return in about 3 months (around 9/7/2023) for Medicare Wellness, Lab Before FUP.  Patient was given instructions and counseling regarding her condition or for health maintenance advice. Please see specific information pulled into the AVS if appropriate.

## 2023-06-07 NOTE — PROGRESS NOTES
"The ABCs of the Annual Wellness Visit  Subsequent Medicare Wellness Visit    Subjective    Marleny Greenwood is a 77 y.o. female who presents for a Subsequent Medicare Wellness Visit.    The following portions of the patient's history were reviewed and   updated as appropriate: {history reviewed:20406::\"allergies\",\"current medications\",\"past family history\",\"past medical history\",\"past social history\",\"past surgical history\",\"problem list\"}.    Compared to one year ago, the patient feels her physical   health is the same.    Compared to one year ago, the patient feels her mental   health is the same.    Recent Hospitalizations:  This patient has had a [facility] admission record on file within the last 365 days.    Current Medical Providers:  Patient Care Team:  Liz Zaragoza MD as PCP - General (Internal Medicine)  Mal Moser MD as Consulting Physician (Cardiology)    Outpatient Medications Prior to Visit   Medication Sig Dispense Refill    albuterol sulfate  (90 Base) MCG/ACT inhaler INHALE TWO PUFFS BY MOUTH EVERY 4 HOURS AS NEEDED FOR WHEEZING 18 g 2    amLODIPine (NORVASC) 5 MG tablet Take 1 tablet by mouth Daily. 90 tablet 3    aspirin 81 MG EC tablet Take 1 tablet by mouth Daily.      aspirin-acetaminophen-caffeine (EXCEDRIN MIGRAINE) 250-250-65 MG per tablet Take 1 tablet by mouth Every 6 (Six) Hours As Needed for Headache.      atorvastatin (LIPITOR) 40 MG tablet TAKE 1 TABLET DAILY 90 tablet 3    butalbital-aspirin-caffeine-codeine (Fiorinal/Codeine #3) -04-30 MG capsule Take 1 capsule by mouth Every 4 (Four) Hours As Needed for Headache. 120 capsule 1    carvedilol (COREG) 25 MG tablet Take 1 tablet by mouth 2 (Two) Times a Day. 180 tablet 3    clopidogrel (PLAVIX) 75 MG tablet TAKE 1 TABLET DAILY 90 tablet 3    FeroSul 325 (65 Fe) MG tablet TAKE ONE TABLET BY MOUTH DAILY WITH BREAKFAST 90 tablet 2    furosemide (LASIX) 40 MG tablet Take 1 tablet by mouth 2 (Two) Times a Day. 180 " tablet 1    hydrALAZINE (APRESOLINE) 50 MG tablet Take 1 tablet by mouth 3 (Three) Times a Day for 30 days. 90 tablet 2    HYDROcodone-homatropine (HYCODAN) 5-1.5 MG/5ML syrup Take 5 mL by mouth Every 6 (Six) Hours As Needed for Cough.      isosorbide mononitrate (IMDUR) 60 MG 24 hr tablet TAKE 1 TABLET DAILY 90 tablet 3    Multiple Vitamins-Minerals (PRESERVISION AREDS 2 PO) Take  by mouth.      nitroglycerin (NITROSTAT) 0.4 MG SL tablet Place 1 tablet under the tongue Every 5 (Five) Minutes As Needed for Chest Pain. Take no more than 3 doses in 15 minutes. 25 tablet 3    omeprazole (priLOSEC) 20 MG capsule TAKE 1 CAPSULE DAILY 90 capsule 3    polyethylene glycol (MIRALAX) packet Take 17 g by mouth Daily.      potassium chloride ER (K-TAB) 20 MEQ tablet controlled-release ER tablet Take 1 tablet by mouth 3 (Three) Times a Day. 270 tablet 1    promethazine (PHENERGAN) 25 MG tablet TAKE 1 TABLET EVERY 6 HOURS AS NEEDED FOR NAUSEA OR VOMITING 90 tablet 14    ranolazine (RANEXA) 1000 MG 12 hr tablet Take 1 tablet by mouth 2 (Two) Times a Day. 60 tablet 11    spironolactone (ALDACTONE) 25 MG tablet Take 1 tablet by mouth Daily. 90 tablet 1    vitamin D (ERGOCALCIFEROL) 1.25 MG (58143 UT) capsule capsule TAKE 1 CAPSULE ONCE WEEKLY 12 capsule 3     No facility-administered medications prior to visit.       Opioid medication/s are on active medication list.  and I have evaluated her active treatment plan and pain score trends (see table).  There were no vitals filed for this visit.  I have reviewed the chart for potential of high risk medication and harmful drug interactions in the elderly.          Aspirin is on active medication list. {ASPIRIN ON MEDICATION LIST INDICATED/NOT INDICATED:56322}.      Patient Active Problem List   Diagnosis    Asthma, cough variant    Coronary arteriosclerosis in native artery    Essential hypertension    Chronic daily headache    Nodule of upper lobe of right lung    Other hyperlipidemia  "   Nonsustained ventricular tachycardia    History of cardiac cath    History of loop recorder    H/O heart artery stent    S/P CABG (coronary artery bypass graft)    H/O echocardiogram    History of nuclear stress test    Osteopenia of multiple sites    Mild aortic insufficiency    Grade I diastolic dysfunction    Renal insufficiency, mild    Abnormal nuclear stress test    Exertional dyspnea     Advance Care Planning   Advance Care Planning     Advance Directive is not on file.  ACP discussion was held with the patient during this visit. {Advance Directive not in EMR:39670}     Objective    Vitals:    23 1023   Temp: 97.1 °F (36.2 °C)   Weight: 74.7 kg (164 lb 9.6 oz)   Height: 154.9 cm (60.98\")     Estimated body mass index is 31.12 kg/m² as calculated from the following:    Height as of this encounter: 154.9 cm (60.98\").    Weight as of this encounter: 74.7 kg (164 lb 9.6 oz).    {BMI is >= 30 and <35. (Class 1 Obesity). The following options were offered after discussion;:[id number]}      Does the patient have evidence of cognitive impairment? No    Lab Results   Component Value Date    CHLPL 194 2023    TRIG 112 2023    HDL 59 2023     (H) 2023    VLDL 20 2023        HEALTH RISK ASSESSMENT    Smoking Status:  Social History     Tobacco Use   Smoking Status Never   Smokeless Tobacco Never     Alcohol Consumption:  Social History     Substance and Sexual Activity   Alcohol Use No     Fall Risk Screen:    STEADI Fall Risk Assessment was completed, and patient is at LOW risk for falls.Assessment completed on:2023    Depression Screenin/7/2023    10:28 AM   PHQ-2/PHQ-9 Depression Screening   Little Interest or Pleasure in Doing Things 0-->not at all   Feeling Down, Depressed or Hopeless 0-->not at all   PHQ-9: Brief Depression Severity Measure Score 0       Health Habits and Functional and Cognitive Screenin/7/2023    10:26 AM   Functional & Cognitive " Status   Do you have difficulty preparing food and eating? No   Do you have difficulty bathing yourself, getting dressed or grooming yourself? No   Do you have difficulty using the toilet? No   Do you have difficulty moving around from place to place? No   Do you have trouble with steps or getting out of a bed or a chair? No   Current Diet Well Balanced Diet   Dental Exam Up to date   Eye Exam Up to date   Exercise (times per week) 2 times per week   Current Exercises Include Walking   Do you need help using the phone?  No   Are you deaf or do you have serious difficulty hearing?  No   Do you need help with transportation? No   Do you need help shopping? No   Do you need help preparing meals?  No   Do you need help with housework?  No   Do you need help with laundry? No   Do you need help taking your medications? No   Do you need help managing money? No   Do you ever drive or ride in a car without wearing a seat belt? No   Have you felt unusual stress, anger or loneliness in the last month? No   Who do you live with? Spouse   If you need help, do you have trouble finding someone available to you? No   Have you been bothered in the last four weeks by sexual problems? No   Do you have difficulty concentrating, remembering or making decisions? No       Age-appropriate Screening Schedule:  Refer to the list below for future screening recommendations based on patient's age, sex and/or medical conditions. Orders for these recommended tests are listed in the plan section. The patient has been provided with a written plan.    Health Maintenance   Topic Date Due    TDAP/TD VACCINES (1 - Tdap) Never done    HEPATITIS C SCREENING  Never done    COVID-19 Vaccine (6 - Pfizer series) 02/05/2023    ANNUAL WELLNESS VISIT  06/30/2023    INFLUENZA VACCINE  08/01/2023    DXA SCAN  09/30/2023    LIPID PANEL  06/02/2024    Pneumococcal Vaccine 65+  Completed    ZOSTER VACCINE  Completed    COLORECTAL CANCER SCREENING  Discontinued  "                 CMS Preventative Services Quick Reference  Risk Factors Identified During Encounter  {Medicare Wellness Risk Factors:88159}  The above risks/problems have been discussed with the patient.  Pertinent information has been shared with the patient in the After Visit Summary.  An After Visit Summary and PPPS were made available to the patient.    Follow Up:   Next Medicare Wellness visit to be scheduled in 1 year.   {Wrapup  Review (Popup)  Advance Care Planning  Labs  CC  Problem List  Visit Diagnosis  Medications  Result Review  Imaging  Health Maintenance  Quality  BestPractice  SmartSets  SnapShot  Encounters  Notes  Media  Procedures :23}    Additional E&M Note during same encounter follows:  Patient has multiple medical problems which are significant and separately identifiable that require additional work above and beyond the Medicare Wellness Visit.      Chief Complaint  No chief complaint on file.    Subjective    {Problem List  Visit Diagnosis   Encounters  Notes  Medications  Labs  Result Review Imaging  Media :23}    HPI  Marleny Greenwood is also being seen today for ***         Objective   Vital Signs:  Temp 97.1 °F (36.2 °C)   Ht 154.9 cm (60.98\")   Wt 74.7 kg (164 lb 9.6 oz)   BMI 31.12 kg/m²     Physical Exam     {The following data was reviewed by (Optional):23496}  {Ambulatory Labs (Optional):85542}  {Data reviewed (Optional):94044:::1}           Assessment and Plan {CC Problem List  Visit Diagnosis   ROS  Review (Popup)  Health Maintenance  Quality  BestPractice  Medications  SmartSets  SnapShot Encounters  Media :23}  There are no diagnoses linked to this encounter.       {Time Spent (Optional):01937}  Follow Up {Instructions Charge Capture  Follow-up Communications :23}  No follow-ups on file.  Patient was given instructions and counseling regarding her condition or for health maintenance advice. Please see specific information pulled into " the AVS if appropriate.

## 2023-06-16 ENCOUNTER — OFFICE VISIT (OUTPATIENT)
Dept: CARDIOLOGY | Facility: CLINIC | Age: 78
End: 2023-06-16
Payer: MEDICARE

## 2023-06-16 VITALS
WEIGHT: 161.4 LBS | DIASTOLIC BLOOD PRESSURE: 84 MMHG | SYSTOLIC BLOOD PRESSURE: 126 MMHG | BODY MASS INDEX: 30.47 KG/M2 | HEART RATE: 79 BPM | HEIGHT: 61 IN

## 2023-06-16 DIAGNOSIS — I20.0 UNSTABLE ANGINA: ICD-10-CM

## 2023-06-16 DIAGNOSIS — I47.29 NONSUSTAINED VENTRICULAR TACHYCARDIA: Chronic | ICD-10-CM

## 2023-06-16 DIAGNOSIS — Z95.1 S/P CABG (CORONARY ARTERY BYPASS GRAFT): Chronic | ICD-10-CM

## 2023-06-16 DIAGNOSIS — I10 ESSENTIAL HYPERTENSION: Primary | Chronic | ICD-10-CM

## 2023-06-16 DIAGNOSIS — Z95.5 H/O HEART ARTERY STENT: Chronic | ICD-10-CM

## 2023-06-16 DIAGNOSIS — I35.1 MILD AORTIC INSUFFICIENCY: Chronic | ICD-10-CM

## 2023-06-16 DIAGNOSIS — I25.10 CORONARY ARTERIOSCLEROSIS IN NATIVE ARTERY: ICD-10-CM

## 2023-06-16 DIAGNOSIS — I51.89 GRADE I DIASTOLIC DYSFUNCTION: Chronic | ICD-10-CM

## 2023-06-16 NOTE — PROGRESS NOTES
Subjective:     Encounter Date:06/16/2023      Patient ID: Marleny Greenwood is a 77 y.o. female.    Chief Complaint:follow up CAD  History of Present Illness  This is a 76 y/o woman who follows with Dr. Hoff and is new to me today. She has a pmhx of coronary artery disease, hypertension, hyperlipidemia and GERD. She had coronary artery bypass surgery in 2011. In July 2022, she underwent a cardiac catheterization that showed a  of the RCA and occluded SVG to RCA. In January 2023, she presented to the ED with complaints of shortness of breath and dyspnea on exertion. She was noted to be severely hypertensive and her hydralazine was increased. She then saw Dr. Hoff in March and reported continued dyspnea on exertion, limiting her daily activities. Her antianginal therapy was increased. She was to return in 6 weeks and, if symptoms persist, will consider LHC with possible PCI.    She is here today for a follow up visit. She reports no improvement in her symptoms with the adjustments of her antianginal medications. She says it is a struggle to just move from one room to another at home because it causes so much shortness of breath. Even taking a bath is difficult. She feels like activities require moving her arms more makes her more short of breath. She denies any chest pain. She denies any swelling in her lower extremities, orthopnea or PND. She denies palpitations or syncope.     I have reviewed and updated as appropriate allergies, current medications, past family history, past medical history, past surgical history and problem list.        Current Outpatient Medications:   •  albuterol sulfate  (90 Base) MCG/ACT inhaler, INHALE TWO PUFFS BY MOUTH EVERY 4 HOURS AS NEEDED FOR WHEEZING, Disp: 18 g, Rfl: 2  •  amLODIPine (NORVASC) 5 MG tablet, Take 1 tablet by mouth Daily., Disp: 90 tablet, Rfl: 3  •  aspirin 81 MG EC tablet, Take 1 tablet by mouth Daily., Disp: , Rfl:   •   butalbital-aspirin-caffeine-codeine (Fiorinal/Codeine #3) -26-30 MG capsule, Take 1 capsule by mouth Every 4 (Four) Hours As Needed for Headache., Disp: 120 capsule, Rfl: 1  •  carvedilol (COREG) 25 MG tablet, Take 1 tablet by mouth 2 (Two) Times a Day., Disp: 180 tablet, Rfl: 3  •  clopidogrel (PLAVIX) 75 MG tablet, TAKE 1 TABLET DAILY, Disp: 90 tablet, Rfl: 3  •  FeroSul 325 (65 Fe) MG tablet, TAKE ONE TABLET BY MOUTH DAILY WITH BREAKFAST, Disp: 90 tablet, Rfl: 2  •  furosemide (LASIX) 40 MG tablet, Take 1 tablet by mouth 2 (Two) Times a Day., Disp: 180 tablet, Rfl: 1  •  hydrALAZINE (APRESOLINE) 50 MG tablet, Take 1 tablet by mouth 3 (Three) Times a Day for 30 days., Disp: 90 tablet, Rfl: 2  •  HYDROcodone-homatropine (HYCODAN) 5-1.5 MG/5ML syrup, Take 5 mL by mouth Every 6 (Six) Hours As Needed for Cough., Disp: , Rfl:   •  isosorbide mononitrate (IMDUR) 60 MG 24 hr tablet, TAKE 1 TABLET DAILY, Disp: 90 tablet, Rfl: 3  •  Multiple Vitamins-Minerals (PRESERVISION AREDS 2 PO), Take  by mouth., Disp: , Rfl:   •  nitroglycerin (NITROSTAT) 0.4 MG SL tablet, Place 1 tablet under the tongue Every 5 (Five) Minutes As Needed for Chest Pain. Take no more than 3 doses in 15 minutes., Disp: 25 tablet, Rfl: 3  •  omeprazole (priLOSEC) 20 MG capsule, TAKE 1 CAPSULE DAILY, Disp: 90 capsule, Rfl: 3  •  polyethylene glycol (MIRALAX) packet, Take 17 g by mouth Daily., Disp: , Rfl:   •  potassium chloride ER (K-TAB) 20 MEQ tablet controlled-release ER tablet, Take 1 tablet by mouth 3 (Three) Times a Day., Disp: 270 tablet, Rfl: 1  •  promethazine (PHENERGAN) 25 MG tablet, TAKE 1 TABLET EVERY 6 HOURS AS NEEDED FOR NAUSEA OR VOMITING, Disp: 90 tablet, Rfl: 14  •  ranolazine (RANEXA) 1000 MG 12 hr tablet, Take 1 tablet by mouth 2 (Two) Times a Day., Disp: 60 tablet, Rfl: 11  •  rosuvastatin (CRESTOR) 40 MG tablet, Take 1 tablet by mouth Daily., Disp: 90 tablet, Rfl: 1  •  spironolactone (ALDACTONE) 25 MG tablet, Take 1 tablet  by mouth Daily., Disp: 90 tablet, Rfl: 1  •  vitamin D (ERGOCALCIFEROL) 1.25 MG (21838 UT) capsule capsule, TAKE 1 CAPSULE ONCE WEEKLY, Disp: 12 capsule, Rfl: 3    Past Medical History:   Diagnosis Date   • Anemia    • Asthma    • Coronary artery disease    • Cough    • Dyspnea    • Esophageal reflux    • H/O echocardiogram 6/11/2019 6/05/18 - Normal LV size and function.  EF 60-65%.  Mild concentric LVH.  Midl AR, MR, and TR.   • H/O heart artery stent 6/11/2019    3/22/12 - VISION stent to the SVG of the RCA reducing a total occlusion to 0% residual narrowing.   • Headache    • History of cardiac cath 6/11/2019   • History of loop recorder 6/11/2019    5/20/15 - [employer] LINQ implant.   • History of nuclear stress test 6/11/2019    10/09/17 - Abnormal study.  EF 46%.   • Hyperlipidemia LDL goal <100 6/11/2019   • Hypokalemia    • Migraine    • Moderate osteopenia    • Nonsustained ventricular tachycardia 6/11/2019   • NSVT (nonsustained ventricular tachycardia)    • Pneumonia    • Postmenopausal atrophic vaginitis    • Pre-syncope    • Respiratory insufficiency 6/11/2019   • S/P CABG (coronary artery bypass graft) 6/11/2019 4/12/11 - LIMA to the LAD and SVG to the posterior descending artery.   • Seasonal allergies    • Shoulder dislocation    • Status post placement of implantable loop recorder 05/20/2015      Successful loop/LINQ implantation by Dr. Geo Wilson.   • Vitamin D deficiency        Past Surgical History:   Procedure Laterality Date   • APPENDECTOMY     • BRONCHOSCOPY N/A 8/7/2019    Procedure: BRONCHOSCOPY WITH BRONCHOALVEOLAR LAVAGE;  Surgeon: Thomas Sheridan MD;  Location: [facility] ENDOSCOPY;  Service: Pulmonary   • CARDIAC CATHETERIZATION  06/02/2014    History of Cardiac Cath Procedure Outcome: Successful   • CARDIAC CATHETERIZATION N/A 11/19/2019    Procedure: Right and Left Heart Cath lv pressures;  Surgeon: Mal Moser MD;  Location: [facility] CATH INVASIVE LOCATION;  Service:  Cardiology   • CARDIAC CATHETERIZATION N/A 2019    Procedure: Saphenous Vein Graft;  Surgeon: Mal Moser MD;  Location:  [facility] CATH INVASIVE LOCATION;  Service: Cardiology   • CARDIAC CATHETERIZATION N/A 2019    Procedure: Coronary angiography;  Surgeon: Mal Moser MD;  Location:  [facility] CATH INVASIVE LOCATION;  Service: Cardiology   • CARDIAC CATHETERIZATION Left 2022    Procedure: Coronary Angiography;  Surgeon: Marco A Muir MD;  Location:  [facility] CATH INVASIVE LOCATION;  Service: Cardiology;  Laterality: Left;   • CARDIAC CATHETERIZATION N/A 2022    Procedure: Left Heart Cath;  Surgeon: Marco A Muir MD;  Location:  [facility] CATH INVASIVE LOCATION;  Service: Cardiology;  Laterality: N/A;   • CATARACT EXTRACTION     •  SECTION     • CHOLECYSTECTOMY     • COLONOSCOPY  2009    q-10   • CORONARY ANGIOPLASTY WITH STENT PLACEMENT  2012     [facility] by Dr. Roman.   • CORONARY ARTERY BYPASS GRAFT Bilateral 2011    with a left internal mammary graft to the LAD and saphenous vein graft to the posterior descending artery   • ENDOSCOPY       Diagnostic Esophagogastroduodenoscopy   • HYSTERECTOMY     • NERVE BLOCK       Nerve Block Transforaminal Epidural Lumbar   • OTHER SURGICAL HISTORY  2015    Patient-Activated Cardiac Event Recorder - From 2015 to 2015       Family History   Problem Relation Age of Onset   • Heart disease Other    • Breast cancer Other    • Breast cancer Mother    • Hypertension Mother        Social History     Tobacco Use   • Smoking status: Never   • Smokeless tobacco: Never   Vaping Use   • Vaping Use: Never used   Substance Use Topics   • Alcohol use: No   • Drug use: No       Procedures       Objective:     There were no vitals taken for this visit.          Physical Exam     Lab Review:   Lipid Panel          10/26/2022    10:56 2023    12:41 2023    10:26   Lipid Panel    Total Cholesterol  160     Total Cholesterol 192   194    Triglycerides 90  83  112    HDL Cholesterol 74  58  59    VLDL Cholesterol 16  16  20    LDL Cholesterol  102  86  115    LDL/HDL Ratio  1.47           Cardiac Procedures:   1.     Assessment:         Diagnoses and all orders for this visit:    1. Essential hypertension (Primary)    2. Nonsustained ventricular tachycardia    3. H/O heart artery stent    4. S/P CABG (coronary artery bypass graft)    5. Mild aortic insufficiency    6. Grade I diastolic dysfunction    7. Coronary arteriosclerosis in native artery            Plan:           Thank you for allowing me to participate in this patient's care. Please call with any questions or concerns. ****will follow up with           Your medication list            Accurate as of June 16, 2023  9:26 AM. If you have any questions, ask your nurse or doctor.                CONTINUE taking these medications        Instructions Last Dose Given Next Dose Due   albuterol sulfate  (90 Base) MCG/ACT inhaler  Commonly known as: PROVENTIL HFA;VENTOLIN HFA;PROAIR HFA      INHALE TWO PUFFS BY MOUTH EVERY 4 HOURS AS NEEDED FOR WHEEZING       amLODIPine 5 MG tablet  Commonly known as: NORVASC      Take 1 tablet by mouth Daily.       aspirin 81 MG EC tablet      Take 1 tablet by mouth Daily.       butalbital-aspirin-caffeine-codeine -28-30 MG capsule  Commonly known as: Fiorinal/Codeine #3      Take 1 capsule by mouth Every 4 (Four) Hours As Needed for Headache.       carvedilol 25 MG tablet  Commonly known as: COREG      Take 1 tablet by mouth 2 (Two) Times a Day.       clopidogrel 75 MG tablet  Commonly known as: PLAVIX      TAKE 1 TABLET DAILY       FeroSul 325 (65 FE) MG tablet  Generic drug: ferrous sulfate      TAKE ONE TABLET BY MOUTH DAILY WITH BREAKFAST       furosemide 40 MG tablet  Commonly known as: LASIX      Take 1 tablet by mouth 2 (Two) Times a Day.       hydrALAZINE 50 MG tablet  Commonly known as:  APRESOLINE      Take 1 tablet by mouth 3 (Three) Times a Day for 30 days.       HYDROcodone-homatropine 5-1.5 MG/5ML syrup  Commonly known as: HYCODAN      Take 5 mL by mouth Every 6 (Six) Hours As Needed for Cough.       isosorbide mononitrate 60 MG 24 hr tablet  Commonly known as: IMDUR      TAKE 1 TABLET DAILY       nitroglycerin 0.4 MG SL tablet  Commonly known as: NITROSTAT      Place 1 tablet under the tongue Every 5 (Five) Minutes As Needed for Chest Pain. Take no more than 3 doses in 15 minutes.       omeprazole 20 MG capsule  Commonly known as: priLOSEC      TAKE 1 CAPSULE DAILY       polyethylene glycol packet  Commonly known as: MIRALAX      Take 17 g by mouth Daily.       potassium chloride ER 20 MEQ tablet controlled-release ER tablet  Commonly known as: K-TAB      Take 1 tablet by mouth 3 (Three) Times a Day.       PRESERVISION AREDS 2 PO      Take  by mouth.       promethazine 25 MG tablet  Commonly known as: PHENERGAN      TAKE 1 TABLET EVERY 6 HOURS AS NEEDED FOR NAUSEA OR VOMITING       ranolazine 1000 MG 12 hr tablet  Commonly known as: RANEXA      Take 1 tablet by mouth 2 (Two) Times a Day.       rosuvastatin 40 MG tablet  Commonly known as: CRESTOR      Take 1 tablet by mouth Daily.       spironolactone 25 MG tablet  Commonly known as: ALDACTONE      Take 1 tablet by mouth Daily.       vitamin D 1.25 MG (66715 UT) capsule capsule  Commonly known as: ERGOCALCIFEROL      TAKE 1 CAPSULE ONCE WEEKLY                  ABDON Trinh  06/16/23  9:26 AM EDT

## 2023-06-16 NOTE — PROGRESS NOTES
Subjective:     Encounter Date:06/16/2023      Patient ID: Marleny Greenwood is a 77 y.o. female.    Chief Complaint:  History of Present Illness  This is a 78 y/o woman who follows with Dr. Hoff and is new to me today. She has a pmhx of coronary artery disease, hypertension, hyperlipidemia and GERD. She had coronary artery bypass surgery in 2011. In July 2022, she underwent a cardiac catheterization that showed a  of the RCA and occluded SVG to RCA. In January 2023, she presented to the ED with complaints of shortness of breath and dyspnea on exertion. She was noted to be severely hypertensive and her hydralazine was increased. She then saw Dr. Hoff in March and reported continued dyspnea on exertion, limiting her daily activities. Her antianginal therapy was increased. She was to return in 6 weeks and, if symptoms persist, will consider LHC with possible PCI.     She is here today for a follow up visit. She reports no improvement in her symptoms with the adjustments of her antianginal medications. She says it is a struggle to just move from one room to another at home because it causes so much shortness of breath. Even taking a bath is difficult. She feels like activities require moving her arms more makes her more short of breath. She denies any chest pain. She denies any swelling in her lower extremities, orthopnea or PND. She denies palpitations or syncope.       I have reviewed and updated as appropriate allergies, current medications, past family history, past medical history, past surgical history and problem list.    Review of Systems   Constitutional: Negative for fever, malaise/fatigue, weight gain and weight loss.   HENT: Negative for congestion, hoarse voice and sore throat.    Eyes: Negative for blurred vision and double vision.   Cardiovascular: Positive for dyspnea on exertion. Negative for chest pain, leg swelling, orthopnea, palpitations and syncope.   Respiratory: Positive for shortness of  breath. Negative for cough and wheezing.    Gastrointestinal: Negative for abdominal pain, hematemesis, hematochezia and melena.   Genitourinary: Negative for dysuria and hematuria.   Neurological: Negative for dizziness, headaches, light-headedness and numbness.   Psychiatric/Behavioral: Negative for depression. The patient is not nervous/anxious.          Current Outpatient Medications:   •  albuterol sulfate  (90 Base) MCG/ACT inhaler, INHALE TWO PUFFS BY MOUTH EVERY 4 HOURS AS NEEDED FOR WHEEZING, Disp: 18 g, Rfl: 2  •  amLODIPine (NORVASC) 5 MG tablet, Take 1 tablet by mouth Daily., Disp: 90 tablet, Rfl: 3  •  aspirin 81 MG EC tablet, Take 1 tablet by mouth Daily., Disp: , Rfl:   •  butalbital-aspirin-caffeine-codeine (Fiorinal/Codeine #3) -38-30 MG capsule, Take 1 capsule by mouth Every 4 (Four) Hours As Needed for Headache., Disp: 120 capsule, Rfl: 1  •  carvedilol (COREG) 25 MG tablet, Take 1 tablet by mouth 2 (Two) Times a Day., Disp: 180 tablet, Rfl: 3  •  clopidogrel (PLAVIX) 75 MG tablet, TAKE 1 TABLET DAILY, Disp: 90 tablet, Rfl: 3  •  FeroSul 325 (65 Fe) MG tablet, TAKE ONE TABLET BY MOUTH DAILY WITH BREAKFAST, Disp: 90 tablet, Rfl: 2  •  furosemide (LASIX) 40 MG tablet, Take 1 tablet by mouth 2 (Two) Times a Day., Disp: 180 tablet, Rfl: 1  •  HYDROcodone-homatropine (HYCODAN) 5-1.5 MG/5ML syrup, Take 5 mL by mouth Every 6 (Six) Hours As Needed for Cough., Disp: , Rfl:   •  isosorbide mononitrate (IMDUR) 60 MG 24 hr tablet, TAKE 1 TABLET DAILY, Disp: 90 tablet, Rfl: 3  •  Multiple Vitamins-Minerals (PRESERVISION AREDS 2 PO), Take  by mouth., Disp: , Rfl:   •  nitroglycerin (NITROSTAT) 0.4 MG SL tablet, Place 1 tablet under the tongue Every 5 (Five) Minutes As Needed for Chest Pain. Take no more than 3 doses in 15 minutes., Disp: 25 tablet, Rfl: 3  •  omeprazole (priLOSEC) 20 MG capsule, TAKE 1 CAPSULE DAILY, Disp: 90 capsule, Rfl: 3  •  polyethylene glycol (MIRALAX) packet, Take 17 g by  mouth Daily., Disp: , Rfl:   •  potassium chloride ER (K-TAB) 20 MEQ tablet controlled-release ER tablet, Take 1 tablet by mouth 3 (Three) Times a Day., Disp: 270 tablet, Rfl: 1  •  promethazine (PHENERGAN) 25 MG tablet, TAKE 1 TABLET EVERY 6 HOURS AS NEEDED FOR NAUSEA OR VOMITING, Disp: 90 tablet, Rfl: 14  •  ranolazine (RANEXA) 1000 MG 12 hr tablet, Take 1 tablet by mouth 2 (Two) Times a Day., Disp: 60 tablet, Rfl: 11  •  rosuvastatin (CRESTOR) 40 MG tablet, Take 1 tablet by mouth Daily., Disp: 90 tablet, Rfl: 1  •  spironolactone (ALDACTONE) 25 MG tablet, Take 1 tablet by mouth Daily., Disp: 90 tablet, Rfl: 1  •  vitamin D (ERGOCALCIFEROL) 1.25 MG (96605 UT) capsule capsule, TAKE 1 CAPSULE ONCE WEEKLY, Disp: 12 capsule, Rfl: 3  •  hydrALAZINE (APRESOLINE) 50 MG tablet, Take 1 tablet by mouth 3 (Three) Times a Day for 30 days., Disp: 90 tablet, Rfl: 2    Past Medical History:   Diagnosis Date   • Anemia    • Asthma    • Coronary artery disease    • Cough    • Dyspnea    • Esophageal reflux    • H/O echocardiogram 6/11/2019 6/05/18 - Normal LV size and function.  EF 60-65%.  Mild concentric LVH.  Midl AR, MR, and TR.   • H/O heart artery stent 6/11/2019    3/22/12 - VISION stent to the SVG of the RCA reducing a total occlusion to 0% residual narrowing.   • Headache    • History of cardiac cath 6/11/2019   • History of loop recorder 6/11/2019    5/20/15 - [employer] LINQ implant.   • History of nuclear stress test 6/11/2019    10/09/17 - Abnormal study.  EF 46%.   • Hyperlipidemia LDL goal <100 6/11/2019   • Hypokalemia    • Migraine    • Moderate osteopenia    • Nonsustained ventricular tachycardia 6/11/2019   • NSVT (nonsustained ventricular tachycardia)    • Pneumonia    • Postmenopausal atrophic vaginitis    • Pre-syncope    • Respiratory insufficiency 6/11/2019   • S/P CABG (coronary artery bypass graft) 6/11/2019 4/12/11 - LIMA to the LAD and SVG to the posterior descending artery.   • Seasonal allergies     • Shoulder dislocation    • Status post placement of implantable loop recorder 2015      Successful loop/LINQ implantation by Dr. Geo Wilson.   • Vitamin D deficiency        Past Surgical History:   Procedure Laterality Date   • APPENDECTOMY     • BRONCHOSCOPY N/A 2019    Procedure: BRONCHOSCOPY WITH BRONCHOALVEOLAR LAVAGE;  Surgeon: Thomas Sheridan MD;  Location: [facility] ENDOSCOPY;  Service: Pulmonary   • CARDIAC CATHETERIZATION  2014    History of Cardiac Cath Procedure Outcome: Successful   • CARDIAC CATHETERIZATION N/A 2019    Procedure: Right and Left Heart Cath lv pressures;  Surgeon: Mal Moser MD;  Location: [facility] CATH INVASIVE LOCATION;  Service: Cardiology   • CARDIAC CATHETERIZATION N/A 2019    Procedure: Saphenous Vein Graft;  Surgeon: Mal Moser MD;  Location: [facility] CATH INVASIVE LOCATION;  Service: Cardiology   • CARDIAC CATHETERIZATION N/A 2019    Procedure: Coronary angiography;  Surgeon: Mal Moser MD;  Location: [facility] CATH INVASIVE LOCATION;  Service: Cardiology   • CARDIAC CATHETERIZATION Left 2022    Procedure: Coronary Angiography;  Surgeon: Marco A Muir MD;  Location: [facility] CATH INVASIVE LOCATION;  Service: Cardiology;  Laterality: Left;   • CARDIAC CATHETERIZATION N/A 2022    Procedure: Left Heart Cath;  Surgeon: Marco A Muir MD;  Location:  [facility] CATH INVASIVE LOCATION;  Service: Cardiology;  Laterality: N/A;   • CATARACT EXTRACTION     •  SECTION     • CHOLECYSTECTOMY     • COLONOSCOPY  2009    q-10   • CORONARY ANGIOPLASTY WITH STENT PLACEMENT  2012     [facility] by Dr. Roman.   • CORONARY ARTERY BYPASS GRAFT Bilateral 2011    with a left internal mammary graft to the LAD and saphenous vein graft to the posterior descending artery   • ENDOSCOPY       Diagnostic Esophagogastroduodenoscopy   • HYSTERECTOMY     • NERVE BLOCK       Nerve Block Transforaminal Epidural  "Lumbar   • OTHER SURGICAL HISTORY  04/07/2015    Patient-Activated Cardiac Event Recorder - From March 14, 2015 to April 7, 2015       Family History   Problem Relation Age of Onset   • Heart disease Other    • Breast cancer Other    • Breast cancer Mother    • Hypertension Mother        Social History     Tobacco Use   • Smoking status: Never   • Smokeless tobacco: Never   Vaping Use   • Vaping Use: Never used   Substance Use Topics   • Alcohol use: No   • Drug use: No         ECG 12 Lead    Date/Time: 6/16/2023 11:45 AM  Performed by: Kelsey Ontiveros APRN  Authorized by: Kelsey Ontiveros APRN   Comparison: compared with previous ECG from 5/5/2023  Similar to previous ECG  Rhythm: sinus rhythm  QRS axis: right  Other findings: non-specific ST-T wave changes  Comments: No significant change from previous EKG               Objective:     Visit Vitals  /84   Pulse 79   Ht 154.9 cm (60.98\")   Wt 73.2 kg (161 lb 6.4 oz)   BMI 30.52 kg/m²             Physical Exam  Constitutional:       Appearance: Normal appearance. She is obese.   HENT:      Head: Normocephalic.   Neck:      Vascular: No carotid bruit.   Cardiovascular:      Rate and Rhythm: Normal rate and regular rhythm.      Chest Wall: PMI is not displaced.      Pulses: Normal pulses.           Radial pulses are 2+ on the right side and 2+ on the left side.        Posterior tibial pulses are 2+ on the right side and 2+ on the left side.      Heart sounds: Normal heart sounds. No murmur heard.    No friction rub. No gallop.   Pulmonary:      Effort: Pulmonary effort is normal.      Breath sounds: Normal breath sounds.   Abdominal:      General: Bowel sounds are normal. There is no distension.      Palpations: Abdomen is soft.   Musculoskeletal:      Right lower leg: No edema.      Left lower leg: No edema.   Skin:     General: Skin is warm and dry.      Capillary Refill: Capillary refill takes less than 2 seconds.   Neurological:      Mental Status: She is " alert and oriented to person, place, and time.   Psychiatric:         Mood and Affect: Mood normal.         Behavior: Behavior normal.         Thought Content: Thought content normal.          Lab Review:   Lipid Panel        10/26/2022    10:56 1/19/2023    12:41 6/2/2023    10:26   Lipid Panel   Total Cholesterol  160     Total Cholesterol 192   194    Triglycerides 90  83  112    HDL Cholesterol 74  58  59    VLDL Cholesterol 16  16  20    LDL Cholesterol  102  86  115    LDL/HDL Ratio  1.47           Cardiac Procedures:   1.     Assessment:         Diagnoses and all orders for this visit:    1. Essential hypertension (Primary)    2. Nonsustained ventricular tachycardia    3. H/O heart artery stent    4. S/P CABG (coronary artery bypass graft)    5. Mild aortic insufficiency    6. Grade I diastolic dysfunction    7. Coronary arteriosclerosis in native artery    8. Unstable angina  -     Case Request Cath Lab: Left Heart Cath            Plan:       1. CAD: s/p CABG in 2011. LHC in July 2022 showed patent SVG to LAD,  of RCA and occluded SVG to RCA. Also noted was severe proximal D2 stenosis. This is felt to possibly be the culprit of her severe dyspnea. Titration of her antianginal therapy did not improve symptoms at all. She is on carvedilol 25 BID, Ranexa 1000 BID, amlodipine 5 daily, Imdur 60 mg daily, aspirin 81 daily and Plavix 75 daily. Will schedule for a left heart catheterization with possible PCI with Dr. Hoff  2. HTN: blood pressure well controlled now. No changes.  3. HLD: on statin therapy.    Thank you for allowing me to participate in this patient's care. Please call with any questions or concerns.         Your medication list          Accurate as of June 16, 2023 11:44 AM. If you have any questions, ask your nurse or doctor.            CONTINUE taking these medications      Instructions Last Dose Given Next Dose Due   albuterol sulfate  (90 Base) MCG/ACT inhaler  Commonly known as:  PROVENTIL HFA;VENTOLIN HFA;PROAIR HFA      INHALE TWO PUFFS BY MOUTH EVERY 4 HOURS AS NEEDED FOR WHEEZING       amLODIPine 5 MG tablet  Commonly known as: NORVASC      Take 1 tablet by mouth Daily.       aspirin 81 MG EC tablet      Take 1 tablet by mouth Daily.       butalbital-aspirin-caffeine-codeine -42-30 MG capsule  Commonly known as: Fiorinal/Codeine #3      Take 1 capsule by mouth Every 4 (Four) Hours As Needed for Headache.       carvedilol 25 MG tablet  Commonly known as: COREG      Take 1 tablet by mouth 2 (Two) Times a Day.       clopidogrel 75 MG tablet  Commonly known as: PLAVIX      TAKE 1 TABLET DAILY       FeroSul 325 (65 FE) MG tablet  Generic drug: ferrous sulfate      TAKE ONE TABLET BY MOUTH DAILY WITH BREAKFAST       furosemide 40 MG tablet  Commonly known as: LASIX      Take 1 tablet by mouth 2 (Two) Times a Day.       hydrALAZINE 50 MG tablet  Commonly known as: APRESOLINE      Take 1 tablet by mouth 3 (Three) Times a Day for 30 days.       HYDROcodone-homatropine 5-1.5 MG/5ML syrup  Commonly known as: HYCODAN      Take 5 mL by mouth Every 6 (Six) Hours As Needed for Cough.       isosorbide mononitrate 60 MG 24 hr tablet  Commonly known as: IMDUR      TAKE 1 TABLET DAILY       nitroglycerin 0.4 MG SL tablet  Commonly known as: NITROSTAT      Place 1 tablet under the tongue Every 5 (Five) Minutes As Needed for Chest Pain. Take no more than 3 doses in 15 minutes.       omeprazole 20 MG capsule  Commonly known as: priLOSEC      TAKE 1 CAPSULE DAILY       polyethylene glycol packet  Commonly known as: MIRALAX      Take 17 g by mouth Daily.       potassium chloride ER 20 MEQ tablet controlled-release ER tablet  Commonly known as: K-TAB      Take 1 tablet by mouth 3 (Three) Times a Day.       PRESERVISION AREDS 2 PO      Take  by mouth.       promethazine 25 MG tablet  Commonly known as: PHENERGAN      TAKE 1 TABLET EVERY 6 HOURS AS NEEDED FOR NAUSEA OR VOMITING       ranolazine 1000 MG 12 hr  tablet  Commonly known as: RANEXA      Take 1 tablet by mouth 2 (Two) Times a Day.       rosuvastatin 40 MG tablet  Commonly known as: CRESTOR      Take 1 tablet by mouth Daily.       spironolactone 25 MG tablet  Commonly known as: ALDACTONE      Take 1 tablet by mouth Daily.       vitamin D 1.25 MG (33288 UT) capsule capsule  Commonly known as: ERGOCALCIFEROL      TAKE 1 CAPSULE ONCE WEEKLY                ABDON Trinh  06/16/23  11:44 AM EDT

## 2023-06-19 DIAGNOSIS — Z12.31 VISIT FOR SCREENING MAMMOGRAM: Primary | ICD-10-CM

## 2023-07-28 RX ORDER — ISOSORBIDE MONONITRATE 60 MG/1
TABLET, EXTENDED RELEASE ORAL
Qty: 90 TABLET | Refills: 3 | Status: SHIPPED | OUTPATIENT
Start: 2023-07-28

## 2023-07-31 DIAGNOSIS — E87.70 HYPERVOLEMIA, UNSPECIFIED HYPERVOLEMIA TYPE: ICD-10-CM

## 2023-07-31 RX ORDER — FUROSEMIDE 40 MG/1
TABLET ORAL
Qty: 180 TABLET | Refills: 3 | Status: SHIPPED | OUTPATIENT
Start: 2023-07-31

## 2023-08-04 ENCOUNTER — OFFICE VISIT (OUTPATIENT)
Dept: CARDIOLOGY | Facility: CLINIC | Age: 78
End: 2023-08-04
Payer: MEDICARE

## 2023-08-04 VITALS
HEART RATE: 73 BPM | HEIGHT: 61 IN | WEIGHT: 161 LBS | DIASTOLIC BLOOD PRESSURE: 78 MMHG | SYSTOLIC BLOOD PRESSURE: 120 MMHG | BODY MASS INDEX: 30.4 KG/M2

## 2023-08-04 DIAGNOSIS — Z95.1 S/P CABG (CORONARY ARTERY BYPASS GRAFT): Chronic | ICD-10-CM

## 2023-08-04 DIAGNOSIS — Z95.5 H/O HEART ARTERY STENT: Chronic | ICD-10-CM

## 2023-08-04 DIAGNOSIS — I10 ESSENTIAL HYPERTENSION: Chronic | ICD-10-CM

## 2023-08-04 DIAGNOSIS — I25.10 CORONARY ARTERIOSCLEROSIS IN NATIVE ARTERY: Primary | ICD-10-CM

## 2023-09-12 ENCOUNTER — TELEPHONE (OUTPATIENT)
Dept: INTERNAL MEDICINE | Facility: CLINIC | Age: 78
End: 2023-09-12
Payer: MEDICARE

## 2023-09-12 RX ORDER — CYCLOBENZAPRINE HCL 10 MG
10 TABLET ORAL 3 TIMES DAILY PRN
Qty: 21 TABLET | Refills: 0 | Status: SHIPPED | OUTPATIENT
Start: 2023-09-12

## 2023-09-12 NOTE — TELEPHONE ENCOUNTER
Called spoke with PT she is having a back issue again and would like to know if you will give her flexeril for this

## 2023-09-12 NOTE — TELEPHONE ENCOUNTER
PATIENT CALLED TO SPEAK TO ABHAY. SHE DID NOT ELABORATE AS TO WHAT WAS NEEDED.  PLEASE CALL [phone]

## 2023-09-27 ENCOUNTER — OFFICE VISIT (OUTPATIENT)
Dept: INTERNAL MEDICINE | Facility: CLINIC | Age: 78
End: 2023-09-27
Payer: MEDICARE

## 2023-09-27 VITALS
WEIGHT: 164 LBS | HEIGHT: 61 IN | HEART RATE: 78 BPM | BODY MASS INDEX: 30.96 KG/M2 | DIASTOLIC BLOOD PRESSURE: 76 MMHG | SYSTOLIC BLOOD PRESSURE: 124 MMHG

## 2023-09-27 DIAGNOSIS — D50.9 IRON DEFICIENCY ANEMIA, UNSPECIFIED IRON DEFICIENCY ANEMIA TYPE: ICD-10-CM

## 2023-09-27 DIAGNOSIS — I10 ESSENTIAL HYPERTENSION: Primary | Chronic | ICD-10-CM

## 2023-09-27 DIAGNOSIS — Z00.00 MEDICARE ANNUAL WELLNESS VISIT, SUBSEQUENT: ICD-10-CM

## 2023-09-27 DIAGNOSIS — J45.991 ASTHMA, COUGH VARIANT: Chronic | ICD-10-CM

## 2023-09-27 DIAGNOSIS — Z95.1 S/P CABG (CORONARY ARTERY BYPASS GRAFT): Chronic | ICD-10-CM

## 2023-09-27 PROBLEM — R94.39 ABNORMAL NUCLEAR STRESS TEST: Status: RESOLVED | Noted: 2022-06-15 | Resolved: 2023-09-27

## 2023-09-27 PROBLEM — R06.09 EXERTIONAL DYSPNEA: Status: RESOLVED | Noted: 2023-01-19 | Resolved: 2023-09-27

## 2023-09-27 PROBLEM — I20.0 UNSTABLE ANGINA: Status: RESOLVED | Noted: 2023-06-16 | Resolved: 2023-09-27

## 2023-09-27 RX ORDER — AMLODIPINE BESYLATE 5 MG/1
5 TABLET ORAL DAILY
Qty: 90 TABLET | Refills: 3 | Status: SHIPPED | OUTPATIENT
Start: 2023-09-27

## 2023-09-27 RX ORDER — FERROUS SULFATE 325(65) MG
1 TABLET ORAL
Qty: 90 TABLET | Refills: 2 | Status: SHIPPED | OUTPATIENT
Start: 2023-09-27

## 2023-09-27 NOTE — PROGRESS NOTES
The ABCs of the Annual Wellness Visit  Subsequent Medicare Wellness Visit    Subjective    Marleny Greenwood is a 77 y.o. female who presents for a Subsequent Medicare Wellness Visit.    The following portions of the patient's history were reviewed and   updated as appropriate: allergies, current medications, past family history, past medical history, past social history, past surgical history, and problem list.    Compared to one year ago, the patient feels her physical   health is the same.    Compared to one year ago, the patient feels her mental   health is the same.    Recent Hospitalizations:  This patient has had a [facility] admission record on file within the last 365 days.    Current Medical Providers:  Patient Care Team:  Liz Zaragoza MD as PCP - General (Internal Medicine)  Mal Moser MD as Consulting Physician (Cardiology)    Outpatient Medications Prior to Visit   Medication Sig Dispense Refill    albuterol sulfate  (90 Base) MCG/ACT inhaler Inhale 2 puffs Every 4 (Four) Hours As Needed for Wheezing. 18 g 2    aspirin 81 MG EC tablet Take 1 tablet by mouth Daily.      butalbital-aspirin-caffeine-codeine (Fiorinal/Codeine #3) -04-30 MG capsule Take 1 capsule by mouth Every 4 (Four) Hours As Needed for Headache. 120 capsule 1    carvedilol (COREG) 25 MG tablet Take 1 tablet by mouth 2 (Two) Times a Day. 180 tablet 3    clopidogrel (PLAVIX) 75 MG tablet TAKE 1 TABLET DAILY 90 tablet 3    cyclobenzaprine (FLEXERIL) 10 MG tablet Take 1 tablet by mouth 3 (Three) Times a Day As Needed for Muscle Spasms. 21 tablet 0    furosemide (LASIX) 40 MG tablet TAKE 1 TABLET TWICE A  tablet 3    hydrALAZINE (APRESOLINE) 50 MG tablet Take 1 tablet by mouth 3 (Three) Times a Day. 270 tablet 71    HYDROcodone-homatropine (HYCODAN) 5-1.5 MG/5ML syrup Take 5 mL by mouth Every 6 (Six) Hours As Needed for Cough.      isosorbide mononitrate (IMDUR) 60 MG 24 hr tablet TAKE 1 TABLET DAILY 90  tablet 3    Multiple Vitamins-Minerals (PRESERVISION AREDS 2 PO) Take  by mouth.      nitroglycerin (NITROSTAT) 0.4 MG SL tablet Place 1 tablet under the tongue Every 5 (Five) Minutes As Needed for Chest Pain. Take no more than 3 doses in 15 minutes. 25 tablet 3    omeprazole (priLOSEC) 20 MG capsule Take 1 capsule by mouth Daily. 90 capsule 3    polyethylene glycol (MIRALAX) packet Take 17 g by mouth Daily.      potassium chloride ER (K-TAB) 20 MEQ tablet controlled-release ER tablet Take 1 tablet by mouth 3 (Three) Times a Day. 270 tablet 1    promethazine (PHENERGAN) 25 MG tablet TAKE 1 TABLET EVERY 6 HOURS AS NEEDED FOR NAUSEA OR VOMITING 90 tablet 14    ranolazine (RANEXA) 1000 MG 12 hr tablet Take 1 tablet by mouth 2 (Two) Times a Day. 60 tablet 11    rosuvastatin (CRESTOR) 40 MG tablet Take 1 tablet by mouth Daily. 90 tablet 1    spironolactone (ALDACTONE) 25 MG tablet Take 1 tablet by mouth Daily. 90 tablet 1    vitamin D (ERGOCALCIFEROL) 1.25 MG (61653 UT) capsule capsule TAKE 1 CAPSULE ONCE WEEKLY 12 capsule 3    amLODIPine (NORVASC) 5 MG tablet Take 1 tablet by mouth Daily. 90 tablet 3    ferrous sulfate (FeroSul) 325 (65 FE) MG tablet Take 1 tablet by mouth Daily With Breakfast. 90 tablet 2     No facility-administered medications prior to visit.       Opioid medication/s are on active medication list.  and I have evaluated her active treatment plan and pain score trends (see table).  There were no vitals filed for this visit.  I have reviewed the chart for potential of high risk medication and harmful drug interactions in the elderly.          Aspirin is on active medication list. Aspirin use is indicated based on review of current medical condition/s. Pros and cons of this therapy have been discussed today. Benefits of this medication outweigh potential harm.  Patient has been encouraged to continue taking this medication.  .      Patient Active Problem List   Diagnosis    Asthma, cough variant     "Coronary arteriosclerosis in native artery    Essential hypertension    Chronic daily headache    Nodule of upper lobe of right lung    Other hyperlipidemia    Nonsustained ventricular tachycardia    History of cardiac cath    History of loop recorder    H/O heart artery stent    S/P CABG (coronary artery bypass graft)    H/O echocardiogram    History of nuclear stress test    Osteopenia of multiple sites    Mild aortic insufficiency    Grade I diastolic dysfunction    Renal insufficiency, mild     Advance Care Planning   Advance Care Planning     Advance Directive is not on file.  ACP discussion was held with the patient during this visit. Patient does not have an advance directive, information provided.     Objective    Vitals:    23 1013   BP: 124/76   Pulse: 78   Weight: 74.4 kg (164 lb)   Height: 154.9 cm (61\")     Estimated body mass index is 30.99 kg/m² as calculated from the following:    Height as of this encounter: 154.9 cm (61\").    Weight as of this encounter: 74.4 kg (164 lb).    BMI is >= 30 and <35. (Class 1 Obesity). The following options were offered after discussion;: exercise counseling/recommendations and nutrition counseling/recommendations      Does the patient have evidence of cognitive impairment? No    Lab Results   Component Value Date    CHLPL 132 2023    TRIG 129 2023    HDL 61 (H) 2023    LDL 49 2023    VLDL 22 2023        HEALTH RISK ASSESSMENT    Smoking Status:  Social History     Tobacco Use   Smoking Status Never   Smokeless Tobacco Never     Alcohol Consumption:  Social History     Substance and Sexual Activity   Alcohol Use No     Fall Risk Screen:    STEADI Fall Risk Assessment was completed, and patient is at LOW risk for falls.Assessment completed on:2023    Depression Screenin/27/2023    10:16 AM   PHQ-2/PHQ-9 Depression Screening   Little Interest or Pleasure in Doing Things 0-->not at all   Feeling Down, Depressed or Hopeless " 0-->not at all   PHQ-9: Brief Depression Severity Measure Score 0       Health Habits and Functional and Cognitive Screenin/27/2023    10:15 AM   Functional & Cognitive Status   Do you have difficulty preparing food and eating? No   Do you have difficulty bathing yourself, getting dressed or grooming yourself? No   Do you have difficulty using the toilet? No   Do you have difficulty moving around from place to place? No   Do you have trouble with steps or getting out of a bed or a chair? No   Current Diet Well Balanced Diet   Dental Exam Up to date   Eye Exam Up to date   Exercise (times per week) 0 times per week   Current Exercises Include No Regular Exercise   Do you need help using the phone?  No   Are you deaf or do you have serious difficulty hearing?  No   Do you need help to go to places out of walking distance? No   Do you need help shopping? No   Do you need help preparing meals?  No   Do you need help with housework?  No   Do you need help with laundry? No   Do you need help taking your medications? No   Do you need help managing money? No   Do you ever drive or ride in a car without wearing a seat belt? No   Have you felt unusual stress, anger or loneliness in the last month? No   Who do you live with? Spouse   If you need help, do you have trouble finding someone available to you? No   Have you been bothered in the last four weeks by sexual problems? No   Do you have difficulty concentrating, remembering or making decisions? No       Age-appropriate Screening Schedule:  Refer to the list below for future screening recommendations based on patient's age, sex and/or medical conditions. Orders for these recommended tests are listed in the plan section. The patient has been provided with a written plan.    Health Maintenance   Topic Date Due    BMI FOLLOWUP  Never done    TDAP/TD VACCINES (1 - Tdap) Never done    HEPATITIS C SCREENING  Never done    COVID-19 Vaccine (6 - Pfizer series) 2023  "   ANNUAL WELLNESS VISIT  06/30/2023    DXA SCAN  09/30/2023    INFLUENZA VACCINE  10/01/2023    LIPID PANEL  09/19/2024    Pneumococcal Vaccine 65+  Completed    ZOSTER VACCINE  Completed    COLORECTAL CANCER SCREENING  Discontinued                  CMS Preventative Services Quick Reference  Risk Factors Identified During Encounter  Immunizations Discussed/Encouraged: Influenza and COVID19  The above risks/problems have been discussed with the patient.  Pertinent information has been shared with the patient in the After Visit Summary.  An After Visit Summary and PPPS were made available to the patient.    Follow Up:   Next Medicare Wellness visit to be scheduled in 1 year.       Additional E&M Note during same encounter follows:  Patient has multiple medical problems which are significant and separately identifiable that require additional work above and beyond the Medicare Wellness Visit.      Chief Complaint  Medicare Wellness-subsequent    Subjective        HPI  Marleny Greenwood is also being seen today for CAD, hyperlipidemia- she had another stent placed in July- has done very well since then.          Objective   Vital Signs:  /76   Pulse 78   Ht 154.9 cm (61\")   Wt 74.4 kg (164 lb)   BMI 30.99 kg/m²     Physical Exam     The following data was reviewed by: Liz Zaragoza MD on 09/27/2023:  Common labs          6/2/2023    10:26 6/27/2023    11:48 9/19/2023    11:22   Common Labs   Glucose 107  108  103    BUN 14  15  11    Creatinine 1.13  1.25  0.95    Sodium 142  140  142    Potassium 4.5  4.0  3.8    Chloride 109  108  108    Calcium 9.7  9.3  9.3    Total Protein 6.5   6.4    Albumin 3.8   4.0    Total Bilirubin 0.3   0.2    Alkaline Phosphatase 64   66    AST (SGOT) 12   17    ALT (SGPT) 13   10    WBC 5.77  5.50     Hemoglobin 11.9  12.0     Hematocrit 37.1  36.3     Platelets 238  207     Total Cholesterol 194   132    Triglycerides 112   129    HDL Cholesterol 59   61    LDL Cholesterol  " 115   49      Data reviewed : Recent hospitalization notes [facility]           Assessment and Plan   Diagnoses and all orders for this visit:    1. Essential hypertension (Primary)  Comments:  controlled, no change.  Orders:  -     amLODIPine (NORVASC) 5 MG tablet; Take 1 tablet by mouth Daily.  Dispense: 90 tablet; Refill: 3    2. Asthma, cough variant  Comments:  stable- using cough meds prn per pulmonary.    3. S/P CABG (coronary artery bypass graft)  Comments:  LDL at goal    4. Iron deficiency anemia, unspecified iron deficiency anemia type  Comments:  can cut back Fe suplement to every other day- recheck at f/u  Orders:  -     ferrous sulfate (FeroSul) 325 (65 FE) MG tablet; Take 1 tablet by mouth Daily With Breakfast.  Dispense: 90 tablet; Refill: 2    5. Medicare annual wellness visit, subsequent  Comments:  doing much better- encouraged walking for exercise regularly. Flu shot and Covid planned at pharmacy.      BMI is >= 30 and <35. (Class 1 Obesity). The following options were offered after discussion;: exercise counseling/recommendations and nutrition counseling/recommendations        Follow Up   Return in about 6 months (around 3/27/2024) for Recheck.  Patient was given instructions and counseling regarding her condition or for health maintenance advice. Please see specific information pulled into the AVS if appropriate.

## 2023-11-16 DIAGNOSIS — E78.49 OTHER HYPERLIPIDEMIA: Chronic | ICD-10-CM

## 2023-11-16 RX ORDER — ROSUVASTATIN CALCIUM 40 MG/1
40 TABLET, COATED ORAL DAILY
Qty: 90 TABLET | Refills: 3 | Status: SHIPPED | OUTPATIENT
Start: 2023-11-16

## 2024-01-29 ENCOUNTER — TELEPHONE (OUTPATIENT)
Dept: INTERNAL MEDICINE | Facility: CLINIC | Age: 79
End: 2024-01-29
Payer: MEDICARE

## 2024-01-29 NOTE — TELEPHONE ENCOUNTER
Patient is calling for a refill on Plavix and also wanting to know if she still needs to be on iron, and if so, she has not had a refill for while, please call [phone].

## 2024-01-30 RX ORDER — CLOPIDOGREL BISULFATE 75 MG/1
75 TABLET ORAL DAILY
Qty: 90 TABLET | Refills: 2 | Status: SHIPPED | OUTPATIENT
Start: 2024-01-30

## 2024-02-06 ENCOUNTER — LAB (OUTPATIENT)
Dept: LAB | Facility: HOSPITAL | Age: 79
End: 2024-02-06
Payer: MEDICARE

## 2024-02-06 ENCOUNTER — TRANSCRIBE ORDERS (OUTPATIENT)
Dept: CARDIOLOGY | Facility: CLINIC | Age: 79
End: 2024-02-06
Payer: MEDICARE

## 2024-02-06 ENCOUNTER — OFFICE VISIT (OUTPATIENT)
Age: 79
End: 2024-02-06
Payer: MEDICARE

## 2024-02-06 VITALS
SYSTOLIC BLOOD PRESSURE: 122 MMHG | HEART RATE: 74 BPM | DIASTOLIC BLOOD PRESSURE: 68 MMHG | WEIGHT: 161 LBS | HEIGHT: 61 IN | OXYGEN SATURATION: 97 % | BODY MASS INDEX: 30.4 KG/M2

## 2024-02-06 DIAGNOSIS — R79.89 ABNORMAL CBC: Primary | ICD-10-CM

## 2024-02-06 DIAGNOSIS — I25.10 CORONARY ARTERIOSCLEROSIS IN NATIVE ARTERY: ICD-10-CM

## 2024-02-06 DIAGNOSIS — E28.39 BMP15-RELATED PREMATURE OVARIAN FAILURE: ICD-10-CM

## 2024-02-06 DIAGNOSIS — R79.89 ABNORMAL CBC: ICD-10-CM

## 2024-02-06 DIAGNOSIS — Z95.1 S/P CABG (CORONARY ARTERY BYPASS GRAFT): Chronic | ICD-10-CM

## 2024-02-06 DIAGNOSIS — I20.0 UNSTABLE ANGINA: ICD-10-CM

## 2024-02-06 DIAGNOSIS — Z95.5 H/O HEART ARTERY STENT: Chronic | ICD-10-CM

## 2024-02-06 DIAGNOSIS — R06.09 DOE (DYSPNEA ON EXERTION): Primary | ICD-10-CM

## 2024-02-06 LAB
ANION GAP SERPL CALCULATED.3IONS-SCNC: 9.7 MMOL/L (ref 5–15)
BASOPHILS # BLD AUTO: 0.05 10*3/MM3 (ref 0–0.2)
BASOPHILS NFR BLD AUTO: 0.9 % (ref 0–1.5)
BUN SERPL-MCNC: 13 MG/DL (ref 8–23)
BUN/CREAT SERPL: 10.2 (ref 7–25)
CALCIUM SPEC-SCNC: 8.9 MG/DL (ref 8.6–10.5)
CHLORIDE SERPL-SCNC: 108 MMOL/L (ref 98–107)
CO2 SERPL-SCNC: 25.3 MMOL/L (ref 22–29)
CREAT SERPL-MCNC: 1.27 MG/DL (ref 0.57–1)
DEPRECATED RDW RBC AUTO: 48.4 FL (ref 37–54)
EGFRCR SERPLBLD CKD-EPI 2021: 43.6 ML/MIN/1.73
EOSINOPHIL # BLD AUTO: 0.17 10*3/MM3 (ref 0–0.4)
EOSINOPHIL NFR BLD AUTO: 2.9 % (ref 0.3–6.2)
ERYTHROCYTE [DISTWIDTH] IN BLOOD BY AUTOMATED COUNT: 15.7 % (ref 12.3–15.4)
GLUCOSE SERPL-MCNC: 99 MG/DL (ref 65–99)
HCT VFR BLD AUTO: 37 % (ref 34–46.6)
HGB BLD-MCNC: 11.5 G/DL (ref 12–15.9)
IMM GRANULOCYTES # BLD AUTO: 0.03 10*3/MM3 (ref 0–0.05)
IMM GRANULOCYTES NFR BLD AUTO: 0.5 % (ref 0–0.5)
LYMPHOCYTES # BLD AUTO: 1.55 10*3/MM3 (ref 0.7–3.1)
LYMPHOCYTES NFR BLD AUTO: 26.6 % (ref 19.6–45.3)
MCH RBC QN AUTO: 26.4 PG (ref 26.6–33)
MCHC RBC AUTO-ENTMCNC: 31.1 G/DL (ref 31.5–35.7)
MCV RBC AUTO: 84.9 FL (ref 79–97)
MONOCYTES # BLD AUTO: 0.71 10*3/MM3 (ref 0.1–0.9)
MONOCYTES NFR BLD AUTO: 12.2 % (ref 5–12)
NEUTROPHILS NFR BLD AUTO: 3.31 10*3/MM3 (ref 1.7–7)
NEUTROPHILS NFR BLD AUTO: 56.9 % (ref 42.7–76)
NRBC BLD AUTO-RTO: 0 /100 WBC (ref 0–0.2)
PLATELET # BLD AUTO: 233 10*3/MM3 (ref 140–450)
PMV BLD AUTO: 10 FL (ref 6–12)
POTASSIUM SERPL-SCNC: 3.6 MMOL/L (ref 3.5–5.2)
RBC # BLD AUTO: 4.36 10*6/MM3 (ref 3.77–5.28)
SODIUM SERPL-SCNC: 143 MMOL/L (ref 136–145)
WBC NRBC COR # BLD AUTO: 5.82 10*3/MM3 (ref 3.4–10.8)

## 2024-02-06 PROCEDURE — 80048 BASIC METABOLIC PNL TOTAL CA: CPT

## 2024-02-06 PROCEDURE — 85025 COMPLETE CBC W/AUTO DIFF WBC: CPT

## 2024-02-06 PROCEDURE — 36415 COLL VENOUS BLD VENIPUNCTURE: CPT

## 2024-02-06 NOTE — PROGRESS NOTES
Subjective:     Encounter Date:02/06/2024        Patient ID: Marleny Greenwood is a 78 y.o. female.    Chief Complaint:  Follow up of CAD    HPI:   78 y.o. female with CAD status post CABG and PCI with 2.5 x 15mm Xience Skypoint drug-eluting stent to ostial D2, hypertension, hyperlipidemia who presents for follow up. Patient was last seen in August 2023 for post PCI follow-up and at that time her symptoms had significantly improved and essentially resolved.  Unfortunately though she notes that since Timo, her symptoms have returned.  They are not as severe as prior but they are very similar in quality.  She notes significant dyspnea on exertion with activities around her house as well as decreased energy.    The following portions of the patient's history were reviewed and updated as appropriate: allergies, current medications, past family history, past medical history, past social history, past surgical history and problem list.     REVIEW OF SYSTEMS:   All systems reviewed.  Pertinent positives identified in HPI.  All other systems are negative.    Past Medical History:   Diagnosis Date    Anemia     Asthma     Coronary artery disease     Cough     Dyspnea     Esophageal reflux     H/O echocardiogram 06/11/2019 6/05/18 - Normal LV size and function.  EF 60-65%.  Mild concentric LVH.  Midl AR, MR, and TR.    H/O heart artery stent 06/11/2019    3/22/12 - VISION stent to the SVG of the RCA reducing a total occlusion to 0% residual narrowing.    Headache     History of cardiac cath 06/11/2019    History of loop recorder 06/11/2019    5/20/15 - [employer] LINQ implant.    History of nuclear stress test 06/11/2019    10/09/17 - Abnormal study.  EF 46%.    History of nuclear stress test 06/11/2019    Hyperlipidemia LDL goal <100 06/11/2019    Hypokalemia     Migraine     Moderate osteopenia     Nonsustained ventricular tachycardia 06/11/2019    NSVT (nonsustained ventricular tachycardia)     Pneumonia      Postmenopausal atrophic vaginitis     Pre-syncope     Respiratory insufficiency 06/11/2019    S/P CABG (coronary artery bypass graft) 06/11/2019 4/12/11 - LIMA to the LAD and SVG to the posterior descending artery.    Seasonal allergies     Shoulder dislocation     Status post placement of implantable loop recorder 05/20/2015      Successful loop/LINQ implantation by Dr. Geo Wilson.    Vitamin D deficiency        Family History   Problem Relation Age of Onset    Heart disease Other     Breast cancer Other     Breast cancer Mother     Hypertension Mother        Social History     Socioeconomic History    Marital status:    Tobacco Use    Smoking status: Never    Smokeless tobacco: Never   Vaping Use    Vaping Use: Never used   Substance and Sexual Activity    Alcohol use: No    Drug use: No    Sexual activity: Defer       Allergies   Allergen Reactions    Adhesive Tape Itching and Rash    Cardizem [Diltiazem] Unknown (See Comments)     Pt is unknown why she can not take this medication     Levaquin [Levofloxacin] Cough       Past Surgical History:   Procedure Laterality Date    APPENDECTOMY      BRONCHOSCOPY N/A 8/7/2019    Procedure: BRONCHOSCOPY WITH BRONCHOALVEOLAR LAVAGE;  Surgeon: Thomas Sheridan MD;  Location: [facility] ENDOSCOPY;  Service: Pulmonary    CARDIAC CATHETERIZATION  06/02/2014    History of Cardiac Cath Procedure Outcome: Successful    CARDIAC CATHETERIZATION N/A 11/19/2019    Procedure: Right and Left Heart Cath lv pressures;  Surgeon: Mal Moser MD;  Location: [facility] CATH INVASIVE LOCATION;  Service: Cardiology    CARDIAC CATHETERIZATION N/A 11/19/2019    Procedure: Saphenous Vein Graft;  Surgeon: Mal Moser MD;  Location: [facility] CATH INVASIVE LOCATION;  Service: Cardiology    CARDIAC CATHETERIZATION N/A 11/19/2019    Procedure: Coronary angiography;  Surgeon: Mal Moser MD;  Location: [facility] CATH INVASIVE LOCATION;  Service: Cardiology    CARDIAC CATHETERIZATION Left  2022    Procedure: Coronary Angiography;  Surgeon: Marco A Muir MD;  Location:  [facility] CATH INVASIVE LOCATION;  Service: Cardiology;  Laterality: Left;    CARDIAC CATHETERIZATION N/A 2022    Procedure: Left Heart Cath;  Surgeon: Marco A Muir MD;  Location:  [facility] CATH INVASIVE LOCATION;  Service: Cardiology;  Laterality: N/A;    CARDIAC CATHETERIZATION N/A 7/3/2023    Procedure: Left Heart Cath;  Surgeon: Marcos Hoff MD;  Location:  [facility] CATH INVASIVE LOCATION;  Service: Cardiology;  Laterality: N/A;    CARDIAC CATHETERIZATION N/A 7/3/2023    Procedure: Coronary angiography;  Surgeon: Marcos Hoff MD;  Location:  [facility] CATH INVASIVE LOCATION;  Service: Cardiology;  Laterality: N/A;    CARDIAC CATHETERIZATION  7/3/2023    Procedure: Saphenous Vein Graft;  Surgeon: Marcos Hoff MD;  Location:  [facility] CATH INVASIVE LOCATION;  Service: Cardiology;;    CARDIAC CATHETERIZATION N/A 7/3/2023    Procedure: Percutaneous Coronary Intervention;  Surgeon: Marcos Hoff MD;  Location:  [facility] CATH INVASIVE LOCATION;  Service: Cardiology;  Laterality: N/A;    CARDIAC CATHETERIZATION N/A 7/3/2023    Procedure: Stent FER coronary;  Surgeon: Marcos Hoff MD;  Location:  [facility] CATH INVASIVE LOCATION;  Service: Cardiology;  Laterality: N/A;    CATARACT EXTRACTION       SECTION      CHOLECYSTECTOMY      COLONOSCOPY  2009    q-10    CORONARY ANGIOPLASTY WITH STENT PLACEMENT  2012     [facility] by Dr. Roman.    CORONARY ARTERY BYPASS GRAFT Bilateral 2011    with a left internal mammary graft to the LAD and saphenous vein graft to the posterior descending artery    ENDOSCOPY       Diagnostic Esophagogastroduodenoscopy    HYSTERECTOMY      NERVE BLOCK       Nerve Block Transforaminal Epidural Lumbar    OTHER SURGICAL HISTORY  2015    Patient-Activated Cardiac Event Recorder - From 2015 to 2015       Procedures       Objective:         Vitals:     02/06/24 1034   BP: 122/68   Pulse: 74   SpO2: 97%       PHYSICAL EXAM:  GEN: well appearing, in NAD   HEENT: NCAT, EOMI, moist mucus membranes   Respiratory: CTAB, no wheezes, rales or rhonchi  CV: normal rate, regular rhythm, normal S1, S2, no murmurs, rubs, gallops, +2 radial pulses b/l  GI: soft, nontender, nondistended  MSK: no edema  Skin: no rash, warm, dry  Heme/Lymph: no bruising or bleeding  Psych: organized thought, normal behavior and affect  Neuro: Alert and Oriented x 3, grossly normal motor function        Assessment:         (R06.09) ROJAS (dyspnea on exertion)    (I20.0) Unstable angina - Plan: Case Request Cath Lab: Left Heart Cath    (I25.10) Coronary arteriosclerosis in native artery    (Z95.5) H/O heart artery stent    (Z95.1) S/P CABG (coronary artery bypass graft)    77 y.o. female with CAD status post CABG, recent PCI to ostial D2, hypertension, hyperlipidemia who presents for follow up.       Plan:       #CAD status post CABG  [facility] July 2022 with patent SVG to LAD,  of RCA and occluded SVG to RCA.  S/p PCI with 2.5 x 15mm Xience Skypoint drug-eluting stent to ostial D2.  Symptoms really improved for about 6 months but have recently worsened.  - continue carvedilol 25 mg twice daily, Ranexa 1000 mg twice daily, Imdur 60 mg daily  - continue amlodipine 5 mg daily  - Continue aspirin 81 mg daily, Plavix 75 mg daily  - Plan for left heart catheterization for further evaluation of diagonal stent as well as worsening of CAD given worsening symptoms      Dr. Zaragoza, thank you very much for referring this kind patient to me. Please call me with any questions or concerns. I will see the patient again in the office pending left heart catheterization.         Marcos Hoff MD  02/06/24  [facility]    Outpatient Encounter Medications as of 2/6/2024   Medication Sig Dispense Refill    albuterol sulfate  (90 Base) MCG/ACT inhaler Inhale 2 puffs Every 4 (Four) Hours As Needed for  Wheezing. 18 g 2    amLODIPine (NORVASC) 5 MG tablet Take 1 tablet by mouth Daily. 90 tablet 3    aspirin 81 MG EC tablet Take 1 tablet by mouth Daily.      carvedilol (COREG) 25 MG tablet Take 1 tablet by mouth 2 (Two) Times a Day. 180 tablet 3    clopidogrel (PLAVIX) 75 MG tablet Take 1 tablet by mouth Daily. 90 tablet 2    cyclobenzaprine (FLEXERIL) 10 MG tablet Take 1 tablet by mouth 3 (Three) Times a Day As Needed for Muscle Spasms. 21 tablet 0    ferrous sulfate (FeroSul) 325 (65 FE) MG tablet Take 1 tablet by mouth Daily With Breakfast. 90 tablet 2    furosemide (LASIX) 40 MG tablet TAKE 1 TABLET TWICE A  tablet 3    hydrALAZINE (APRESOLINE) 50 MG tablet Take 1 tablet by mouth 3 (Three) Times a Day. 270 tablet 71    HYDROcodone-homatropine (HYCODAN) 5-1.5 MG/5ML syrup Take 5 mL by mouth Every 6 (Six) Hours As Needed for Cough.      isosorbide mononitrate (IMDUR) 60 MG 24 hr tablet TAKE 1 TABLET DAILY 90 tablet 3    Multiple Vitamins-Minerals (PRESERVISION AREDS 2 PO) Take  by mouth.      nitroglycerin (NITROSTAT) 0.4 MG SL tablet Place 1 tablet under the tongue Every 5 (Five) Minutes As Needed for Chest Pain. Take no more than 3 doses in 15 minutes. 25 tablet 3    omeprazole (priLOSEC) 20 MG capsule Take 1 capsule by mouth Daily. 90 capsule 3    polyethylene glycol (MIRALAX) packet Take 17 g by mouth Daily.      potassium chloride ER (K-TAB) 20 MEQ tablet controlled-release ER tablet Take 1 tablet by mouth 3 (Three) Times a Day. 270 tablet 1    promethazine (PHENERGAN) 25 MG tablet TAKE 1 TABLET EVERY 6 HOURS AS NEEDED FOR NAUSEA OR VOMITING 90 tablet 14    ranolazine (RANEXA) 1000 MG 12 hr tablet Take 1 tablet by mouth 2 (Two) Times a Day. 60 tablet 11    rosuvastatin (CRESTOR) 40 MG tablet TAKE 1 TABLET DAILY 90 tablet 3    spironolactone (ALDACTONE) 25 MG tablet Take 1 tablet by mouth Daily. 90 tablet 1    vitamin D (ERGOCALCIFEROL) 1.25 MG (08948 UT) capsule capsule TAKE 1 CAPSULE ONCE WEEKLY 12  capsule 3    butalbital-aspirin-caffeine-codeine (Fiorinal/Codeine #3) -12-30 MG capsule Take 1 capsule by mouth Every 4 (Four) Hours As Needed for Headache. (Patient not taking: Reported on 2/6/2024) 120 capsule 1     No facility-administered encounter medications on file as of 2/6/2024.

## 2024-02-06 NOTE — H&P (VIEW-ONLY)
Subjective:     Encounter Date:02/06/2024        Patient ID: Marleny Greenwood is a 78 y.o. female.    Chief Complaint:  Follow up of CAD    HPI:   78 y.o. female with CAD status post CABG and PCI with 2.5 x 15mm Xience Skypoint drug-eluting stent to ostial D2, hypertension, hyperlipidemia who presents for follow up. Patient was last seen in August 2023 for post PCI follow-up and at that time her symptoms had significantly improved and essentially resolved.  Unfortunately though she notes that since Timo, her symptoms have returned.  They are not as severe as prior but they are very similar in quality.  She notes significant dyspnea on exertion with activities around her house as well as decreased energy.    The following portions of the patient's history were reviewed and updated as appropriate: allergies, current medications, past family history, past medical history, past social history, past surgical history and problem list.     REVIEW OF SYSTEMS:   All systems reviewed.  Pertinent positives identified in HPI.  All other systems are negative.    Past Medical History:   Diagnosis Date    Anemia     Asthma     Coronary artery disease     Cough     Dyspnea     Esophageal reflux     H/O echocardiogram 06/11/2019 6/05/18 - Normal LV size and function.  EF 60-65%.  Mild concentric LVH.  Midl AR, MR, and TR.    H/O heart artery stent 06/11/2019    3/22/12 - VISION stent to the SVG of the RCA reducing a total occlusion to 0% residual narrowing.    Headache     History of cardiac cath 06/11/2019    History of loop recorder 06/11/2019    5/20/15 - Medtronic LINQ implant.    History of nuclear stress test 06/11/2019    10/09/17 - Abnormal study.  EF 46%.    History of nuclear stress test 06/11/2019    Hyperlipidemia LDL goal <100 06/11/2019    Hypokalemia     Migraine     Moderate osteopenia     Nonsustained ventricular tachycardia 06/11/2019    NSVT (nonsustained ventricular tachycardia)     Pneumonia      Postmenopausal atrophic vaginitis     Pre-syncope     Respiratory insufficiency 06/11/2019    S/P CABG (coronary artery bypass graft) 06/11/2019 4/12/11 - LIMA to the LAD and SVG to the posterior descending artery.    Seasonal allergies     Shoulder dislocation     Status post placement of implantable loop recorder 05/20/2015      Successful loop/LINQ implantation by Dr. Geo Wilson.    Vitamin D deficiency        Family History   Problem Relation Age of Onset    Heart disease Other     Breast cancer Other     Breast cancer Mother     Hypertension Mother        Social History     Socioeconomic History    Marital status:    Tobacco Use    Smoking status: Never    Smokeless tobacco: Never   Vaping Use    Vaping Use: Never used   Substance and Sexual Activity    Alcohol use: No    Drug use: No    Sexual activity: Defer       Allergies   Allergen Reactions    Adhesive Tape Itching and Rash    Cardizem [Diltiazem] Unknown (See Comments)     Pt is unknown why she can not take this medication     Levaquin [Levofloxacin] Cough       Past Surgical History:   Procedure Laterality Date    APPENDECTOMY      BRONCHOSCOPY N/A 8/7/2019    Procedure: BRONCHOSCOPY WITH BRONCHOALVEOLAR LAVAGE;  Surgeon: Thomas Sheridan MD;  Location: [facility] ENDOSCOPY;  Service: Pulmonary    CARDIAC CATHETERIZATION  06/02/2014    History of Cardiac Cath Procedure Outcome: Successful    CARDIAC CATHETERIZATION N/A 11/19/2019    Procedure: Right and Left Heart Cath lv pressures;  Surgeon: Mal Moser MD;  Location: [facility] CATH INVASIVE LOCATION;  Service: Cardiology    CARDIAC CATHETERIZATION N/A 11/19/2019    Procedure: Saphenous Vein Graft;  Surgeon: Mal Moser MD;  Location: [facility] CATH INVASIVE LOCATION;  Service: Cardiology    CARDIAC CATHETERIZATION N/A 11/19/2019    Procedure: Coronary angiography;  Surgeon: Mal Moser MD;  Location: [facility] CATH INVASIVE LOCATION;  Service: Cardiology    CARDIAC CATHETERIZATION Left  2022    Procedure: Coronary Angiography;  Surgeon: Marco A Muir MD;  Location:  [facility] CATH INVASIVE LOCATION;  Service: Cardiology;  Laterality: Left;    CARDIAC CATHETERIZATION N/A 2022    Procedure: Left Heart Cath;  Surgeon: Marco A Muir MD;  Location:  [facility] CATH INVASIVE LOCATION;  Service: Cardiology;  Laterality: N/A;    CARDIAC CATHETERIZATION N/A 7/3/2023    Procedure: Left Heart Cath;  Surgeon: Marcos Hoff MD;  Location:  [facility] CATH INVASIVE LOCATION;  Service: Cardiology;  Laterality: N/A;    CARDIAC CATHETERIZATION N/A 7/3/2023    Procedure: Coronary angiography;  Surgeon: Marcos Hoff MD;  Location:  [facility] CATH INVASIVE LOCATION;  Service: Cardiology;  Laterality: N/A;    CARDIAC CATHETERIZATION  7/3/2023    Procedure: Saphenous Vein Graft;  Surgeon: Marcos Hoff MD;  Location:  [facility] CATH INVASIVE LOCATION;  Service: Cardiology;;    CARDIAC CATHETERIZATION N/A 7/3/2023    Procedure: Percutaneous Coronary Intervention;  Surgeon: Marcos Hoff MD;  Location:  [facility] CATH INVASIVE LOCATION;  Service: Cardiology;  Laterality: N/A;    CARDIAC CATHETERIZATION N/A 7/3/2023    Procedure: Stent FER coronary;  Surgeon: Marcos Hoff MD;  Location:  [facility] CATH INVASIVE LOCATION;  Service: Cardiology;  Laterality: N/A;    CATARACT EXTRACTION       SECTION      CHOLECYSTECTOMY      COLONOSCOPY  2009    q-10    CORONARY ANGIOPLASTY WITH STENT PLACEMENT  2012     [facility] by Dr. Roman.    CORONARY ARTERY BYPASS GRAFT Bilateral 2011    with a left internal mammary graft to the LAD and saphenous vein graft to the posterior descending artery    ENDOSCOPY       Diagnostic Esophagogastroduodenoscopy    HYSTERECTOMY      NERVE BLOCK       Nerve Block Transforaminal Epidural Lumbar    OTHER SURGICAL HISTORY  2015    Patient-Activated Cardiac Event Recorder - From 2015 to 2015       Procedures       Objective:         Vitals:     02/06/24 1034   BP: 122/68   Pulse: 74   SpO2: 97%       PHYSICAL EXAM:  GEN: well appearing, in NAD   HEENT: NCAT, EOMI, moist mucus membranes   Respiratory: CTAB, no wheezes, rales or rhonchi  CV: normal rate, regular rhythm, normal S1, S2, no murmurs, rubs, gallops, +2 radial pulses b/l  GI: soft, nontender, nondistended  MSK: no edema  Skin: no rash, warm, dry  Heme/Lymph: no bruising or bleeding  Psych: organized thought, normal behavior and affect  Neuro: Alert and Oriented x 3, grossly normal motor function        Assessment:         (R06.09) ROJAS (dyspnea on exertion)    (I20.0) Unstable angina - Plan: Case Request Cath Lab: Left Heart Cath    (I25.10) Coronary arteriosclerosis in native artery    (Z95.5) H/O heart artery stent    (Z95.1) S/P CABG (coronary artery bypass graft)    77 y.o. female with CAD status post CABG, recent PCI to ostial D2, hypertension, hyperlipidemia who presents for follow up.       Plan:       #CAD status post CABG  [facility] July 2022 with patent SVG to LAD,  of RCA and occluded SVG to RCA.  S/p PCI with 2.5 x 15mm Xience Skypoint drug-eluting stent to ostial D2.  Symptoms really improved for about 6 months but have recently worsened.  - continue carvedilol 25 mg twice daily, Ranexa 1000 mg twice daily, Imdur 60 mg daily  - continue amlodipine 5 mg daily  - Continue aspirin 81 mg daily, Plavix 75 mg daily  - Plan for left heart catheterization for further evaluation of diagonal stent as well as worsening of CAD given worsening symptoms      Dr. Zaragoza, thank you very much for referring this kind patient to me. Please call me with any questions or concerns. I will see the patient again in the office pending left heart catheterization.         Marcos Hoff MD  02/06/24  [facility]    Outpatient Encounter Medications as of 2/6/2024   Medication Sig Dispense Refill    albuterol sulfate  (90 Base) MCG/ACT inhaler Inhale 2 puffs Every 4 (Four) Hours As Needed for  Wheezing. 18 g 2    amLODIPine (NORVASC) 5 MG tablet Take 1 tablet by mouth Daily. 90 tablet 3    aspirin 81 MG EC tablet Take 1 tablet by mouth Daily.      carvedilol (COREG) 25 MG tablet Take 1 tablet by mouth 2 (Two) Times a Day. 180 tablet 3    clopidogrel (PLAVIX) 75 MG tablet Take 1 tablet by mouth Daily. 90 tablet 2    cyclobenzaprine (FLEXERIL) 10 MG tablet Take 1 tablet by mouth 3 (Three) Times a Day As Needed for Muscle Spasms. 21 tablet 0    ferrous sulfate (FeroSul) 325 (65 FE) MG tablet Take 1 tablet by mouth Daily With Breakfast. 90 tablet 2    furosemide (LASIX) 40 MG tablet TAKE 1 TABLET TWICE A  tablet 3    hydrALAZINE (APRESOLINE) 50 MG tablet Take 1 tablet by mouth 3 (Three) Times a Day. 270 tablet 71    HYDROcodone-homatropine (HYCODAN) 5-1.5 MG/5ML syrup Take 5 mL by mouth Every 6 (Six) Hours As Needed for Cough.      isosorbide mononitrate (IMDUR) 60 MG 24 hr tablet TAKE 1 TABLET DAILY 90 tablet 3    Multiple Vitamins-Minerals (PRESERVISION AREDS 2 PO) Take  by mouth.      nitroglycerin (NITROSTAT) 0.4 MG SL tablet Place 1 tablet under the tongue Every 5 (Five) Minutes As Needed for Chest Pain. Take no more than 3 doses in 15 minutes. 25 tablet 3    omeprazole (priLOSEC) 20 MG capsule Take 1 capsule by mouth Daily. 90 capsule 3    polyethylene glycol (MIRALAX) packet Take 17 g by mouth Daily.      potassium chloride ER (K-TAB) 20 MEQ tablet controlled-release ER tablet Take 1 tablet by mouth 3 (Three) Times a Day. 270 tablet 1    promethazine (PHENERGAN) 25 MG tablet TAKE 1 TABLET EVERY 6 HOURS AS NEEDED FOR NAUSEA OR VOMITING 90 tablet 14    ranolazine (RANEXA) 1000 MG 12 hr tablet Take 1 tablet by mouth 2 (Two) Times a Day. 60 tablet 11    rosuvastatin (CRESTOR) 40 MG tablet TAKE 1 TABLET DAILY 90 tablet 3    spironolactone (ALDACTONE) 25 MG tablet Take 1 tablet by mouth Daily. 90 tablet 1    vitamin D (ERGOCALCIFEROL) 1.25 MG (23803 UT) capsule capsule TAKE 1 CAPSULE ONCE WEEKLY 12  capsule 3    butalbital-aspirin-caffeine-codeine (Fiorinal/Codeine #3) -79-30 MG capsule Take 1 capsule by mouth Every 4 (Four) Hours As Needed for Headache. (Patient not taking: Reported on 2/6/2024) 120 capsule 1     No facility-administered encounter medications on file as of 2/6/2024.

## 2024-02-06 NOTE — H&P (VIEW-ONLY)
Subjective:     Encounter Date:02/06/2024        Patient ID: Marleny Greenwood is a 78 y.o. female.    Chief Complaint:  Follow up of CAD    HPI:   78 y.o. female with CAD status post CABG and PCI with 2.5 x 15mm Xience Skypoint drug-eluting stent to ostial D2, hypertension, hyperlipidemia who presents for follow up. Patient was last seen in August 2023 for post PCI follow-up and at that time her symptoms had significantly improved and essentially resolved.  Unfortunately though she notes that since Timo, her symptoms have returned.  They are not as severe as prior but they are very similar in quality.  She notes significant dyspnea on exertion with activities around her house as well as decreased energy.    The following portions of the patient's history were reviewed and updated as appropriate: allergies, current medications, past family history, past medical history, past social history, past surgical history and problem list.     REVIEW OF SYSTEMS:   All systems reviewed.  Pertinent positives identified in HPI.  All other systems are negative.    Past Medical History:   Diagnosis Date    Anemia     Asthma     Coronary artery disease     Cough     Dyspnea     Esophageal reflux     H/O echocardiogram 06/11/2019 6/05/18 - Normal LV size and function.  EF 60-65%.  Mild concentric LVH.  Midl AR, MR, and TR.    H/O heart artery stent 06/11/2019    3/22/12 - VISION stent to the SVG of the RCA reducing a total occlusion to 0% residual narrowing.    Headache     History of cardiac cath 06/11/2019    History of loop recorder 06/11/2019    5/20/15 - Medtronic LINQ implant.    History of nuclear stress test 06/11/2019    10/09/17 - Abnormal study.  EF 46%.    History of nuclear stress test 06/11/2019    Hyperlipidemia LDL goal <100 06/11/2019    Hypokalemia     Migraine     Moderate osteopenia     Nonsustained ventricular tachycardia 06/11/2019    NSVT (nonsustained ventricular tachycardia)     Pneumonia      Postmenopausal atrophic vaginitis     Pre-syncope     Respiratory insufficiency 06/11/2019    S/P CABG (coronary artery bypass graft) 06/11/2019 4/12/11 - LIMA to the LAD and SVG to the posterior descending artery.    Seasonal allergies     Shoulder dislocation     Status post placement of implantable loop recorder 05/20/2015      Successful loop/LINQ implantation by Dr. Geo Wilson.    Vitamin D deficiency        Family History   Problem Relation Age of Onset    Heart disease Other     Breast cancer Other     Breast cancer Mother     Hypertension Mother        Social History     Socioeconomic History    Marital status:    Tobacco Use    Smoking status: Never    Smokeless tobacco: Never   Vaping Use    Vaping Use: Never used   Substance and Sexual Activity    Alcohol use: No    Drug use: No    Sexual activity: Defer       Allergies   Allergen Reactions    Adhesive Tape Itching and Rash    Cardizem [Diltiazem] Unknown (See Comments)     Pt is unknown why she can not take this medication     Levaquin [Levofloxacin] Cough       Past Surgical History:   Procedure Laterality Date    APPENDECTOMY      BRONCHOSCOPY N/A 8/7/2019    Procedure: BRONCHOSCOPY WITH BRONCHOALVEOLAR LAVAGE;  Surgeon: Thomas Sheridan MD;  [facility] [facility];  Service: Pulmonary    CARDIAC CATHETERIZATION  06/02/2014    History of Cardiac Cath Procedure Outcome: Successful    CARDIAC CATHETERIZATION N/A 11/19/2019    Procedure: Right and Left Heart Cath lv pressures;  Surgeon: Mal Moser MD;  Location: [facility]VASIVE LOCATION;  Service: Cardiology    CARDIAC CATHETERIZATION N/A 11/19/2019    Procedure: Saphenous Vein Graft;  Surgeon: Mal Moser MD;  Location: [facility]VASIVE LOCATION;  Service: Cardiology    CARDIAC CATHETERIZATION N/A 11/19/2019    Procedure: Coronary angiography;  Surgeon: Mal Moser MD;  Location: [facility] CATH INVASIVE LOCATION;  Service: Cardiology    CARDIAC CATHETERIZATION Left  2022    Procedure: Coronary Angiography;  Surgeon: Marco A Muir MD;  Location:  [facility] CATH INVASIVE LOCATION;  Service: Cardiology;  Laterality: Left;    CARDIAC CATHETERIZATION N/A 2022    Procedure: Left Heart Cath;  Surgeon: Marco A Muir MD;  Location:  [facility] CATH INVASIVE LOCATION;  Service: Cardiology;  Laterality: N/A;    CARDIAC CATHETERIZATION N/A 7/3/2023    Procedure: Left Heart Cath;  Surgeon: Marcos Hoff MD;  Location:  [facility] CATH INVASIVE LOCATION;  Service: Cardiology;  Laterality: N/A;    CARDIAC CATHETERIZATION N/A 7/3/2023    Procedure: Coronary angiography;  Surgeon: Marcos Hoff MD;  Location:  [facility] CATH INVASIVE LOCATION;  Service: Cardiology;  Laterality: N/A;    CARDIAC CATHETERIZATION  7/3/2023    Procedure: Saphenous Vein Graft;  Surgeon: Marcos Hoff MD;  Location:  [facility] CATH INVASIVE LOCATION;  Service: Cardiology;;    CARDIAC CATHETERIZATION N/A 7/3/2023    Procedure: Percutaneous Coronary Intervention;  Surgeon: Marcos Hoff MD;  Location:  [facility] CATH INVASIVE LOCATION;  Service: Cardiology;  Laterality: N/A;    CARDIAC CATHETERIZATION N/A 7/3/2023    Procedure: Stent FER coronary;  Surgeon: Marcos Hoff MD;  Location:  [facility] CATH INVASIVE LOCATION;  Service: Cardiology;  Laterality: N/A;    CATARACT EXTRACTION       SECTION      CHOLECYSTECTOMY      COLONOSCOPY  2009    q-10    CORONARY ANGIOPLASTY WITH STENT PLACEMENT  2012     [facility] by Dr. Roman.    CORONARY ARTERY BYPASS GRAFT Bilateral 2011    with a left internal mammary graft to the LAD and saphenous vein graft to the posterior descending artery    ENDOSCOPY       Diagnostic Esophagogastroduodenoscopy    HYSTERECTOMY      NERVE BLOCK       Nerve Block Transforaminal Epidural Lumbar    OTHER SURGICAL HISTORY  2015    Patient-Activated Cardiac Event Recorder - From 2015 to 2015       Procedures       Objective:         Vitals:     02/06/24 1034   BP: 122/68   Pulse: 74   SpO2: 97%       PHYSICAL EXAM:  GEN: well appearing, in NAD   HEENT: NCAT, EOMI, moist mucus membranes   Respiratory: CTAB, no wheezes, rales or rhonchi  CV: normal rate, regular rhythm, normal S1, S2, no murmurs, rubs, gallops, +2 radial pulses b/l  GI: soft, nontender, nondistended  MSK: no edema  Skin: no rash, warm, dry  Heme/Lymph: no bruising or bleeding  Psych: organized thought, normal behavior and affect  Neuro: Alert and Oriented x 3, grossly normal motor function        Assessment:         (R06.09) ROJAS (dyspnea on exertion)    (I20.0) Unstable angina - Plan: Case Request Cath Lab: Left Heart Cath    (I25.10) Coronary arteriosclerosis in native artery    (Z95.5) H/O heart artery stent    (Z95.1) S/P CABG (coronary artery bypass graft)    77 y.o. female with CAD status post CABG, recent PCI to ostial D2, hypertension, hyperlipidemia who presents for follow up.       Plan:       #CAD status post CABG  [facility] July 2022 with patent SVG to LAD,  of RCA and occluded SVG to RCA.  S/p PCI with 2.5 x 15mm Xience Skypoint drug-eluting stent to ostial D2.  Symptoms really improved for about 6 months but have recently worsened.  - continue carvedilol 25 mg twice daily, Ranexa 1000 mg twice daily, Imdur 60 mg daily  - continue amlodipine 5 mg daily  - Continue aspirin 81 mg daily, Plavix 75 mg daily  - Plan for left heart catheterization for further evaluation of diagonal stent as well as worsening of CAD given worsening symptoms      Dr. Zaragoza, thank you very much for referring this kind patient to me. Please call me with any questions or concerns. I will see the patient again in the office pending left heart catheterization.         Marcos Hoff MD  02/06/24  [facility]    Outpatient Encounter Medications as of 2/6/2024   Medication Sig Dispense Refill    albuterol sulfate  (90 Base) MCG/ACT inhaler Inhale 2 puffs Every 4 (Four) Hours As Needed for  Wheezing. 18 g 2    amLODIPine (NORVASC) 5 MG tablet Take 1 tablet by mouth Daily. 90 tablet 3    aspirin 81 MG EC tablet Take 1 tablet by mouth Daily.      carvedilol (COREG) 25 MG tablet Take 1 tablet by mouth 2 (Two) Times a Day. 180 tablet 3    clopidogrel (PLAVIX) 75 MG tablet Take 1 tablet by mouth Daily. 90 tablet 2    cyclobenzaprine (FLEXERIL) 10 MG tablet Take 1 tablet by mouth 3 (Three) Times a Day As Needed for Muscle Spasms. 21 tablet 0    ferrous sulfate (FeroSul) 325 (65 FE) MG tablet Take 1 tablet by mouth Daily With Breakfast. 90 tablet 2    furosemide (LASIX) 40 MG tablet TAKE 1 TABLET TWICE A  tablet 3    hydrALAZINE (APRESOLINE) 50 MG tablet Take 1 tablet by mouth 3 (Three) Times a Day. 270 tablet 71    HYDROcodone-homatropine (HYCODAN) 5-1.5 MG/5ML syrup Take 5 mL by mouth Every 6 (Six) Hours As Needed for Cough.      isosorbide mononitrate (IMDUR) 60 MG 24 hr tablet TAKE 1 TABLET DAILY 90 tablet 3    Multiple Vitamins-Minerals (PRESERVISION AREDS 2 PO) Take  by mouth.      nitroglycerin (NITROSTAT) 0.4 MG SL tablet Place 1 tablet under the tongue Every 5 (Five) Minutes As Needed for Chest Pain. Take no more than 3 doses in 15 minutes. 25 tablet 3    omeprazole (priLOSEC) 20 MG capsule Take 1 capsule by mouth Daily. 90 capsule 3    polyethylene glycol (MIRALAX) packet Take 17 g by mouth Daily.      potassium chloride ER (K-TAB) 20 MEQ tablet controlled-release ER tablet Take 1 tablet by mouth 3 (Three) Times a Day. 270 tablet 1    promethazine (PHENERGAN) 25 MG tablet TAKE 1 TABLET EVERY 6 HOURS AS NEEDED FOR NAUSEA OR VOMITING 90 tablet 14    ranolazine (RANEXA) 1000 MG 12 hr tablet Take 1 tablet by mouth 2 (Two) Times a Day. 60 tablet 11    rosuvastatin (CRESTOR) 40 MG tablet TAKE 1 TABLET DAILY 90 tablet 3    spironolactone (ALDACTONE) 25 MG tablet Take 1 tablet by mouth Daily. 90 tablet 1    vitamin D (ERGOCALCIFEROL) 1.25 MG (31806 UT) capsule capsule TAKE 1 CAPSULE ONCE WEEKLY 12  capsule 3    butalbital-aspirin-caffeine-codeine (Fiorinal/Codeine #3) -39-30 MG capsule Take 1 capsule by mouth Every 4 (Four) Hours As Needed for Headache. (Patient not taking: Reported on 2/6/2024) 120 capsule 1     No facility-administered encounter medications on file as of 2/6/2024.

## 2024-02-08 ENCOUNTER — HOSPITAL ENCOUNTER (OUTPATIENT)
Facility: HOSPITAL | Age: 79
Setting detail: HOSPITAL OUTPATIENT SURGERY
Discharge: HOME | End: 2024-02-08
Attending: STUDENT IN AN ORGANIZED HEALTH CARE EDUCATION/TRAINING PROGRAM | Admitting: STUDENT IN AN ORGANIZED HEALTH CARE EDUCATION/TRAINING PROGRAM
Payer: MEDICARE

## 2024-02-08 VITALS
OXYGEN SATURATION: 96 % | HEART RATE: 64 BPM | SYSTOLIC BLOOD PRESSURE: 156 MMHG | HEIGHT: 61 IN | TEMPERATURE: 97.2 F | RESPIRATION RATE: 16 BRPM | BODY MASS INDEX: 29.27 KG/M2 | WEIGHT: 155 LBS | DIASTOLIC BLOOD PRESSURE: 86 MMHG

## 2024-02-08 DIAGNOSIS — I20.0 UNSTABLE ANGINA: ICD-10-CM

## 2024-02-08 PROCEDURE — 93459 L HRT ART/GRFT ANGIO: CPT | Performed by: STUDENT IN AN ORGANIZED HEALTH CARE EDUCATION/TRAINING PROGRAM

## 2024-02-08 PROCEDURE — C1887 CATHETER, GUIDING: HCPCS | Performed by: STUDENT IN AN ORGANIZED HEALTH CARE EDUCATION/TRAINING PROGRAM

## 2024-02-08 PROCEDURE — C1769 GUIDE WIRE: HCPCS | Performed by: STUDENT IN AN ORGANIZED HEALTH CARE EDUCATION/TRAINING PROGRAM

## 2024-02-08 PROCEDURE — C1894 INTRO/SHEATH, NON-LASER: HCPCS | Performed by: STUDENT IN AN ORGANIZED HEALTH CARE EDUCATION/TRAINING PROGRAM

## 2024-02-08 PROCEDURE — 25010000002 MIDAZOLAM PER 1 MG: Performed by: STUDENT IN AN ORGANIZED HEALTH CARE EDUCATION/TRAINING PROGRAM

## 2024-02-08 PROCEDURE — 25510000001 IOPAMIDOL PER 1 ML: Performed by: STUDENT IN AN ORGANIZED HEALTH CARE EDUCATION/TRAINING PROGRAM

## 2024-02-08 PROCEDURE — 25010000002 FENTANYL CITRATE (PF) 50 MCG/ML SOLUTION: Performed by: STUDENT IN AN ORGANIZED HEALTH CARE EDUCATION/TRAINING PROGRAM

## 2024-02-08 PROCEDURE — 25010000002 HEPARIN (PORCINE) PER 1000 UNITS: Performed by: STUDENT IN AN ORGANIZED HEALTH CARE EDUCATION/TRAINING PROGRAM

## 2024-02-08 PROCEDURE — 25010000002 NICARDIPINE 2.5 MG/ML SOLUTION: Performed by: STUDENT IN AN ORGANIZED HEALTH CARE EDUCATION/TRAINING PROGRAM

## 2024-02-08 PROCEDURE — 25810000003 SODIUM CHLORIDE 0.9 % SOLUTION: Performed by: STUDENT IN AN ORGANIZED HEALTH CARE EDUCATION/TRAINING PROGRAM

## 2024-02-08 RX ORDER — SODIUM CHLORIDE 9 MG/ML
250 INJECTION, SOLUTION INTRAVENOUS ONCE AS NEEDED
Status: DISCONTINUED | OUTPATIENT
Start: 2024-02-08 | End: 2024-02-08 | Stop reason: HOSPADM

## 2024-02-08 RX ORDER — SODIUM CHLORIDE 0.9 % (FLUSH) 0.9 %
10 SYRINGE (ML) INJECTION AS NEEDED
Status: DISCONTINUED | OUTPATIENT
Start: 2024-02-08 | End: 2024-02-08 | Stop reason: HOSPADM

## 2024-02-08 RX ORDER — SODIUM CHLORIDE 9 MG/ML
75 INJECTION, SOLUTION INTRAVENOUS CONTINUOUS
Status: DISCONTINUED | OUTPATIENT
Start: 2024-02-08 | End: 2024-02-08 | Stop reason: HOSPADM

## 2024-02-08 RX ORDER — SODIUM CHLORIDE 0.9 % (FLUSH) 0.9 %
10 SYRINGE (ML) INJECTION EVERY 12 HOURS SCHEDULED
Status: DISCONTINUED | OUTPATIENT
Start: 2024-02-08 | End: 2024-02-08 | Stop reason: HOSPADM

## 2024-02-08 RX ORDER — MIDAZOLAM HYDROCHLORIDE 1 MG/ML
INJECTION INTRAMUSCULAR; INTRAVENOUS
Status: DISCONTINUED | OUTPATIENT
Start: 2024-02-08 | End: 2024-02-08 | Stop reason: HOSPADM

## 2024-02-08 RX ORDER — HEPARIN SODIUM 1000 [USP'U]/ML
INJECTION, SOLUTION INTRAVENOUS; SUBCUTANEOUS
Status: DISCONTINUED | OUTPATIENT
Start: 2024-02-08 | End: 2024-02-08 | Stop reason: HOSPADM

## 2024-02-08 RX ORDER — VERAPAMIL HYDROCHLORIDE 2.5 MG/ML
INJECTION, SOLUTION INTRAVENOUS
Status: DISCONTINUED | OUTPATIENT
Start: 2024-02-08 | End: 2024-02-08 | Stop reason: HOSPADM

## 2024-02-08 RX ORDER — NITROGLYCERIN 0.4 MG/1
0.4 TABLET SUBLINGUAL
Status: DISCONTINUED | OUTPATIENT
Start: 2024-02-08 | End: 2024-02-08 | Stop reason: HOSPADM

## 2024-02-08 RX ORDER — ACETAMINOPHEN 325 MG/1
650 TABLET ORAL EVERY 4 HOURS PRN
Status: DISCONTINUED | OUTPATIENT
Start: 2024-02-08 | End: 2024-02-08 | Stop reason: HOSPADM

## 2024-02-08 RX ORDER — FENTANYL CITRATE 50 UG/ML
INJECTION, SOLUTION INTRAMUSCULAR; INTRAVENOUS
Status: DISCONTINUED | OUTPATIENT
Start: 2024-02-08 | End: 2024-02-08 | Stop reason: HOSPADM

## 2024-02-08 RX ORDER — LIDOCAINE HYDROCHLORIDE 20 MG/ML
INJECTION, SOLUTION INFILTRATION; PERINEURAL
Status: DISCONTINUED | OUTPATIENT
Start: 2024-02-08 | End: 2024-02-08 | Stop reason: HOSPADM

## 2024-02-08 RX ORDER — NICARDIPINE HYDROCHLORIDE 2.5 MG/ML
INJECTION INTRAVENOUS
Status: DISCONTINUED | OUTPATIENT
Start: 2024-02-08 | End: 2024-02-08 | Stop reason: HOSPADM

## 2024-02-08 RX ORDER — SODIUM CHLORIDE 9 MG/ML
40 INJECTION, SOLUTION INTRAVENOUS AS NEEDED
Status: DISCONTINUED | OUTPATIENT
Start: 2024-02-08 | End: 2024-02-08 | Stop reason: HOSPADM

## 2024-02-08 RX ADMIN — SODIUM CHLORIDE 219 ML: 9 INJECTION, SOLUTION INTRAVENOUS at 08:15

## 2024-02-08 RX ADMIN — SODIUM CHLORIDE 75 ML/HR: 9 INJECTION, SOLUTION INTRAVENOUS at 09:15

## 2024-02-08 NOTE — DISCHARGE INSTRUCTIONS
[facility]  [address] [place] [address]      Coronary Angiogram (Femoral Approach) After Care     Refer to this sheet in the next few weeks. These instructions provide you with information on caring for yourself after your procedure. Your health care provider may also give you more specific instructions. Your treatment has been planned according to current medical practices, but problems sometimes occur. Call your health care provider if you have any problems or questions after your procedure.      What to Expect After the Procedure:  After your procedure, it is typical to have the following sensations:  Minor discomfort or tenderness and a small bump at the catheter insertion site. The bump should usually decrease in size and tenderness within 1 to 2 weeks.  Any bruising will usually fade within 2 to 4 weeks.    Home Care Instructions:  Do not apply powder or lotion to the site.  Do not take baths, swim, or use a hot tub until your health care provider approves and the site is completely healed.  Do not bend, squat, or lift anything over 20 lb (9 kg) or as directed by your health care provider. However, we recommend lifting nothing heavier than a gallon of milk.    You may shower 24 hours after the procedure. Remove the bandage (dressing) and gently wash the site with plain soap and water. Gently pat the site dry. You may apply a band aid daily for 2 days if desired.    Inspect the site at least twice daily.  Increase your fluid intake for the next 2 days.    Limit your activity for the first 48 hours. .    Avoid strenuous activity for 1 week or as advised by your physician.    Follow instructions about when you can drive or return to work as directed by your physician.    Hold direct pressure over the site when you cough, sneeze, laugh or change positions.  Do this for the next 2 days.    Do not operate machinery or power tools for 24 hours.  A responsible adult should be with you for the  first 24 hours after you arrive home. Do not make any important legal decisions or sign legal papers for 24 hours.  Do not drink alcohol for 24 hours.  Metformin or any medications containing Metformin should not be taken for 48 hours after your procedure.      Call Your Doctor If:  You have drainage (other than a small amount of blood on the dressing).  You have chills or a fever > 101.  You have redness, warmth, swelling(larger than a walnut), or pain at the insertion site  You develop chest pain or shortness of breath, feel faint, or pass out.  You develop pain, discoloration, coldness, numbness, tingling, or severe bruising in the leg that held the catheter.  You develop bleeding from any other place, such as the bowels.  You have heavy bleeding from the site, hold pressure on the site for 20 minutes.  If the bleeding stops, apply a fresh bandage and call your cardiologist.  However, if you continue to have bleeding, call 911.  You have any symptoms of a stroke.  Remember BE FAST  B-balance. Sudden trouble walking or loss of balance.  E-eyes.  Sudden changes in how you see or a sudden onset of a very bad headache.   F-face. Sudden weakness or loss of feeling of the face or facial droop on one side.   A-arms Sudden weakness or numbness in one arm.  One arm drifts down if they are both held out in front of you. This happens suddenly and usually on one side of the body.  S-speech.  Sudden trouble speaking, slurred speech or trouble understanding what people are saying.   T-time  Time to call emergency services.  Write down the symptoms and the time they started.        Make Sure You:  Understand these instructions.  Will watch your condition.  Will get help right away if you are not doing well or get worse.[facility]  [address] [place] [address]    Coronary Angiogram (Radial/Ulnar Approach) After Care    Refer to this sheet in the next few weeks. These instructions provide you with  information on caring for yourself after your procedure. Your caregiver may also give you more specific instructions. Your treatment has been planned according to current medical practices, but problems sometimes occur. Call your caregiver if you have any problems or questions after your procedure.    Home Care Instructions:  You may shower the day after the procedure. Remove the bandage (dressing) and gently wash the site with plain soap and water. Gently pat the site dry. You may apply a band aid daily for 2 days if desired.    Do not apply powder or lotion to the site.  Do not submerge the affected site in water for 3 to 5 days or until the site is completely healed.   Do not lift, push or pull anything over 5 pounds for 5 days after your procedure or as directed by your physician.  As a reference, a gallon of milk weighs 8 pounds.   Inspect the site at least twice daily. You may notice some bruising at the site and it may be tender for 1 to 2 weeks.     Increase your fluid intake for the next 2 days.    Keep arm elevated for 24 hours. For the remainder of the day, keep your arm in “Pledge of Allegiance” position when up and about.     You may drive 24 hours after the procedure unless otherwise instructed by your caregiver.  Do not operate machinery or power tools for 24 hours.  A responsible adult should be with you for the first 24 hours after you arrive home. Do not make any important legal decisions or sign legal papers for 24 hours.  Do not drink alcohol for 24 hours.    Metformin or any medications containing Metformin should not be taken for 48 hours after your procedure.      Call Your Doctor if:   You have unusual pain at the radial/ulnar (wrist) site.  You have redness, warmth, swelling, or pain at the radial/ulnar (wrist) site.  You have drainage (other than a small amount of blood on the dressing).  `You have chills or a fever > 101.  Your arm becomes pale or dark, cool, tingly, or numb.  You develop  chest pain, shortness of breath, feel faint or pass out.    You have heavy bleeding from the site, hold pressure on the site for 20 minutes.  If the bleeding stops, apply a fresh bandage and call your cardiologist.  However, if you        continue to have bleeding, call 911 and continue to apply pressure to the site.   You have any symptoms of a stroke.  Remember BE FAST  B-balance. Sudden trouble walking or loss of balance.  E-eyes.  Sudden changes in how you see or a sudden onset of a very bad headache.   F-face. Sudden weakness or loss of feeling of the face or facial droop on one side.   A-arms Sudden weakness or numbness in one arm.  One arm drifts down if they are both held out in front of you. This happens suddenly and usually on one side of the body.   S-speech.  Sudden trouble speaking, slurred speech or trouble understanding what are saying.   T-time  Time to call emergency services.  Write down the symptoms and the time they started.

## 2024-02-08 NOTE — Clinical Note
The right DP pulse is +1. The right PT pulse is +1. The right radial pulse is +2. The right ulnar pulse is +2. The right femoral pulse is +2.

## 2024-02-08 NOTE — Clinical Note
A 5 fr sheath was  inserted using micropuncture technique with ultrasound guidance into the right femoral artery. Angio RFA

## 2024-02-15 ENCOUNTER — TELEPHONE (OUTPATIENT)
Dept: INTERNAL MEDICINE | Facility: CLINIC | Age: 79
End: 2024-02-15
Payer: MEDICARE

## 2024-02-15 DIAGNOSIS — R05.1 ACUTE COUGH: Primary | ICD-10-CM

## 2024-02-15 RX ORDER — HYDROCODONE BITARTRATE AND HOMATROPINE METHYLBROMIDE ORAL SOLUTION 5; 1.5 MG/5ML; MG/5ML
2.5 LIQUID ORAL EVERY 6 HOURS PRN
Qty: 120 ML | Refills: 0 | Status: SHIPPED | OUTPATIENT
Start: 2024-02-15

## 2024-02-15 NOTE — TELEPHONE ENCOUNTER
Spoke with PT she has a cough and sinus HA, Took covid test was negative she would like to know if you can give her some cough syrup and something for the HA.  She really does not feel like coming in

## 2024-02-15 NOTE — TELEPHONE ENCOUNTER
Caller: Marleny Greenwood    Relationship: Self    Best call back number: [phone]     What is the best time to reach you: ANY    Who are you requesting to speak with (clinical staff, provider,  specific staff member): ABHAY HOLCOMB    What was the call regarding: PATIENT STATES IT'S SOMETHING PERSONAL.    Is it okay if the provider responds through MyChart: NO

## 2024-02-29 ENCOUNTER — HOSPITAL ENCOUNTER (OUTPATIENT)
Facility: HOSPITAL | Age: 79
Setting detail: HOSPITAL OUTPATIENT SURGERY
Discharge: HOME | End: 2024-02-29
Attending: STUDENT IN AN ORGANIZED HEALTH CARE EDUCATION/TRAINING PROGRAM | Admitting: STUDENT IN AN ORGANIZED HEALTH CARE EDUCATION/TRAINING PROGRAM
Payer: MEDICARE

## 2024-02-29 VITALS
WEIGHT: 155 LBS | RESPIRATION RATE: 16 BRPM | BODY MASS INDEX: 29.27 KG/M2 | TEMPERATURE: 97.3 F | HEART RATE: 66 BPM | SYSTOLIC BLOOD PRESSURE: 164 MMHG | DIASTOLIC BLOOD PRESSURE: 84 MMHG | HEIGHT: 61 IN | OXYGEN SATURATION: 95 %

## 2024-02-29 DIAGNOSIS — I20.0 UNSTABLE ANGINA: ICD-10-CM

## 2024-02-29 LAB
ANION GAP SERPL CALCULATED.3IONS-SCNC: 12.3 MMOL/L (ref 5–15)
BUN SERPL-MCNC: 13 MG/DL (ref 8–23)
BUN/CREAT SERPL: 11.6 (ref 7–25)
CALCIUM SPEC-SCNC: 8.7 MG/DL (ref 8.6–10.5)
CHLORIDE SERPL-SCNC: 109 MMOL/L (ref 98–107)
CO2 SERPL-SCNC: 22.7 MMOL/L (ref 22–29)
CREAT SERPL-MCNC: 1.12 MG/DL (ref 0.57–1)
EGFRCR SERPLBLD CKD-EPI 2021: 50.8 ML/MIN/1.73
GLUCOSE SERPL-MCNC: 102 MG/DL (ref 65–99)
POTASSIUM SERPL-SCNC: 3.5 MMOL/L (ref 3.5–5.2)
SODIUM SERPL-SCNC: 144 MMOL/L (ref 136–145)

## 2024-02-29 PROCEDURE — C1769 GUIDE WIRE: HCPCS | Performed by: STUDENT IN AN ORGANIZED HEALTH CARE EDUCATION/TRAINING PROGRAM

## 2024-02-29 PROCEDURE — 25010000002 FENTANYL CITRATE (PF) 50 MCG/ML SOLUTION: Performed by: STUDENT IN AN ORGANIZED HEALTH CARE EDUCATION/TRAINING PROGRAM

## 2024-02-29 PROCEDURE — C1894 INTRO/SHEATH, NON-LASER: HCPCS | Performed by: STUDENT IN AN ORGANIZED HEALTH CARE EDUCATION/TRAINING PROGRAM

## 2024-02-29 PROCEDURE — 85347 COAGULATION TIME ACTIVATED: CPT

## 2024-02-29 PROCEDURE — 25010000002 MIDAZOLAM PER 1 MG: Performed by: STUDENT IN AN ORGANIZED HEALTH CARE EDUCATION/TRAINING PROGRAM

## 2024-02-29 PROCEDURE — C1887 CATHETER, GUIDING: HCPCS | Performed by: STUDENT IN AN ORGANIZED HEALTH CARE EDUCATION/TRAINING PROGRAM

## 2024-02-29 PROCEDURE — 93799 UNLISTED CV SVC/PROCEDURE: CPT | Performed by: STUDENT IN AN ORGANIZED HEALTH CARE EDUCATION/TRAINING PROGRAM

## 2024-02-29 PROCEDURE — 25010000002 HYDRALAZINE PER 20 MG: Performed by: STUDENT IN AN ORGANIZED HEALTH CARE EDUCATION/TRAINING PROGRAM

## 2024-02-29 PROCEDURE — 93455 CORONARY ART/GRFT ANGIO S&I: CPT | Performed by: STUDENT IN AN ORGANIZED HEALTH CARE EDUCATION/TRAINING PROGRAM

## 2024-02-29 PROCEDURE — 25010000002 HEPARIN (PORCINE) PER 1000 UNITS: Performed by: STUDENT IN AN ORGANIZED HEALTH CARE EDUCATION/TRAINING PROGRAM

## 2024-02-29 PROCEDURE — 25010000002 NICARDIPINE 2.5 MG/ML SOLUTION: Performed by: STUDENT IN AN ORGANIZED HEALTH CARE EDUCATION/TRAINING PROGRAM

## 2024-02-29 PROCEDURE — 80048 BASIC METABOLIC PNL TOTAL CA: CPT | Performed by: STUDENT IN AN ORGANIZED HEALTH CARE EDUCATION/TRAINING PROGRAM

## 2024-02-29 PROCEDURE — 93571 IV DOP VEL&/PRESS C FLO 1ST: CPT | Performed by: STUDENT IN AN ORGANIZED HEALTH CARE EDUCATION/TRAINING PROGRAM

## 2024-02-29 PROCEDURE — 25810000003 SODIUM CHLORIDE 0.9 % SOLUTION: Performed by: STUDENT IN AN ORGANIZED HEALTH CARE EDUCATION/TRAINING PROGRAM

## 2024-02-29 PROCEDURE — C1760 CLOSURE DEV, VASC: HCPCS | Performed by: STUDENT IN AN ORGANIZED HEALTH CARE EDUCATION/TRAINING PROGRAM

## 2024-02-29 PROCEDURE — 25510000001 IOPAMIDOL PER 1 ML: Performed by: STUDENT IN AN ORGANIZED HEALTH CARE EDUCATION/TRAINING PROGRAM

## 2024-02-29 RX ORDER — HEPARIN SODIUM 1000 [USP'U]/ML
INJECTION, SOLUTION INTRAVENOUS; SUBCUTANEOUS
Status: DISCONTINUED | OUTPATIENT
Start: 2024-02-29 | End: 2024-02-29 | Stop reason: HOSPADM

## 2024-02-29 RX ORDER — HYDRALAZINE HYDROCHLORIDE 50 MG/1
50 TABLET, FILM COATED ORAL ONCE
Status: COMPLETED | OUTPATIENT
Start: 2024-02-29 | End: 2024-02-29

## 2024-02-29 RX ORDER — SODIUM CHLORIDE 9 MG/ML
40 INJECTION, SOLUTION INTRAVENOUS AS NEEDED
Status: DISCONTINUED | OUTPATIENT
Start: 2024-02-29 | End: 2024-02-29 | Stop reason: HOSPADM

## 2024-02-29 RX ORDER — NICARDIPINE HYDROCHLORIDE 2.5 MG/ML
INJECTION INTRAVENOUS
Status: DISCONTINUED | OUTPATIENT
Start: 2024-02-29 | End: 2024-02-29 | Stop reason: HOSPADM

## 2024-02-29 RX ORDER — MIDAZOLAM HYDROCHLORIDE 1 MG/ML
INJECTION INTRAMUSCULAR; INTRAVENOUS
Status: DISCONTINUED | OUTPATIENT
Start: 2024-02-29 | End: 2024-02-29 | Stop reason: HOSPADM

## 2024-02-29 RX ORDER — ACETAMINOPHEN 325 MG/1
650 TABLET ORAL EVERY 4 HOURS PRN
Status: DISCONTINUED | OUTPATIENT
Start: 2024-02-29 | End: 2024-02-29 | Stop reason: HOSPADM

## 2024-02-29 RX ORDER — FENTANYL CITRATE 50 UG/ML
INJECTION, SOLUTION INTRAMUSCULAR; INTRAVENOUS
Status: DISCONTINUED | OUTPATIENT
Start: 2024-02-29 | End: 2024-02-29 | Stop reason: HOSPADM

## 2024-02-29 RX ORDER — SODIUM CHLORIDE 9 MG/ML
75 INJECTION, SOLUTION INTRAVENOUS CONTINUOUS
Status: DISCONTINUED | OUTPATIENT
Start: 2024-02-29 | End: 2024-02-29 | Stop reason: HOSPADM

## 2024-02-29 RX ORDER — SODIUM CHLORIDE 0.9 % (FLUSH) 0.9 %
10 SYRINGE (ML) INJECTION EVERY 12 HOURS SCHEDULED
Status: DISCONTINUED | OUTPATIENT
Start: 2024-02-29 | End: 2024-02-29 | Stop reason: HOSPADM

## 2024-02-29 RX ORDER — LIDOCAINE HYDROCHLORIDE 20 MG/ML
INJECTION, SOLUTION INFILTRATION; PERINEURAL
Status: DISCONTINUED | OUTPATIENT
Start: 2024-02-29 | End: 2024-02-29 | Stop reason: HOSPADM

## 2024-02-29 RX ORDER — HYDRALAZINE HYDROCHLORIDE 20 MG/ML
10 INJECTION INTRAMUSCULAR; INTRAVENOUS ONCE
Status: COMPLETED | OUTPATIENT
Start: 2024-02-29 | End: 2024-02-29

## 2024-02-29 RX ORDER — SODIUM CHLORIDE 0.9 % (FLUSH) 0.9 %
10 SYRINGE (ML) INJECTION AS NEEDED
Status: DISCONTINUED | OUTPATIENT
Start: 2024-02-29 | End: 2024-02-29 | Stop reason: HOSPADM

## 2024-02-29 RX ADMIN — HYDRALAZINE HYDROCHLORIDE 10 MG: 20 INJECTION, SOLUTION INTRAMUSCULAR; INTRAVENOUS at 10:55

## 2024-02-29 RX ADMIN — SODIUM CHLORIDE 75 ML/HR: 9 INJECTION, SOLUTION INTRAVENOUS at 07:41

## 2024-02-29 RX ADMIN — HYDRALAZINE HYDROCHLORIDE 50 MG: 50 TABLET ORAL at 13:17

## 2024-02-29 NOTE — Clinical Note
The DP pulses are +2 bilaterally. The PT pulses are +1 bilaterally. The right radial pulse is +2. The right ulnar pulse is +2. The femoral pulses are +2 bilaterally.

## 2024-02-29 NOTE — INTERVAL H&P NOTE
H&P updated. The patient was examined and the following changes are noted:  Will take a look at her SVG to LAD with a guiding catheter and if severely stenotic will pursue PCI of native LAD.

## 2024-02-29 NOTE — Clinical Note
A 5 fr sheath was  inserted using micropuncture technique with ultrasound guidance into the right femoral artery. Sheath insertion not delayed. Angio RFA

## 2024-02-29 NOTE — Clinical Note
Hemostasis started on the right ulnar artery. R-Band was used in achieving hemostasis. Radial compression device applied to vessel. Hemostasis achieved successfully. Closure device additional comment: Vasc band with 12cc of air

## 2024-02-29 NOTE — DISCHARGE INSTRUCTIONS
[facility]  [address] [place] [address]    Coronary Angiogram (Radial/Ulnar Approach) After Care    Refer to this sheet in the next few weeks. These instructions provide you with information on caring for yourself after your procedure. Your caregiver may also give you more specific instructions. Your treatment has been planned according to current medical practices, but problems sometimes occur. Call your caregiver if you have any problems or questions after your procedure.    Home Care Instructions:  You may shower the day after the procedure. Remove the bandage (dressing) and gently wash the site with plain soap and water. Gently pat the site dry. You may apply a band aid daily for 2 days if desired.    Do not apply powder or lotion to the site.  Do not submerge the affected site in water for 3 to 5 days or until the site is completely healed.   Do not lift, push or pull anything over 5 pounds for 5 days after your procedure or as directed by your physician.  As a reference, a gallon of milk weighs 8 pounds.   Inspect the site at least twice daily. You may notice some bruising at the site and it may be tender for 1 to 2 weeks.     Increase your fluid intake for the next 2 days.    Keep arm elevated for 24 hours. For the remainder of the day, keep your arm in “Pledge of Allegiance” position when up and about.     You may drive 24 hours after the procedure unless otherwise instructed by your caregiver.  Do not operate machinery or power tools for 24 hours.  A responsible adult should be with you for the first 24 hours after you arrive home. Do not make any important legal decisions or sign legal papers for 24 hours.  Do not drink alcohol for 24 hours.    Metformin or any medications containing Metformin should not be taken for 48 hours after your procedure.      Call Your Doctor if:   You have unusual pain at the radial/ulnar (wrist) site.  You have redness, warmth, swelling, or pain at the  radial/ulnar (wrist) site.  You have drainage (other than a small amount of blood on the dressing).  `You have chills or a fever > 101.  Your arm becomes pale or dark, cool, tingly, or numb.  You develop chest pain, shortness of breath, feel faint or pass out.    You have heavy bleeding from the site, hold pressure on the site for 20 minutes.  If the bleeding stops, apply a fresh bandage and call your cardiologist.  However, if you        continue to have bleeding, call 911 and continue to apply pressure to the site.   You have any symptoms of a stroke.  Remember BE FAST  B-balance. Sudden trouble walking or loss of balance.  E-eyes.  Sudden changes in how you see or a sudden onset of a very bad headache.   F-face. Sudden weakness or loss of feeling of the face or facial droop on one side.   A-arms Sudden weakness or numbness in one arm.  One arm drifts down if they are both held out in front of you. This happens suddenly and usually on one side of the body.   S-speech.  Sudden trouble speaking, slurred speech or trouble understanding what are saying.   T-time  Time to call emergency services.  Write down the symptoms and the time they started.     [facility]  [address] [place] [address]      Coronary Angiogram (Femoral Approach) After Care     Refer to this sheet in the next few weeks. These instructions provide you with information on caring for yourself after your procedure. Your health care provider may also give you more specific instructions. Your treatment has been planned according to current medical practices, but problems sometimes occur. Call your health care provider if you have any problems or questions after your procedure.      What to Expect After the Procedure:  After your procedure, it is typical to have the following sensations:  Minor discomfort or tenderness and a small bump at the catheter insertion site. The bump should usually decrease in size and tenderness within 1  to 2 weeks.  Any bruising will usually fade within 2 to 4 weeks.    Home Care Instructions:  Do not apply powder or lotion to the site.  Do not take baths, swim, or use a hot tub until your health care provider approves and the site is completely healed.  Do not bend, squat, or lift anything over 20 lb (9 kg) or as directed by your health care provider. However, we recommend lifting nothing heavier than a gallon of milk.    You may shower 24 hours after the procedure. Remove the bandage (dressing) and gently wash the site with plain soap and water. Gently pat the site dry. You may apply a band aid daily for 2 days if desired.    Inspect the site at least twice daily.  Increase your fluid intake for the next 2 days.    Limit your activity for the first 48 hours. .    Avoid strenuous activity for 1 week or as advised by your physician.    Follow instructions about when you can drive or return to work as directed by your physician.    Hold direct pressure over the site when you cough, sneeze, laugh or change positions.  Do this for the next 2 days.    Do not operate machinery or power tools for 24 hours.  A responsible adult should be with you for the first 24 hours after you arrive home. Do not make any important legal decisions or sign legal papers for 24 hours.  Do not drink alcohol for 24 hours.  Metformin or any medications containing Metformin should not be taken for 48 hours after your procedure.      Call Your Doctor If:  You have drainage (other than a small amount of blood on the dressing).  You have chills or a fever > 101.  You have redness, warmth, swelling(larger than a walnut), or pain at the insertion site  You develop chest pain or shortness of breath, feel faint, or pass out.  You develop pain, discoloration, coldness, numbness, tingling, or severe bruising in the leg that held the catheter.  You develop bleeding from any other place, such as the bowels.  You have heavy bleeding from the site, hold  pressure on the site for 20 minutes.  If the bleeding stops, apply a fresh bandage and call your cardiologist.  However, if you continue to have bleeding, call 911.  You have any symptoms of a stroke.  Remember BE FAST  B-balance. Sudden trouble walking or loss of balance.  E-eyes.  Sudden changes in how you see or a sudden onset of a very bad headache.   F-face. Sudden weakness or loss of feeling of the face or facial droop on one side.   A-arms Sudden weakness or numbness in one arm.  One arm drifts down if they are both held out in front of you. This happens suddenly and usually on one side of the body.  S-speech.  Sudden trouble speaking, slurred speech or trouble understanding what people are saying.   T-time  Time to call emergency services.  Write down the symptoms and the time they started.        Make Sure You:  Understand these instructions.  Will watch your condition.  Will get help right away if you are not doing well or get worse.

## 2024-03-01 LAB — ACT BLD: 239 SECONDS (ref 82–152)

## 2024-03-04 ENCOUNTER — TELEPHONE (OUTPATIENT)
Dept: CARDIOLOGY | Facility: CLINIC | Age: 79
End: 2024-03-04

## 2024-03-04 NOTE — TELEPHONE ENCOUNTER
03/04/2024    Called and left a voicemail for patient to return call to schedule this appointment.     Thanks   Radha

## 2024-03-04 NOTE — TELEPHONE ENCOUNTER
Caller: Marleny Greenwood    Relationship to patient: Self    Best call back number: [phone]    Chief complaint: NONE    Type of visit: HOSPITAL FOLLOW UP    Requested date: ?

## 2024-03-13 ENCOUNTER — TELEPHONE (OUTPATIENT)
Dept: INTERNAL MEDICINE | Facility: CLINIC | Age: 79
End: 2024-03-13
Payer: MEDICARE

## 2024-03-13 RX ORDER — ALBUTEROL SULFATE 90 UG/1
2 AEROSOL, METERED RESPIRATORY (INHALATION) EVERY 4 HOURS PRN
Qty: 18 G | Refills: 2 | Status: SHIPPED | OUTPATIENT
Start: 2024-03-13

## 2024-03-13 NOTE — TELEPHONE ENCOUNTER
Caller: Marleny Greenwood    Relationship: Self    Best call back number: [phone]    Requested Prescriptions:   Requested Prescriptions     Pending Prescriptions Disp Refills    albuterol sulfate  (90 Base) MCG/ACT inhaler 18 g 2     Sig: Inhale 2 puffs Every 4 (Four) Hours As Needed for Wheezing.        Pharmacy where request should be sent: [id number] [address] [facility] [phone] [facility] [phone] FX     Last office visit with prescribing clinician: 9/27/2023   Last telemedicine visit with prescribing clinician: Visit date not found   Next office visit with prescribing clinician: 4/1/2024     Additional details provided by patient: COMPLETELY OUT    Does the patient have less than a 3 day supply:  [x] Yes  [] No    Would you like a call back once the refill request has been completed: [] Yes [x] No    If the office needs to give you a call back, can they leave a voicemail: [] Yes [x] No    Mike Weir Rep   03/13/24 14:59 EDT

## 2024-03-15 NOTE — TELEPHONE ENCOUNTER
Caller: Marleny Greenwood    Relationship: Self    Best call back number: [phone]    What is the best time to reach you: ANYTIME    Who are you requesting to speak with (clinical staff, provider,  specific staff member): ANYONE    What was the call regarding: PT IS RETURNING A CALL BACK TO SCHEDULE AN APPT

## 2024-03-19 ENCOUNTER — OFFICE VISIT (OUTPATIENT)
Age: 79
End: 2024-03-19
Payer: MEDICARE

## 2024-03-19 VITALS
WEIGHT: 159 LBS | BODY MASS INDEX: 30.02 KG/M2 | DIASTOLIC BLOOD PRESSURE: 70 MMHG | SYSTOLIC BLOOD PRESSURE: 112 MMHG | HEART RATE: 72 BPM | OXYGEN SATURATION: 95 % | HEIGHT: 61 IN

## 2024-03-19 DIAGNOSIS — Z95.5 H/O HEART ARTERY STENT: Chronic | ICD-10-CM

## 2024-03-19 DIAGNOSIS — Z95.1 S/P CABG (CORONARY ARTERY BYPASS GRAFT): Chronic | ICD-10-CM

## 2024-03-19 DIAGNOSIS — I25.10 CORONARY ARTERIOSCLEROSIS IN NATIVE ARTERY: Primary | ICD-10-CM

## 2024-03-19 NOTE — PROGRESS NOTES
Subjective:     Encounter Date:03/19/2024        Patient ID: Marleny Greenwood is a 78 y.o. female.    Chief Complaint:  Follow up of CAD    HPI:   78 y.o. female with CAD status post CABG and PCI with 2.5 x 15mm Xience Skypoint drug-eluting stent to ostial D2, hypertension, hyperlipidemia who presents for follow up. Patient underwent left heart catheterization February 6 and at that time I was concerned for possible anastomotic SVG to LAD stenosis.  Decision was made to bring patient back for possible PCI and on February 12 I performed RFR of the SVG into the LAD and this was negative so PCI was deferred.  She presents for follow-up.  Her symptoms of dyspnea on exertion have not improved.  She is scheduled to follow-up with her pulmonologist in April.    The following portions of the patient's history were reviewed and updated as appropriate: allergies, current medications, past family history, past medical history, past social history, past surgical history and problem list.     REVIEW OF SYSTEMS:   All systems reviewed.  Pertinent positives identified in HPI.  All other systems are negative.    Past Medical History:   Diagnosis Date    Anemia     Asthma     Chronic kidney disease     Coronary artery disease     Cough     Dyspnea     Esophageal reflux     H/O echocardiogram 06/11/2019 6/05/18 - Normal LV size and function.  EF 60-65%.  Mild concentric LVH.  Midl AR, MR, and TR.    H/O heart artery stent 06/11/2019    3/22/12 - VISION stent to the SVG of the RCA reducing a total occlusion to 0% residual narrowing.    Headache     History of cardiac cath 06/11/2019    History of loop recorder 06/11/2019    5/20/15 - [employer] LINQ implant.    History of nuclear stress test 06/11/2019    10/09/17 - Abnormal study.  EF 46%.    History of nuclear stress test 06/11/2019    Hyperlipidemia LDL goal <100 06/11/2019    Hypertension     Hypokalemia     Migraine     Moderate osteopenia     Nonsustained ventricular  tachycardia 06/11/2019    NSVT (nonsustained ventricular tachycardia)     Pneumonia     Postmenopausal atrophic vaginitis     Pre-syncope     Respiratory insufficiency 06/11/2019    S/P CABG (coronary artery bypass graft) 06/11/2019 4/12/11 - LIMA to the LAD and SVG to the posterior descending artery.    Seasonal allergies     Shoulder dislocation     Status post placement of implantable loop recorder 05/20/2015      Successful loop/LINQ implantation by Dr. Geo Wilson.    Vitamin D deficiency        Family History   Problem Relation Age of Onset    Heart disease Other     Breast cancer Other     Breast cancer Mother     Hypertension Mother        Social History     Socioeconomic History    Marital status:    Tobacco Use    Smoking status: Never    Smokeless tobacco: Never   Vaping Use    Vaping status: Never Used   Substance and Sexual Activity    Alcohol use: No    Drug use: No    Sexual activity: Defer       Allergies   Allergen Reactions    Tylenol [Acetaminophen] GI Intolerance    Adhesive Tape Itching and Rash    Cardizem [Diltiazem] Unknown (See Comments)     Pt is unknown why she can not take this medication     Levaquin [Levofloxacin] Cough       Past Surgical History:   Procedure Laterality Date    APPENDECTOMY      BRONCHOSCOPY N/A 8/7/2019    Procedure: BRONCHOSCOPY WITH BRONCHOALVEOLAR LAVAGE;  Surgeon: Thomas Sheridan MD;  Location: [facility] ENDOSCOPY;  Service: Pulmonary    CARDIAC CATHETERIZATION  06/02/2014    History of Cardiac Cath Procedure Outcome: Successful    CARDIAC CATHETERIZATION N/A 11/19/2019    Procedure: Right and Left Heart Cath lv pressures;  Surgeon: Mal Moser MD;  Location: [facility] CATH INVASIVE LOCATION;  Service: Cardiology    CARDIAC CATHETERIZATION N/A 11/19/2019    Procedure: Saphenous Vein Graft;  Surgeon: Mal Moser MD;  Location: [facility] CATH INVASIVE LOCATION;  Service: Cardiology    CARDIAC CATHETERIZATION N/A 11/19/2019    Procedure: Coronary  angiography;  Surgeon: Mal Moser MD;  Location:  [facility] CATH INVASIVE LOCATION;  Service: Cardiology    CARDIAC CATHETERIZATION Left 7/1/2022    Procedure: Coronary Angiography;  Surgeon: Marco A Muir MD;  Location:  [facility] CATH INVASIVE LOCATION;  Service: Cardiology;  Laterality: Left;    CARDIAC CATHETERIZATION N/A 7/1/2022    Procedure: Left Heart Cath;  Surgeon: Marco A Muir MD;  Location:  [facility] CATH INVASIVE LOCATION;  Service: Cardiology;  Laterality: N/A;    CARDIAC CATHETERIZATION N/A 7/3/2023    Procedure: Left Heart Cath;  Surgeon: Marcos Hoff MD;  Location:  [facility] CATH INVASIVE LOCATION;  Service: Cardiology;  Laterality: N/A;    CARDIAC CATHETERIZATION N/A 7/3/2023    Procedure: Coronary angiography;  Surgeon: Marcos Hoff MD;  Location:  [facility] CATH INVASIVE LOCATION;  Service: Cardiology;  Laterality: N/A;    CARDIAC CATHETERIZATION  7/3/2023    Procedure: Saphenous Vein Graft;  Surgeon: Marcos Hoff MD;  Location: [facility] CATH INVASIVE LOCATION;  Service: Cardiology;;    CARDIAC CATHETERIZATION N/A 7/3/2023    Procedure: Percutaneous Coronary Intervention;  Surgeon: Marcos Hoff MD;  Location:  [facility] CATH INVASIVE LOCATION;  Service: Cardiology;  Laterality: N/A;    CARDIAC CATHETERIZATION N/A 7/3/2023    Procedure: Stent FER coronary;  Surgeon: Marcos Hoff MD;  Location:  [facility] CATH INVASIVE LOCATION;  Service: Cardiology;  Laterality: N/A;    CARDIAC CATHETERIZATION N/A 2/8/2024    Procedure: Left Heart Cath;  Surgeon: Marcos Hoff MD;  Location: [facility] CATH INVASIVE LOCATION;  Service: Cardiology;  Laterality: N/A;    CARDIAC CATHETERIZATION N/A 2/8/2024    Procedure: Coronary angiography;  Surgeon: Marcos Hoff MD;  Location:  [facility] CATH INVASIVE LOCATION;  Service: Cardiology;  Laterality: N/A;    CARDIAC CATHETERIZATION  2/8/2024    Procedure: Saphenous Vein Graft;  Surgeon: Marcos oHff MD;  Location:  [facility] CATH INVASIVE LOCATION;  Service: Cardiology;;    CARDIAC  CATHETERIZATION N/A 2024    Procedure: Coronary angiography;  Surgeon: Marcos Hoff MD;  Location:  [facility] CATH INVASIVE LOCATION;  Service: Cardiovascular;  Laterality: N/A;    CARDIAC CATHETERIZATION N/A 2024    Procedure: Resting Full Cycle Ratio;  Surgeon: Marcos Hoff MD;  Location:  [facility] CATH INVASIVE LOCATION;  Service: Cardiovascular;  Laterality: N/A;    CARDIAC CATHETERIZATION  2024    Procedure: Saphenous Vein Graft;  Surgeon: Marcos Hoff MD;  Location:  [facility] CATH INVASIVE LOCATION;  Service: Cardiovascular;;    CATARACT EXTRACTION       SECTION      CHOLECYSTECTOMY      COLONOSCOPY  2009    q-10    CORONARY ANGIOPLASTY WITH STENT PLACEMENT  2012     [facility] by Dr. Roman.    CORONARY ARTERY BYPASS GRAFT Bilateral 2011    with a left internal mammary graft to the LAD and saphenous vein graft to the posterior descending artery    ENDOSCOPY       Diagnostic Esophagogastroduodenoscopy    HYSTERECTOMY      NERVE BLOCK       Nerve Block Transforaminal Epidural Lumbar    OTHER SURGICAL HISTORY  2015    Patient-Activated Cardiac Event Recorder - From 2015 to 2015       Procedures       Objective:         Vitals:    24 1005   BP: 112/70   Pulse: 72   SpO2: 95%       PHYSICAL EXAM:  GEN: well appearing, in NAD   HEENT: NCAT, EOMI, moist mucus membranes   Respiratory: CTAB, no wheezes, rales or rhonchi  CV: normal rate, regular rhythm, normal S1, S2, no murmurs, rubs, gallops, right ulnar access site cdi  GI: soft, nontender, nondistended  MSK: no edema  Skin: no rash, warm, dry  Heme/Lymph: no bruising or bleeding  Psych: organized thought, normal behavior and affect  Neuro: Alert and Oriented x 3, grossly normal motor function        Assessment:         (I25.10) Coronary arteriosclerosis in native artery    (Z95.5) H/O heart artery stent    (Z95.1) S/P CABG (coronary artery bypass graft)    78 y.o. female with CAD status post  CABG, recent PCI to ostial D2, hypertension, hyperlipidemia who presents for follow up.       Plan:       #CAD status post CABG  [facility] July 2022 with patent SVG to LAD,  of RCA and occluded SVG to RCA.  S/p PCI with 2.5 x 15mm Xience Skypoint drug-eluting stent to ostial D2. Recent RFR to SVG to LAD Feb 2024 was negative.  - continue carvedilol 25 mg twice daily, Ranexa 1000 mg twice daily, Imdur 60 mg daily  - continue amlodipine 5 mg daily  - Continue aspirin 81 mg daily, Plavix 75 mg daily      Dr. Zaragoza, thank you very much for referring this kind patient to me. Please call me with any questions or concerns. I will see the patient again in the office in 6 months or earlier as needed.         Marcos Hoff MD  03/19/24  [facility]    Outpatient Encounter Medications as of 3/19/2024   Medication Sig Dispense Refill    albuterol sulfate  (90 Base) MCG/ACT inhaler Inhale 2 puffs Every 4 (Four) Hours As Needed for Wheezing. 18 g 2    amLODIPine (NORVASC) 5 MG tablet Take 1 tablet by mouth Daily. 90 tablet 3    aspirin 81 MG EC tablet Take 1 tablet by mouth Daily.      butalbital-aspirin-caffeine-codeine (Fiorinal/Codeine #3) -06-30 MG capsule Take 1 capsule by mouth Every 4 (Four) Hours As Needed for Headache. 120 capsule 1    carvedilol (COREG) 25 MG tablet Take 1 tablet by mouth 2 (Two) Times a Day. 180 tablet 3    clopidogrel (PLAVIX) 75 MG tablet Take 1 tablet by mouth Daily. 90 tablet 2    cyclobenzaprine (FLEXERIL) 10 MG tablet Take 1 tablet by mouth 3 (Three) Times a Day As Needed for Muscle Spasms. 21 tablet 0    furosemide (LASIX) 40 MG tablet TAKE 1 TABLET TWICE A  tablet 3    hydrALAZINE (APRESOLINE) 50 MG tablet Take 1 tablet by mouth 3 (Three) Times a Day. 270 tablet 71    HYDROcodone Bit-Homatrop MBr (HYCODAN) 5-1.5 MG/5ML solution Take 2.5 mL by mouth Every 6 (Six) Hours As Needed for Cough. 120 mL 0    isosorbide mononitrate (IMDUR) 60 MG 24 hr tablet TAKE 1 TABLET  DAILY 90 tablet 3    Multiple Vitamins-Minerals (PRESERVISION AREDS 2 PO) Take  by mouth.      nitroglycerin (NITROSTAT) 0.4 MG SL tablet Place 1 tablet under the tongue Every 5 (Five) Minutes As Needed for Chest Pain. Take no more than 3 doses in 15 minutes. 25 tablet 3    omeprazole (priLOSEC) 20 MG capsule Take 1 capsule by mouth Daily. 90 capsule 3    polyethylene glycol (MIRALAX) packet Take 17 g by mouth Daily.      potassium chloride ER (K-TAB) 20 MEQ tablet controlled-release ER tablet Take 1 tablet by mouth 3 (Three) Times a Day. 270 tablet 1    promethazine (PHENERGAN) 25 MG tablet TAKE 1 TABLET EVERY 6 HOURS AS NEEDED FOR NAUSEA OR VOMITING 90 tablet 14    ranolazine (RANEXA) 1000 MG 12 hr tablet Take 1 tablet by mouth 2 (Two) Times a Day. 60 tablet 11    rosuvastatin (CRESTOR) 40 MG tablet TAKE 1 TABLET DAILY 90 tablet 3    spironolactone (ALDACTONE) 25 MG tablet Take 1 tablet by mouth Daily. 90 tablet 1    vitamin D (ERGOCALCIFEROL) 1.25 MG (93351 UT) capsule capsule TAKE 1 CAPSULE ONCE WEEKLY 12 capsule 3     No facility-administered encounter medications on file as of 3/19/2024.

## 2024-04-01 ENCOUNTER — OFFICE VISIT (OUTPATIENT)
Dept: INTERNAL MEDICINE | Facility: CLINIC | Age: 79
End: 2024-04-01
Payer: MEDICARE

## 2024-04-01 VITALS
WEIGHT: 155 LBS | BODY MASS INDEX: 29.27 KG/M2 | HEART RATE: 78 BPM | HEIGHT: 61 IN | DIASTOLIC BLOOD PRESSURE: 74 MMHG | SYSTOLIC BLOOD PRESSURE: 122 MMHG

## 2024-04-01 DIAGNOSIS — I25.10 CORONARY ARTERIOSCLEROSIS IN NATIVE ARTERY: ICD-10-CM

## 2024-04-01 DIAGNOSIS — E78.49 OTHER HYPERLIPIDEMIA: Chronic | ICD-10-CM

## 2024-04-01 DIAGNOSIS — M85.88 OTHER SPECIFIED DISORDERS OF BONE DENSITY AND STRUCTURE, OTHER SITE: Primary | Chronic | ICD-10-CM

## 2024-04-01 DIAGNOSIS — I10 ESSENTIAL HYPERTENSION: Chronic | ICD-10-CM

## 2024-04-01 DIAGNOSIS — E55.9 VITAMIN D DEFICIENCY: Chronic | ICD-10-CM

## 2024-04-01 DIAGNOSIS — D50.9 IRON DEFICIENCY ANEMIA, UNSPECIFIED IRON DEFICIENCY ANEMIA TYPE: Chronic | ICD-10-CM

## 2024-04-01 DIAGNOSIS — R51.9 CHRONIC DAILY HEADACHE: ICD-10-CM

## 2024-04-01 PROBLEM — Z95.5 H/O HEART ARTERY STENT: Chronic | Status: RESOLVED | Noted: 2019-06-11 | Resolved: 2024-04-01

## 2024-04-01 PROBLEM — Z92.89 HISTORY OF NUCLEAR STRESS TEST: Status: RESOLVED | Noted: 2019-06-11 | Resolved: 2024-04-01

## 2024-04-01 PROBLEM — Z92.89 H/O ECHOCARDIOGRAM: Status: RESOLVED | Noted: 2019-06-11 | Resolved: 2024-04-01

## 2024-04-01 PROCEDURE — 3078F DIAST BP <80 MM HG: CPT | Performed by: INTERNAL MEDICINE

## 2024-04-01 PROCEDURE — 1160F RVW MEDS BY RX/DR IN RCRD: CPT | Performed by: INTERNAL MEDICINE

## 2024-04-01 PROCEDURE — 1159F MED LIST DOCD IN RCRD: CPT | Performed by: INTERNAL MEDICINE

## 2024-04-01 PROCEDURE — 99214 OFFICE O/P EST MOD 30 MIN: CPT | Performed by: INTERNAL MEDICINE

## 2024-04-01 PROCEDURE — 3074F SYST BP LT 130 MM HG: CPT | Performed by: INTERNAL MEDICINE

## 2024-04-01 NOTE — PROGRESS NOTES
"Chief Complaint  Hypertension    Subjective        Marleny Greenwood presents to [facility]  History of Present Illness had a stent placed in Feb for increasing SOB- there is one more stenosis that has not been fixed- only 60% occluded- but Dr. Hoff not sure it's causative. Wants her to have pulmonary assessment again to be sure. She gets very fatigued with activity- has to do things slowly or piecemeal. She feels good at rest, but even going to bathroom causes issues.  She has no chest pain. There is no cough. She feels like the issue is her heart and not her lungs.   She doesn't like to take so many medications.   Off asthma meds with Dr. Sheridan.     Objective   Vital Signs:  Ht 154.9 cm (61\")   Wt 70.3 kg (155 lb)   BMI 29.29 kg/m²   Estimated body mass index is 29.29 kg/m² as calculated from the following:    Height as of this encounter: 154.9 cm (61\").    Weight as of this encounter: 70.3 kg (155 lb).               Physical Exam  Constitutional:       Appearance: Normal appearance.   Cardiovascular:      Rate and Rhythm: Normal rate and regular rhythm.   Pulmonary:      Effort: Pulmonary effort is normal.      Breath sounds: Normal breath sounds.   Musculoskeletal:      Right lower leg: No edema.      Left lower leg: No edema.      Result Review :                     Assessment and Plan     There are no diagnoses linked to this encounter.         Follow Up     No follow-ups on file.  Patient was given instructions and counseling regarding her condition or for health maintenance advice. Please see specific information pulled into the AVS if appropriate.         "

## 2024-04-02 LAB
25(OH)D3+25(OH)D2 SERPL-MCNC: 33.4 NG/ML (ref 30–100)
ALBUMIN SERPL-MCNC: 4.1 G/DL (ref 3.5–5.2)
ALBUMIN/GLOB SERPL: 1.4 G/DL
ALP SERPL-CCNC: 64 U/L (ref 39–117)
ALT SERPL-CCNC: 10 U/L (ref 1–33)
AST SERPL-CCNC: 15 U/L (ref 1–32)
BASOPHILS # BLD AUTO: 0.05 10*3/MM3 (ref 0–0.2)
BASOPHILS NFR BLD AUTO: 1 % (ref 0–1.5)
BILIRUB SERPL-MCNC: 0.3 MG/DL (ref 0–1.2)
BUN SERPL-MCNC: 14 MG/DL (ref 8–23)
BUN/CREAT SERPL: 10.8 (ref 7–25)
CALCIUM SERPL-MCNC: 9 MG/DL (ref 8.6–10.5)
CHLORIDE SERPL-SCNC: 106 MMOL/L (ref 98–107)
CHOLEST SERPL-MCNC: 140 MG/DL (ref 0–200)
CHOLEST/HDLC SERPL: 2.12 {RATIO}
CO2 SERPL-SCNC: 25.3 MMOL/L (ref 22–29)
CREAT SERPL-MCNC: 1.3 MG/DL (ref 0.57–1)
EGFRCR SERPLBLD CKD-EPI 2021: 42.4 ML/MIN/1.73
EOSINOPHIL # BLD AUTO: 0.19 10*3/MM3 (ref 0–0.4)
EOSINOPHIL NFR BLD AUTO: 3.9 % (ref 0.3–6.2)
ERYTHROCYTE [DISTWIDTH] IN BLOOD BY AUTOMATED COUNT: 14.9 % (ref 12.3–15.4)
FERRITIN SERPL-MCNC: 14.2 NG/ML (ref 13–150)
FOLATE SERPL-MCNC: 5.92 NG/ML (ref 4.78–24.2)
GLOBULIN SER CALC-MCNC: 2.9 GM/DL
GLUCOSE SERPL-MCNC: 96 MG/DL (ref 65–99)
HCT VFR BLD AUTO: 35.6 % (ref 34–46.6)
HDLC SERPL-MCNC: 66 MG/DL (ref 40–60)
HGB BLD-MCNC: 11.1 G/DL (ref 12–15.9)
IMM GRANULOCYTES # BLD AUTO: 0.01 10*3/MM3 (ref 0–0.05)
IMM GRANULOCYTES NFR BLD AUTO: 0.2 % (ref 0–0.5)
IRON SATN MFR SERPL: 12 % (ref 20–50)
IRON SERPL-MCNC: 53 MCG/DL (ref 37–145)
LDLC SERPL CALC-MCNC: 58 MG/DL (ref 0–100)
LYMPHOCYTES # BLD AUTO: 1.32 10*3/MM3 (ref 0.7–3.1)
LYMPHOCYTES NFR BLD AUTO: 27.2 % (ref 19.6–45.3)
MCH RBC QN AUTO: 26.2 PG (ref 26.6–33)
MCHC RBC AUTO-ENTMCNC: 31.2 G/DL (ref 31.5–35.7)
MCV RBC AUTO: 84 FL (ref 79–97)
MONOCYTES # BLD AUTO: 0.63 10*3/MM3 (ref 0.1–0.9)
MONOCYTES NFR BLD AUTO: 13 % (ref 5–12)
NEUTROPHILS # BLD AUTO: 2.65 10*3/MM3 (ref 1.7–7)
NEUTROPHILS NFR BLD AUTO: 54.7 % (ref 42.7–76)
NRBC BLD AUTO-RTO: 0 /100 WBC (ref 0–0.2)
PLATELET # BLD AUTO: 245 10*3/MM3 (ref 140–450)
POTASSIUM SERPL-SCNC: 3.8 MMOL/L (ref 3.5–5.2)
PROT SERPL-MCNC: 7 G/DL (ref 6–8.5)
RBC # BLD AUTO: 4.24 10*6/MM3 (ref 3.77–5.28)
SODIUM SERPL-SCNC: 143 MMOL/L (ref 136–145)
TIBC SERPL-MCNC: 446 MCG/DL
TRIGL SERPL-MCNC: 81 MG/DL (ref 0–150)
UIBC SERPL-MCNC: 393 MCG/DL (ref 112–346)
VIT B12 SERPL-MCNC: 387 PG/ML (ref 211–946)
VLDLC SERPL CALC-MCNC: 16 MG/DL (ref 5–40)
WBC # BLD AUTO: 4.85 10*3/MM3 (ref 3.4–10.8)
WRITTEN AUTHORIZATION: NORMAL

## 2024-04-03 ENCOUNTER — TELEPHONE (OUTPATIENT)
Dept: INTERNAL MEDICINE | Facility: CLINIC | Age: 79
End: 2024-04-03
Payer: MEDICARE

## 2024-04-03 NOTE — TELEPHONE ENCOUNTER
Caller: Marleny Greenwood    Relationship: Self    Best call back number: [phone]    What is the best time to reach you: ANYTIME     Who are you requesting to speak with (clinical staff, provider,  specific staff member): ABHAY     What was the call regarding: PATIENT STATES SHE IS RETURNING A MISSED CALL FROM ABHAY.    PATIENT IS REQUESTING A CALL BACK FROM ABHAY.

## 2024-05-15 ENCOUNTER — TELEPHONE (OUTPATIENT)
Dept: INTERNAL MEDICINE | Facility: CLINIC | Age: 79
End: 2024-05-15
Payer: MEDICARE

## 2024-05-15 DIAGNOSIS — R51.9 CHRONIC DAILY HEADACHE: ICD-10-CM

## 2024-05-15 RX ORDER — CODEINE/BUTALBITAL/ASA/CAFFEIN 30-50-325
1 CAPSULE ORAL EVERY 4 HOURS PRN
Qty: 120 CAPSULE | Refills: 1 | Status: SHIPPED | OUTPATIENT
Start: 2024-05-15

## 2024-05-15 RX ORDER — ISOSORBIDE MONONITRATE 60 MG/1
60 TABLET, EXTENDED RELEASE ORAL DAILY
Qty: 90 TABLET | Refills: 3 | Status: SHIPPED | OUTPATIENT
Start: 2024-05-15

## 2024-05-15 NOTE — TELEPHONE ENCOUNTER
Caller: Marleny Greenwood    Relationship: Self    Best call back number: [phone]       What was the call regarding: PATIENT WOULD LIKE TO SPEAK TO ABHAY HUNT. SHE WOULD NOT TELL THE HUB WHAT IT WAS REGARDING.

## 2024-05-23 ENCOUNTER — HOSPITAL ENCOUNTER (OUTPATIENT)
Dept: BONE DENSITY | Facility: HOSPITAL | Age: 79
Discharge: HOME | End: 2024-05-23
Admitting: INTERNAL MEDICINE
Payer: MEDICARE

## 2024-05-23 PROCEDURE — 77080 DXA BONE DENSITY AXIAL: CPT

## 2024-05-30 ENCOUNTER — TELEPHONE (OUTPATIENT)
Dept: INTERNAL MEDICINE | Facility: CLINIC | Age: 79
End: 2024-05-30
Payer: MEDICARE

## 2024-06-10 ENCOUNTER — OFFICE VISIT (OUTPATIENT)
Dept: INTERNAL MEDICINE | Facility: CLINIC | Age: 79
End: 2024-06-10
Payer: MEDICARE

## 2024-06-10 VITALS
HEART RATE: 68 BPM | DIASTOLIC BLOOD PRESSURE: 90 MMHG | SYSTOLIC BLOOD PRESSURE: 138 MMHG | BODY MASS INDEX: 29.27 KG/M2 | WEIGHT: 155 LBS | TEMPERATURE: 98.1 F | RESPIRATION RATE: 16 BRPM | HEIGHT: 61 IN

## 2024-06-10 DIAGNOSIS — D64.9 ANEMIA, UNSPECIFIED TYPE: ICD-10-CM

## 2024-06-10 DIAGNOSIS — R53.83 FATIGUE, UNSPECIFIED TYPE: Primary | ICD-10-CM

## 2024-06-10 PROCEDURE — 3075F SYST BP GE 130 - 139MM HG: CPT | Performed by: NURSE PRACTITIONER

## 2024-06-10 PROCEDURE — 1126F AMNT PAIN NOTED NONE PRSNT: CPT | Performed by: NURSE PRACTITIONER

## 2024-06-10 PROCEDURE — 3080F DIAST BP >= 90 MM HG: CPT | Performed by: NURSE PRACTITIONER

## 2024-06-10 PROCEDURE — 1160F RVW MEDS BY RX/DR IN RCRD: CPT | Performed by: NURSE PRACTITIONER

## 2024-06-10 PROCEDURE — 1159F MED LIST DOCD IN RCRD: CPT | Performed by: NURSE PRACTITIONER

## 2024-06-10 PROCEDURE — 99214 OFFICE O/P EST MOD 30 MIN: CPT | Performed by: NURSE PRACTITIONER

## 2024-06-10 NOTE — PROGRESS NOTES
Subjective   Chief Complaint   Patient presents with    Fatigue     Onset 3 weeks        History of Present Illness   78 y.o. female presents with cc fatigue ongoing times for months but worsening over the last 3 weeks; she is followed by PCP Dr. Zaragoza.     She is tired throughout the day. This is ongoing since February.She gets up and moves around from one room to the next but is very tired and needs to rest. Sleeps well at night. Denies chest pain. Notes dyspnea which is unchanged. Denies fever or chills; no recent illness. Denies wheezing. No lower extremity edema. She had a stent placed with Dr. Hoff in February. Follow up in April with Dr. Sheridan for dyspnea; he did not feel it was related to her lungs. She denies melena or hematochezia. No new medications or OTC supplements. No falls.     Patient Active Problem List   Diagnosis    Asthma, cough variant    Coronary arteriosclerosis in native artery    Essential hypertension    Chronic daily headache    Nodule of upper lobe of right lung    Other hyperlipidemia    Nonsustained ventricular tachycardia    History of cardiac cath    History of loop recorder    S/P CABG (coronary artery bypass graft)    Osteopenia of multiple sites    Mild aortic insufficiency    Grade I diastolic dysfunction    Renal insufficiency, mild    Unstable angina       Allergies   Allergen Reactions    Tylenol [Acetaminophen] GI Intolerance    Adhesive Tape Itching and Rash    Cardizem [Diltiazem] Unknown (See Comments)     Pt is unknown why she can not take this medication     Levaquin [Levofloxacin] Cough       Current Outpatient Medications on File Prior to Visit   Medication Sig Dispense Refill    albuterol sulfate  (90 Base) MCG/ACT inhaler Inhale 2 puffs Every 4 (Four) Hours As Needed for Wheezing. 18 g 2    amLODIPine (NORVASC) 5 MG tablet Take 1 tablet by mouth Daily. 90 tablet 3    aspirin 81 MG EC tablet Take 1 tablet by mouth Daily.       butalbital-aspirin-caffeine-codeine (Fiorinal/Codeine #3) -84-30 MG capsule Take 1 capsule by mouth Every 4 (Four) Hours As Needed for Headache. 120 capsule 1    carvedilol (COREG) 25 MG tablet Take 1 tablet by mouth 2 (Two) Times a Day. 180 tablet 3    clopidogrel (PLAVIX) 75 MG tablet Take 1 tablet by mouth Daily. 90 tablet 2    furosemide (LASIX) 40 MG tablet TAKE 1 TABLET TWICE A  tablet 3    hydrALAZINE (APRESOLINE) 50 MG tablet Take 1 tablet by mouth 3 (Three) Times a Day. 270 tablet 71    isosorbide mononitrate (IMDUR) 60 MG 24 hr tablet Take 1 tablet by mouth Daily. 90 tablet 3    Multiple Vitamins-Minerals (PRESERVISION AREDS 2 PO) Take  by mouth.      nitroglycerin (NITROSTAT) 0.4 MG SL tablet Place 1 tablet under the tongue Every 5 (Five) Minutes As Needed for Chest Pain. Take no more than 3 doses in 15 minutes. 25 tablet 3    omeprazole (priLOSEC) 20 MG capsule Take 1 capsule by mouth Daily. 90 capsule 3    polyethylene glycol (MIRALAX) packet Take 17 g by mouth Daily.      potassium chloride ER (K-TAB) 20 MEQ tablet controlled-release ER tablet Take 1 tablet by mouth 3 (Three) Times a Day. 270 tablet 1    promethazine (PHENERGAN) 25 MG tablet TAKE 1 TABLET EVERY 6 HOURS AS NEEDED FOR NAUSEA OR VOMITING 90 tablet 14    ranolazine (RANEXA) 1000 MG 12 hr tablet Take 1 tablet by mouth 2 (Two) Times a Day. 60 tablet 11    rosuvastatin (CRESTOR) 40 MG tablet TAKE 1 TABLET DAILY 90 tablet 3    spironolactone (ALDACTONE) 25 MG tablet Take 1 tablet by mouth Daily. 90 tablet 1    vitamin D (ERGOCALCIFEROL) 1.25 MG (99097 UT) capsule capsule TAKE 1 CAPSULE ONCE WEEKLY 12 capsule 3    [DISCONTINUED] HYDROcodone Bit-Homatrop MBr (HYCODAN) 5-1.5 MG/5ML solution Take 2.5 mL by mouth Every 6 (Six) Hours As Needed for Cough. 120 mL 0     No current facility-administered medications on file prior to visit.       Past Medical History:   Diagnosis Date    Anemia     Asthma     Chronic kidney disease     Cough      Esophageal reflux     H/O echocardiogram 06/11/2019 6/05/18 - Normal LV size and function.  EF 60-65%.  Mild concentric LVH.  Midl AR, MR, and TR.    H/O heart artery stent 06/11/2019    3/22/12 - VISION stent to the SVG of the RCA reducing a total occlusion to 0% residual narrowing.    History of cardiac cath 06/11/2019    History of loop recorder 06/11/2019    5/20/15 - [employer] LINQ implant.    History of nuclear stress test 06/11/2019    10/09/17 - Abnormal study.  EF 46%.    Hyperlipidemia LDL goal <100 06/11/2019    Hypokalemia     Migraine     Nonsustained ventricular tachycardia 06/11/2019    Pneumonia     Postmenopausal atrophic vaginitis     Pre-syncope     Respiratory insufficiency 06/11/2019    S/P CABG (coronary artery bypass graft) 06/11/2019 4/12/11 - LIMA to the LAD and SVG to the posterior descending artery.    Seasonal allergies     Shoulder dislocation     Status post placement of implantable loop recorder 05/20/2015      Successful loop/LINQ implantation by Dr. Geo Wilson.    Vitamin D deficiency        Family History   Problem Relation Age of Onset    Heart disease Other     Breast cancer Other     Breast cancer Mother     Hypertension Mother        Social History     Socioeconomic History    Marital status:    Tobacco Use    Smoking status: Never    Smokeless tobacco: Never   Vaping Use    Vaping status: Never Used   Substance and Sexual Activity    Alcohol use: No    Drug use: No    Sexual activity: Defer       Past Surgical History:   Procedure Laterality Date    APPENDECTOMY      BRONCHOSCOPY N/A 8/7/2019    Procedure: BRONCHOSCOPY WITH BRONCHOALVEOLAR LAVAGE;  Surgeon: Thomas Sheridan MD;  Location: [facility] ENDOSCOPY;  Service: Pulmonary    CARDIAC CATHETERIZATION  06/02/2014    History of Cardiac Cath Procedure Outcome: Successful    CARDIAC CATHETERIZATION N/A 11/19/2019    Procedure: Right and Left Heart Cath lv pressures;  Surgeon: Mal Moser MD;  Location:   [facility] CATH INVASIVE LOCATION;  Service: Cardiology    CARDIAC CATHETERIZATION N/A 11/19/2019    Procedure: Saphenous Vein Graft;  Surgeon: Mal Moser MD;  Location: [facility] CATH INVASIVE LOCATION;  Service: Cardiology    CARDIAC CATHETERIZATION N/A 11/19/2019    Procedure: Coronary angiography;  Surgeon: Mal Moser MD;  Location:  [facility] CATH INVASIVE LOCATION;  Service: Cardiology    CARDIAC CATHETERIZATION Left 7/1/2022    Procedure: Coronary Angiography;  Surgeon: Marco A Muir MD;  Location: [facility] CATH INVASIVE LOCATION;  Service: Cardiology;  Laterality: Left;    CARDIAC CATHETERIZATION N/A 7/1/2022    Procedure: Left Heart Cath;  Surgeon: Marco A Muir MD;  Location: [facility] CATH INVASIVE LOCATION;  Service: Cardiology;  Laterality: N/A;    CARDIAC CATHETERIZATION N/A 7/3/2023    Procedure: Left Heart Cath;  Surgeon: Marcos Hoff MD;  Location: [facility] CATH INVASIVE LOCATION;  Service: Cardiology;  Laterality: N/A;    CARDIAC CATHETERIZATION N/A 7/3/2023    Procedure: Coronary angiography;  Surgeon: Marcos Hoff MD;  Location: [facility] CATH INVASIVE LOCATION;  Service: Cardiology;  Laterality: N/A;    CARDIAC CATHETERIZATION  7/3/2023    Procedure: Saphenous Vein Graft;  Surgeon: Marcos Hoff MD;  Location: [facility] CATH INVASIVE LOCATION;  Service: Cardiology;;    CARDIAC CATHETERIZATION N/A 7/3/2023    Procedure: Percutaneous Coronary Intervention;  Surgeon: Marcos Hoff MD;  Location: [facility] CATH INVASIVE LOCATION;  Service: Cardiology;  Laterality: N/A;    CARDIAC CATHETERIZATION N/A 7/3/2023    Procedure: Stent FER coronary;  Surgeon: Marcos Hoff MD;  Location: [facility] CATH INVASIVE LOCATION;  Service: Cardiology;  Laterality: N/A;    CARDIAC CATHETERIZATION N/A 2/8/2024    Procedure: Left Heart Cath;  Surgeon: Marcos Hoff MD;  Location: [facility] CATH INVASIVE LOCATION;  Service: Cardiology;  Laterality: N/A;    CARDIAC CATHETERIZATION N/A 2/8/2024    Procedure: Coronary angiography;   Surgeon: Marcos Hoff MD;  Location:  [facility] CATH INVASIVE LOCATION;  Service: Cardiology;  Laterality: N/A;    CARDIAC CATHETERIZATION  2024    Procedure: Saphenous Vein Graft;  Surgeon: Marcos Hoff MD;  Location:  [facility] CATH INVASIVE LOCATION;  Service: Cardiology;;    CARDIAC CATHETERIZATION N/A 2024    Procedure: Coronary angiography;  Surgeon: Marcos Hoff MD;  Location:  [facility] CATH INVASIVE LOCATION;  Service: Cardiovascular;  Laterality: N/A;    CARDIAC CATHETERIZATION N/A 2024    Procedure: Resting Full Cycle Ratio;  Surgeon: Marcos Hoff MD;  Location:  [facility] CATH INVASIVE LOCATION;  Service: Cardiovascular;  Laterality: N/A;    CARDIAC CATHETERIZATION  2024    Procedure: Saphenous Vein Graft;  Surgeon: Marcos Hoff MD;  Location:  [facility] CATH INVASIVE LOCATION;  Service: Cardiovascular;;    CATARACT EXTRACTION       SECTION      CHOLECYSTECTOMY      COLONOSCOPY  2009    q-10    CORONARY ANGIOPLASTY WITH STENT PLACEMENT  2012     [facility] by Dr. Roman.    CORONARY ARTERY BYPASS GRAFT Bilateral 2011    with a left internal mammary graft to the LAD and saphenous vein graft to the posterior descending artery    ENDOSCOPY       Diagnostic Esophagogastroduodenoscopy    HYSTERECTOMY      NERVE BLOCK       Nerve Block Transforaminal Epidural Lumbar    OTHER SURGICAL HISTORY  2015    Patient-Activated Cardiac Event Recorder - From 2015 to 2015       The following portions of the patient's history were reviewed and updated as appropriate: problem list, allergies, current medications, past medical history, past family history, past social history, and past surgical history.    Review of Systems    Immunization History   Administered Date(s) Administered    COVID-19 (PFIZER) BIVALENT 12+YRS 10/05/2022    COVID-19 (PFIZER) Purple Cap Monovalent 2021, 2021, 10/04/2021    Covid-19 (Pfizer) Gray Cap Monovalent 2022     "Fluzone High Dose =>65 Years ([employer] ONLY) 09/01/2018, 09/17/2019, 09/20/2020, 10/13/2022    Fluzone High-Dose 65+yrs 10/04/2021, 10/05/2022    PEDS-Pneumococcal Conjugate (PCV7) 06/23/2015    Pneumococcal Conjugate 13-Valent (PCV13) 08/04/2020    Pneumococcal Conjugate Unspecified 06/23/2015    Shingrix 09/01/2020, 11/05/2020       Objective   Vitals:    06/10/24 1250   BP: 138/90   Pulse: 68   Resp: 16   Temp: 98.1 °F (36.7 °C)   Weight: 70.3 kg (155 lb)   Height: 154.9 cm (60.98\")     Body mass index is 29.3 kg/m².  Physical Exam  Constitutional:       Comments: She appears fatigued; nodding off for seconds during her visit. She is accompanied by her .    HENT:      Head: Normocephalic and atraumatic.      Mouth/Throat:      Mouth: Mucous membranes are moist.      Pharynx: Oropharynx is clear. No oropharyngeal exudate or posterior oropharyngeal erythema.   Eyes:      Conjunctiva/sclera: Conjunctivae normal.      Pupils: Pupils are equal, round, and reactive to light.   Neck:      Thyroid: No thyroid mass, thyromegaly or thyroid tenderness.   Cardiovascular:      Rate and Rhythm: Normal rate and regular rhythm.      Heart sounds: Normal heart sounds.   Pulmonary:      Effort: Pulmonary effort is normal.      Breath sounds: Normal breath sounds.   Abdominal:      General: Bowel sounds are normal. There is no distension.      Palpations: Abdomen is soft.      Tenderness: There is no abdominal tenderness.   Genitourinary:     Rectum: Normal. No mass.      Comments: No stool in the rectal vault for FOB.   Musculoskeletal:      Cervical back: Neck supple.      Comments: W/C; no clubbing, cyanosis or edema.    Lymphadenopathy:      Cervical: No cervical adenopathy.   Skin:     General: Skin is warm and dry.   Psychiatric:         Mood and Affect: Mood normal.         Behavior: Behavior normal.         Procedures    Assessment & Plan   Diagnoses and all orders for this visit:    1. Fatigue, unspecified type " (Primary)  Comments:  VSS. Euvolemic. NAD. NKI. Nodding off at times during exam. Denies new medications or supplements.  Orders:  -     CBC & Differential  -     Comprehensive Metabolic Panel  -     TSH    2. Anemia, unspecified type  Comments:  Mild on labs in April. Repeat labs as below and check SPEP and UPEP. If not revealing then she needs echo repeated; appears euvolemic on exam.  Orders:  -     CBC & Differential  -     Vitamin B12  -     Iron Profile  -     Ferritin  -     Protein Elec + Interp, Serum  -     Protein Electrophoresis, Random Urine - Urine, Clean Catch    Records reviewed include previous OV with Dr. Zaragoza, Dr. Hoff, and Dr. Sheridan as well as labs and echo 2023.     Determine follow up after reviewing labs today.     Return for Lab Today.

## 2024-06-13 DIAGNOSIS — R77.8 ABNORMAL SPEP: Primary | ICD-10-CM

## 2024-06-13 DIAGNOSIS — R80.9 PROTEINURIA, UNSPECIFIED TYPE: ICD-10-CM

## 2024-06-13 DIAGNOSIS — D64.9 ANEMIA, UNSPECIFIED TYPE: ICD-10-CM

## 2024-06-13 DIAGNOSIS — R53.83 FATIGUE, UNSPECIFIED TYPE: ICD-10-CM

## 2024-06-13 LAB
ALBUMIN MFR UR ELPH: 29.5 %
ALBUMIN SERPL ELPH-MCNC: 3.4 G/DL (ref 2.9–4.4)
ALBUMIN SERPL-MCNC: 4.1 G/DL (ref 3.8–4.8)
ALBUMIN/GLOB SERPL: 0.9 {RATIO} (ref 0.7–1.7)
ALBUMIN/GLOB SERPL: 1.3 {RATIO}
ALP SERPL-CCNC: 70 IU/L (ref 44–121)
ALPHA1 GLOB MFR UR ELPH: 1.5 %
ALPHA1 GLOB SERPL ELPH-MCNC: 0.3 G/DL (ref 0–0.4)
ALPHA2 GLOB MFR UR ELPH: 17.8 %
ALPHA2 GLOB SERPL ELPH-MCNC: 1.2 G/DL (ref 0.4–1)
ALT SERPL-CCNC: 11 IU/L (ref 0–32)
AST SERPL-CCNC: 26 IU/L (ref 0–40)
B-GLOBULIN MFR UR ELPH: 31.6 %
B-GLOBULIN SERPL ELPH-MCNC: 1.2 G/DL (ref 0.7–1.3)
BASOPHILS # BLD AUTO: 0 X10E3/UL (ref 0–0.2)
BASOPHILS NFR BLD AUTO: 1 %
BILIRUB SERPL-MCNC: <0.2 MG/DL (ref 0–1.2)
BUN SERPL-MCNC: 7 MG/DL (ref 8–27)
BUN/CREAT SERPL: 6 (ref 12–28)
CALCIUM SERPL-MCNC: 8.7 MG/DL (ref 8.7–10.3)
CHLORIDE SERPL-SCNC: 106 MMOL/L (ref 96–106)
CO2 SERPL-SCNC: 25 MMOL/L (ref 20–29)
CREAT SERPL-MCNC: 1.21 MG/DL (ref 0.57–1)
EGFRCR SERPLBLD CKD-EPI 2021: 46 ML/MIN/1.73
EOSINOPHIL # BLD AUTO: 0.2 X10E3/UL (ref 0–0.4)
EOSINOPHIL NFR BLD AUTO: 4 %
ERYTHROCYTE [DISTWIDTH] IN BLOOD BY AUTOMATED COUNT: 16.4 % (ref 11.7–15.4)
FERRITIN SERPL-MCNC: 14 NG/ML (ref 15–150)
GAMMA GLOB MFR UR ELPH: 19.5 %
GAMMA GLOB SERPL ELPH-MCNC: 1.1 G/DL (ref 0.4–1.8)
GLOBULIN SER CALC-MCNC: 3.1 G/DL (ref 1.5–4.5)
GLOBULIN SER CALC-MCNC: 3.8 G/DL (ref 2.2–3.9)
GLUCOSE SERPL-MCNC: 86 MG/DL (ref 70–99)
HCT VFR BLD AUTO: 34.9 % (ref 34–46.6)
HGB BLD-MCNC: 10.6 G/DL (ref 11.1–15.9)
IMM GRANULOCYTES # BLD AUTO: 0 X10E3/UL (ref 0–0.1)
IMM GRANULOCYTES NFR BLD AUTO: 0 %
IRON SATN MFR SERPL: 6 % (ref 15–55)
IRON SERPL-MCNC: 21 UG/DL (ref 27–139)
LABORATORY COMMENT REPORT: ABNORMAL
LABORATORY COMMENT REPORT: NORMAL
LYMPHOCYTES # BLD AUTO: 1.2 X10E3/UL (ref 0.7–3.1)
LYMPHOCYTES NFR BLD AUTO: 23 %
M PROTEIN MFR UR ELPH: NORMAL %
M PROTEIN SERPL ELPH-MCNC: ABNORMAL G/DL
MCH RBC QN AUTO: 25.1 PG (ref 26.6–33)
MCHC RBC AUTO-ENTMCNC: 30.4 G/DL (ref 31.5–35.7)
MCV RBC AUTO: 83 FL (ref 79–97)
MONOCYTES # BLD AUTO: 0.6 X10E3/UL (ref 0.1–0.9)
MONOCYTES NFR BLD AUTO: 10 %
NEUTROPHILS # BLD AUTO: 3.4 X10E3/UL (ref 1.4–7)
NEUTROPHILS NFR BLD AUTO: 62 %
PLATELET # BLD AUTO: 244 X10E3/UL (ref 150–450)
POTASSIUM SERPL-SCNC: 3.6 MMOL/L (ref 3.5–5.2)
PROT PATTERN SERPL ELPH-IMP: ABNORMAL
PROT SERPL-MCNC: 7.2 G/DL (ref 6–8.5)
PROT UR-MCNC: 125.9 MG/DL
RBC # BLD AUTO: 4.22 X10E6/UL (ref 3.77–5.28)
SODIUM SERPL-SCNC: 148 MMOL/L (ref 134–144)
TIBC SERPL-MCNC: 324 UG/DL (ref 250–450)
TSH SERPL DL<=0.005 MIU/L-ACNC: 0.81 UIU/ML (ref 0.45–4.5)
UIBC SERPL-MCNC: 303 UG/DL (ref 118–369)
VIT B12 SERPL-MCNC: 750 PG/ML (ref 232–1245)
WBC # BLD AUTO: 5.4 X10E3/UL (ref 3.4–10.8)

## 2024-06-18 ENCOUNTER — OFFICE VISIT (OUTPATIENT)
Dept: INTERNAL MEDICINE | Facility: CLINIC | Age: 79
End: 2024-06-18
Payer: MEDICARE

## 2024-06-18 VITALS
HEART RATE: 76 BPM | DIASTOLIC BLOOD PRESSURE: 84 MMHG | SYSTOLIC BLOOD PRESSURE: 118 MMHG | WEIGHT: 150 LBS | HEIGHT: 61 IN | BODY MASS INDEX: 28.32 KG/M2

## 2024-06-18 DIAGNOSIS — R77.8 ABNORMAL SPEP: Primary | ICD-10-CM

## 2024-06-18 DIAGNOSIS — E87.0 HYPERNATREMIA: ICD-10-CM

## 2024-06-18 PROCEDURE — 1159F MED LIST DOCD IN RCRD: CPT | Performed by: INTERNAL MEDICINE

## 2024-06-18 PROCEDURE — 99213 OFFICE O/P EST LOW 20 MIN: CPT | Performed by: INTERNAL MEDICINE

## 2024-06-18 PROCEDURE — 1160F RVW MEDS BY RX/DR IN RCRD: CPT | Performed by: INTERNAL MEDICINE

## 2024-06-18 PROCEDURE — 3079F DIAST BP 80-89 MM HG: CPT | Performed by: INTERNAL MEDICINE

## 2024-06-18 PROCEDURE — 3074F SYST BP LT 130 MM HG: CPT | Performed by: INTERNAL MEDICINE

## 2024-06-18 PROCEDURE — 1126F AMNT PAIN NOTED NONE PRSNT: CPT | Performed by: INTERNAL MEDICINE

## 2024-06-18 NOTE — PROGRESS NOTES
"Chief Complaint  Hypertension    Subjective        Marleny Greenwood presents to [facility]  History of Present Illness  Here to f/u after seeing ELIZABETH Hernandez last week for progressive fatigue.  She felt at that time like she was \"just going downhill.\" She has controlled sx now with decreased activity.   is doing most around house, getting her food, etc. No falls, presyncope.   Reviewed notes, labs- some abnormal proteins on electrophoresis- iron def.  To have more lab work done.   She saw Dr. Sheridan who does not think it is her lungs. She is not having CP.   Eating and drinking ok- not a lot but regularly.    Objective   Vital Signs:  /84   Pulse 76   Ht 154.9 cm (60.98\")   Wt 68 kg (150 lb)   BMI 28.36 kg/m²   Estimated body mass index is 28.36 kg/m² as calculated from the following:    Height as of this encounter: 154.9 cm (60.98\").    Weight as of this encounter: 68 kg (150 lb).               Physical Exam  Constitutional:       Appearance: Normal appearance. She is not ill-appearing.   Cardiovascular:      Rate and Rhythm: Normal rate.   Pulmonary:      Effort: Pulmonary effort is normal.      Breath sounds: Normal breath sounds.   Musculoskeletal:      Right lower leg: No edema.      Left lower leg: No edema.   Lymphadenopathy:      Cervical: No cervical adenopathy.        Result Review :    The following data was reviewed by: Liz Zaragoza MD on 06/18/2024:  Common labs          2/29/2024    08:56 4/1/2024    11:43 6/10/2024    13:52   Common Labs   Glucose 102  96  86    BUN 13  14  7    Creatinine 1.12  1.30  1.21    Sodium 144  143  148    Potassium 3.5  3.8  3.6    Chloride 109  106  106    Calcium 8.7  9.0  8.7    Total Protein  7.0  7.2    Albumin  4.1  4.1     3.4    Total Bilirubin  0.3  <0.2    Alkaline Phosphatase  64  70    AST (SGOT)  15  26    ALT (SGPT)  10  11    WBC  4.85  5.4    Hemoglobin  11.1  10.6    Hematocrit  35.6  34.9    Platelets  245  " 244    Total Cholesterol  140     Triglycerides  81     HDL Cholesterol  66     LDL Cholesterol   58       Data reviewed : Consultant notes Chris Hernandez             Assessment and Plan     Diagnoses and all orders for this visit:    1. Abnormal SPEP (Primary)  Comments:  concerning given sx- further testing ordered and referral placed to hematology- will add iron orally BID with meals.  Orders:  -     Ambulatory Referral to Hematology    2. Hypernatremia  Comments:  recheck  Orders:  -     Basic Metabolic Panel  -     Osmolality, Urine - Urine, Clean Catch             Follow Up     No follow-ups on file.  Patient was given instructions and counseling regarding her condition or for health maintenance advice. Please see specific information pulled into the AVS if appropriate.

## 2024-06-19 LAB
BUN SERPL-MCNC: 7 MG/DL (ref 8–23)
BUN/CREAT SERPL: 7.5 (ref 7–25)
CALCIUM SERPL-MCNC: 8.6 MG/DL (ref 8.6–10.5)
CHLORIDE SERPL-SCNC: 102 MMOL/L (ref 98–107)
CO2 SERPL-SCNC: 28.7 MMOL/L (ref 22–29)
CREAT SERPL-MCNC: 0.93 MG/DL (ref 0.57–1)
EGFRCR SERPLBLD CKD-EPI 2021: 63.4 ML/MIN/1.73
GLUCOSE SERPL-MCNC: 95 MG/DL (ref 65–99)
OSMOLALITY UR: 829 MOSM/KG
POTASSIUM SERPL-SCNC: 3.4 MMOL/L (ref 3.5–5.2)
SODIUM SERPL-SCNC: 142 MMOL/L (ref 136–145)

## 2024-06-24 ENCOUNTER — TELEPHONE (OUTPATIENT)
Dept: ONCOLOGY | Facility: CLINIC | Age: 79
End: 2024-06-24

## 2024-06-24 NOTE — TELEPHONE ENCOUNTER
Caller: CAROL DIANA    Relationship to Patient: SELF    Phone Number: [phone]    Reason for Call: PT IS SCHEDULED FOR A NEW PT APPT ON 10/31 WITH DR GIRALDO, PT STATES SHE DOESN'T USE [employer] OR [employer], AND HAS ASKED IF A PAPER REMINDER CAN BE MAILED TO HER. THANK YOU

## 2024-06-25 ENCOUNTER — TELEPHONE (OUTPATIENT)
Dept: INTERNAL MEDICINE | Facility: CLINIC | Age: 79
End: 2024-06-25
Payer: MEDICARE

## 2024-06-25 NOTE — TELEPHONE ENCOUNTER
Caller: Marleny Greenwood    Relationship: Self    Best call back number:     [phone] (Home)       What is the best time to reach you: ANYTIME    Who are you requesting to speak with (clinical staff, provider,  specific staff member): ABHAY    Do you know the name of the person who called:      What was the call regarding: PATIENT CALLED TO REQUEST A CALLBACK TO DISCUSS A FORM THAT SHE RECEIVED.      Is it okay if the provider responds through MyChart:            THANKS

## 2024-06-25 NOTE — TELEPHONE ENCOUNTER
Spoke with PT answered her question but she wanted me to reach out to you and ask if you have any suggestion for her now because all she can say is she just does not feel well

## 2024-07-01 ENCOUNTER — TELEPHONE (OUTPATIENT)
Dept: INTERNAL MEDICINE | Facility: CLINIC | Age: 79
End: 2024-07-01

## 2024-07-01 NOTE — TELEPHONE ENCOUNTER
Spoke with PT she wanted to know if she could take OTC Colace gave OK she will let us know if this does not work for her

## 2024-07-01 NOTE — TELEPHONE ENCOUNTER
Caller: Marleny Greenwood    Relationship: Self    Best call back number: [phone]     What is the best time to reach you: ANYTIME TODAY    Who are you requesting to speak with (clinical staff, provider,  specific staff member):         What was the call regarding: PATIENT IS REQUESTING A CALL BACK AND IT IS IMPORTANT SHE HAS NOT BEEN FEELING WELL.

## 2024-07-11 ENCOUNTER — TELEPHONE (OUTPATIENT)
Dept: INTERNAL MEDICINE | Facility: CLINIC | Age: 79
End: 2024-07-11
Payer: MEDICARE

## 2024-07-11 DIAGNOSIS — E87.6 HYPOKALEMIA: ICD-10-CM

## 2024-07-11 RX ORDER — POTASSIUM CHLORIDE 1500 MG/1
20 TABLET, EXTENDED RELEASE ORAL 2 TIMES DAILY
Qty: 270 TABLET | Refills: 1 | Status: SHIPPED | OUTPATIENT
Start: 2024-07-11

## 2024-07-11 NOTE — TELEPHONE ENCOUNTER
Caller: Marleny Greenwood    Relationship: Self    Best call back number:[phone]     Who are you requesting to speak with (clinical staff, provider,  specific staff member): ABHAY     What was the call regarding: WANTS TO SPEAK TO ABHAY WHEN SHE CAN CALL HER BACK

## 2024-07-25 ENCOUNTER — TELEPHONE (OUTPATIENT)
Dept: INTERNAL MEDICINE | Facility: CLINIC | Age: 79
End: 2024-07-25
Payer: MEDICARE

## 2024-07-25 RX ORDER — CLOPIDOGREL BISULFATE 75 MG/1
75 TABLET ORAL DAILY
Qty: 90 TABLET | Refills: 2 | Status: CANCELLED | OUTPATIENT
Start: 2024-07-25

## 2024-07-25 RX ORDER — PROMETHAZINE HYDROCHLORIDE 25 MG/1
25 TABLET ORAL EVERY 6 HOURS PRN
Qty: 30 TABLET | Refills: 14 | Status: CANCELLED | OUTPATIENT
Start: 2024-07-25

## 2024-07-25 NOTE — TELEPHONE ENCOUNTER
Caller: Marleny Greenwood    Relationship: Self    Best call back number: [phone]    Which medication are you concerned about: clopidogrel (PLAVIX) 75 MG tablet     What are your concerns: PATIENT STATES THAT SHE WOULD LIKE TO SPEAK WITH ABHAY FORD ABOUT THIS MEDICATION. PATIENT LEFT NO FURTHER DETAIL.

## 2024-08-06 ENCOUNTER — OFFICE VISIT (OUTPATIENT)
Dept: INTERNAL MEDICINE | Facility: CLINIC | Age: 79
End: 2024-08-06
Payer: MEDICARE

## 2024-08-06 VITALS
DIASTOLIC BLOOD PRESSURE: 62 MMHG | WEIGHT: 138 LBS | HEIGHT: 61 IN | BODY MASS INDEX: 26.06 KG/M2 | HEART RATE: 70 BPM | SYSTOLIC BLOOD PRESSURE: 112 MMHG

## 2024-08-06 DIAGNOSIS — Z29.11 NEED FOR RSV VACCINATION: ICD-10-CM

## 2024-08-06 DIAGNOSIS — R77.8 ABNORMAL SPEP: Chronic | ICD-10-CM

## 2024-08-06 DIAGNOSIS — I51.89 GRADE I DIASTOLIC DYSFUNCTION: Chronic | ICD-10-CM

## 2024-08-06 DIAGNOSIS — I10 ESSENTIAL HYPERTENSION: Primary | Chronic | ICD-10-CM

## 2024-08-06 DIAGNOSIS — R51.9 CHRONIC DAILY HEADACHE: Chronic | ICD-10-CM

## 2024-08-06 PROBLEM — I20.0 UNSTABLE ANGINA: Status: RESOLVED | Noted: 2024-02-06 | Resolved: 2024-08-06

## 2024-08-06 PROCEDURE — 1160F RVW MEDS BY RX/DR IN RCRD: CPT | Performed by: INTERNAL MEDICINE

## 2024-08-06 PROCEDURE — 99214 OFFICE O/P EST MOD 30 MIN: CPT | Performed by: INTERNAL MEDICINE

## 2024-08-06 PROCEDURE — 1126F AMNT PAIN NOTED NONE PRSNT: CPT | Performed by: INTERNAL MEDICINE

## 2024-08-06 PROCEDURE — 1159F MED LIST DOCD IN RCRD: CPT | Performed by: INTERNAL MEDICINE

## 2024-08-06 PROCEDURE — 3078F DIAST BP <80 MM HG: CPT | Performed by: INTERNAL MEDICINE

## 2024-08-06 PROCEDURE — 3074F SYST BP LT 130 MM HG: CPT | Performed by: INTERNAL MEDICINE

## 2024-08-06 NOTE — PROGRESS NOTES
"Chief Complaint  Hypertension    Subjective        Marleny Greenwood presents to [facility]  History of Present Illness she is feeling much better now- she went thru a couple of months recently where she felt terrible and didn't eat much- weight down. Felt very weak- something started improving 2-3 weeks ago, unsure why.  Energy better.   She denies any CP, SOB. Staying somewhat active but doesn't go out much alone. She feels ready to go back to [employer].   Hasn't had bad headaches in several  years. Uses very rare butalbital -   Cough seems better- she uses Hydromet prn per Dr. Sheridan- doesn't use very often.     Objective   Vital Signs:  /62   Pulse 70   Ht 154.9 cm (60.98\")   Wt 62.6 kg (138 lb)   BMI 26.09 kg/m²   Estimated body mass index is 26.09 kg/m² as calculated from the following:    Height as of this encounter: 154.9 cm (60.98\").    Weight as of this encounter: 62.6 kg (138 lb).               Physical Exam  Constitutional:       Appearance: Normal appearance.   Cardiovascular:      Rate and Rhythm: Normal rate and regular rhythm.   Pulmonary:      Effort: Pulmonary effort is normal.   Musculoskeletal:      Right lower leg: No edema.      Left lower leg: No edema.        Result Review :                     Assessment and Plan     Diagnoses and all orders for this visit:    1. Essential hypertension (Primary)  Comments:  Well controlled, would like her to check at home to be sure she isn't getting too low.    2. Grade I diastolic dysfunction  Comments:  no evidence of decompensation today    3. Chronic daily headache  Comments:  stable- meds reviewed- no change.    4. Abnormal SPEP  Comments:  awaiting heme referral-- is feeling much better    5. Need for RSV vaccination  Comments:  Encouraged her to get vaccine at her convenience.             Follow Up     Return in about 6 months (around 2/6/2025) for Medicare Wellness, Lab Before FUP.  Patient was given instructions " and counseling regarding her condition or for health maintenance advice. Please see specific information pulled into the AVS if appropriate.

## 2024-08-07 ENCOUNTER — TELEPHONE (OUTPATIENT)
Dept: INTERNAL MEDICINE | Facility: CLINIC | Age: 79
End: 2024-08-07
Payer: MEDICARE

## 2024-08-07 DIAGNOSIS — I10 ESSENTIAL HYPERTENSION: ICD-10-CM

## 2024-08-07 DIAGNOSIS — I10 ESSENTIAL HYPERTENSION: Chronic | ICD-10-CM

## 2024-08-07 RX ORDER — CLOPIDOGREL BISULFATE 75 MG/1
75 TABLET ORAL DAILY
Qty: 90 TABLET | Refills: 3 | Status: SHIPPED | OUTPATIENT
Start: 2024-08-07

## 2024-08-07 RX ORDER — AMLODIPINE BESYLATE 5 MG/1
5 TABLET ORAL DAILY
Qty: 90 TABLET | Refills: 3 | Status: SHIPPED | OUTPATIENT
Start: 2024-08-07

## 2024-08-07 RX ORDER — SPIRONOLACTONE 25 MG/1
25 TABLET ORAL DAILY
Qty: 90 TABLET | Refills: 3 | Status: SHIPPED | OUTPATIENT
Start: 2024-08-07

## 2024-08-07 NOTE — TELEPHONE ENCOUNTER
PATIENT CALLED IN REGARDS TO MEDICATIONS    clopidogrel (PLAVIX) 75 MG tablet       amLODIPine (NORVASC) 5 MG tablet       spironolactone (ALDACTONE) 25 MG tablet       SHE IS WANTING TO KNOW IF THEY NEED REFILLS OR IF ANYTHING HAS CHANGED SINCE HER LAST VISIT    PLEASE CALL [phone]    [facility] - [place], [place] - [address] [place] [address] - [phone] [facility] [phone]  [phone]

## 2024-08-25 NOTE — OUTREACH NOTE
CHF Week 1 Survey      Responses   Facility patient discharged from?  [facility]   Does the patient have one of the following disease processes/diagnoses(primary or secondary)?  CHF   Is there a successful TCM telephone encounter documented?  No   CHF Week 1 attempt successful?  No   Unsuccessful attempts  Attempt 1          Yun Vera RN   Medical Social Work     MYRON called [facility] and spoke to PENNIE Rush and gave her report and advised her [facility] is sending the pt back via [facility]. MYRON faxed a PCS form to [facility] and set up transport with [facility] for 2200. Transfer packet completed and given to the RN.

## 2024-09-10 ENCOUNTER — TELEPHONE (OUTPATIENT)
Dept: INTERNAL MEDICINE | Facility: CLINIC | Age: 79
End: 2024-09-10
Payer: MEDICARE

## 2024-09-10 DIAGNOSIS — R51.9 CHRONIC DAILY HEADACHE: Primary | ICD-10-CM

## 2024-09-10 RX ORDER — PROMETHAZINE HYDROCHLORIDE 25 MG/1
25 TABLET ORAL EVERY 6 HOURS PRN
Qty: 90 TABLET | Refills: 3 | Status: SHIPPED | OUTPATIENT
Start: 2024-09-10

## 2024-09-10 NOTE — TELEPHONE ENCOUNTER
Caller: Marleny Greenwood    Relationship: Self    Best call back number: [phone]     What is the best time to reach you: ANY    Who are you requesting to speak with (clinical staff, provider,  specific staff member): ABHAY HOLCOMB    What was the call regarding: QUESTIONS ABOUT SOME MEDICATIONS.     Is it okay if the provider responds through MyChart: NO

## 2024-09-20 ENCOUNTER — OFFICE VISIT (OUTPATIENT)
Age: 79
End: 2024-09-20
Payer: MEDICARE

## 2024-09-20 VITALS
DIASTOLIC BLOOD PRESSURE: 60 MMHG | BODY MASS INDEX: 26.06 KG/M2 | HEIGHT: 61 IN | SYSTOLIC BLOOD PRESSURE: 112 MMHG | OXYGEN SATURATION: 96 % | WEIGHT: 138 LBS | HEART RATE: 66 BPM

## 2024-09-20 DIAGNOSIS — I25.10 CORONARY ARTERIOSCLEROSIS IN NATIVE ARTERY: Primary | ICD-10-CM

## 2024-09-20 DIAGNOSIS — Z95.1 S/P CABG (CORONARY ARTERY BYPASS GRAFT): Chronic | ICD-10-CM

## 2024-09-20 DIAGNOSIS — I10 ESSENTIAL HYPERTENSION: Chronic | ICD-10-CM

## 2024-09-20 PROCEDURE — 1159F MED LIST DOCD IN RCRD: CPT | Performed by: STUDENT IN AN ORGANIZED HEALTH CARE EDUCATION/TRAINING PROGRAM

## 2024-09-20 PROCEDURE — 3074F SYST BP LT 130 MM HG: CPT | Performed by: STUDENT IN AN ORGANIZED HEALTH CARE EDUCATION/TRAINING PROGRAM

## 2024-09-20 PROCEDURE — 99214 OFFICE O/P EST MOD 30 MIN: CPT | Performed by: STUDENT IN AN ORGANIZED HEALTH CARE EDUCATION/TRAINING PROGRAM

## 2024-09-20 PROCEDURE — 1160F RVW MEDS BY RX/DR IN RCRD: CPT | Performed by: STUDENT IN AN ORGANIZED HEALTH CARE EDUCATION/TRAINING PROGRAM

## 2024-09-20 PROCEDURE — 93000 ELECTROCARDIOGRAM COMPLETE: CPT | Performed by: STUDENT IN AN ORGANIZED HEALTH CARE EDUCATION/TRAINING PROGRAM

## 2024-09-20 PROCEDURE — 3078F DIAST BP <80 MM HG: CPT | Performed by: STUDENT IN AN ORGANIZED HEALTH CARE EDUCATION/TRAINING PROGRAM

## 2024-10-02 ENCOUNTER — TELEPHONE (OUTPATIENT)
Dept: INTERNAL MEDICINE | Facility: CLINIC | Age: 79
End: 2024-10-02
Payer: MEDICARE

## 2024-10-02 RX ORDER — ISOSORBIDE MONONITRATE 60 MG/1
60 TABLET, EXTENDED RELEASE ORAL DAILY
Qty: 90 TABLET | Refills: 3 | Status: SHIPPED | OUTPATIENT
Start: 2024-10-02

## 2024-10-02 NOTE — TELEPHONE ENCOUNTER
Caller: Marleny Greenwood    Relationship: Self    Best call back number:    [phone] (Home)       What is the best time to reach you: ANYTIME TODAY    Who are you requesting to speak with (clinical staff, provider,  specific staff member): CLINICAL STAFF    Do you know the name of the person who called:      What was the call regarding:  PATIENT CALLED TO REQUEST A CALLBACK TO DISCUSS A QUESTION THAT SHE HAS ABOUT     isosorbide mononitrate (IMDUR) 60 MG 24 hr tablet   PLEASE CONTACT PATIENT TO ADVISE.    Is it okay if the provider responds through MyChart:            THANKS

## 2024-10-14 DIAGNOSIS — R51.9 CHRONIC DAILY HEADACHE: ICD-10-CM

## 2024-10-15 RX ORDER — CODEINE/BUTALBITAL/ASA/CAFFEIN 30-50-325
1 CAPSULE ORAL EVERY 4 HOURS PRN
Qty: 120 CAPSULE | Refills: 1 | Status: SHIPPED | OUTPATIENT
Start: 2024-10-15

## 2024-10-31 RX ORDER — CARVEDILOL 25 MG/1
25 TABLET ORAL 2 TIMES DAILY
Qty: 180 TABLET | Refills: 3 | Status: SHIPPED | OUTPATIENT
Start: 2024-10-31

## 2024-12-18 ENCOUNTER — TELEPHONE (OUTPATIENT)
Dept: INTERNAL MEDICINE | Facility: CLINIC | Age: 79
End: 2024-12-18

## 2024-12-18 NOTE — TELEPHONE ENCOUNTER
Caller: Marleny Greenwood    Relationship: Self    Best call back number: [phone]    Which medication are you concerned about: PATIENT NOT SURE WHICH ONE    Who prescribed you this medication: DR BARBOSA    When did you start taking this medication: VARIOUS TIMES    What are your concerns: PATIENT STATED SHE WOULD LIKE TO DISCUSS HER MEDICATIONS. SOMETHING IS GOING ON BUT SHE IS NOT SURE EXACTLY WHAT.    How long have you had these concerns: APPROX A MONTH    PATIENT WOULD LIKE TO DISCUSS WITH DR BARBOSA OR ABHAY

## 2024-12-19 ENCOUNTER — TELEPHONE (OUTPATIENT)
Dept: INTERNAL MEDICINE | Facility: CLINIC | Age: 79
End: 2024-12-19

## 2024-12-19 ENCOUNTER — OFFICE VISIT (OUTPATIENT)
Dept: INTERNAL MEDICINE | Facility: CLINIC | Age: 79
End: 2024-12-19
Payer: MEDICARE

## 2024-12-19 VITALS — HEIGHT: 60 IN | WEIGHT: 131 LBS | BODY MASS INDEX: 25.72 KG/M2

## 2024-12-19 DIAGNOSIS — E53.8 VITAMIN B 12 DEFICIENCY: Primary | ICD-10-CM

## 2024-12-19 DIAGNOSIS — R10.12 LEFT UPPER QUADRANT ABDOMINAL PAIN: ICD-10-CM

## 2024-12-19 DIAGNOSIS — J45.991 ASTHMA, COUGH VARIANT: Chronic | ICD-10-CM

## 2024-12-19 DIAGNOSIS — I10 ESSENTIAL HYPERTENSION: Chronic | ICD-10-CM

## 2024-12-19 PROBLEM — Z98.890 HISTORY OF LOOP RECORDER: Status: RESOLVED | Noted: 2019-06-11 | Resolved: 2024-12-19

## 2024-12-19 PROBLEM — R91.1 NODULE OF UPPER LOBE OF RIGHT LUNG: Status: RESOLVED | Noted: 2019-05-28 | Resolved: 2024-12-19

## 2024-12-19 PROCEDURE — 1159F MED LIST DOCD IN RCRD: CPT | Performed by: INTERNAL MEDICINE

## 2024-12-19 PROCEDURE — 96372 THER/PROPH/DIAG INJ SC/IM: CPT | Performed by: INTERNAL MEDICINE

## 2024-12-19 PROCEDURE — 99214 OFFICE O/P EST MOD 30 MIN: CPT | Performed by: INTERNAL MEDICINE

## 2024-12-19 PROCEDURE — 1126F AMNT PAIN NOTED NONE PRSNT: CPT | Performed by: INTERNAL MEDICINE

## 2024-12-19 PROCEDURE — 1160F RVW MEDS BY RX/DR IN RCRD: CPT | Performed by: INTERNAL MEDICINE

## 2024-12-19 RX ORDER — CYANOCOBALAMIN 1000 UG/ML
1000 INJECTION, SOLUTION INTRAMUSCULAR; SUBCUTANEOUS
Status: SHIPPED | OUTPATIENT
Start: 2024-12-19

## 2024-12-19 RX ADMIN — CYANOCOBALAMIN 1000 MCG: 1000 INJECTION, SOLUTION INTRAMUSCULAR; SUBCUTANEOUS at 14:35

## 2024-12-19 NOTE — TELEPHONE ENCOUNTER
"Called spoke with PT she said for about 1 month she has had no energy, said that she wakes up in the morning feeling good and when she starts to move around she feels like she is \"shinking\"  gets very tired and hard for her to move or do anything at all     Do you want to see her?  PT said she is just not right something is wrong   "

## 2024-12-19 NOTE — PROGRESS NOTES
"Chief Complaint  Fatigue and Extremity Weakness    Subjective        Marleny Greenwood presents to [facility]  History of Present Illness not feeling well- feels very fatigued, out of energy- has been going on a couple months. She has lost almost 20 lbs since June. Appetite is down, does not make her sick but not pushing herself to eat. Drinking Ensure daily. No chest pain, shortness of breath, cough, fever. No GI sx at all.   Saw Dr. Maksim Shipley about anemia and abnormal immunofixation- can't remember much of what he said/recommended. On questioning thinks he recommended B12 but she has not added.   Dr. Hoff discontinued amlodipine and Ranexa- no BP monitoring since. No chest pain.  Hasn't taken butalbital since late October- was making her ill so she hasn't taken.      Objective   Vital Signs:  Ht 152.4 cm (60\")   Wt 59.4 kg (131 lb)   BMI 25.58 kg/m²   Estimated body mass index is 25.58 kg/m² as calculated from the following:    Height as of this encounter: 152.4 cm (60\").    Weight as of this encounter: 59.4 kg (131 lb).    BMI is >= 25 and <30. (Overweight) The following options were offered after discussion;: none (medical contraindication)      Physical Exam  Constitutional:       General: She is not in acute distress.     Appearance: She is not ill-appearing.   HENT:      Nose: No congestion.      Mouth/Throat:      Mouth: Mucous membranes are moist.      Pharynx: Oropharynx is clear.   Eyes:      Conjunctiva/sclera: Conjunctivae normal.   Cardiovascular:      Rate and Rhythm: Normal rate and regular rhythm.   Pulmonary:      Effort: Pulmonary effort is normal.      Breath sounds: Normal breath sounds.   Abdominal:      General: Abdomen is flat. Bowel sounds are normal.      Palpations: Abdomen is soft.      Comments: Initially some abdominal soreness in LUQ, epigastrium but went away. Normal bowel sounds. No masses.    Lymphadenopathy:      Cervical: No cervical adenopathy. "   Neurological:      General: No focal deficit present.   Psychiatric:         Mood and Affect: Mood normal.         Thought Content: Thought content normal.        Result Review :  The following data was reviewed by: Liz Zaragoza MD on 12/19/2024:    Data reviewed : heme           Assessment and Plan   Diagnoses and all orders for this visit:    1. Vitamin B 12 deficiency (Primary)  Comments:  check today - will give in j to see if helps  Orders:  -     CBC & Differential  -     Vitamin B12    2. Essential hypertension  Comments:  doing fine off amlodipine  Orders:  -     Comprehensive Metabolic Panel    3. Left upper quadrant abdominal pain  Comments:  not sure if related- check lipase- consider imaging  Orders:  -     Lipase    4. Asthma, cough variant  Comments:  stable             Follow Up   Return for Keep previously scheduled appointment.  Patient was given instructions and counseling regarding her condition or for health maintenance advice. Please see specific information pulled into the AVS if appropriate.

## 2024-12-19 NOTE — TELEPHONE ENCOUNTER
Caller: Marleny Greenwood     Best call back number: [phone]    What is your medical concern? PATIENT STATES THAT SHE DOES NOT FEEL RIGHT AND SHE CANNOT EXPLAIN ANYMORE. PLEASE ADVISE PATIENT, SHE WOULD LIKE TO SPEAK WITH DR. BARBOSA TODAY.

## 2024-12-20 LAB
ALBUMIN SERPL-MCNC: 3.8 G/DL (ref 3.5–5.2)
ALBUMIN/GLOB SERPL: 1.6 G/DL
ALP SERPL-CCNC: 57 U/L (ref 39–117)
ALT SERPL-CCNC: 6 U/L (ref 1–33)
AST SERPL-CCNC: 12 U/L (ref 1–32)
BASOPHILS # BLD AUTO: 0.04 10*3/MM3 (ref 0–0.2)
BASOPHILS NFR BLD AUTO: 0.7 % (ref 0–1.5)
BILIRUB SERPL-MCNC: <0.2 MG/DL (ref 0–1.2)
BUN SERPL-MCNC: 11 MG/DL (ref 8–23)
BUN/CREAT SERPL: 11.2 (ref 7–25)
CALCIUM SERPL-MCNC: 8.7 MG/DL (ref 8.6–10.5)
CHLORIDE SERPL-SCNC: 110 MMOL/L (ref 98–107)
CO2 SERPL-SCNC: 26.7 MMOL/L (ref 22–29)
CREAT SERPL-MCNC: 0.98 MG/DL (ref 0.57–1)
EGFRCR SERPLBLD CKD-EPI 2021: 59.2 ML/MIN/1.73
EOSINOPHIL # BLD AUTO: 0.13 10*3/MM3 (ref 0–0.4)
EOSINOPHIL NFR BLD AUTO: 2.4 % (ref 0.3–6.2)
ERYTHROCYTE [DISTWIDTH] IN BLOOD BY AUTOMATED COUNT: 16.4 % (ref 12.3–15.4)
GLOBULIN SER CALC-MCNC: 2.4 GM/DL
GLUCOSE SERPL-MCNC: 99 MG/DL (ref 65–99)
HCT VFR BLD AUTO: 32.8 % (ref 34–46.6)
HGB BLD-MCNC: 9.5 G/DL (ref 12–15.9)
IMM GRANULOCYTES # BLD AUTO: 0.02 10*3/MM3 (ref 0–0.05)
IMM GRANULOCYTES NFR BLD AUTO: 0.4 % (ref 0–0.5)
LIPASE SERPL-CCNC: 21 U/L (ref 13–60)
LYMPHOCYTES # BLD AUTO: 1.54 10*3/MM3 (ref 0.7–3.1)
LYMPHOCYTES NFR BLD AUTO: 28.8 % (ref 19.6–45.3)
MCH RBC QN AUTO: 24.2 PG (ref 26.6–33)
MCHC RBC AUTO-ENTMCNC: 29 G/DL (ref 31.5–35.7)
MCV RBC AUTO: 83.7 FL (ref 79–97)
MONOCYTES # BLD AUTO: 0.55 10*3/MM3 (ref 0.1–0.9)
MONOCYTES NFR BLD AUTO: 10.3 % (ref 5–12)
NEUTROPHILS # BLD AUTO: 3.06 10*3/MM3 (ref 1.7–7)
NEUTROPHILS NFR BLD AUTO: 57.4 % (ref 42.7–76)
NRBC BLD AUTO-RTO: 0 /100 WBC (ref 0–0.2)
PLATELET # BLD AUTO: 239 10*3/MM3 (ref 140–450)
POTASSIUM SERPL-SCNC: 3.7 MMOL/L (ref 3.5–5.2)
PROT SERPL-MCNC: 6.2 G/DL (ref 6–8.5)
RBC # BLD AUTO: 3.92 10*6/MM3 (ref 3.77–5.28)
SODIUM SERPL-SCNC: 144 MMOL/L (ref 136–145)
VIT B12 SERPL-MCNC: 321 PG/ML (ref 211–946)
WBC # BLD AUTO: 5.34 10*3/MM3 (ref 3.4–10.8)

## 2024-12-21 ENCOUNTER — APPOINTMENT (OUTPATIENT)
Dept: GENERAL RADIOLOGY | Facility: HOSPITAL | Age: 79
End: 2024-12-21
Payer: MEDICARE

## 2024-12-21 ENCOUNTER — HOSPITAL ENCOUNTER (EMERGENCY)
Facility: HOSPITAL | Age: 79
Discharge: HOME | End: 2024-12-21
Attending: EMERGENCY MEDICINE
Payer: MEDICARE

## 2024-12-21 VITALS
SYSTOLIC BLOOD PRESSURE: 190 MMHG | TEMPERATURE: 98.8 F | OXYGEN SATURATION: 92 % | RESPIRATION RATE: 16 BRPM | DIASTOLIC BLOOD PRESSURE: 83 MMHG | HEART RATE: 66 BPM

## 2024-12-21 DIAGNOSIS — J10.1 INFLUENZA A: Primary | ICD-10-CM

## 2024-12-21 DIAGNOSIS — J45.901 MILD ASTHMA WITH EXACERBATION, UNSPECIFIED WHETHER PERSISTENT: ICD-10-CM

## 2024-12-21 DIAGNOSIS — R06.00 DYSPNEA, UNSPECIFIED TYPE: ICD-10-CM

## 2024-12-21 LAB
ALBUMIN SERPL-MCNC: 3.6 G/DL (ref 3.5–5.2)
ALBUMIN/GLOB SERPL: 1.1 G/DL
ALP SERPL-CCNC: 55 U/L (ref 39–117)
ALT SERPL W P-5'-P-CCNC: 7 U/L (ref 1–33)
ANION GAP SERPL CALCULATED.3IONS-SCNC: 9.4 MMOL/L (ref 5–15)
AST SERPL-CCNC: 14 U/L (ref 1–32)
B PARAPERT DNA SPEC QL NAA+PROBE: NOT DETECTED
B PERT DNA SPEC QL NAA+PROBE: NOT DETECTED
BASOPHILS # BLD AUTO: 0.02 10*3/MM3 (ref 0–0.2)
BASOPHILS NFR BLD AUTO: 0.3 % (ref 0–1.5)
BILIRUB SERPL-MCNC: 0.2 MG/DL (ref 0–1.2)
BUN SERPL-MCNC: 10 MG/DL (ref 8–23)
BUN/CREAT SERPL: 12.8 (ref 7–25)
C PNEUM DNA NPH QL NAA+NON-PROBE: NOT DETECTED
CALCIUM SPEC-SCNC: 8.9 MG/DL (ref 8.6–10.5)
CHLORIDE SERPL-SCNC: 111 MMOL/L (ref 98–107)
CO2 SERPL-SCNC: 23.6 MMOL/L (ref 22–29)
CREAT SERPL-MCNC: 0.78 MG/DL (ref 0.57–1)
DEPRECATED RDW RBC AUTO: 47.4 FL (ref 37–54)
EGFRCR SERPLBLD CKD-EPI 2021: 77.9 ML/MIN/1.73
EOSINOPHIL # BLD AUTO: 0.09 10*3/MM3 (ref 0–0.4)
EOSINOPHIL NFR BLD AUTO: 1.3 % (ref 0.3–6.2)
ERYTHROCYTE [DISTWIDTH] IN BLOOD BY AUTOMATED COUNT: 16.2 % (ref 12.3–15.4)
FLUAV H3 RNA NPH QL NAA+PROBE: DETECTED
FLUBV RNA ISLT QL NAA+PROBE: NOT DETECTED
GEN 5 1HR TROPONIN T REFLEX: 13 NG/L
GLOBULIN UR ELPH-MCNC: 3.2 GM/DL
GLUCOSE SERPL-MCNC: 104 MG/DL (ref 65–99)
HADV DNA SPEC NAA+PROBE: NOT DETECTED
HCOV 229E RNA SPEC QL NAA+PROBE: NOT DETECTED
HCOV HKU1 RNA SPEC QL NAA+PROBE: NOT DETECTED
HCOV NL63 RNA SPEC QL NAA+PROBE: NOT DETECTED
HCOV OC43 RNA SPEC QL NAA+PROBE: NOT DETECTED
HCT VFR BLD AUTO: 32 % (ref 34–46.6)
HGB BLD-MCNC: 9.9 G/DL (ref 12–15.9)
HMPV RNA NPH QL NAA+NON-PROBE: NOT DETECTED
HOLD SPECIMEN: NORMAL
HOLD SPECIMEN: NORMAL
HPIV1 RNA ISLT QL NAA+PROBE: NOT DETECTED
HPIV2 RNA SPEC QL NAA+PROBE: NOT DETECTED
HPIV3 RNA NPH QL NAA+PROBE: NOT DETECTED
HPIV4 P GENE NPH QL NAA+PROBE: NOT DETECTED
IMM GRANULOCYTES # BLD AUTO: 0.03 10*3/MM3 (ref 0–0.05)
IMM GRANULOCYTES NFR BLD AUTO: 0.4 % (ref 0–0.5)
LYMPHOCYTES # BLD AUTO: 0.82 10*3/MM3 (ref 0.7–3.1)
LYMPHOCYTES NFR BLD AUTO: 11.6 % (ref 19.6–45.3)
M PNEUMO IGG SER IA-ACNC: NOT DETECTED
MCH RBC QN AUTO: 25.3 PG (ref 26.6–33)
MCHC RBC AUTO-ENTMCNC: 30.9 G/DL (ref 31.5–35.7)
MCV RBC AUTO: 81.8 FL (ref 79–97)
MONOCYTES # BLD AUTO: 0.4 10*3/MM3 (ref 0.1–0.9)
MONOCYTES NFR BLD AUTO: 5.7 % (ref 5–12)
NEUTROPHILS NFR BLD AUTO: 5.71 10*3/MM3 (ref 1.7–7)
NEUTROPHILS NFR BLD AUTO: 80.7 % (ref 42.7–76)
NRBC BLD AUTO-RTO: 0 /100 WBC (ref 0–0.2)
NT-PROBNP SERPL-MCNC: 1099 PG/ML (ref 0–1800)
PLATELET # BLD AUTO: 236 10*3/MM3 (ref 140–450)
PMV BLD AUTO: 10.2 FL (ref 6–12)
POTASSIUM SERPL-SCNC: 3.9 MMOL/L (ref 3.5–5.2)
PROT SERPL-MCNC: 6.8 G/DL (ref 6–8.5)
QT INTERVAL: 385 MS
QTC INTERVAL: 416 MS
RBC # BLD AUTO: 3.91 10*6/MM3 (ref 3.77–5.28)
RHINOVIRUS RNA SPEC NAA+PROBE: NOT DETECTED
RSV RNA NPH QL NAA+NON-PROBE: NOT DETECTED
SARS-COV-2 RNA NPH QL NAA+NON-PROBE: NOT DETECTED
SODIUM SERPL-SCNC: 144 MMOL/L (ref 136–145)
TROPONIN T NUMERIC DELTA: 0 NG/L
TROPONIN T SERPL HS-MCNC: 13 NG/L
WBC NRBC COR # BLD AUTO: 7.07 10*3/MM3 (ref 3.4–10.8)
WHOLE BLOOD HOLD COAG: NORMAL
WHOLE BLOOD HOLD SPECIMEN: NORMAL

## 2024-12-21 PROCEDURE — 83880 ASSAY OF NATRIURETIC PEPTIDE: CPT | Performed by: PHYSICIAN ASSISTANT

## 2024-12-21 PROCEDURE — 85025 COMPLETE CBC W/AUTO DIFF WBC: CPT | Performed by: PHYSICIAN ASSISTANT

## 2024-12-21 PROCEDURE — 84484 ASSAY OF TROPONIN QUANT: CPT | Performed by: PHYSICIAN ASSISTANT

## 2024-12-21 PROCEDURE — 36415 COLL VENOUS BLD VENIPUNCTURE: CPT

## 2024-12-21 PROCEDURE — 94799 UNLISTED PULMONARY SVC/PX: CPT

## 2024-12-21 PROCEDURE — 93010 ELECTROCARDIOGRAM REPORT: CPT | Performed by: INTERNAL MEDICINE

## 2024-12-21 PROCEDURE — 94761 N-INVAS EAR/PLS OXIMETRY MLT: CPT

## 2024-12-21 PROCEDURE — 94640 AIRWAY INHALATION TREATMENT: CPT

## 2024-12-21 PROCEDURE — 99284 EMERGENCY DEPT VISIT MOD MDM: CPT

## 2024-12-21 PROCEDURE — 71045 X-RAY EXAM CHEST 1 VIEW: CPT

## 2024-12-21 PROCEDURE — 93005 ELECTROCARDIOGRAM TRACING: CPT | Performed by: PHYSICIAN ASSISTANT

## 2024-12-21 PROCEDURE — 80053 COMPREHEN METABOLIC PANEL: CPT | Performed by: PHYSICIAN ASSISTANT

## 2024-12-21 PROCEDURE — 0202U NFCT DS 22 TRGT SARS-COV-2: CPT | Performed by: EMERGENCY MEDICINE

## 2024-12-21 RX ORDER — PREDNISONE 20 MG/1
40 TABLET ORAL DAILY
Qty: 10 TABLET | Refills: 0 | Status: SHIPPED | OUTPATIENT
Start: 2024-12-21 | End: 2024-12-26

## 2024-12-21 RX ORDER — OSELTAMIVIR PHOSPHATE 30 MG/1
30 CAPSULE ORAL 2 TIMES DAILY
Qty: 10 CAPSULE | Refills: 0 | Status: SHIPPED | OUTPATIENT
Start: 2024-12-21 | End: 2024-12-26

## 2024-12-21 RX ORDER — ONDANSETRON 4 MG/1
4-8 TABLET, ORALLY DISINTEGRATING ORAL EVERY 8 HOURS PRN
Qty: 10 TABLET | Refills: 0 | Status: SHIPPED | OUTPATIENT
Start: 2024-12-21

## 2024-12-21 RX ORDER — IPRATROPIUM BROMIDE AND ALBUTEROL SULFATE 2.5; .5 MG/3ML; MG/3ML
3 SOLUTION RESPIRATORY (INHALATION) ONCE
Status: COMPLETED | OUTPATIENT
Start: 2024-12-21 | End: 2024-12-21

## 2024-12-21 RX ADMIN — IPRATROPIUM BROMIDE AND ALBUTEROL SULFATE 3 ML: 2.5; .5 SOLUTION RESPIRATORY (INHALATION) at 08:58

## 2024-12-21 NOTE — ED PROVIDER NOTES
MD ATTESTATION NOTE  I supervised care provided by the midlevel provider. We have discussed this patient's history, physical exam, and treatment plan. I have reviewed the midlevel provider's note and I agree with the midlevel provider's findings and plan of care.   SHARED VISIT: This visit was performed by BOTH a physician and an APC. The substantive portion of the medical decision making was performed by this attesting physician who made or approved the management plan and takes responsibility for patient management. All studies in the APC note (if performed) were independently interpreted by me.   I have personally had a face to face encounter with the patient.     PCP: Liz Zaragoza MD  Patient Care Team:  Liz Zaragoza MD as PCP - General (Internal Medicine)  Cullen Sheridan MD as Consulting Physician (Pulmonary Disease)  Marcos Hoff MD as Cardiologist (Cardiology)  Angela Ibarra MD as Consulting Physician (Hematology and Oncology)  Cindy Lucero APRN as Referring Physician (Family Medicine)     Marleny Greenwood is a 79 y.o. female who presents to the ED c/o shortness of breath and cough.  Patient began having a cough yesterday.  Her son was recently diagnosed with influenza.  She denies fever, headaches, myalgias, nausea or vomiting.  She denies chest pain or lower extremity edema.  Coronary disease with stents.  No history of heart failure.    On exam:  General: NAD.  Head: NCAT.  ENT: nares patent, no scleral icterus  Neck: Supple, trachea midline.  Cardiac: regular rate and rhythm.  Lungs: normal effort, clear to auscultation bilaterally  Abdomen: Soft, nondistended, NTTP, no rebound tenderness, no guarding or rigidity.   Extremities: Moves all extremities well, no peripheral edema  Neuro: alert, MAEW, follows commands  Psych: calm, cooperative  Skin: Warm, dry.    Medical Decision Making:  After the initial H&P, I discussed pertinent information from history and physical exam with  patient/family.  Discussed differential diagnosis.  Discussed plan for ED evaluation/work-up/treatment.  All questions answered.  Patient/family is agreeable with plan.    ED Course as of 12/21/24 1327   Sat Dec 21, 2024   0812 WBC: 7.07 [KA]   0812 Hemoglobin(!): 9.9 [KA]   0817 XR Chest 1 View  My independent interpretation of the imaging study is no lobar infiltrate or pneumothorax [AB]   0830 EKG interpreted by myself  Time: 0824  Rate: 70  Rhythm: NSR  No ST elevation or depression  Normal intervals  LVH [AB]   0916 Influenza A H3(!): Detected [KA]   0916 proBNP: 1,099.0 [KA]   0916 Glucose(!): 104 [KA]   0916 Creatinine: 0.78 [KA]   0917 My independent interpretation of the chest x-ray cardiomegaly, no infiltrate [KA]   1016 Reassessed the patient, she is resting, vitals normal on the monitor.  Oxygen is 93% on room air.  She feels improved after DuoNeb.  Influenza positive, no hypoxia, cardiac enzymes unremarkable, EKG and chest x-ray unremarkable.  Will prescribe Tamiflu, course of steroids and Zofran in case she develops nausea related to the Tamiflu.  Return precautions discussed.  She has an albuterol inhaler at home have encouraged her to use. [KA]      ED Course User Index  [AB] Gold Krishnan MD  [KA] Savanah Espinoza PA-C       Diagnosis  Final diagnoses:   Influenza A   Dyspnea, unspecified type   Mild asthma with exacerbation, unspecified whether persistent        Gold Krishnan MD  12/21/24 1029       Gold Krishnan MD  12/21/24 1327

## 2024-12-21 NOTE — ED PROVIDER NOTES
EMERGENCY DEPARTMENT ENCOUNTER  Room Number:  01/01  PCP: Liz Zaragoza MD  Independent Historians: Patient      HPI:  Chief Complaint: had concerns including Shortness of Breath.     A complete HPI/ROS/PMH/PSH/SH/FH are unobtainable due to: None    Chronic or social conditions impacting patient care (Social Determinants of Health): None      Context: The patient is a 79 y.o. female with a medical history of asthma, hypertension, hyperlipidemia who presents to the ED c/o acute cough, chest congestion, shortness of breath.  States she started feeling ill 2 days ago, symptoms worsening.  Denies chest pain fever chills body aches headache, has some mild nasal symptoms.  Denies any nausea vomiting or diarrhea.  States she used her inhaler last night 1 time with brief relief.   was diagnosed with influenza couple days ago.       Review of prior external notes (non-ED) -and- Review of prior external test results outside of this encounter:  Labs performed 12/19/2024 and creatinine was 0.98, hemoglobin 9.5, white blood cell count 5.3        PAST MEDICAL HISTORY  Active Ambulatory Problems     Diagnosis Date Noted    Asthma, cough variant 03/25/2019    Coronary arteriosclerosis in native artery 03/25/2019    Essential hypertension 03/25/2019    Chronic daily headache 03/25/2019    Other hyperlipidemia 06/11/2019    Nonsustained ventricular tachycardia 06/11/2019    History of cardiac cath 06/11/2019    S/P CABG (coronary artery bypass graft) 06/11/2019    Osteopenia of multiple sites 12/02/2019    Mild aortic insufficiency 07/22/2020    Grade I diastolic dysfunction 01/07/2021    Renal insufficiency, mild 12/21/2021     Resolved Ambulatory Problems     Diagnosis Date Noted    Nodule of upper lobe of right lung 05/28/2019    History of loop recorder 06/11/2019    H/O heart artery stent 06/11/2019    H/O echocardiogram 06/11/2019    History of nuclear stress test 06/11/2019    Congestive heart failure (CHF)  06/11/2019    Pericardial constriction 11/01/2019    Acute diastolic congestive heart failure 03/11/2020    Hypokalemia 07/22/2020    Pulmonary hypertension 07/22/2020    Precordial pain 11/02/2020    Abnormal nuclear stress test 06/15/2022    Anginal equivalent 06/15/2022    Exertional dyspnea 01/19/2023    Unstable angina 02/06/2024     Past Medical History:   Diagnosis Date    Anemia     Asthma     Chronic kidney disease     Cough     Esophageal reflux     Hyperlipidemia LDL goal <100 06/11/2019    Migraine     Pneumonia     Postmenopausal atrophic vaginitis     Pre-syncope     Respiratory insufficiency 06/11/2019    Seasonal allergies     Shoulder dislocation     Status post placement of implantable loop recorder 05/20/2015    Vitamin D deficiency          PAST SURGICAL HISTORY  Past Surgical History:   Procedure Laterality Date    APPENDECTOMY      BRONCHOSCOPY N/A 8/7/2019    Procedure: BRONCHOSCOPY WITH BRONCHOALVEOLAR LAVAGE;  Surgeon: Thomas Sheridan MD;  Location: [facility] ENDOSCOPY;  Service: Pulmonary    CARDIAC CATHETERIZATION  06/02/2014    History of Cardiac Cath Procedure Outcome: Successful    CARDIAC CATHETERIZATION N/A 11/19/2019    Procedure: Right and Left Heart Cath lv pressures;  Surgeon: Mal Moser MD;  Location: [facility] CATH INVASIVE LOCATION;  Service: Cardiology    CARDIAC CATHETERIZATION N/A 11/19/2019    Procedure: Saphenous Vein Graft;  Surgeon: Mal Moser MD;  Location: [facility] CATH [facility]VASIVE LOC[facility];  Service: Cardiology    CARDIAC CATHETERIZATION N/A 11/19/2019    Procedure: Coronary angiography;  Surgeon: Mal Moser MD;  [facility];  Service: Cardiology    CARDIAC CATHETERIZATION Left 7/1/2022    Procedure: Coronary Angiography;  Surgeon: Marco A Muir MD;  Location: [facility] CATH INVASIVE LOCATION;  Service: Cardiology;  Laterality: Left;    CARDIAC CATHETERIZATION N/A 7/1/2022    Procedure: Left Heart Cath;  Surgeon: Isadora  Marco A BRIGGS MD;  Location:  [facility] CATH INVASIVE LOCATION;  Service: Cardiology;  Laterality: N/A;    CARDIAC CATHETERIZATION N/A 7/3/2023    Procedure: Left Heart Cath;  Surgeon: Macros Hoff MD;  Location:  [facility] CATH INVASIVE LOCATION;  Service: Cardiology;  Laterality: N/A;    CARDIAC CATHETERIZATION N/A 7/3/2023    Procedure: Coronary angiography;  Surgeon: Marcos Hoff MD;  Location:  [facility] CATH INVASIVE LOCATION;  Service: Cardiology;  Laterality: N/A;    CARDIAC CATHETERIZATION  7/3/2023    Procedure: Saphenous Vein Graft;  Surgeon: Marcos Hfof MD;  Location:  [facility] CATH INVASIVE LOCATION;  Service: Cardiology;;    CARDIAC CATHETERIZATION N/A 7/3/2023    Procedure: Percutaneous Coronary Intervention;  Surgeon: Marcos Hoff MD;  Location:  [facility] CATH INVASIVE LOCATION;  Service: Cardiology;  Laterality: N/A;    CARDIAC CATHETERIZATION N/A 7/3/2023    Procedure: Stent FER coronary;  Surgeon: Marcos Hoff MD;  [facility];  Service: Cardiology;  Laterality: N/A;    CARDIAC CATHETERIZATION N/A 2/8/2024    Procedure: Left Heart Cath;  Surgeon: Marcos Hoff MD;  [facility];  Service: Cardiology;  Laterality: N/A;    CARDIAC CATHETERIZATION N/A 2/8/2024    Procedure: Coronary angiography;  Surgeon: Marcos Hoff MD;  [facility];  Service: Cardiology;  Laterality: N/A;    CARDIAC CATHETERIZATION  2/8/2024    Procedure: Saphenous Vein Graft;  Surgeon: Marcos Hoff MD;  [facility];  Service: Cardiology;;    CARDIAC CATHETERIZATION N/A 2/29/2024    Procedure: Coronary angiography;  Surgeon: Marcos Hoff MD;  [facility];  Service: Cardiovascular;  Laterality: N/A;    CARDIAC CATHETERIZATION N/A 2/29/2024    Procedure: Resting Full Cycle Ratio;  Surgeon: Marcos Hoff MD;  [facility];  Service: Cardiovascular;  Laterality: N/A;    CARDIAC CATHETERIZATION   2024    Procedure: Saphenous Vein Graft;  Surgeon: Marcos Hoff MD;  Location: [facility] CATH INVASIVE LOCATION;  Service: Cardiovascular;;    CATARACT EXTRACTION       SECTION      CHOLECYSTECTOMY      COLONOSCOPY  2009    q-10    CORONARY ANGIOPLASTY WITH STENT PLACEMENT  2012     [facility] by Dr. Roman.    CORONARY ARTERY BYPASS GRAFT Bilateral 2011    with a left internal mammary graft to the LAD and saphenous vein graft to the posterior descending artery    ENDOSCOPY       Diagnostic Esophagogastroduodenoscopy    HYSTERECTOMY      NERVE BLOCK       Nerve Block Transforaminal Epidural Lumbar    OTHER SURGICAL HISTORY  2015    Patient-Activated Cardiac Event Recorder - From 2015 to 2015         FAMILY HISTORY  Family History   Problem Relation Age of Onset    Heart disease Other     Breast cancer Other     Breast cancer Mother     Hypertension Mother          SOCIAL HISTORY  Social History     Socioeconomic History    Marital status:    Tobacco Use    Smoking status: Never    Smokeless tobacco: Never   Vaping Use    Vaping status: Never Used   Substance and Sexual Activity    Alcohol use: No    Drug use: No    Sexual activity: Defer         ALLERGIES  Tylenol [acetaminophen], Adhesive tape, Cardizem [diltiazem], and Levaquin [levofloxacin]      REVIEW OF SYSTEMS  Review of Systems  Included in HPI  All systems reviewed and negative except for those discussed in HPI.      PHYSICAL EXAM    I have reviewed the triage vital signs and nursing notes.    ED Triage Vitals [24 0735]   Temp Heart Rate Resp BP SpO2   98.8 °F (37.1 °C) 69 16 (!) 193/96 94 %      Temp src Heart Rate Source Patient Position BP Location FiO2 (%)   Tympanic -- -- -- --       Physical Exam  GENERAL: alert, no acute distress  SKIN: Warm, dry  HENT: Normocephalic, atraumatic  EYES: no scleral icterus  CV: regular rhythm, regular rate  RESPIRATORY: normal effort, lungs  clear, no rhonchi wheezes or crackles  ABDOMEN: nondistended soft nontender  MUSCULOSKELETAL: no deformity  NEURO: alert, moves all extremities, follows commands            LAB RESULTS  Recent Results (from the past 24 hours)   Respiratory Panel PCR w/COVID-19(SARS-CoV-2) [facility] In-House, NP Swab in UTM/VTM, 2 HR TAT - Swab, Nasopharynx    Collection Time: 12/21/24  7:52 AM    Specimen: Nasopharynx; Swab   Result Value Ref Range    ADENOVIRUS, PCR Not Detected Not Detected    Coronavirus 229E Not Detected Not Detected    Coronavirus HKU1 Not Detected Not Detected    Coronavirus NL63 Not Detected Not Detected    Coronavirus OC43 Not Detected Not Detected    COVID19 Not Detected Not Detected - Ref. Range    Human Metapneumovirus Not Detected Not Detected    Human Rhinovirus/Enterovirus Not Detected Not Detected    Influenza A H3 Detected (A) Not Detected    Influenza B PCR Not Detected Not Detected    Parainfluenza Virus 1 Not Detected Not Detected    Parainfluenza Virus 2 Not Detected Not Detected    Parainfluenza Virus 3 Not Detected Not Detected    Parainfluenza Virus 4 Not Detected Not Detected    RSV, PCR Not Detected Not Detected    Bordetella pertussis pcr Not Detected Not Detected    Bordetella parapertussis PCR Not Detected Not Detected    Chlamydophila pneumoniae PCR Not Detected Not Detected    Mycoplasma pneumo by PCR Not Detected Not Detected   Green Top (Gel)    Collection Time: 12/21/24  7:52 AM   Result Value Ref Range    Extra Tube Hold for add-ons.    Lavender Top    Collection Time: 12/21/24  7:52 AM   Result Value Ref Range    Extra Tube hold for add-on    Gold Top - SST    Collection Time: 12/21/24  7:52 AM   Result Value Ref Range    Extra Tube Hold for add-ons.    Light Blue Top    Collection Time: 12/21/24  7:52 AM   Result Value Ref Range    Extra Tube Hold for add-ons.    Comprehensive Metabolic Panel    Collection Time: 12/21/24  7:52 AM    Specimen: Blood   Result Value Ref  Range    Glucose 104 (H) 65 - 99 mg/dL    BUN 10 8 - 23 mg/dL    Creatinine 0.78 0.57 - 1.00 mg/dL    Sodium 144 136 - 145 mmol/L    Potassium 3.9 3.5 - 5.2 mmol/L    Chloride 111 (H) 98 - 107 mmol/L    CO2 23.6 22.0 - 29.0 mmol/L    Calcium 8.9 8.6 - 10.5 mg/dL    Total Protein 6.8 6.0 - 8.5 g/dL    Albumin 3.6 3.5 - 5.2 g/dL    ALT (SGPT) 7 1 - 33 U/L    AST (SGOT) 14 1 - 32 U/L    Alkaline Phosphatase 55 39 - 117 U/L    Total Bilirubin 0.2 0.0 - 1.2 mg/dL    Globulin 3.2 gm/dL    A/G Ratio 1.1 g/dL    BUN/Creatinine Ratio 12.8 7.0 - 25.0    Anion Gap 9.4 5.0 - 15.0 mmol/L    eGFR 77.9 >60.0 mL/min/1.73   High Sensitivity Troponin T    Collection Time: 12/21/24  7:52 AM    Specimen: Blood   Result Value Ref Range    HS Troponin T 13 <14 ng/L   BNP    Collection Time: 12/21/24  7:52 AM    Specimen: Blood   Result Value Ref Range    proBNP 1,099.0 0.0 - 1,800.0 pg/mL   CBC Auto Differential    Collection Time: 12/21/24  7:52 AM    Specimen: Blood   Result Value Ref Range    WBC 7.07 3.40 - 10.80 10*3/mm3    RBC 3.91 3.77 - 5.28 10*6/mm3    Hemoglobin 9.9 (L) 12.0 - 15.9 g/dL    Hematocrit 32.0 (L) 34.0 - 46.6 %    MCV 81.8 79.0 - 97.0 fL    MCH 25.3 (L) 26.6 - 33.0 pg    MCHC 30.9 (L) 31.5 - 35.7 g/dL    RDW 16.2 (H) 12.3 - 15.4 %    RDW-SD 47.4 37.0 - 54.0 fl    MPV 10.2 6.0 - 12.0 fL    Platelets 236 140 - 450 10*3/mm3    Neutrophil % 80.7 (H) 42.7 - 76.0 %    Lymphocyte % 11.6 (L) 19.6 - 45.3 %    Monocyte % 5.7 5.0 - 12.0 %    Eosinophil % 1.3 0.3 - 6.2 %    Basophil % 0.3 0.0 - 1.5 %    Immature Grans % 0.4 0.0 - 0.5 %    Neutrophils, Absolute 5.71 1.70 - 7.00 10*3/mm3    Lymphocytes, Absolute 0.82 0.70 - 3.10 10*3/mm3    Monocytes, Absolute 0.40 0.10 - 0.90 10*3/mm3    Eosinophils, Absolute 0.09 0.00 - 0.40 10*3/mm3    Basophils, Absolute 0.02 0.00 - 0.20 10*3/mm3    Immature Grans, Absolute 0.03 0.00 - 0.05 10*3/mm3    nRBC 0.0 0.0 - 0.2 /100 WBC   ECG 12 Lead Dyspnea    Collection Time: 12/21/24  8:24 AM    Result Value Ref Range    QT Interval 385 ms    QTC Interval 416 ms   High Sensitivity Troponin T 1Hr    Collection Time: 12/21/24  9:28 AM    Specimen: Blood   Result Value Ref Range    HS Troponin T 13 <14 ng/L    Troponin T Numeric Delta 0 Abnormal if >/=3 ng/L         RADIOLOGY  XR Chest 1 View    Result Date: 12/21/2024  XR CHEST 1 VW-   INDICATION: Shortness of air, cough  COMPARISON: Chest radiograph January 19, 2023  TECHNIQUE: 1 view chest  FINDINGS:  No focal opacity. No effusions. Stable mediastinum. Heart is normal in size. Median sternotomy. Left chest wall implantable loop recorder. Cholecystectomy clips.      No acute cardiopulmonary process  This report was finalized on 12/21/2024 8:19 AM by Dr. Joseph Banks M.D on Workstation: [id number]         MEDICATIONS GIVEN IN ER  Medications   ipratropium-albuterol (DUO-NEB) nebulizer solution 3 mL (3 mL Nebulization Given 12/21/24 0858)         ORDERS PLACED DURING THIS VISIT:  Orders Placed This Encounter   Procedures    Respiratory Panel PCR w/COVID-19(SARS-CoV-2) [facility], NP Swab in UTM/VTM, 2 HR TAT - Swab, Nasopharynx    XR Chest 1 View    [place] Draw    Comprehensive Metabolic Panel    High Sensitivity Troponin T    BNP    CBC Auto Differential    High Sensitivity Troponin T 1Hr    Monitor Blood Pressure    Continuous Pulse Oximetry    ECG 12 Lead Dyspnea    Telemetry Scan    Telemetry Scan    Green Top (Gel)    Lavender Top    Gold Top - SST    Light Blue Top    CBC & Differential         OUTPATIENT MEDICATION MANAGEMENT:  Current Facility-Administered Medications Ordered in Epic   Medication Dose Route Frequency Provider Last Rate Last Admin    cyanocobalamin injection 1,000 mcg  1,000 mcg Intramuscular Q28 Days Liz Zaragoza MD   1,000 mcg at 12/19/24 1435     Current Outpatient Medications Ordered in Epic   Medication Sig Dispense Refill    albuterol sulfate  (90 Base) MCG/ACT inhaler Inhale 2 puffs Every  4 (Four) Hours As Needed for Wheezing. 18 g 2    aspirin 81 MG EC tablet Take 1 tablet by mouth Daily.      carvedilol (COREG) 25 MG tablet Take 1 tablet by mouth 2 (Two) Times a Day. 180 tablet 3    clopidogrel (PLAVIX) 75 MG tablet Take 1 tablet by mouth Daily. 90 tablet 3    furosemide (LASIX) 40 MG tablet TAKE 1 TABLET TWICE A  tablet 3    hydrALAZINE (APRESOLINE) 50 MG tablet Take 1 tablet by mouth 3 (Three) Times a Day. 270 tablet 71    isosorbide mononitrate (IMDUR) 60 MG 24 hr tablet Take 1 tablet by mouth Daily. 90 tablet 3    Multiple Vitamins-Minerals (PRESERVISION AREDS 2 PO) Take  by mouth.      nitroglycerin (NITROSTAT) 0.4 MG SL tablet Place 1 tablet under the tongue Every 5 (Five) Minutes As Needed for Chest Pain. Take no more than 3 doses in 15 minutes. 25 tablet 3    omeprazole (priLOSEC) 20 MG capsule Take 1 capsule by mouth Daily. 90 capsule 3    ondansetron ODT (ZOFRAN-ODT) 4 MG disintegrating tablet Place 1-2 tablets on the tongue Every 8 (Eight) Hours As Needed for Nausea or Vomiting. 10 tablet 0    oseltamivir (Tamiflu) 30 MG capsule Take 1 capsule by mouth 2 (Two) Times a Day for 5 days. 10 capsule 0    polyethylene glycol (MIRALAX) packet Take 17 g by mouth Daily.      potassium chloride ER (K-TAB) 20 MEQ tablet controlled-release ER tablet Take 1 tablet by mouth 2 (Two) Times a Day. 270 tablet 1    predniSONE (DELTASONE) 20 MG tablet Take 2 tablets by mouth Daily for 5 days. 10 tablet 0    promethazine (PHENERGAN) 25 MG tablet Take 1 tablet by mouth Every 6 (Six) Hours As Needed for Nausea or Vomiting. 90 tablet 3    rosuvastatin (CRESTOR) 40 MG tablet TAKE 1 TABLET DAILY 90 tablet 3    spironolactone (ALDACTONE) 25 MG tablet Take 1 tablet by mouth Daily. 90 tablet 3    vitamin D (ERGOCALCIFEROL) 1.25 MG (96178 UT) capsule capsule TAKE 1 CAPSULE ONCE WEEKLY 12 capsule 3         PROCEDURES  Procedures            PROGRESS, DATA ANALYSIS, CONSULTS, AND MEDICAL DECISION MAKING  All labs  have been independently interpreted by me.  All radiology studies have been reviewed by me. All EKG's have been independently viewed and interpreted by me.  Discussion below represents my analysis of pertinent findings related to patient's condition, differential diagnosis, treatment plan and final disposition.    DIFFERENTIAL    Differential diagnosis includes but is not limited to viral URI, pneumonia, acute asthma exacerbation, bronchitis    Clinical Scores:                  ED Course as of 12/21/24 1322   Sat Dec 21, 2024   0812 WBC: 7.07 [KA]   0812 Hemoglobin(!): 9.9 [KA]   0817 XR Chest 1 View  My independent interpretation of the imaging study is no lobar infiltrate or pneumothorax [AB]   0830 EKG interpreted by myself  Time: 0824  Rate: 70  Rhythm: NSR  No ST elevation or depression  Normal intervals  LVH [AB]   0916 Influenza A H3(!): Detected [KA]   0916 proBNP: 1,099.0 [KA]   0916 Glucose(!): 104 [KA]   0916 Creatinine: 0.78 [KA]   0917 My independent interpretation of the chest x-ray cardiomegaly, no infiltrate [KA]   1016 Reassessed the patient, she is resting, vitals normal on the monitor.  Oxygen is 93% on room air.  She feels improved after DuoNeb.  Influenza positive, no hypoxia, cardiac enzymes unremarkable, EKG and chest x-ray unremarkable.  Will prescribe Tamiflu, course of steroids and Zofran in case she develops nausea related to the Tamiflu.  Return precautions discussed.  She has an albuterol inhaler at home have encouraged her to use. [KA]      ED Course User Index  [AB] Gold Krishnan MD  [KA] Savanah Espinoza PA-C             AS OF 13:22 EST VITALS:    BP - (!) 190/83  HR - 66  TEMP - 98.8 °F (37.1 °C) (Tympanic)  O2 SATS - 92%    COMPLEXITY OF CARE  Admission was considered but after careful review of the patient's presentation, physical examination, diagnostic results, and response to treatment the patient may be safely discharged with outpatient follow-up.      DIAGNOSIS  Final  diagnoses:   Influenza A   Dyspnea, unspecified type   Mild asthma with exacerbation, unspecified whether persistent         DISPOSITION  ED Disposition       ED Disposition   Discharge    Condition   Good    Comment   --                  FOLLOW UP  Liz Zaragoza MD  [address]  [facility] [address]  [facility] [id number] [phone]    Schedule an appointment as soon as possible for a visit in 3 days      [facility] EMERGENCY DEPARTMENT  [address]  [facility] [address] [phone]    If symptoms worsen or any concerns        Prescribed Medications     Medication List        New Prescriptions      ondansetron ODT 4 MG disintegrating tablet  Commonly known as: ZOFRAN-ODT  Place 1-2 tablets on the tongue Every 8 (Eight) Hours As Needed for Nausea or Vomiting.     oseltamivir 30 MG capsule  Commonly known as: Tamiflu  Take 1 capsule by mouth 2 (Two) Times a Day for 5 days.     predniSONE 20 MG tablet  Commonly known as: DELTASONE  Take 2 tablets by mouth Daily for 5 days.               Where to Get Your Medications        These medications were sent to [id number] - [place], [place] - [address] AT [facility] - [phone]  - [phone]   [address] [address]      Phone: [phone]   ondansetron ODT 4 MG disintegrating tablet  oseltamivir 30 MG capsule  predniSONE 20 MG tablet                   Please note that portions of this document were completed with a voice recognition program.    Note Disclaimer: At [facility], we believe that sharing information builds trust and better relationships. You are receiving this note because you recently visited [facility]. It is possible you will see health information before a provider has talked with you about it. This kind of information can be easy to misunderstand. To help you fully understand what it means for your health, we urge you to discuss this note with your  provider.         Savanah Espinoza PA-C  12/21/24 3120

## 2025-01-13 ENCOUNTER — TELEPHONE (OUTPATIENT)
Dept: INTERNAL MEDICINE | Facility: CLINIC | Age: 80
End: 2025-01-13
Payer: MEDICARE

## 2025-01-13 NOTE — TELEPHONE ENCOUNTER
PT called said that she was in ER 12/21 and was Dx with the flu.  She thought she was getting better but her cough seems to be getting worse.  Do you need to see her?   Can something be called in?  PT said that she has hydromet and it does not seem to be working

## 2025-01-13 NOTE — TELEPHONE ENCOUNTER
"  Caller: Marleny Greenwood    Relationship: Self    Best call back number: [phone]    What is the best time to reach you: ASAP    Who are you requesting to speak with (clinical staff, provider,  specific staff member): CLINICAL      What was the call regarding: \"A LOT GOING ON RIGHT NOW, NEED TO GET IN ASAP\" HUB OFFERED WEDNESDAY, GABRIELLE 15, PATIENT STATED THAT WON'T WORK     "

## 2025-01-14 ENCOUNTER — OFFICE VISIT (OUTPATIENT)
Dept: INTERNAL MEDICINE | Facility: CLINIC | Age: 80
End: 2025-01-14
Payer: MEDICARE

## 2025-01-14 VITALS
HEIGHT: 60 IN | DIASTOLIC BLOOD PRESSURE: 68 MMHG | BODY MASS INDEX: 25.13 KG/M2 | WEIGHT: 128 LBS | HEART RATE: 80 BPM | SYSTOLIC BLOOD PRESSURE: 144 MMHG

## 2025-01-14 DIAGNOSIS — J40 BRONCHITIS: Primary | ICD-10-CM

## 2025-01-14 DIAGNOSIS — D51.9 ANEMIA DUE TO VITAMIN B12 DEFICIENCY, UNSPECIFIED B12 DEFICIENCY TYPE: Chronic | ICD-10-CM

## 2025-01-14 PROCEDURE — 1159F MED LIST DOCD IN RCRD: CPT | Performed by: INTERNAL MEDICINE

## 2025-01-14 PROCEDURE — 3077F SYST BP >= 140 MM HG: CPT | Performed by: INTERNAL MEDICINE

## 2025-01-14 PROCEDURE — 1126F AMNT PAIN NOTED NONE PRSNT: CPT | Performed by: INTERNAL MEDICINE

## 2025-01-14 PROCEDURE — 96372 THER/PROPH/DIAG INJ SC/IM: CPT | Performed by: INTERNAL MEDICINE

## 2025-01-14 PROCEDURE — 99214 OFFICE O/P EST MOD 30 MIN: CPT | Performed by: INTERNAL MEDICINE

## 2025-01-14 PROCEDURE — 3078F DIAST BP <80 MM HG: CPT | Performed by: INTERNAL MEDICINE

## 2025-01-14 PROCEDURE — 1160F RVW MEDS BY RX/DR IN RCRD: CPT | Performed by: INTERNAL MEDICINE

## 2025-01-14 RX ORDER — DOXYCYCLINE 100 MG/1
100 CAPSULE ORAL 2 TIMES DAILY
Qty: 14 CAPSULE | Refills: 0 | Status: SHIPPED | OUTPATIENT
Start: 2025-01-14

## 2025-01-14 RX ORDER — CYANOCOBALAMIN 1000 UG/ML
1000 INJECTION, SOLUTION INTRAMUSCULAR; SUBCUTANEOUS
Status: SHIPPED | OUTPATIENT
Start: 2025-01-14

## 2025-01-14 RX ORDER — DEXTROMETHORPHAN HYDROBROMIDE AND PROMETHAZINE HYDROCHLORIDE 15; 6.25 MG/5ML; MG/5ML
5 SYRUP ORAL 4 TIMES DAILY PRN
Qty: 240 ML | Refills: 0 | Status: SHIPPED | OUTPATIENT
Start: 2025-01-14

## 2025-01-14 RX ADMIN — CYANOCOBALAMIN 1000 MCG: 1000 INJECTION, SOLUTION INTRAMUSCULAR; SUBCUTANEOUS at 11:28

## 2025-01-14 NOTE — PROGRESS NOTES
"Chief Complaint  Cough    Subjective        Marleny Greenwood presents to [facility]  History of Present Illness diagnosed with flu just before Timo- got a bit better but recently getting worse again with cough- she has a chronic cough but this is different. She did not take Tamiflu because she had been sick for a few days and ER NP wasn't sure it would be helpful.   She has not been taking B12.    Objective   Vital Signs:  /68   Pulse 80   Ht 152.4 cm (60\")   Wt 58.1 kg (128 lb)   BMI 25.00 kg/m²   Estimated body mass index is 25 kg/m² as calculated from the following:    Height as of this encounter: 152.4 cm (60\").    Weight as of this encounter: 58.1 kg (128 lb).          Physical Exam  Constitutional:       Appearance: Normal appearance.   Cardiovascular:      Rate and Rhythm: Normal rate.   Pulmonary:      Effort: Pulmonary effort is normal.      Comments: Diffuse congestion, some clearing with cough. No wheezing.   Lymphadenopathy:      Cervical: No cervical adenopathy.        Result Review :  The following data was reviewed by: Liz Zaragoza MD on 01/14/2025:    Data reviewed : Recent hospitalization notes ER           Assessment and Plan   Diagnoses and all orders for this visit:    1. Bronchitis (Primary)  Comments:  I think this is secondary to recent flu illness- will treat with abx, cough meds- recheck if fails to improve.    2. Anemia due to vitamin B12 deficiency, unspecified B12 deficiency type  Comments:  B12 inj today- monitor closely- recheck at f/u- likely needs monthly inj.    Other orders  -     doxycycline (VIBRAMYCIN) 100 MG capsule; Take 1 capsule by mouth 2 (Two) Times a Day.  Dispense: 14 capsule; Refill: 0  -     promethazine-dextromethorphan (PROMETHAZINE-DM) 6.25-15 MG/5ML syrup; Take 5 mL by mouth 4 (Four) Times a Day As Needed for Cough.  Dispense: 240 mL; Refill: 0             Follow Up   No follow-ups on file.  Patient was given " instructions and counseling regarding her condition or for health maintenance advice. Please see specific information pulled into the AVS if appropriate.

## 2025-02-24 ENCOUNTER — OFFICE VISIT (OUTPATIENT)
Dept: INTERNAL MEDICINE | Facility: CLINIC | Age: 80
End: 2025-02-24
Payer: MEDICARE

## 2025-02-24 VITALS
WEIGHT: 128 LBS | BODY MASS INDEX: 25.13 KG/M2 | SYSTOLIC BLOOD PRESSURE: 128 MMHG | DIASTOLIC BLOOD PRESSURE: 60 MMHG | HEIGHT: 60 IN | HEART RATE: 74 BPM

## 2025-02-24 DIAGNOSIS — Z00.00 MEDICARE ANNUAL WELLNESS VISIT, SUBSEQUENT: Primary | ICD-10-CM

## 2025-02-24 DIAGNOSIS — I25.708 CORONARY ARTERY DISEASE OF BYPASS GRAFT OF NATIVE HEART WITH STABLE ANGINA PECTORIS: Chronic | ICD-10-CM

## 2025-02-24 DIAGNOSIS — E53.8 VITAMIN B 12 DEFICIENCY: Chronic | ICD-10-CM

## 2025-02-24 PROCEDURE — 3078F DIAST BP <80 MM HG: CPT | Performed by: INTERNAL MEDICINE

## 2025-02-24 PROCEDURE — 1126F AMNT PAIN NOTED NONE PRSNT: CPT | Performed by: INTERNAL MEDICINE

## 2025-02-24 PROCEDURE — 1160F RVW MEDS BY RX/DR IN RCRD: CPT | Performed by: INTERNAL MEDICINE

## 2025-02-24 PROCEDURE — 1170F FXNL STATUS ASSESSED: CPT | Performed by: INTERNAL MEDICINE

## 2025-02-24 PROCEDURE — 96372 THER/PROPH/DIAG INJ SC/IM: CPT | Performed by: INTERNAL MEDICINE

## 2025-02-24 PROCEDURE — 1159F MED LIST DOCD IN RCRD: CPT | Performed by: INTERNAL MEDICINE

## 2025-02-24 PROCEDURE — G0439 PPPS, SUBSEQ VISIT: HCPCS | Performed by: INTERNAL MEDICINE

## 2025-02-24 PROCEDURE — 3074F SYST BP LT 130 MM HG: CPT | Performed by: INTERNAL MEDICINE

## 2025-02-24 RX ORDER — CYANOCOBALAMIN 1000 UG/ML
1000 INJECTION, SOLUTION INTRAMUSCULAR; SUBCUTANEOUS
Status: SHIPPED | OUTPATIENT
Start: 2025-02-24

## 2025-02-24 RX ADMIN — CYANOCOBALAMIN 1000 MCG: 1000 INJECTION, SOLUTION INTRAMUSCULAR; SUBCUTANEOUS at 11:23

## 2025-02-24 NOTE — PROGRESS NOTES
Subjective   The ABCs of the Annual Wellness Visit  Medicare Wellness Visit      Marleny Greenwood is a 79 y.o. patient who presents for a Medicare Wellness Visit.    The following portions of the patient's history were reviewed and   updated as appropriate: allergies, current medications, past family history, past medical history, past social history, past surgical history, and problem list.    Compared to one year ago, the patient's physical   health is the same.  Compared to one year ago, the patient's mental   health is the same.    Recent Hospitalizations:  She was not admitted to the hospital during the last year.     Current Medical Providers:  Patient Care Team:  Liz Zaragoza MD as PCP - General (Internal Medicine)  Cullen Sheridan MD as Consulting Physician (Pulmonary Disease)  Marcos Hoff MD as Cardiologist (Cardiology)    Outpatient Medications Prior to Visit   Medication Sig Dispense Refill    albuterol sulfate  (90 Base) MCG/ACT inhaler Inhale 2 puffs Every 4 (Four) Hours As Needed for Wheezing. 18 g 2    aspirin 81 MG EC tablet Take 1 tablet by mouth Daily.      carvedilol (COREG) 25 MG tablet Take 1 tablet by mouth 2 (Two) Times a Day. 180 tablet 3    clopidogrel (PLAVIX) 75 MG tablet Take 1 tablet by mouth Daily. 90 tablet 3    furosemide (LASIX) 40 MG tablet TAKE 1 TABLET TWICE A  tablet 3    hydrALAZINE (APRESOLINE) 50 MG tablet Take 1 tablet by mouth 3 (Three) Times a Day. 270 tablet 71    isosorbide mononitrate (IMDUR) 60 MG 24 hr tablet Take 1 tablet by mouth Daily. 90 tablet 3    Multiple Vitamins-Minerals (PRESERVISION AREDS 2 PO) Take  by mouth.      nitroglycerin (NITROSTAT) 0.4 MG SL tablet Place 1 tablet under the tongue Every 5 (Five) Minutes As Needed for Chest Pain. Take no more than 3 doses in 15 minutes. 25 tablet 3    polyethylene glycol (MIRALAX) packet Take 17 g by mouth Daily.      potassium chloride ER (K-TAB) 20 MEQ tablet controlled-release ER tablet Take  1 tablet by mouth 2 (Two) Times a Day. 270 tablet 1    promethazine (PHENERGAN) 25 MG tablet Take 1 tablet by mouth Every 6 (Six) Hours As Needed for Nausea or Vomiting. 90 tablet 3    rosuvastatin (CRESTOR) 40 MG tablet TAKE 1 TABLET DAILY 90 tablet 3    spironolactone (ALDACTONE) 25 MG tablet Take 1 tablet by mouth Daily. 90 tablet 3    vitamin D (ERGOCALCIFEROL) 1.25 MG (28340 UT) capsule capsule TAKE 1 CAPSULE ONCE WEEKLY 12 capsule 3    omeprazole (priLOSEC) 20 MG capsule Take 1 capsule by mouth Daily. 90 capsule 3    doxycycline (VIBRAMYCIN) 100 MG capsule Take 1 capsule by mouth 2 (Two) Times a Day. (Patient not taking: Reported on 2/24/2025) 14 capsule 0    promethazine-dextromethorphan (PROMETHAZINE-DM) 6.25-15 MG/5ML syrup Take 5 mL by mouth 4 (Four) Times a Day As Needed for Cough. (Patient not taking: Reported on 2/24/2025) 240 mL 0     Facility-Administered Medications Prior to Visit   Medication Dose Route Frequency Provider Last Rate Last Admin    cyanocobalamin injection 1,000 mcg  1,000 mcg Intramuscular Q28 Days Liz Zaragoza MD   1,000 mcg at 12/19/24 1435    cyanocobalamin injection 1,000 mcg  1,000 mcg Intramuscular Q28 Days Liz Zaragoza MD   1,000 mcg at 01/14/25 1128     No opioid medication identified on active medication list. I have reviewed chart for other potential  high risk medication/s and harmful drug interactions in the elderly.      Aspirin is on active medication list. Aspirin use is indicated based on review of current medical condition/s. Pros and cons of this therapy have been discussed today. Benefits of this medication outweigh potential harm.  Patient has been encouraged to continue taking this medication.  .      Patient Active Problem List   Diagnosis    Asthma, cough variant    Coronary arteriosclerosis in native artery    Essential hypertension    Chronic daily headache    Other hyperlipidemia    Nonsustained ventricular tachycardia    History of cardiac cath    S/P  "CABG (coronary artery bypass graft)    Osteopenia of multiple sites    Mild aortic insufficiency    Grade I diastolic dysfunction    Renal insufficiency, mild    B12 deficiency anemia     Advance Care Planning Advance Directive is not on file.  ACP discussion was declined by the patient. Patient does not have an advance directive, declines further assistance.            Objective   Vitals:    25 1054   BP: 128/60   Pulse: 74   Weight: 58.1 kg (128 lb)   Height: 152.4 cm (60\")       Estimated body mass index is 25 kg/m² as calculated from the following:    Height as of this encounter: 152.4 cm (60\").    Weight as of this encounter: 58.1 kg (128 lb).    BMI is >= 25 and <30. (Overweight) The following options were offered after discussion;: none (medical contraindication)           Does the patient have evidence of cognitive impairment? No                                                                                                Health  Risk Assessment    Smoking Status:  Social History     Tobacco Use   Smoking Status Never   Smokeless Tobacco Never     Alcohol Consumption:  Social History     Substance and Sexual Activity   Alcohol Use No       Fall Risk Screen  STEADI Fall Risk Assessment was completed, and patient is at LOW risk for falls.Assessment completed on:2025    Depression Screening   Little interest or pleasure in doing things? Not at all   Feeling down, depressed, or hopeless? Not at all   PHQ-2 Total Score 0      Health Habits and Functional and Cognitive Screenin/24/2025    10:57 AM   Functional & Cognitive Status   Do you have difficulty preparing food and eating? No   Do you have difficulty bathing yourself, getting dressed or grooming yourself? No   Do you have difficulty using the toilet? No   Do you have difficulty moving around from place to place? No   Do you have trouble with steps or getting out of a bed or a chair? No   Current Diet Well Balanced Diet   Dental Exam Up " to date   Eye Exam Up to date   Exercise (times per week) 0 times per week   Current Exercises Include No Regular Exercise   Do you need help using the phone?  No   Are you deaf or do you have serious difficulty hearing?  No   Do you need help to go to places out of walking distance? No   Do you need help shopping? No   Do you need help preparing meals?  No   Do you need help with housework?  No   Do you need help with laundry? No   Do you need help taking your medications? No   Do you need help managing money? No   Do you ever drive or ride in a car without wearing a seat belt? No   Have you felt unusual stress, anger or loneliness in the last month? No   Who do you live with? Spouse   If you need help, do you have trouble finding someone available to you? No   Have you been bothered in the last four weeks by sexual problems? No   Do you have difficulty concentrating, remembering or making decisions? No           Age-appropriate Screening Schedule:  Refer to the list below for future screening recommendations based on patient's age, sex and/or medical conditions. Orders for these recommended tests are listed in the plan section. The patient has been provided with a written plan.    Health Maintenance List  Health Maintenance   Topic Date Due    TDAP/TD VACCINES (1 - Tdap) Never done    HEPATITIS C SCREENING  Never done    RSV Vaccine - Adults (1 - 1-dose 75+ series) Never done    BMI FOLLOWUP  09/27/2024    LIPID PANEL  04/01/2025    ANNUAL WELLNESS VISIT  02/24/2026    DXA SCAN  05/23/2026    COVID-19 Vaccine  Completed    INFLUENZA VACCINE  Completed    Pneumococcal Vaccine 50+  Completed    ZOSTER VACCINE  Completed    MAMMOGRAM  Discontinued    COLORECTAL CANCER SCREENING  Discontinued                                                                                                                                                CMS Preventative Services Quick Reference  Risk Factors Identified During  "Encounter  Immunizations Discussed/Encouraged: Tdap and RSV (Respiratory Syncytial Virus)    The above risks/problems have been discussed with the patient.  Pertinent information has been shared with the patient in the After Visit Summary.  An After Visit Summary and PPPS were made available to the patient.    Follow Up:   Next Medicare Wellness visit to be scheduled in 1 year.         Additional E&M Note during same encounter follows:  Patient has additional, significant, and separately identifiable condition(s)/problem(s) that require work above and beyond the Medicare Wellness Visit     Chief Complaint  Medicare Wellness-subsequent    Subjective   HPI  Marleny is also being seen today for additional medical problem/s.   She is feeling much better- got very weak/tired- saw heme at [facility], doesn't think they noted anything abnormal- told her to take B12 inj. She thinks maybe associated with the butalbital- can't think of anything else. Does not plan on taking again.   She is not sure that she was told anything else is wrong.   Her cough has been better- she has no CP, SOB.               Objective   Vital Signs:  /60   Pulse 74   Ht 152.4 cm (60\")   Wt 58.1 kg (128 lb)   BMI 25.00 kg/m²   Physical Exam  Constitutional:       Appearance: Normal appearance.   Cardiovascular:      Rate and Rhythm: Normal rate and regular rhythm.   Pulmonary:      Effort: Pulmonary effort is normal.      Breath sounds: Normal breath sounds.   Musculoskeletal:      Right lower leg: No edema.      Left lower leg: No edema.   Lymphadenopathy:      Cervical: No cervical adenopathy.   Neurological:      General: No focal deficit present.   Psychiatric:         Mood and Affect: Mood normal.         Thought Content: Thought content normal.         The following data was reviewed by: Liz Zaragoza MD on 02/24/2025:       Data reviewed : Consultant notes pillo fernández       Assessment and Plan               Vitamin B 12 " deficiency    Coronary artery disease of bypass graft of native heart with stable angina pectoris    Medicare annual wellness visit, subsequent    Diagnoses and all orders for this visit:    1. Medicare annual wellness visit, subsequent (Primary)  Comments:  encouraged to get RSV vacc- feels she's doing great and doesn't want to change anything now- has f/u with Dr. Hoff.    2. Vitamin B 12 deficiency  Comments:  Will start monthly B12 inj- is feeling much better.  Orders:  -     cyanocobalamin injection 1,000 mcg    3. Coronary artery disease of bypass graft of native heart with stable angina pectoris  Comments:  stable- feels great- labs have been at goal.       New Medications Ordered This Visit   Medications    cyanocobalamin injection 1,000 mcg          Follow Up   Return in about 3 months (around 5/24/2025) for Recheck, Lab Before FUP.  Patient was given instructions and counseling regarding her condition or for health maintenance advice. Please see specific information pulled into the AVS if appropriate.

## 2025-03-11 ENCOUNTER — TELEPHONE (OUTPATIENT)
Dept: INTERNAL MEDICINE | Facility: CLINIC | Age: 80
End: 2025-03-11
Payer: MEDICARE

## 2025-03-11 RX ORDER — CYCLOBENZAPRINE HCL 10 MG
TABLET ORAL
Qty: 30 TABLET | Refills: 0 | Status: SHIPPED | OUTPATIENT
Start: 2025-03-11

## 2025-03-11 NOTE — TELEPHONE ENCOUNTER
Spoke with PT she is have back spasm and said that in the past you have given her muscle relaxers to help.  She would like to know if you will send something to her Rikki

## 2025-03-11 NOTE — TELEPHONE ENCOUNTER
Caller: Marleny Greenwood    Relationship: Self    Best call back number: [phone]     What is the best time to reach you: ASAP    Who are you requesting to speak with (clinical staff, provider,  specific staff member):         What was the call regarding: PATIENT WOULD LIKE A CALL TO DISCUSS ONGOING BACK PAIN-ISSUES THAT SHE IS HAVING. PLEASE CALL

## 2025-03-24 ENCOUNTER — CLINICAL SUPPORT (OUTPATIENT)
Dept: INTERNAL MEDICINE | Facility: CLINIC | Age: 80
End: 2025-03-24
Payer: MEDICARE

## 2025-03-24 DIAGNOSIS — D51.9 ANEMIA DUE TO VITAMIN B12 DEFICIENCY, UNSPECIFIED B12 DEFICIENCY TYPE: Primary | ICD-10-CM

## 2025-03-24 PROCEDURE — 96372 THER/PROPH/DIAG INJ SC/IM: CPT | Performed by: INTERNAL MEDICINE

## 2025-03-24 RX ADMIN — CYANOCOBALAMIN 1000 MCG: 1000 INJECTION, SOLUTION INTRAMUSCULAR; SUBCUTANEOUS at 10:36

## 2025-05-29 ENCOUNTER — OFFICE VISIT (OUTPATIENT)
Dept: INTERNAL MEDICINE | Facility: CLINIC | Age: 80
End: 2025-05-29
Payer: MEDICARE

## 2025-05-29 VITALS
DIASTOLIC BLOOD PRESSURE: 62 MMHG | BODY MASS INDEX: 27.68 KG/M2 | HEART RATE: 74 BPM | WEIGHT: 141 LBS | SYSTOLIC BLOOD PRESSURE: 146 MMHG | HEIGHT: 60 IN

## 2025-05-29 DIAGNOSIS — I10 ESSENTIAL HYPERTENSION: Chronic | ICD-10-CM

## 2025-05-29 DIAGNOSIS — E87.6 HYPOKALEMIA: ICD-10-CM

## 2025-05-29 DIAGNOSIS — E78.49 OTHER HYPERLIPIDEMIA: Chronic | ICD-10-CM

## 2025-05-29 DIAGNOSIS — D51.9 ANEMIA DUE TO VITAMIN B12 DEFICIENCY, UNSPECIFIED B12 DEFICIENCY TYPE: Primary | Chronic | ICD-10-CM

## 2025-05-29 RX ORDER — POTASSIUM CHLORIDE 1500 MG/1
20 TABLET, EXTENDED RELEASE ORAL 2 TIMES DAILY
Qty: 180 TABLET | Refills: 1 | Status: SHIPPED | OUTPATIENT
Start: 2025-05-29

## 2025-05-29 RX ORDER — ROSUVASTATIN CALCIUM 40 MG/1
40 TABLET, COATED ORAL DAILY
Qty: 90 TABLET | Refills: 1 | Status: SHIPPED | OUTPATIENT
Start: 2025-05-29

## 2025-05-29 RX ORDER — CYANOCOBALAMIN 1000 UG/ML
1000 INJECTION, SOLUTION INTRAMUSCULAR; SUBCUTANEOUS
Status: SHIPPED | OUTPATIENT
Start: 2025-05-29

## 2025-05-29 RX ORDER — CARVEDILOL 25 MG/1
25 TABLET ORAL 2 TIMES DAILY
Qty: 180 TABLET | Refills: 1 | Status: SHIPPED | OUTPATIENT
Start: 2025-05-29

## 2025-05-29 RX ADMIN — CYANOCOBALAMIN 1000 MCG: 1000 INJECTION, SOLUTION INTRAMUSCULAR; SUBCUTANEOUS at 13:22

## 2025-05-29 NOTE — PROGRESS NOTES
"Chief Complaint  Hyperlipidemia and Hypertension    Subjective        Marleny Greenwood presents to [facility]  History of Present Illness has been doing well- her appetite has been better- thinks she is approaching her more normal weight.   She hasn't been getting monthly B12- skipped April- she does feel overall she is better.   No chest pain, denies SOB.     Objective   Vital Signs:  /62   Pulse 74   Ht 152.4 cm (60\")   Wt 64 kg (141 lb)   BMI 27.54 kg/m²   Estimated body mass index is 27.54 kg/m² as calculated from the following:    Height as of this encounter: 152.4 cm (60\").    Weight as of this encounter: 64 kg (141 lb).          Physical Exam  Constitutional:       Appearance: Normal appearance.   Cardiovascular:      Rate and Rhythm: Normal rate and regular rhythm.   Pulmonary:      Effort: Pulmonary effort is normal.      Breath sounds: No wheezing.   Musculoskeletal:      Right lower leg: No edema.      Left lower leg: No edema.   Psychiatric:         Mood and Affect: Mood normal.         Thought Content: Thought content normal.        Result Review :  The following data was reviewed by: Liz Zaragoza MD on 05/29/2025:  CMP          12/19/2024    14:10 12/21/2024    07:52 5/22/2025    10:50   CMP   Glucose 99  104  98    BUN 11  10  13    Creatinine 0.98  0.78  0.99    EGFR 59.2  77.9  58.5    Sodium 144  144  144    Potassium 3.7  3.9  4.1    Chloride 110  111  111    Calcium 8.7  8.9  9.1    Total Protein 6.2  6.8  6.6    Albumin 3.8  3.6  3.9    Globulin 2.4  3.2  2.7    Total Bilirubin <0.2  0.2  0.3    Alkaline Phosphatase 57  55  51    AST (SGOT) 12  14  12    ALT (SGPT) 6  7  7    Albumin/Globulin Ratio 1.6  1.1  1.4    BUN/Creatinine Ratio 11.2  12.8  13.1    Anion Gap  9.4       CBC          12/19/2024    14:10 12/21/2024    07:52 5/22/2025    10:50   CBC   WBC 5.34  7.07  4.15     4.38    RBC 3.92  3.91  3.84     3.89    Hemoglobin 9.5  9.9  9.6     9.5  "   Hematocrit 32.8  32.0  30.3     33.0    MCV 83.7  81.8  78.9     84.8    MCH 24.2  25.3  25.0     24.4    MCHC 29.0  30.9  31.7     28.8    RDW 16.4  16.2  17.3     17.6    Platelets 239  236  191     183      Lipid Panel          5/22/2025    10:50   Lipid Panel   Total Cholesterol 133    Triglycerides 49    HDL Cholesterol 69    VLDL Cholesterol 11    LDL Cholesterol  53                Assessment and Plan   Diagnoses and all orders for this visit:    1. Anemia due to vitamin B12 deficiency, unspecified B12 deficiency type (Primary)  Comments:  will resume B12, can take iron every other day or so-r echeck with f/u    2. Other hyperlipidemia  Comments:  at goal, no change.  Orders:  -     rosuvastatin (CRESTOR) 40 MG tablet; Take 1 tablet by mouth Daily.  Dispense: 90 tablet; Refill: 1    3. Hypokalemia  Comments:  increased to 4 supplements last week- will check again next week.   Orders:  -     potassium chloride ER (K-TAB) 20 MEQ tablet controlled-release ER tablet; Take 1 tablet by mouth 2 (Two) Times a Day.  Dispense: 180 tablet; Refill: 1    4. Essential hypertension  Comments:  controlled, no change.  Orders:  -     carvedilol (COREG) 25 MG tablet; Take 1 tablet by mouth 2 (Two) Times a Day.  Dispense: 180 tablet; Refill: 1             Follow Up   Return in about 3 months (around 8/29/2025) for Recheck.  Patient was given instructions and counseling regarding her condition or for health maintenance advice. Please see specific information pulled into the AVS if appropriate.

## 2025-06-25 ENCOUNTER — CLINICAL SUPPORT (OUTPATIENT)
Dept: INTERNAL MEDICINE | Facility: CLINIC | Age: 80
End: 2025-06-25
Payer: MEDICARE

## 2025-06-25 DIAGNOSIS — D51.9 ANEMIA DUE TO VITAMIN B12 DEFICIENCY, UNSPECIFIED B12 DEFICIENCY TYPE: Primary | ICD-10-CM

## 2025-07-04 DIAGNOSIS — I10 ESSENTIAL HYPERTENSION: ICD-10-CM

## 2025-07-07 RX ORDER — CLOPIDOGREL BISULFATE 75 MG/1
75 TABLET ORAL DAILY
Qty: 90 TABLET | Refills: 3 | Status: SHIPPED | OUTPATIENT
Start: 2025-07-07

## 2025-07-07 RX ORDER — SPIRONOLACTONE 25 MG/1
25 TABLET ORAL DAILY
Qty: 90 TABLET | Refills: 3 | Status: SHIPPED | OUTPATIENT
Start: 2025-07-07

## 2025-07-23 ENCOUNTER — TELEPHONE (OUTPATIENT)
Dept: INTERNAL MEDICINE | Facility: CLINIC | Age: 80
End: 2025-07-23
Payer: MEDICARE

## 2025-07-23 DIAGNOSIS — I10 ESSENTIAL HYPERTENSION: ICD-10-CM

## 2025-07-23 DIAGNOSIS — R06.89 RESPIRATORY INSUFFICIENCY: ICD-10-CM

## 2025-07-23 RX ORDER — HYDRALAZINE HYDROCHLORIDE 50 MG/1
50 TABLET, FILM COATED ORAL 3 TIMES DAILY
Qty: 270 TABLET | Refills: 3 | Status: SHIPPED | OUTPATIENT
Start: 2025-07-23

## 2025-07-23 NOTE — TELEPHONE ENCOUNTER
Caller: Marleny Greenwood    Relationship: Self    Best call back number: [phone]    What is the best time to reach you: ANY TIME    Who are you requesting to speak with (clinical staff, provider,  specific staff member): DR VINICIUS HUNT    Do you know the name of the person who called: MARLENY GREENWOOD    What was the call regarding: MEDICATION    Is it okay if the provider responds through MyChart: NO.    PLEASE ADVISE.

## 2025-07-30 ENCOUNTER — CLINICAL SUPPORT (OUTPATIENT)
Dept: INTERNAL MEDICINE | Facility: CLINIC | Age: 80
End: 2025-07-30
Payer: MEDICARE

## 2025-07-30 DIAGNOSIS — E53.8 VITAMIN B 12 DEFICIENCY: Primary | ICD-10-CM

## 2025-07-30 PROCEDURE — 96372 THER/PROPH/DIAG INJ SC/IM: CPT | Performed by: INTERNAL MEDICINE

## 2025-07-30 RX ORDER — CYANOCOBALAMIN 1000 UG/ML
1000 INJECTION, SOLUTION INTRAMUSCULAR; SUBCUTANEOUS
Status: SHIPPED | OUTPATIENT
Start: 2025-07-30

## 2025-07-30 RX ADMIN — CYANOCOBALAMIN 1000 MCG: 1000 INJECTION, SOLUTION INTRAMUSCULAR; SUBCUTANEOUS at 11:08

## 2025-08-20 ENCOUNTER — TELEPHONE (OUTPATIENT)
Age: 80
End: 2025-08-20
Payer: MEDICARE

## 2025-08-20 ENCOUNTER — OFFICE VISIT (OUTPATIENT)
Dept: INTERNAL MEDICINE | Facility: CLINIC | Age: 80
End: 2025-08-20
Payer: MEDICARE

## 2025-08-20 VITALS
HEART RATE: 74 BPM | BODY MASS INDEX: 29.25 KG/M2 | HEIGHT: 60 IN | WEIGHT: 149 LBS | SYSTOLIC BLOOD PRESSURE: 132 MMHG | DIASTOLIC BLOOD PRESSURE: 80 MMHG

## 2025-08-20 DIAGNOSIS — D51.9 ANEMIA DUE TO VITAMIN B12 DEFICIENCY, UNSPECIFIED B12 DEFICIENCY TYPE: ICD-10-CM

## 2025-08-20 DIAGNOSIS — R06.09 DOE (DYSPNEA ON EXERTION): Primary | Chronic | ICD-10-CM

## 2025-08-20 DIAGNOSIS — I25.10 CORONARY ARTERIOSCLEROSIS IN NATIVE ARTERY: Chronic | ICD-10-CM

## 2025-08-20 RX ORDER — RANOLAZINE 500 MG/1
500 TABLET, EXTENDED RELEASE ORAL 2 TIMES DAILY
Qty: 180 TABLET | Refills: 0 | Status: SHIPPED | OUTPATIENT
Start: 2025-08-20 | End: 2025-08-25 | Stop reason: SDUPTHER

## 2025-08-20 RX ADMIN — CYANOCOBALAMIN 1000 MCG: 1000 INJECTION, SOLUTION INTRAMUSCULAR; SUBCUTANEOUS at 14:08

## 2025-08-21 ENCOUNTER — TELEPHONE (OUTPATIENT)
Age: 80
End: 2025-08-21
Payer: MEDICARE

## 2025-08-25 ENCOUNTER — TELEPHONE (OUTPATIENT)
Dept: INTERNAL MEDICINE | Facility: CLINIC | Age: 80
End: 2025-08-25
Payer: MEDICARE

## 2025-08-25 RX ORDER — RANOLAZINE 500 MG/1
500 TABLET, EXTENDED RELEASE ORAL 2 TIMES DAILY
Qty: 180 TABLET | Refills: 1 | Status: SHIPPED | OUTPATIENT
Start: 2025-08-25

## (undated) DEVICE — GW EMR FIX EXCHG J STD .035 3MM 260CM

## (undated) DEVICE — PK CATH CARD 40

## (undated) DEVICE — SINGLE USE BIOPSY VALVE MAJ-210: Brand: SINGLE USE BIOPSY VALVE (STERILE)

## (undated) DEVICE — CATH DIAG IMPULSE IMT 5F 100CM

## (undated) DEVICE — CATH DIAG IMPULSE PIG 5F 100CM

## (undated) DEVICE — GW PRESSUREWIRE X WIRELESS FFR 175CM

## (undated) DEVICE — Device

## (undated) DEVICE — INTRO SHEATH ART/FEM ENGAGE .035 6F12CM

## (undated) DEVICE — 6F .070 JR 4 100CM: Brand: CORDIS

## (undated) DEVICE — GLIDESHEATH BASIC HYDROPHILIC COATED INTRODUCER SHEATH: Brand: GLIDESHEATH

## (undated) DEVICE — VITAL SIGNS™ JACKSON-REES CIRCUITS: Brand: VITAL SIGNS™

## (undated) DEVICE — CATH GUIDE CONVEY AL1 .058IN 5FR

## (undated) DEVICE — INTRO GLIDESHEATH SLENDER SS 21G .021 6F 10CM 45CM

## (undated) DEVICE — CATH DIAG IMPULSE MPA1 5F 100CM

## (undated) DEVICE — RUNTHROUGH NS EXTRA FLOPPY PTCA GUIDEWIRE: Brand: RUNTHROUGH

## (undated) DEVICE — KT MANIFLD CARDIAC

## (undated) DEVICE — GW SIONBLUE STR 190CM

## (undated) DEVICE — SENSR O2 OXIMAX FNGR A/ 18IN NONSTR

## (undated) DEVICE — BND COMPR/HEMO RADL VASCBAND REG 24CM 1P/U

## (undated) DEVICE — SINGLE USE SUCTION VALVE MAJ-209: Brand: SINGLE USE SUCTION VALVE (STERILE)

## (undated) DEVICE — CATH DIAG IMPULSE FR4 5F 100CM

## (undated) DEVICE — CATH GUIDE LAUNCHER FR 4.0 6F 100CM STD RT

## (undated) DEVICE — 6F .070 AL.75 100CM: Brand: CORDIS

## (undated) DEVICE — CATH DIAG IMPULSE MPA2 SH 5F 100CM

## (undated) DEVICE — CATH DIAG IMPULSE FL4 5F 100CM

## (undated) DEVICE — CATH DIAG IMPULSE AR1 5F 100CM

## (undated) DEVICE — INTRO SHEATH ART/FEM ENGAGE .035 7F12CM

## (undated) DEVICE — CATH DIAG EXPO .045 FL3  5F 100CM

## (undated) DEVICE — LN SMPL O2 NASL/ORL SMART/CAPNOLINE PLS A/

## (undated) DEVICE — DEV INDEFLATOR P/N 580289

## (undated) DEVICE — VLV CONTRL HEMO BLEEDBACK COPILOT

## (undated) DEVICE — TUBING, SUCTION, 1/4" X 10', STRAIGHT: Brand: MEDLINE

## (undated) DEVICE — CATH DIAG IMPULSE FR5 5F 100CM

## (undated) DEVICE — CATH DIAG IMPULSE FL3.5 5F 100CM

## (undated) DEVICE — GW HITORQUE/BAL MID/WT J W/HCOAT .014 3X190CM

## (undated) DEVICE — CATH PULM WEDGE PRESS 7F

## (undated) DEVICE — DEV CLS VASC ANGIOSEAL VIP PLTFRM 6FR

## (undated) DEVICE — TRAP,MUCUS SPECIMEN, 80CC: Brand: MEDLINE

## (undated) DEVICE — MSK AIRWY LARYNG LMA PILOT SZ4

## (undated) DEVICE — SHEATH INTRO PINN PERIPH .038 6F10CM

## (undated) DEVICE — BND PRESS RADL COMFRT 14IN STRL

## (undated) DEVICE — CATH DIAG DXTERITY ULTRA TRANSRADIAL 4.0 5F 100CM 0/SH

## (undated) DEVICE — ADAPT SWVL FIBROPTIC BRONCH

## (undated) DEVICE — GW INQWIRE FC PTFE J/3MM .021 180